# Patient Record
Sex: FEMALE | Race: OTHER | HISPANIC OR LATINO
[De-identification: names, ages, dates, MRNs, and addresses within clinical notes are randomized per-mention and may not be internally consistent; named-entity substitution may affect disease eponyms.]

---

## 2017-02-24 ENCOUNTER — APPOINTMENT (OUTPATIENT)
Dept: FAMILY MEDICINE | Facility: CLINIC | Age: 77
End: 2017-02-24

## 2017-02-24 VITALS
WEIGHT: 137 LBS | OXYGEN SATURATION: 98 % | SYSTOLIC BLOOD PRESSURE: 185 MMHG | DIASTOLIC BLOOD PRESSURE: 90 MMHG | HEIGHT: 62 IN | HEART RATE: 75 BPM | BODY MASS INDEX: 25.21 KG/M2 | TEMPERATURE: 98.2 F

## 2017-02-24 DIAGNOSIS — Z00.00 ENCOUNTER FOR GENERAL ADULT MEDICAL EXAMINATION W/OUT ABNORMAL FINDINGS: ICD-10-CM

## 2017-06-22 ENCOUNTER — APPOINTMENT (OUTPATIENT)
Dept: FAMILY MEDICINE | Facility: CLINIC | Age: 77
End: 2017-06-22

## 2017-06-22 VITALS
DIASTOLIC BLOOD PRESSURE: 75 MMHG | OXYGEN SATURATION: 97 % | BODY MASS INDEX: 24.84 KG/M2 | TEMPERATURE: 97.9 F | SYSTOLIC BLOOD PRESSURE: 158 MMHG | HEART RATE: 75 BPM | HEIGHT: 62 IN | WEIGHT: 135 LBS

## 2017-06-25 LAB
25(OH)D3 SERPL-MCNC: 13.8 NG/ML
ALBUMIN SERPL ELPH-MCNC: 4.2 G/DL
ALP BLD-CCNC: 89 U/L
ALT SERPL-CCNC: 83 U/L
ANION GAP SERPL CALC-SCNC: 19 MMOL/L
APPEARANCE: CLEAR
AST SERPL-CCNC: 71 U/L
BACTERIA: NEGATIVE
BASOPHILS # BLD AUTO: 0.02 K/UL
BASOPHILS NFR BLD AUTO: 0.4 %
BILIRUB SERPL-MCNC: 0.5 MG/DL
BILIRUBIN URINE: NEGATIVE
BLOOD URINE: NEGATIVE
BUN SERPL-MCNC: 16 MG/DL
CALCIUM SERPL-MCNC: 9.1 MG/DL
CHLORIDE SERPL-SCNC: 97 MMOL/L
CO2 SERPL-SCNC: 25 MMOL/L
COLOR: ABNORMAL
CREAT SERPL-MCNC: 0.64 MG/DL
CREAT SPEC-SCNC: 133 MG/DL
EOSINOPHIL # BLD AUTO: 0.32 K/UL
EOSINOPHIL NFR BLD AUTO: 6.8 %
GLUCOSE QUALITATIVE U: NORMAL MG/DL
GLUCOSE SERPL-MCNC: 210 MG/DL
HBA1C MFR BLD HPLC: 9.1 %
HCT VFR BLD CALC: 41.9 %
HGB BLD-MCNC: 14 G/DL
HYALINE CASTS: 0 /LPF
IMM GRANULOCYTES NFR BLD AUTO: 0.2 %
KETONES URINE: NEGATIVE
LEUKOCYTE ESTERASE URINE: NEGATIVE
LYMPHOCYTES # BLD AUTO: 1.3 K/UL
LYMPHOCYTES NFR BLD AUTO: 27.5 %
MAN DIFF?: NORMAL
MCHC RBC-ENTMCNC: 28.5 PG
MCHC RBC-ENTMCNC: 33.4 GM/DL
MCV RBC AUTO: 85.3 FL
MICROALBUMIN 24H UR DL<=1MG/L-MCNC: 3.8 MG/DL
MICROALBUMIN/CREAT 24H UR-RTO: 29 MG/G
MICROSCOPIC-UA: NORMAL
MONOCYTES # BLD AUTO: 0.25 K/UL
MONOCYTES NFR BLD AUTO: 5.3 %
NEUTROPHILS # BLD AUTO: 2.82 K/UL
NEUTROPHILS NFR BLD AUTO: 59.8 %
NITRITE URINE: NEGATIVE
PH URINE: 5.5
PLATELET # BLD AUTO: 262 K/UL
POTASSIUM SERPL-SCNC: 4.3 MMOL/L
PROT SERPL-MCNC: 7.7 G/DL
PROTEIN URINE: ABNORMAL MG/DL
RBC # BLD: 4.91 M/UL
RBC # FLD: 13.5 %
RED BLOOD CELLS URINE: 2 /HPF
SODIUM SERPL-SCNC: 141 MMOL/L
SPECIFIC GRAVITY URINE: 1.03
SQUAMOUS EPITHELIAL CELLS: 1 /HPF
T4 FREE SERPL-MCNC: 1.4 NG/DL
TSH SERPL-ACNC: 0.78 UIU/ML
URINE COMMENTS: NORMAL
UROBILINOGEN URINE: NORMAL MG/DL
WBC # FLD AUTO: 4.72 K/UL
WHITE BLOOD CELLS URINE: 2 /HPF

## 2017-08-03 ENCOUNTER — APPOINTMENT (OUTPATIENT)
Dept: FAMILY MEDICINE | Facility: CLINIC | Age: 77
End: 2017-08-03
Payer: MEDICARE

## 2017-08-03 VITALS
TEMPERATURE: 98.4 F | OXYGEN SATURATION: 96 % | HEIGHT: 62 IN | WEIGHT: 135 LBS | SYSTOLIC BLOOD PRESSURE: 155 MMHG | HEART RATE: 67 BPM | DIASTOLIC BLOOD PRESSURE: 82 MMHG | BODY MASS INDEX: 24.84 KG/M2

## 2017-08-03 PROCEDURE — 36415 COLL VENOUS BLD VENIPUNCTURE: CPT

## 2017-08-03 PROCEDURE — 99214 OFFICE O/P EST MOD 30 MIN: CPT | Mod: 25

## 2017-08-03 RX ORDER — TRIAMCINOLONE ACETONIDE 5 MG/G
0.5 CREAM TOPICAL
Qty: 60 | Refills: 0 | Status: ACTIVE | COMMUNITY
Start: 2017-08-03 | End: 1900-01-01

## 2017-08-06 LAB
ALBUMIN SERPL ELPH-MCNC: 4.4 G/DL
ALP BLD-CCNC: 66 U/L
ALT SERPL-CCNC: 66 U/L
ANION GAP SERPL CALC-SCNC: 15 MMOL/L
AST SERPL-CCNC: 53 U/L
BASOPHILS # BLD AUTO: 0.02 K/UL
BASOPHILS NFR BLD AUTO: 0.5 %
BILIRUB DIRECT SERPL-MCNC: 0.1 MG/DL
BILIRUB INDIRECT SERPL-MCNC: 0.4 MG/DL
BILIRUB SERPL-MCNC: 0.5 MG/DL
BUN SERPL-MCNC: 15 MG/DL
CALCIUM SERPL-MCNC: 8.9 MG/DL
CHLORIDE SERPL-SCNC: 99 MMOL/L
CHOLEST SERPL-MCNC: 213 MG/DL
CHOLEST/HDLC SERPL: 5.8 RATIO
CO2 SERPL-SCNC: 25 MMOL/L
CREAT SERPL-MCNC: 0.69 MG/DL
EOSINOPHIL # BLD AUTO: 0.28 K/UL
EOSINOPHIL NFR BLD AUTO: 6.7 %
ERYTHROCYTE [SEDIMENTATION RATE] IN BLOOD BY WESTERGREN METHOD: 9 MM/HR
GLUCOSE SERPL-MCNC: 153 MG/DL
HCT VFR BLD CALC: 40.7 %
HDLC SERPL-MCNC: 37 MG/DL
HGB BLD-MCNC: 13.8 G/DL
IMM GRANULOCYTES NFR BLD AUTO: 0.2 %
LDLC SERPL CALC-MCNC: 147 MG/DL
LYMPHOCYTES # BLD AUTO: 1.22 K/UL
LYMPHOCYTES NFR BLD AUTO: 29.3 %
MAN DIFF?: NORMAL
MCHC RBC-ENTMCNC: 29.4 PG
MCHC RBC-ENTMCNC: 33.9 GM/DL
MCV RBC AUTO: 86.8 FL
MONOCYTES # BLD AUTO: 0.26 K/UL
MONOCYTES NFR BLD AUTO: 6.3 %
NEUTROPHILS # BLD AUTO: 2.37 K/UL
NEUTROPHILS NFR BLD AUTO: 57 %
PLATELET # BLD AUTO: 230 K/UL
POTASSIUM SERPL-SCNC: 4.4 MMOL/L
PROT SERPL-MCNC: 7.8 G/DL
RBC # BLD: 4.69 M/UL
RBC # FLD: 13.9 %
SODIUM SERPL-SCNC: 139 MMOL/L
TRIGL SERPL-MCNC: 143 MG/DL
WBC # FLD AUTO: 4.16 K/UL

## 2017-08-10 ENCOUNTER — APPOINTMENT (OUTPATIENT)
Dept: FAMILY MEDICINE | Facility: CLINIC | Age: 77
End: 2017-08-10
Payer: MEDICARE

## 2017-08-10 VITALS
DIASTOLIC BLOOD PRESSURE: 80 MMHG | TEMPERATURE: 97.6 F | WEIGHT: 137 LBS | HEART RATE: 77 BPM | BODY MASS INDEX: 25.21 KG/M2 | HEIGHT: 62 IN | OXYGEN SATURATION: 98 % | SYSTOLIC BLOOD PRESSURE: 196 MMHG

## 2017-08-10 VITALS — SYSTOLIC BLOOD PRESSURE: 170 MMHG | DIASTOLIC BLOOD PRESSURE: 90 MMHG

## 2017-08-10 PROCEDURE — 99214 OFFICE O/P EST MOD 30 MIN: CPT

## 2017-08-10 RX ORDER — ERGOCALCIFEROL 1.25 MG/1
1.25 MG CAPSULE, LIQUID FILLED ORAL
Qty: 12 | Refills: 0 | Status: DISCONTINUED | COMMUNITY
Start: 2017-06-26 | End: 2017-08-10

## 2017-09-27 ENCOUNTER — APPOINTMENT (OUTPATIENT)
Dept: FAMILY MEDICINE | Facility: CLINIC | Age: 77
End: 2017-09-27

## 2017-11-02 ENCOUNTER — APPOINTMENT (OUTPATIENT)
Dept: FAMILY MEDICINE | Facility: CLINIC | Age: 77
End: 2017-11-02
Payer: MEDICARE

## 2017-11-02 VITALS
BODY MASS INDEX: 24.66 KG/M2 | HEIGHT: 62 IN | OXYGEN SATURATION: 97 % | SYSTOLIC BLOOD PRESSURE: 175 MMHG | HEART RATE: 63 BPM | WEIGHT: 134 LBS | TEMPERATURE: 97.8 F | DIASTOLIC BLOOD PRESSURE: 76 MMHG

## 2017-11-02 PROCEDURE — 99214 OFFICE O/P EST MOD 30 MIN: CPT

## 2017-11-02 RX ORDER — PERMETHRIN 50 MG/G
5 CREAM TOPICAL
Qty: 1 | Refills: 0 | Status: DISCONTINUED | COMMUNITY
Start: 2017-08-10 | End: 2017-11-02

## 2017-11-08 DIAGNOSIS — Z56.0 UNEMPLOYMENT, UNSPECIFIED: ICD-10-CM

## 2017-11-08 DIAGNOSIS — Z63.4 DISAPPEARANCE AND DEATH OF FAMILY MEMBER: ICD-10-CM

## 2017-11-08 SDOH — SOCIAL STABILITY - SOCIAL INSECURITY: DISSAPEARANCE AND DEATH OF FAMILY MEMBER: Z63.4

## 2017-11-08 SDOH — ECONOMIC STABILITY - INCOME SECURITY: UNEMPLOYMENT, UNSPECIFIED: Z56.0

## 2017-12-07 ENCOUNTER — APPOINTMENT (OUTPATIENT)
Dept: FAMILY MEDICINE | Facility: CLINIC | Age: 77
End: 2017-12-07

## 2017-12-23 ENCOUNTER — RX RENEWAL (OUTPATIENT)
Age: 77
End: 2017-12-23

## 2018-02-21 ENCOUNTER — APPOINTMENT (OUTPATIENT)
Dept: FAMILY MEDICINE | Facility: CLINIC | Age: 78
End: 2018-02-21
Payer: MEDICARE

## 2018-02-21 VITALS
HEIGHT: 62 IN | SYSTOLIC BLOOD PRESSURE: 190 MMHG | WEIGHT: 135 LBS | HEART RATE: 80 BPM | OXYGEN SATURATION: 96 % | DIASTOLIC BLOOD PRESSURE: 83 MMHG | BODY MASS INDEX: 24.84 KG/M2 | TEMPERATURE: 97.1 F

## 2018-02-21 VITALS — SYSTOLIC BLOOD PRESSURE: 193 MMHG | DIASTOLIC BLOOD PRESSURE: 100 MMHG

## 2018-02-21 DIAGNOSIS — Z91.11 PATIENT'S NONCOMPLIANCE WITH DIETARY REGIMEN: ICD-10-CM

## 2018-02-21 PROCEDURE — 99215 OFFICE O/P EST HI 40 MIN: CPT | Mod: 25

## 2018-02-21 PROCEDURE — 36415 COLL VENOUS BLD VENIPUNCTURE: CPT

## 2018-02-21 PROCEDURE — 93000 ELECTROCARDIOGRAM COMPLETE: CPT

## 2018-02-24 LAB
25(OH)D3 SERPL-MCNC: 11.9 NG/ML
ALBUMIN SERPL ELPH-MCNC: 4.5 G/DL
ALP BLD-CCNC: 82 U/L
ALT SERPL-CCNC: 74 U/L
ANION GAP SERPL CALC-SCNC: 17 MMOL/L
APPEARANCE: CLEAR
AST SERPL-CCNC: 37 U/L
BACTERIA: NEGATIVE
BASOPHILS # BLD AUTO: 0.01 K/UL
BASOPHILS NFR BLD AUTO: 0.1 %
BILIRUB SERPL-MCNC: 0.8 MG/DL
BILIRUBIN URINE: NEGATIVE
BLOOD URINE: NEGATIVE
BUN SERPL-MCNC: 14 MG/DL
CALCIUM SERPL-MCNC: 8.9 MG/DL
CHLORIDE SERPL-SCNC: 95 MMOL/L
CHOLEST SERPL-MCNC: 225 MG/DL
CHOLEST/HDLC SERPL: 5.2 RATIO
CO2 SERPL-SCNC: 23 MMOL/L
COLOR: YELLOW
CREAT SERPL-MCNC: 0.79 MG/DL
CREAT SPEC-SCNC: 59 MG/DL
EOSINOPHIL # BLD AUTO: 0.27 K/UL
EOSINOPHIL NFR BLD AUTO: 3.1 %
ERYTHROCYTE [SEDIMENTATION RATE] IN BLOOD BY WESTERGREN METHOD: 12 MM/HR
GLUCOSE QUALITATIVE U: 500 MG/DL
GLUCOSE SERPL-MCNC: 255 MG/DL
HBA1C MFR BLD HPLC: 8.2 %
HCT VFR BLD CALC: 44.8 %
HDLC SERPL-MCNC: 43 MG/DL
HETEROPH AB SER QL: NEGATIVE
HGB BLD-MCNC: 15.4 G/DL
HYALINE CASTS: 0 /LPF
IMM GRANULOCYTES NFR BLD AUTO: 0.1 %
KETONES URINE: NEGATIVE
LDLC SERPL CALC-MCNC: 151 MG/DL
LEUKOCYTE ESTERASE URINE: NEGATIVE
LYMPHOCYTES # BLD AUTO: 1.48 K/UL
LYMPHOCYTES NFR BLD AUTO: 17.2 %
MAN DIFF?: NORMAL
MCHC RBC-ENTMCNC: 29.8 PG
MCHC RBC-ENTMCNC: 34.4 GM/DL
MCV RBC AUTO: 86.8 FL
MICROALBUMIN 24H UR DL<=1MG/L-MCNC: 2.1 MG/DL
MICROALBUMIN/CREAT 24H UR-RTO: 36 MG/G
MICROSCOPIC-UA: NORMAL
MONOCYTES # BLD AUTO: 0.45 K/UL
MONOCYTES NFR BLD AUTO: 5.2 %
NEUTROPHILS # BLD AUTO: 6.36 K/UL
NEUTROPHILS NFR BLD AUTO: 74.3 %
NITRITE URINE: NEGATIVE
PH URINE: 5
PLATELET # BLD AUTO: 257 K/UL
POTASSIUM SERPL-SCNC: 4.5 MMOL/L
PROT SERPL-MCNC: 8 G/DL
PROTEIN URINE: NEGATIVE MG/DL
RAPID RVP RESULT: DETECTED
RBC # BLD: 5.16 M/UL
RBC # FLD: 13.9 %
RED BLOOD CELLS URINE: 0 /HPF
RV+EV RNA SPEC QL NAA+PROBE: DETECTED
SODIUM SERPL-SCNC: 135 MMOL/L
SPECIFIC GRAVITY URINE: 1.02
SQUAMOUS EPITHELIAL CELLS: 0 /HPF
T4 FREE SERPL-MCNC: 1.5 NG/DL
TRIGL SERPL-MCNC: 156 MG/DL
TSH SERPL-ACNC: 0.94 UIU/ML
UROBILINOGEN URINE: 1 MG/DL
WBC # FLD AUTO: 8.58 K/UL
WHITE BLOOD CELLS URINE: 0 /HPF

## 2018-03-13 ENCOUNTER — APPOINTMENT (OUTPATIENT)
Dept: FAMILY MEDICINE | Facility: CLINIC | Age: 78
End: 2018-03-13

## 2018-12-03 ENCOUNTER — APPOINTMENT (OUTPATIENT)
Dept: INTERNAL MEDICINE | Facility: CLINIC | Age: 78
End: 2018-12-03
Payer: MEDICARE

## 2018-12-03 ENCOUNTER — NON-APPOINTMENT (OUTPATIENT)
Age: 78
End: 2018-12-03

## 2018-12-03 VITALS
SYSTOLIC BLOOD PRESSURE: 144 MMHG | HEIGHT: 62 IN | TEMPERATURE: 97.9 F | WEIGHT: 128 LBS | BODY MASS INDEX: 23.55 KG/M2 | DIASTOLIC BLOOD PRESSURE: 100 MMHG | OXYGEN SATURATION: 95 % | HEART RATE: 58 BPM

## 2018-12-03 DIAGNOSIS — Z12.11 ENCOUNTER FOR SCREENING FOR MALIGNANT NEOPLASM OF COLON: ICD-10-CM

## 2018-12-03 DIAGNOSIS — J06.9 ACUTE UPPER RESPIRATORY INFECTION, UNSPECIFIED: ICD-10-CM

## 2018-12-03 DIAGNOSIS — Z87.898 PERSONAL HISTORY OF OTHER SPECIFIED CONDITIONS: ICD-10-CM

## 2018-12-03 DIAGNOSIS — R52 PAIN, UNSPECIFIED: ICD-10-CM

## 2018-12-03 DIAGNOSIS — Z87.09 PERSONAL HISTORY OF OTHER DISEASES OF THE RESPIRATORY SYSTEM: ICD-10-CM

## 2018-12-03 DIAGNOSIS — Z87.2 PERSONAL HISTORY OF DISEASES OF THE SKIN AND SUBCUTANEOUS TISSUE: ICD-10-CM

## 2018-12-03 LAB — GLUCOSE BLDC GLUCOMTR-MCNC: 370

## 2018-12-03 PROCEDURE — 99215 OFFICE O/P EST HI 40 MIN: CPT | Mod: 25

## 2018-12-03 PROCEDURE — 93000 ELECTROCARDIOGRAM COMPLETE: CPT

## 2018-12-03 PROCEDURE — 82962 GLUCOSE BLOOD TEST: CPT

## 2018-12-03 RX ORDER — AZITHROMYCIN DIHYDRATE 250 MG/1
250 TABLET, FILM COATED ORAL
Qty: 1 | Refills: 0 | Status: DISCONTINUED | COMMUNITY
Start: 2018-02-21 | End: 2018-12-03

## 2018-12-03 RX ORDER — OSELTAMIVIR PHOSPHATE 75 MG/1
75 CAPSULE ORAL TWICE DAILY
Qty: 10 | Refills: 0 | Status: DISCONTINUED | COMMUNITY
Start: 2018-02-21 | End: 2018-12-03

## 2018-12-03 RX ORDER — ERGOCALCIFEROL 1.25 MG/1
1.25 MG CAPSULE, LIQUID FILLED ORAL
Qty: 12 | Refills: 0 | Status: DISCONTINUED | COMMUNITY
Start: 2018-02-24 | End: 2018-12-03

## 2018-12-03 NOTE — HISTORY OF PRESENT ILLNESS
[de-identified] : Here today with her daughter \par \par She is here to f/u on her DM and HTN.  She reports compliance with meds\par \par She has not been checking her FS because she gardner snot have test strips\par \par She has lost about 7 lbs.  She is not sure if this s intentional.  She states she has been eating vegetable soups which she makes at home\par \par She was previously advised to see cardiology, Endocrine, ophthalmology, and hepatology but she never went

## 2018-12-03 NOTE — COUNSELING
[Weight management counseling provided] : Weight management [Healthy eating counseling provided] : healthy eating [Transportation Issues] : Transportation issues [Patient motivation] : Patient motivation [Good understanding] : Patient has a good understanding of lifestyle changes and the steps needed to achieve self management goals

## 2018-12-03 NOTE — PHYSICAL EXAM
[General Appearance - Alert] : alert [General Appearance - In No Acute Distress] : in no acute distress [Sclera] : the sclera and conjunctiva were normal [PERRL With Normal Accommodation] : pupils were equal in size, round, and reactive to light [Neck Appearance] : the appearance of the neck was normal [Jugular Venous Distention Increased] : there was no jugular-venous distention [Auscultation Breath Sounds / Voice Sounds] : lungs were clear to auscultation bilaterally [Heart Rate And Rhythm] : heart rate was normal and rhythm regular [Heart Sounds] : normal S1 and S2 [Heart Sounds Gallop] : no gallops [Murmurs] : no murmurs [Heart Sounds Pericardial Friction Rub] : no pericardial rub [Edema] : there was no peripheral edema [Bowel Sounds] : normal bowel sounds [Abdomen Soft] : soft [Abdomen Tenderness] : non-tender [Abdomen Mass (___ Cm)] : no abdominal mass palpated [No Spinal Tenderness] : no spinal tenderness [Nail Clubbing] : no clubbing  or cyanosis of the fingernails [] : no rash [No Focal Deficits] : no focal deficits [Oriented To Time, Place, And Person] : oriented to person, place, and time [Affect] : the affect was normal [Mood] : the mood was normal [Oropharynx] : the oropharynx was normal [Neck Cervical Mass (___cm)] : no neck mass was observed [Thyroid Diffuse Enlargement] : the thyroid was not enlarged [Thyroid Nodule] : there were no palpable thyroid nodules [FreeTextEntry1] : no calf tenderness

## 2018-12-03 NOTE — REVIEW OF SYSTEMS
[Recent Weight Loss (___ Lbs)] : recent [unfilled] ~Ulb weight loss [Negative] : Musculoskeletal [Fever] : no fever [Chills] : no chills [Feeling Poorly] : not feeling poorly [Feeling Tired] : not feeling tired [Earache] : no earache [Nasal Discharge] : no nasal discharge [Sore Throat] : no sore throat [Chest Pain] : no chest pain [Palpitations] : no palpitations [Lower Ext Edema] : no lower extremity edema [Shortness Of Breath] : no shortness of breath [Wheezing] : no wheezing [Cough] : no cough [SOB on Exertion] : no shortness of breath during exertion [Abdominal Pain] : no abdominal pain [Vomiting] : no vomiting [Diarrhea] : no diarrhea [Anxiety] : no anxiety [Depression] : no depression

## 2018-12-03 NOTE — ASSESSMENT
[FreeTextEntry1] : \par \par Fatigue:\par -has resolved\par \par CAD:\par -mild CAD noted on cath\par -she has refused ASA\par -on atorvastatin 20mg PO daily\par -EKG today NSR at 70- with PAC and non specific T wave abnormalities\par -she denies chest pain, sob, palpitations, CARO\par -referred to cardiology today\par -will check fasting labs\par \par DM:\par -HgA1c  8.2 2018\par -FS today 370 after eating\par -on lantus 30 units QHS and metformin 1000mg PO BID\par -I advised her she needs to adhere to low fat/low cholesterol low carb diet\par -she needs to start checking FS BID-she will call back with name of glucometer she has so I can send strips\par -A:C 36 2018-will check urine A:C-she is on losartan\par -on atorvastatin-will check lipids\par -Ophthalmology 3/2017-referred today\par -Foot exam 2017-will check foot exam at next visit\par -she was previously advised to see Endocrine but she refused\par \par Hyperlipidemia:\par -low fat/low cholesterol diet advised\par -on atorvastatin 20mg PO daily \par -will check labs\par \par Elevated LFTs\par -hepatitis B and C serologies were negative\par -Abd US revealed fatty liver\par -she was referred to hepatology-but she never went \par -will check labs\par \par HTN:\par -she has uncontrolled HTN\par -she reports compliance with meds\par -risks of uncontrolled HTN discussed\par -continue losartan/HCTZ 100/25mg PO QAM, Norvasc 10 mg PO daily,  and  coreg 6.25mg PO BID (will not increase coreg as her HR is 58)\par -will add hydralazine 25mg PO BID\par -I adv low fat/low chol diet and low salt diet\par -F/U 4 weeks for lab review and BP check\par \par -Vitamin D deficiency:\par -will check vitamin D 25-OH\par \par Hearing loss:\par -will refer to ENT previously but she did not go\par \par Weight loss unclear if this is intentional-she states she has been eating healthier\par -will check labs\par -she refuses mammograms, GYN exams and colonoscopy\par -she was agreeable to doing FIT test\par -f/u 4 weeks for weight check\par \par HCM:\par \par EKG 12/3/2018\par \par Flu shot: pt refused 12/3/2018\par \par Pneumovax adv: pt refused previously\par \par Prevnar 13: adv pt refused previously\par \par Tdap: adv pt refused previously\par \par Shingles vaccine adv: pt refused previously\par \par GYN/PAP: pt does not want further exams\par \par Mammogram: advised 12/3/2018: pt refused \par \par Colonoscopy: advised 12/3/2018-pt refused. \par \par I advised FIT testing-she was agreeable and took testing kit\par \par DEXA: advised: pt refused previously\par \par Depression screenin/3/2018 negative\par \par HIV testing offered: she refused 8/3/2017\par \par F/U 4 weeks\par \par

## 2019-01-16 ENCOUNTER — APPOINTMENT (OUTPATIENT)
Dept: INTERNAL MEDICINE | Facility: CLINIC | Age: 79
End: 2019-01-16

## 2019-01-17 LAB
25(OH)D3 SERPL-MCNC: 17.8 NG/ML
ALBUMIN SERPL ELPH-MCNC: 4.7 G/DL
ALP BLD-CCNC: 75 U/L
ALT SERPL-CCNC: 97 U/L
ANION GAP SERPL CALC-SCNC: 14 MMOL/L
APPEARANCE: ABNORMAL
AST SERPL-CCNC: 51 U/L
BACTERIA: NEGATIVE
BASOPHILS # BLD AUTO: 0.01 K/UL
BASOPHILS NFR BLD AUTO: 0.2 %
BILIRUB SERPL-MCNC: 0.4 MG/DL
BILIRUBIN URINE: NEGATIVE
BLOOD URINE: NEGATIVE
BUN SERPL-MCNC: 23 MG/DL
CALCIUM SERPL-MCNC: 9.6 MG/DL
CHLORIDE SERPL-SCNC: 101 MMOL/L
CHOLEST SERPL-MCNC: 183 MG/DL
CHOLEST/HDLC SERPL: 3.8 RATIO
CO2 SERPL-SCNC: 25 MMOL/L
COLOR: ABNORMAL
CREAT SERPL-MCNC: 0.79 MG/DL
CREAT SPEC-SCNC: 190 MG/DL
EOSINOPHIL # BLD AUTO: 0.34 K/UL
EOSINOPHIL NFR BLD AUTO: 6 %
ERYTHROCYTE [SEDIMENTATION RATE] IN BLOOD BY WESTERGREN METHOD: 20 MM/HR
GLUCOSE QUALITATIVE U: 1000 MG/DL
GLUCOSE SERPL-MCNC: 301 MG/DL
HBA1C MFR BLD HPLC: 10.8 %
HCT VFR BLD CALC: 41.6 %
HDLC SERPL-MCNC: 48 MG/DL
HGB BLD-MCNC: 14.3 G/DL
HYALINE CASTS: 7 /LPF
IMM GRANULOCYTES NFR BLD AUTO: 0.2 %
KETONES URINE: NEGATIVE
LDLC SERPL CALC-MCNC: 114 MG/DL
LEUKOCYTE ESTERASE URINE: NEGATIVE
LYMPHOCYTES # BLD AUTO: 1.32 K/UL
LYMPHOCYTES NFR BLD AUTO: 23.4 %
MAN DIFF?: NORMAL
MCHC RBC-ENTMCNC: 29.3 PG
MCHC RBC-ENTMCNC: 34.4 GM/DL
MCV RBC AUTO: 85.2 FL
MICROALBUMIN 24H UR DL<=1MG/L-MCNC: 4.2 MG/DL
MICROALBUMIN/CREAT 24H UR-RTO: 22 MG/G
MICROSCOPIC-UA: NORMAL
MONOCYTES # BLD AUTO: 0.28 K/UL
MONOCYTES NFR BLD AUTO: 5 %
NEUTROPHILS # BLD AUTO: 3.67 K/UL
NEUTROPHILS NFR BLD AUTO: 65.2 %
NITRITE URINE: NEGATIVE
PH URINE: 5
PLATELET # BLD AUTO: 305 K/UL
POTASSIUM SERPL-SCNC: 4.4 MMOL/L
PROT SERPL-MCNC: 7.3 G/DL
PROTEIN URINE: ABNORMAL MG/DL
RBC # BLD: 4.88 M/UL
RBC # FLD: 13.1 %
RED BLOOD CELLS URINE: 8 /HPF
SODIUM SERPL-SCNC: 140 MMOL/L
SPECIFIC GRAVITY URINE: 1.03
SQUAMOUS EPITHELIAL CELLS: 2 /HPF
T4 FREE SERPL-MCNC: 1.5 NG/DL
TRIGL SERPL-MCNC: 103 MG/DL
TSH SERPL-ACNC: 0.67 UIU/ML
UROBILINOGEN URINE: NEGATIVE MG/DL
WBC # FLD AUTO: 5.63 K/UL
WHITE BLOOD CELLS URINE: 3 /HPF

## 2019-02-12 ENCOUNTER — APPOINTMENT (OUTPATIENT)
Dept: INTERNAL MEDICINE | Facility: CLINIC | Age: 79
End: 2019-02-12
Payer: MEDICARE

## 2019-02-12 VITALS
OXYGEN SATURATION: 98 % | WEIGHT: 128 LBS | SYSTOLIC BLOOD PRESSURE: 132 MMHG | RESPIRATION RATE: 14 BRPM | TEMPERATURE: 97.6 F | HEART RATE: 77 BPM | DIASTOLIC BLOOD PRESSURE: 90 MMHG | HEIGHT: 62 IN | BODY MASS INDEX: 23.55 KG/M2

## 2019-02-12 DIAGNOSIS — R80.9 PROTEINURIA, UNSPECIFIED: ICD-10-CM

## 2019-02-12 DIAGNOSIS — K76.0 FATTY (CHANGE OF) LIVER, NOT ELSEWHERE CLASSIFIED: ICD-10-CM

## 2019-02-12 DIAGNOSIS — E11.9 TYPE 2 DIABETES MELLITUS W/OUT COMPLICATIONS: ICD-10-CM

## 2019-02-12 LAB — GLUCOSE BLDC GLUCOMTR-MCNC: 171

## 2019-02-12 PROCEDURE — 99215 OFFICE O/P EST HI 40 MIN: CPT | Mod: 25

## 2019-02-12 PROCEDURE — 82962 GLUCOSE BLOOD TEST: CPT

## 2019-02-12 NOTE — ASSESSMENT
[FreeTextEntry1] : \par CAD:\par -mild CAD noted on cath\par -she has refused ASA\par -on atorvastatin \par -EKG previously NSR at 70- with PAC and non specific T wave abnormalities\par -she denies chest pain, sob, palpitations, CARO\par -referred to cardiology last visit-advised she make appt\par \par DM:\par -HgA1c  10.8 2019\par -FS today 171\par -on lantus 30 units QHS and metformin 1000mg PO BID-she states she is taking medication as directed\par -I discussed adding 3rd agent but she refuses\par -I advised she see Endocrine but she refuses\par -I advised her she needs to adhere to low fat/low cholesterol low carb diet\par -risks of uncontrolled DM discussed with pt at length\par -she needs to start checking FS BID\par -A:C negative 2019 on losartan\par -on atorvastatin\par -Ophthalmology 3/2017-referred previously\par -Foot exam 2017-will check foot exam at next visit\par \par Hyperlipidemia:\par -recent lipids reviewed with pt-\par -low fat/low cholesterol diet advised\par -will increase atorvastatin 40mg PO daily \par -will check labs in 3 months\par \par Elevated LFTs:\par -AST/ALT 51/97\par -hepatitis B and C serologies were negative\par -Abd US revealed fatty liver\par -she was referred to hepatology-but she never went and refuses to go\par -will monitor closely\par \par HTN:\par -BP better but still not at goal\par -appears she is not taking coreg or hydralazine BID\par -I instructed pt on how she is supposed to take medication\par -risks of uncontrolled HTN discussed\par -continue losartan/HCTZ 100/25mg PO QAM, Norvasc 10 mg PO daily, coreg 6.25mg PO BID, and hydralazine 25mg PO BID\par -I adv low fat/low chol diet and low salt diet\par \par Vitamin D Deficiency:\par -I advised ergocalciferol 50,000 units once a week for 12 weeks\par -After finishing 12 week course of ergocalciferol, I adv starting over the counter vitamin D 3 1000 units daily\par -I advised repeat vitamin D 25-OH level in 3 months\par \par Hearing loss:\par -she has been referred to ENT previously but she did not go\par \par Weight loss:\par -weight stable\par -she refuses mammograms, GYN exams and colonoscopy\par -she refuses to do FIT test\par \par microscopic hematuria and mild proteinuria on UA:\par -urine A:C negative\par -will check UA and urine P:C\par \par HCM:\par \par EKG 12/3/2018\par \par Flu shot: pt refused 12/3/2018\par \par Pneumovax adv: pt refused previously\par \par Prevnar 13: adv pt refused previously\par \par Tdap: adv pt refused previously\par \par Shingles vaccine adv: pt refused previously\par \par GYN/PAP: pt does not want further exams\par \par Mammogram: advised 12/3/2018: pt refused \par \par Colonoscopy: advised 12/3/2018-pt refused. \par \par I advised FIT testing-she refuses\par \par DEXA: advised: pt refused previously\par \par Depression screenin/3/2018 negative\par \par HIV testing offered: she refused 8/3/2017\par \par F/U 3 months\par \par

## 2019-02-12 NOTE — REVIEW OF SYSTEMS
[Negative] : Genitourinary [Fever] : no fever [Chills] : no chills [Feeling Poorly] : not feeling poorly [Feeling Tired] : not feeling tired [Recent Weight Loss (___ Lbs)] : no recent weight loss [Chest Pain] : no chest pain [Palpitations] : no palpitations [Lower Ext Edema] : no lower extremity edema [Shortness Of Breath] : no shortness of breath [Wheezing] : no wheezing [Cough] : no cough [SOB on Exertion] : no shortness of breath during exertion [Abdominal Pain] : no abdominal pain [Vomiting] : no vomiting [Diarrhea] : no diarrhea

## 2019-02-12 NOTE — PHYSICAL EXAM
[General Appearance - Alert] : alert [General Appearance - In No Acute Distress] : in no acute distress [Sclera] : the sclera and conjunctiva were normal [PERRL With Normal Accommodation] : pupils were equal in size, round, and reactive to light [Oropharynx] : the oropharynx was normal [Neck Appearance] : the appearance of the neck was normal [Neck Cervical Mass (___cm)] : no neck mass was observed [Jugular Venous Distention Increased] : there was no jugular-venous distention [Thyroid Diffuse Enlargement] : the thyroid was not enlarged [Thyroid Nodule] : there were no palpable thyroid nodules [Auscultation Breath Sounds / Voice Sounds] : lungs were clear to auscultation bilaterally [Heart Rate And Rhythm] : heart rate was normal and rhythm regular [Heart Sounds] : normal S1 and S2 [Heart Sounds Gallop] : no gallops [Murmurs] : no murmurs [Heart Sounds Pericardial Friction Rub] : no pericardial rub [Edema] : there was no peripheral edema [Bowel Sounds] : normal bowel sounds [Abdomen Soft] : soft [Abdomen Tenderness] : non-tender [Abdomen Mass (___ Cm)] : no abdominal mass palpated [No Spinal Tenderness] : no spinal tenderness [Nail Clubbing] : no clubbing  or cyanosis of the fingernails [] : no rash [No Focal Deficits] : no focal deficits [Oriented To Time, Place, And Person] : oriented to person, place, and time [Affect] : the affect was normal [Mood] : the mood was normal [FreeTextEntry1] : no calf tenderness

## 2019-02-12 NOTE — HISTORY OF PRESENT ILLNESS
[de-identified] : Here for f/u of DM and BP check\par \par She appears to not be taking coreg and hydrazine BID and only once daily\par \par She is not checking FS at home\par \par She states she is not adhering to DM diet\par \par No new complaints

## 2019-02-16 LAB
APPEARANCE: CLEAR
BACTERIA: NEGATIVE
BILIRUBIN URINE: NEGATIVE
BLOOD URINE: NEGATIVE
COLOR: YELLOW
CREAT SPEC-SCNC: 120 MG/DL
CREAT/PROT UR: 0.2 RATIO
GLUCOSE QUALITATIVE U: NEGATIVE MG/DL
HYALINE CASTS: 3 /LPF
KETONES URINE: NEGATIVE
LEUKOCYTE ESTERASE URINE: NEGATIVE
MICROSCOPIC-UA: NORMAL
NITRITE URINE: NEGATIVE
PH URINE: 5.5
PROT UR-MCNC: 25 MG/DL
PROTEIN URINE: NEGATIVE MG/DL
RED BLOOD CELLS URINE: 5 /HPF
SPECIFIC GRAVITY URINE: 1.02
SQUAMOUS EPITHELIAL CELLS: 1 /HPF
UROBILINOGEN URINE: NEGATIVE MG/DL
WHITE BLOOD CELLS URINE: 1 /HPF

## 2019-03-01 ENCOUNTER — OUTPATIENT (OUTPATIENT)
Dept: OUTPATIENT SERVICES | Facility: HOSPITAL | Age: 79
LOS: 1 days | End: 2019-03-01
Payer: MEDICARE

## 2019-03-01 DIAGNOSIS — Z98.89 OTHER SPECIFIED POSTPROCEDURAL STATES: Chronic | ICD-10-CM

## 2019-03-01 PROCEDURE — G9001: CPT

## 2019-03-21 ENCOUNTER — EMERGENCY (EMERGENCY)
Facility: HOSPITAL | Age: 79
LOS: 1 days | Discharge: HOME | End: 2019-03-21
Attending: EMERGENCY MEDICINE
Payer: MEDICARE

## 2019-03-21 VITALS
DIASTOLIC BLOOD PRESSURE: 85 MMHG | HEART RATE: 76 BPM | TEMPERATURE: 98 F | RESPIRATION RATE: 20 BRPM | SYSTOLIC BLOOD PRESSURE: 175 MMHG | OXYGEN SATURATION: 100 %

## 2019-03-21 DIAGNOSIS — Z98.89 OTHER SPECIFIED POSTPROCEDURAL STATES: Chronic | ICD-10-CM

## 2019-03-21 PROCEDURE — 93971 EXTREMITY STUDY: CPT | Mod: 26,RT

## 2019-03-21 PROCEDURE — 99284 EMERGENCY DEPT VISIT MOD MDM: CPT | Mod: 25

## 2019-03-21 PROCEDURE — 99284 EMERGENCY DEPT VISIT MOD MDM: CPT

## 2019-03-21 PROCEDURE — 73590 X-RAY EXAM OF LOWER LEG: CPT | Mod: 26,RT

## 2019-03-21 PROCEDURE — 93971 EXTREMITY STUDY: CPT

## 2019-03-21 PROCEDURE — 73590 X-RAY EXAM OF LOWER LEG: CPT

## 2019-03-21 RX ORDER — HYDRALAZINE HCL 50 MG
0 TABLET ORAL
Qty: 0 | Refills: 0 | COMMUNITY

## 2019-03-21 RX ORDER — IBUPROFEN 200 MG
600 TABLET ORAL ONCE
Qty: 0 | Refills: 0 | Status: COMPLETED | OUTPATIENT
Start: 2019-03-21 | End: 2019-03-21

## 2019-03-21 RX ORDER — LOSARTAN/HYDROCHLOROTHIAZIDE 100MG-25MG
1 TABLET ORAL
Qty: 0 | Refills: 0 | COMMUNITY

## 2019-03-21 RX ORDER — ATORVASTATIN CALCIUM 80 MG/1
1 TABLET, FILM COATED ORAL
Qty: 0 | Refills: 0 | COMMUNITY

## 2019-03-21 RX ADMIN — Medication 600 MILLIGRAM(S): at 14:16

## 2019-03-21 NOTE — ED STATDOCS - CLINICAL SUMMARY MEDICAL DECISION MAKING FREE TEXT BOX
Pt with x3 days of RLE pain, atraumatic with hx of DM but no hx of neuropathy will eval for DVT with Us and labs.

## 2019-03-21 NOTE — ED ADULT NURSE NOTE - CAS EDP DISCH TYPE
Patient with complaints of generalized fatigue and states that she had her blood drawn on Monday at dialysis and was sent here for evaluation. Sepsis Screening completed    (  )Patient meets SIRS criteria. ( x )Patient does not meet SIRS criteria.       SIRS Criteria is achieved when two or more of the following are present   Temperature < 96.8°F (36°C) or > 100.9°F (38.3°C)   Heart Rate > 90 beats per minute   Respiratory Rate > 20 breaths per minute   WBC count > 12,000 or <4,000 or > 10% bands
Home

## 2019-03-21 NOTE — ED STATDOCS - CARDIAC, MLM
normal rate, regular rhythm, and no murmur. DP and PT pulses 2+ with cap refill <2 sec, extremity warm

## 2019-03-21 NOTE — ED STATDOCS - MUSCULOSKELETAL, MLM
RLE calf negative Britni's sign, achilles intact. no tenderness to sciatic notch, full ROM of Right knee and right hip with no tenderness. Antalgic gait.

## 2019-03-21 NOTE — ED STATDOCS - PROGRESS NOTE DETAILS
PA note: No acute findings on imaging. Pt with improvement in symptoms s/p treatment. WIll give follow up information for ortho (Dr. Garvin )

## 2019-03-21 NOTE — ED ADULT NURSE NOTE - NSIMPLEMENTINTERV_GEN_ALL_ED
Implemented All Fall with Harm Risk Interventions:  [place] to call system. Call bell, personal items and telephone within reach. Instruct patient to call for assistance. Room bathroom lighting operational. Non-slip footwear when patient is off stretcher. Physically safe environment: no spills, clutter or unnecessary equipment. Stretcher in lowest position, wheels locked, appropriate side rails in place. Provide visual cue, wrist band, yellow gown, etc. Monitor gait and stability. Monitor for mental status changes and reorient to person, place, and time. Review medications for side effects contributing to fall risk. Reinforce activity limits and safety measures with patient and family. Provide visual clues: red socks.

## 2019-03-21 NOTE — ED ADULT TRIAGE NOTE - CHIEF COMPLAINT QUOTE
Pt c/o severe right leg pain x3 days, denies any fall. No visible signs of trauma, extremity warm to touch with +2 palpable pulses. Hx of HTN and DM

## 2019-03-21 NOTE — ED ADULT NURSE NOTE - OBJECTIVE STATEMENT
Pt comes in complaining of right lower leg pain for 3 days. Denies any falls or injuries. No swelling or deformity noted. Pain is 10/10.

## 2019-03-21 NOTE — ED STATDOCS - OBJECTIVE STATEMENT
78 y/o F pt with hx of DM and HTN presents to ED with daughter who translates c/o RLE pain radiating from knee down to foot worsening over last few weeks. Sitting allows relief while ambulation and standing causes exacerbation of pain. Daughter states she had similar pain last year that resolved spontaneously. Squeezing of calf allows slight relief. BS at 146 this morning, states it is usually higher for her. Denies hx of DVT or neuropathy, N/V, SOB, CP, recent trauma. 78 y/o F pt with hx of DM and HTN presents to ED with daughter who translates c/o RLE pain radiating from knee down to foot x3 days. Sitting allows relief while ambulation and standing causes exacerbation of pain. Daughter states she had similar pain last year that resolved spontaneously. Squeezing of calf allows slight relief. BS at 146 this morning, states it is usually higher for her. Denies hx of DVT or neuropathy, N/V, SOB, CP, recent trauma.

## 2019-03-22 DIAGNOSIS — Z71.89 OTHER SPECIFIED COUNSELING: ICD-10-CM

## 2019-03-26 ENCOUNTER — APPOINTMENT (OUTPATIENT)
Dept: INTERNAL MEDICINE | Facility: CLINIC | Age: 79
End: 2019-03-26
Payer: MEDICARE

## 2019-03-26 VITALS
DIASTOLIC BLOOD PRESSURE: 80 MMHG | OXYGEN SATURATION: 98 % | TEMPERATURE: 97.5 F | SYSTOLIC BLOOD PRESSURE: 160 MMHG | RESPIRATION RATE: 14 BRPM | HEIGHT: 62 IN | HEART RATE: 72 BPM

## 2019-03-26 PROBLEM — E78.5 HYPERLIPIDEMIA, UNSPECIFIED: Chronic | Status: ACTIVE | Noted: 2019-03-21

## 2019-03-26 PROCEDURE — 99214 OFFICE O/P EST MOD 30 MIN: CPT | Mod: 25

## 2019-03-26 PROCEDURE — 36415 COLL VENOUS BLD VENIPUNCTURE: CPT
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
## 2019-03-26 NOTE — REVIEW OF SYSTEMS
[Negative] : Psychiatric [Feeling Poorly] : feeling poorly [Recent Weight Loss (___ Lbs)] : recent [unfilled] ~Ulb weight loss [see HPI] : see HPI [Fever] : no fever [Chills] : no chills [Feeling Tired] : not feeling tired [Chest Pain] : no chest pain [Palpitations] : no palpitations [Leg Claudication] : no intermittent leg claudication [Lower Ext Edema] : no lower extremity edema [Shortness Of Breath] : no shortness of breath [Wheezing] : no wheezing [Cough] : no cough [SOB on Exertion] : no shortness of breath during exertion [Abdominal Pain] : no abdominal pain [Vomiting] : no vomiting [Diarrhea] : no diarrhea

## 2019-03-28 ENCOUNTER — FORM ENCOUNTER (OUTPATIENT)
Age: 79
End: 2019-03-28

## 2019-03-29 ENCOUNTER — OUTPATIENT (OUTPATIENT)
Dept: OUTPATIENT SERVICES | Facility: HOSPITAL | Age: 79
LOS: 1 days | End: 2019-03-29
Payer: MEDICARE

## 2019-03-29 ENCOUNTER — APPOINTMENT (OUTPATIENT)
Dept: MRI IMAGING | Facility: CLINIC | Age: 79
End: 2019-03-29
Payer: MEDICARE

## 2019-03-29 DIAGNOSIS — Z00.8 ENCOUNTER FOR OTHER GENERAL EXAMINATION: ICD-10-CM

## 2019-03-29 DIAGNOSIS — Z98.89 OTHER SPECIFIED POSTPROCEDURAL STATES: Chronic | ICD-10-CM

## 2019-03-29 DIAGNOSIS — M54.16 RADICULOPATHY, LUMBAR REGION: ICD-10-CM

## 2019-03-29 DIAGNOSIS — M79.604 PAIN IN RIGHT LEG: ICD-10-CM

## 2019-03-29 LAB
ALBUMIN SERPL ELPH-MCNC: 4.8 G/DL
ALP BLD-CCNC: 60 U/L
ALT SERPL-CCNC: 37 U/L
ANION GAP SERPL CALC-SCNC: 11 MMOL/L
APPEARANCE: CLEAR
AST SERPL-CCNC: 30 U/L
BACTERIA UR CULT: NORMAL
BACTERIA: NEGATIVE
BASOPHILS # BLD AUTO: 0.02 K/UL
BASOPHILS NFR BLD AUTO: 0.4 %
BILIRUB SERPL-MCNC: 0.3 MG/DL
BILIRUBIN URINE: NEGATIVE
BLOOD URINE: NEGATIVE
BUN SERPL-MCNC: 17 MG/DL
CALCIUM SERPL-MCNC: 9.7 MG/DL
CHLORIDE SERPL-SCNC: 103 MMOL/L
CK SERPL-CCNC: 88 U/L
CO2 SERPL-SCNC: 22 MMOL/L
COLOR: NORMAL
CREAT SERPL-MCNC: 0.8 MG/DL
EOSINOPHIL # BLD AUTO: 0.22 K/UL
EOSINOPHIL NFR BLD AUTO: 4.1 %
ERYTHROCYTE [SEDIMENTATION RATE] IN BLOOD BY WESTERGREN METHOD: 6 MM/HR
ESTIMATED AVERAGE GLUCOSE: 243 MG/DL
GLUCOSE QUALITATIVE U: NORMAL
GLUCOSE SERPL-MCNC: 159 MG/DL
HBA1C MFR BLD HPLC: 10.1 %
HCT VFR BLD CALC: 42.1 %
HGB BLD-MCNC: 13.6 G/DL
HYALINE CASTS: 2 /LPF
IMM GRANULOCYTES NFR BLD AUTO: 0 %
KETONES URINE: NEGATIVE
LEUKOCYTE ESTERASE URINE: NEGATIVE
LYMPHOCYTES # BLD AUTO: 1.52 K/UL
LYMPHOCYTES NFR BLD AUTO: 28.4 %
MAN DIFF?: NORMAL
MCHC RBC-ENTMCNC: 29.2 PG
MCHC RBC-ENTMCNC: 32.3 GM/DL
MCV RBC AUTO: 90.5 FL
MICROSCOPIC-UA: NORMAL
MONOCYTES # BLD AUTO: 0.29 K/UL
MONOCYTES NFR BLD AUTO: 5.4 %
NEUTROPHILS # BLD AUTO: 3.3 K/UL
NEUTROPHILS NFR BLD AUTO: 61.7 %
NITRITE URINE: NEGATIVE
PH URINE: 6
PLATELET # BLD AUTO: 274 K/UL
POTASSIUM SERPL-SCNC: 4.6 MMOL/L
PROT SERPL-MCNC: 7.2 G/DL
PROTEIN URINE: NORMAL
RBC # BLD: 4.65 M/UL
RBC # FLD: 13.3 %
RED BLOOD CELLS URINE: 1 /HPF
SODIUM SERPL-SCNC: 136 MMOL/L
SPECIFIC GRAVITY URINE: 1.01
SQUAMOUS EPITHELIAL CELLS: 5 /HPF
UROBILINOGEN URINE: NORMAL
WBC # FLD AUTO: 5.35 K/UL
WHITE BLOOD CELLS URINE: 3 /HPF

## 2019-03-29 PROCEDURE — 72148 MRI LUMBAR SPINE W/O DYE: CPT | Mod: 26

## 2019-03-29 PROCEDURE — 72148 MRI LUMBAR SPINE W/O DYE: CPT

## 2019-04-02 ENCOUNTER — APPOINTMENT (OUTPATIENT)
Dept: ORTHOPEDIC SURGERY | Facility: CLINIC | Age: 79
End: 2019-04-02

## 2019-04-03 ENCOUNTER — FORM ENCOUNTER (OUTPATIENT)
Age: 79
End: 2019-04-03

## 2019-04-04 ENCOUNTER — OUTPATIENT (OUTPATIENT)
Dept: OUTPATIENT SERVICES | Facility: HOSPITAL | Age: 79
LOS: 1 days | End: 2019-04-04
Payer: MEDICARE

## 2019-04-04 ENCOUNTER — APPOINTMENT (OUTPATIENT)
Dept: INTERNAL MEDICINE | Facility: CLINIC | Age: 79
End: 2019-04-04
Payer: MEDICARE

## 2019-04-04 VITALS
RESPIRATION RATE: 14 BRPM | HEIGHT: 62 IN | SYSTOLIC BLOOD PRESSURE: 136 MMHG | DIASTOLIC BLOOD PRESSURE: 90 MMHG | OXYGEN SATURATION: 99 % | TEMPERATURE: 97.8 F | HEART RATE: 81 BPM

## 2019-04-04 DIAGNOSIS — Z56.0 UNEMPLOYMENT, UNSPECIFIED: ICD-10-CM

## 2019-04-04 DIAGNOSIS — Z98.89 OTHER SPECIFIED POSTPROCEDURAL STATES: Chronic | ICD-10-CM

## 2019-04-04 DIAGNOSIS — Z63.4 DISAPPEARANCE AND DEATH OF FAMILY MEMBER: ICD-10-CM

## 2019-04-04 PROCEDURE — 73564 X-RAY EXAM KNEE 4 OR MORE: CPT | Mod: 26,RT

## 2019-04-04 PROCEDURE — 99214 OFFICE O/P EST MOD 30 MIN: CPT

## 2019-04-04 RX ORDER — LANCETS 33 GAUGE
31G X 5 MM EACH MISCELLANEOUS
Qty: 1 | Refills: 5 | Status: ACTIVE | COMMUNITY
Start: 2017-02-24 | End: 1900-01-01

## 2019-04-04 SDOH — ECONOMIC STABILITY - INCOME SECURITY: UNEMPLOYMENT, UNSPECIFIED: Z56.0

## 2019-04-04 SDOH — SOCIAL STABILITY - SOCIAL INSECURITY: DISSAPEARANCE AND DEATH OF FAMILY MEMBER: Z63.4

## 2019-04-07 NOTE — ASSESSMENT
[FreeTextEntry1] : \par Severe right leg pain: possible secondary to lumbar radiculopathy\par -MRI of L-spine results reviewed with pt and her daughter\par -I have advised she make appt to see pain management \par -will also refer her to Neurosurgery for evaluation\par -xray of tib/Fib was normal\par -Right leg venous US was normal\par -will check xray of right knee\par -she states her pain is still severe and ibuprofen and tylenol have not helped at all\par -will refill cyclobenzeprine 5mg PO TID prn \par -will refill percocet 5/325mg PO q 6 hours prn 5 day supply given-  (R/B/A/side effects discussed including addictive potential)\par -I STOP reviewed\par -she missed appt with orthopedics 2019\par \par CAD:\par -mild CAD noted on cath\par -she has refused ASA\par -she is on atorvastatin\par -referred to cardiology previously\par \par DM:\par -HgA1c 10.1 (3/2019)-still not at goal\par -he daughter states she does not take her DM meds every day\par -I advised compliance with meds\par -advised she check her sugars three times daily at home and bring results with her next visit \par -on lantus 30 units QHS and metformin 1000mg PO BID\par -I advised she see Endocrine-referred today\par -A:C negative 2019 on losartan\par -on atorvastatin\par -Ophthalmology 3/2017-referred previously\par -Foot exam: 2019\par \par Hyperlipidemia:\par -low fat/low cholesterol diet advised\par -on atorvastatin 40mg PO daily \par -will check labs in 2 months\par \par Elevated LFTs:\par -LFTs normal 3/2019\par -hepatitis B and C serologies were negative\par -Abd US revealed fatty liver\par -she was referred to hepatology-but she never went and refused to go\par \par HTN:\par -BP elevated today but she is in pain\par -continue losartan/HCTZ 100/25mg PO QAM, Norvasc 10 mg PO daily, coreg 6.25mg PO BID, and hydralazine 25mg PO BID\par \par Vitamin D Deficiency:\par -I advised ergocalciferol 50,000 units once a week for 12 weeks\par -After finishing 12 week course of ergocalciferol, I adv starting over the counter vitamin D 3 1000 units daily\par -I advised repeat vitamin D 25-OH level in 2 months\par \par Hearing loss:\par -she has been referred to ENT previously but she did not go\par \par Weight loss:\par -she refused to be weighed today\par -she refuses mammograms, GYN exams and colonoscopy\par -she refuses to do FIT test\par \par Microscopic hematuria\par -repeat UA was normal\par \par HCM:\par \par EKG 12/3/2018\par \par Flu shot: pt refused 12/3/2018\par \par Pneumovax adv: pt refused previously\par \par Prevnar 13: adv pt refused previously\par \par Tdap: adv pt refused previously\par \par Shingles vaccine adv: pt refused previously\par \par GYN/PAP: pt does not want further exams\par \par Mammogram: advised 12/3/2018: pt refused \par \par Colonoscopy: advised 12/3/2018-pt refused. \par \par I advised FIT testing-she refuses\par \par DEXA: advised: pt refused previously\par \par Depression screenin/3/2018 negative\par \par HIV testing offered: she refused 8/3/2017\par \par F/U 4-6 weeks\par \par

## 2019-04-07 NOTE — PHYSICAL EXAM
[Well Developed] : well developed [Well-Appearing] : well-appearing [Normal Voice/Communication] : normal voice/communication [PERRL] : pupils equal round and reactive to light [Normal Oropharynx] : the oropharynx was normal [No JVD] : no jugular venous distention [Supple] : supple [No Lymphadenopathy] : no lymphadenopathy [No Respiratory Distress] : no respiratory distress  [Clear to Auscultation] : lungs were clear to auscultation bilaterally [No Accessory Muscle Use] : no accessory muscle use [Normal Percussion] : the chest was normal to percussion [Normal Rate] : normal rate  [Regular Rhythm] : with a regular rhythm [Normal S1, S2] : normal S1 and S2 [No Murmur] : no murmur heard [Pedal Pulses Present] : the pedal pulses are present [No Edema] : there was no peripheral edema [Soft] : abdomen soft [Non Tender] : non-tender [Non-distended] : non-distended [No HSM] : no HSM [No CVA Tenderness] : no CVA  tenderness [No Spinal Tenderness] : no spinal tenderness [No Focal Deficits] : no focal deficits [Normal Affect] : the affect was normal [Alert and Oriented x3] : oriented to person, place, and time [Normal Mood] : the mood was normal [Normal Insight/Judgement] : insight and judgment were intact [No Acute Distress] : no acute distress [Normal Sclera/Conjunctiva] : normal sclera/conjunctiva [No Extremity Clubbing/Cyanosis] : no extremity clubbing/cyanosis [No Joint Swelling] : no joint swelling [Grossly Normal Strength/Tone] : grossly normal strength/tone [No Rash] : no rash [No Skin Lesions] : no skin lesions [Comprehensive Foot Exam Normal] : Right and left foot were examined and both feet are normal. No ulcers in either foot. Toes are normal and with full ROM.  Normal tactile sensation with monofilament testing throughout both feet [de-identified] : no calf tenderness [de-identified] : right knee with full ROM, no swelling, no tendernes

## 2019-04-07 NOTE — REVIEW OF SYSTEMS
[Negative] : Neurological [Fever] : no fever [Chills] : no chills [Fatigue] : no fatigue [Chest Pain] : no chest pain [Palpitations] : no palpitations [Lower Ext Edema] : no lower extremity edema [Shortness Of Breath] : no shortness of breath [Wheezing] : no wheezing [Cough] : no cough [Dyspnea on Exertion] : no dyspnea on exertion [Abdominal Pain] : no abdominal pain [Nausea] : no nausea [Diarrhea] : diarrhea [Vomiting] : no vomiting [Skin Rash] : no skin rash [FreeTextEntry9] : see HPI

## 2019-04-07 NOTE — ADDENDUM
[FreeTextEntry1] : I, Antionette Wyatt, acted solely as a scribe for Dr. Reed on this date [4/4/19].\par

## 2019-04-07 NOTE — END OF VISIT
[FreeTextEntry3] : All medical entries made by the Scribe were at my, Dr. Ge Reed's, direction and personally dictated by me on [4/4/19]. I have reviewed the chart and agree that the record accurately reflects my personal performance of the history, physical exam, assessment and plan. I have also personally directed, reviewed, and agreed with the chart.\par

## 2019-04-07 NOTE — HISTORY OF PRESENT ILLNESS
[FreeTextEntry1] : Right leg pain [de-identified] : Patient is here for follow up of right leg pain. \par \par She states she is still having severe pain in her right leg. She states that when she puts weight on her leg is when the pain is most severe. She states that the medication has provided her relief but she needs a refill on percocet and cyclobenzeprine.  She had MRI of L-spine done.  he daughter is concerned that she may have OA knee or knee pathology which may be contributing.  She denies leg swelling\par \par She has not gone to pain management yet

## 2019-04-19 ENCOUNTER — APPOINTMENT (OUTPATIENT)
Dept: INTERNAL MEDICINE | Facility: CLINIC | Age: 79
End: 2019-04-19
Payer: MEDICARE

## 2019-04-19 VITALS
BODY MASS INDEX: 24.11 KG/M2 | SYSTOLIC BLOOD PRESSURE: 132 MMHG | HEART RATE: 71 BPM | OXYGEN SATURATION: 98 % | DIASTOLIC BLOOD PRESSURE: 84 MMHG | RESPIRATION RATE: 14 BRPM | TEMPERATURE: 97.5 F | HEIGHT: 62 IN | WEIGHT: 131 LBS

## 2019-04-19 DIAGNOSIS — Z86.79 PERSONAL HISTORY OF OTHER DISEASES OF THE CIRCULATORY SYSTEM: ICD-10-CM

## 2019-04-19 DIAGNOSIS — M54.16 RADICULOPATHY, LUMBAR REGION: ICD-10-CM

## 2019-04-19 DIAGNOSIS — Z86.69 PERSONAL HISTORY OF OTHER DISEASES OF THE NERVOUS SYSTEM AND SENSE ORGANS: ICD-10-CM

## 2019-04-19 DIAGNOSIS — M79.604 PAIN IN RIGHT LEG: ICD-10-CM

## 2019-04-19 LAB — GLUCOSE BLDC GLUCOMTR-MCNC: 135

## 2019-04-19 PROCEDURE — 99214 OFFICE O/P EST MOD 30 MIN: CPT | Mod: 25

## 2019-04-19 PROCEDURE — 82962 GLUCOSE BLOOD TEST: CPT

## 2019-04-19 NOTE — PHYSICAL EXAM
[No Acute Distress] : no acute distress [Well-Appearing] : well-appearing [Normal Sclera/Conjunctiva] : normal sclera/conjunctiva [PERRL] : pupils equal round and reactive to light [Normal Voice/Communication] : normal voice/communication [No JVD] : no jugular venous distention [Normal Oropharynx] : the oropharynx was normal [Supple] : supple [No Lymphadenopathy] : no lymphadenopathy [Clear to Auscultation] : lungs were clear to auscultation bilaterally [No Accessory Muscle Use] : no accessory muscle use [No Respiratory Distress] : no respiratory distress  [Normal Percussion] : the chest was normal to percussion [Normal Rate] : normal rate  [Regular Rhythm] : with a regular rhythm [Normal S1, S2] : normal S1 and S2 [No Murmur] : no murmur heard [Pedal Pulses Present] : the pedal pulses are present [No Extremity Clubbing/Cyanosis] : no extremity clubbing/cyanosis [No Edema] : there was no peripheral edema [No CVA Tenderness] : no CVA  tenderness [No Spinal Tenderness] : no spinal tenderness [No Joint Swelling] : no joint swelling [No Rash] : no rash [No Skin Lesions] : no skin lesions [No Focal Deficits] : no focal deficits [Normal Affect] : the affect was normal [Normal Mood] : the mood was normal [Normal Insight/Judgement] : insight and judgment were intact [Alert and Oriented x3] : oriented to person, place, and time [Normal Outer Ear/Nose] : the outer ears and nose were normal in appearance [de-identified] : mild B/l cerumen noted in B/l canals bu not obstructing.  TMs appears to be intact, narrow canals [de-identified] : no calf tenderness

## 2019-04-19 NOTE — REVIEW OF SYSTEMS
[Negative] : ENT [Fever] : no fever [Chills] : no chills [Fatigue] : no fatigue [Chest Pain] : no chest pain [Recent Change In Weight] : ~T no recent weight change [Palpitations] : no palpitations [Lower Ext Edema] : no lower extremity edema [Shortness Of Breath] : no shortness of breath [Wheezing] : no wheezing [Cough] : no cough [Dyspnea on Exertion] : no dyspnea on exertion [Abdominal Pain] : no abdominal pain [Vomiting] : no vomiting [Nausea] : no nausea [Diarrhea] : diarrhea [FreeTextEntry9] : see HPI [Skin Rash] : no skin rash

## 2019-04-19 NOTE — HISTORY OF PRESENT ILLNESS
[FreeTextEntry1] : Here for follow up \par  [de-identified] : Patient is here today with her daughter. \par \par Her daughter states the her mother's right leg pain sx have improved since last visit but not gone. Her daughter states that she has been taking her to the Chiropractor and to acupuncture which has helped her sx but the pain is still present. She also states that her mother has an appt with pain management Dr. Díaz today.\par \par Her daughter also states that because of her pain her mother has been eating much\par \par They would also like a referral for the ENT doctor.  She states she went to audiologist who told her there was some wax in her canals which may be contributing to hearing loss and advised her to see ENT.  She denies ear pain

## 2019-04-19 NOTE — ADDENDUM
[FreeTextEntry1] : I, Antionette Wyatt, acted solely as a scribe for Dr. Reed on this date [4/19/19].\par

## 2019-04-19 NOTE — ASSESSMENT
[FreeTextEntry1] : \par Right leg pain: possible secondary to lumbar radiculopathy\par -she has been seeing an acupuncturist and chiropractor,  sx have lessen but not gone\par -she has appt for pain management today with Dr. Díaz \par -x-ray of right knee which was normal \par -xray of tib/Fib was normal\par -Right leg venous US was normal\par -she was also previously referred to Neurosurgery for evaluation\par -xray of tib/Fib was normal\par -Right leg venous US was normal\par -using c yclobenzeprine 5mg PO TID prn and percocet 5/325mg PO q 6 hours prn\par \par CAD:\par -mild CAD noted on cath\par -she has refused ASA\par -she is on atorvastatin\par -referred to cardiology previously-I again advised she make appt\par \par DM:\par -will check glucose FS today-135\par -HgA1c 10.1 (3/2019)\par -I advised compliance with meds\par -she did not bring in FS log\par -on lantus 30 units QHS and metformin 1000mg PO BID\par -I advised she see Endocrine-she has appt May 2019 \par -A:C negative 2019 on losartan\par -on atorvastatin\par -Ophthalmology 3/2017-referred previously-she will make appt \par -Foot exam: 2019\par \par Hyperlipidemia:\par -low fat/low cholesterol diet advised\par -on atorvastatin 40mg PO daily \par -will check labs in 2-3 months\par \par Elevated LFTs:\par -LFTs normal 3/2019\par -hepatitis B and C serologies were negative\par -Abd US revealed fatty liver\par -she was referred to hepatology-but she never went and refused to go\par \par HTN:\par -BP at goal today\par -continue losartan/HCTZ 100/25mg PO QAM, Norvasc 10 mg PO daily, coreg 6.25mg PO BID, and hydralazine 25mg PO BID\par \par Vitamin D Deficiency:\par -I advised ergocalciferol 50,000 units once a week for 12 weeks\par -After finishing 12 week course of ergocalciferol, I adv starting over the counter vitamin D 3 1000 units daily\par -I advised repeat vitamin D 25-OH level in 2-3 months\par \par Hearing loss:\par -will refer to ENT\par \par \par HCM:\par \par EKG 12/3/2018\par \par Flu shot: pt refused 12/3/2018\par \par Pneumovax adv: pt refused previously\par \par Prevnar 13: adv pt refused previously\par \par Tdap: adv pt refused previously\par \par Shingles vaccine adv: pt refused previously\par \par GYN/PAP: pt does not want further exams\par \par Mammogram: advised 12/3/2018: pt refused \par \par Colonoscopy: advised 12/3/2018-pt refused. \par \par I advised FIT testing-she refuses\par \par DEXA: advised: pt refused previously\par \par Depression screenin/3/2018 negative\par \par HIV testing offered: she refused 8/3/2017\par \par F/U 2-3 months for fasting labs\par \par

## 2019-04-19 NOTE — END OF VISIT
[FreeTextEntry3] : All medical entries made by the Scribe were at my, Dr. Ge Reed's, direction and personally dictated by me on [4/19/19]. I have reviewed the chart and agree that the record accurately reflects my personal performance of the history, physical exam, assessment and plan. I have also personally directed, reviewed, and agreed with the chart.\par

## 2019-04-27 ENCOUNTER — APPOINTMENT (OUTPATIENT)
Dept: INTERNAL MEDICINE | Facility: CLINIC | Age: 79
End: 2019-04-27

## 2019-05-02 PROBLEM — Z56.0 UNEMPLOYED: Status: ACTIVE | Noted: 2019-05-02

## 2019-05-02 PROBLEM — Z63.4 WIDOWED: Status: ACTIVE | Noted: 2019-05-02

## 2019-05-07 ENCOUNTER — RX RENEWAL (OUTPATIENT)
Age: 79
End: 2019-05-07

## 2019-05-13 ENCOUNTER — APPOINTMENT (OUTPATIENT)
Dept: INTERNAL MEDICINE | Facility: CLINIC | Age: 79
End: 2019-05-13

## 2019-06-02 ENCOUNTER — RX RENEWAL (OUTPATIENT)
Age: 79
End: 2019-06-02

## 2019-07-14 ENCOUNTER — EMERGENCY (EMERGENCY)
Facility: HOSPITAL | Age: 79
LOS: 1 days | Discharge: HOME | End: 2019-07-14
Attending: EMERGENCY MEDICINE
Payer: MEDICARE

## 2019-07-14 VITALS
WEIGHT: 130.95 LBS | SYSTOLIC BLOOD PRESSURE: 148 MMHG | TEMPERATURE: 98 F | HEIGHT: 62 IN | OXYGEN SATURATION: 99 % | RESPIRATION RATE: 18 BRPM | DIASTOLIC BLOOD PRESSURE: 75 MMHG | HEART RATE: 67 BPM

## 2019-07-14 DIAGNOSIS — Z98.89 OTHER SPECIFIED POSTPROCEDURAL STATES: Chronic | ICD-10-CM

## 2019-07-14 PROCEDURE — 96372 THER/PROPH/DIAG INJ SC/IM: CPT

## 2019-07-14 PROCEDURE — 99284 EMERGENCY DEPT VISIT MOD MDM: CPT

## 2019-07-14 PROCEDURE — 99283 EMERGENCY DEPT VISIT LOW MDM: CPT | Mod: 25

## 2019-07-14 RX ORDER — OXYCODONE AND ACETAMINOPHEN 5; 325 MG/1; MG/1
1 TABLET ORAL ONCE
Refills: 0 | Status: DISCONTINUED | OUTPATIENT
Start: 2019-07-14 | End: 2019-07-14

## 2019-07-14 RX ORDER — DIAZEPAM 5 MG
2 TABLET ORAL ONCE
Refills: 0 | Status: DISCONTINUED | OUTPATIENT
Start: 2019-07-14 | End: 2019-07-14

## 2019-07-14 RX ORDER — KETOROLAC TROMETHAMINE 30 MG/ML
30 SYRINGE (ML) INJECTION ONCE
Refills: 0 | Status: DISCONTINUED | OUTPATIENT
Start: 2019-07-14 | End: 2019-07-14

## 2019-07-14 RX ADMIN — Medication 30 MILLIGRAM(S): at 21:41

## 2019-07-14 RX ADMIN — Medication 40 MILLIGRAM(S): at 21:41

## 2019-07-14 RX ADMIN — OXYCODONE AND ACETAMINOPHEN 1 TABLET(S): 5; 325 TABLET ORAL at 21:41

## 2019-07-14 RX ADMIN — Medication 2 MILLIGRAM(S): at 21:41

## 2019-07-14 NOTE — ED PROVIDER NOTE - CLINICAL SUMMARY MEDICAL DECISION MAKING FREE TEXT BOX
80 yo female with herniated discs and left side sciatica with acute atraumatic flare. pain control and refer to spine center

## 2019-07-14 NOTE — ED ADULT TRIAGE NOTE - CHIEF COMPLAINT QUOTE
patient states that she has sciatica pain left lower back radiating down left leg, has HX of herniated disc

## 2019-07-14 NOTE — ED ADULT NURSE NOTE - OBJECTIVE STATEMENT
Patient presents to ER complaining of severe pain on left buttock radiating to lower extremity, denies recent injuries, reports HX of sciatica

## 2019-07-14 NOTE — ED PROVIDER NOTE - ATTENDING CONTRIBUTION TO CARE
I personally saw the patient with the PA, and completed the key components of the history and physical exam. I then discussed the management plan with the PA.   gen in nad resp clear cardiac no murmur abd soft msk + ttp right paraspinal lumbar region no midline ttp neuro: CN II - XII intact, EOMI, PERRL, no papilledema, 5/5 muscle strength x 4 extremities, no sensory deficits, 2+ dtr globally, negative babinski, no ataxic gait, normal MEHNAZ and FNT, normal romberg

## 2019-07-14 NOTE — ED PROVIDER NOTE - OBJECTIVE STATEMENT
80 yo female htn DM hx of herniated discs and sciatica ( being treated by chiropractor and pain management, no spine specialist). states that she has had left lower back pain the radiates to the buttocks and the left knee for the last 2 days with minimal relief with ibu and flexeril. pain similar episodes of sciatica. no recent accidents or injuries, no bladder or bowel incontinence or numbness or tingling in the genital region. pain with ambulation. no hematuria abdominal pain chest pain or sob.

## 2019-07-18 ENCOUNTER — APPOINTMENT (OUTPATIENT)
Dept: INTERNAL MEDICINE | Facility: CLINIC | Age: 79
End: 2019-07-18

## 2019-07-23 ENCOUNTER — APPOINTMENT (OUTPATIENT)
Dept: INTERNAL MEDICINE | Facility: CLINIC | Age: 79
End: 2019-07-23

## 2019-08-14 ENCOUNTER — APPOINTMENT (OUTPATIENT)
Dept: INTERNAL MEDICINE | Facility: CLINIC | Age: 79
End: 2019-08-14
Payer: MEDICARE

## 2019-08-14 VITALS
HEIGHT: 62 IN | TEMPERATURE: 98 F | SYSTOLIC BLOOD PRESSURE: 140 MMHG | BODY MASS INDEX: 23.74 KG/M2 | WEIGHT: 129 LBS | DIASTOLIC BLOOD PRESSURE: 82 MMHG | HEART RATE: 74 BPM | OXYGEN SATURATION: 97 % | RESPIRATION RATE: 14 BRPM

## 2019-08-14 PROCEDURE — 99214 OFFICE O/P EST MOD 30 MIN: CPT | Mod: 25

## 2019-08-14 PROCEDURE — 36415 COLL VENOUS BLD VENIPUNCTURE: CPT

## 2019-08-14 RX ORDER — CARVEDILOL 6.25 MG/1
6.25 TABLET, FILM COATED ORAL TWICE DAILY
Qty: 180 | Refills: 1 | Status: ACTIVE | COMMUNITY
Start: 2017-08-10 | End: 1900-01-01

## 2019-08-14 RX ORDER — BIOTIN 1 MG
W/DEVICE TABLET ORAL
Qty: 1 | Refills: 0 | Status: ACTIVE | COMMUNITY
Start: 2019-02-12 | End: 1900-01-01

## 2019-08-18 LAB
25(OH)D3 SERPL-MCNC: 15.2 NG/ML
ALBUMIN SERPL ELPH-MCNC: 4.6 G/DL
ALP BLD-CCNC: 92 U/L
ALT SERPL-CCNC: 52 U/L
ANION GAP SERPL CALC-SCNC: 13 MMOL/L
APPEARANCE: CLEAR
AST SERPL-CCNC: 39 U/L
BACTERIA: NEGATIVE
BASOPHILS # BLD AUTO: 0.03 K/UL
BASOPHILS NFR BLD AUTO: 0.5 %
BILIRUB SERPL-MCNC: 0.6 MG/DL
BILIRUBIN URINE: NEGATIVE
BLOOD URINE: NEGATIVE
BUN SERPL-MCNC: 12 MG/DL
CALCIUM SERPL-MCNC: 9.9 MG/DL
CHLORIDE SERPL-SCNC: 100 MMOL/L
CHOLEST SERPL-MCNC: 228 MG/DL
CHOLEST/HDLC SERPL: 4.7 RATIO
CO2 SERPL-SCNC: 23 MMOL/L
COLOR: YELLOW
CREAT SERPL-MCNC: 0.61 MG/DL
CREAT SPEC-SCNC: 83 MG/DL
EOSINOPHIL # BLD AUTO: 0.25 K/UL
EOSINOPHIL NFR BLD AUTO: 4.1 %
ESTIMATED AVERAGE GLUCOSE: 229 MG/DL
GLUCOSE QUALITATIVE U: NEGATIVE
GLUCOSE SERPL-MCNC: 194 MG/DL
HBA1C MFR BLD HPLC: 9.6 %
HCT VFR BLD CALC: 44.2 %
HDLC SERPL-MCNC: 49 MG/DL
HGB BLD-MCNC: 14.8 G/DL
HYALINE CASTS: 0 /LPF
IMM GRANULOCYTES NFR BLD AUTO: 0.2 %
KETONES URINE: NEGATIVE
LDLC SERPL CALC-MCNC: 146 MG/DL
LEUKOCYTE ESTERASE URINE: NEGATIVE
LYMPHOCYTES # BLD AUTO: 1.16 K/UL
LYMPHOCYTES NFR BLD AUTO: 19 %
MAN DIFF?: NORMAL
MCHC RBC-ENTMCNC: 29.9 PG
MCHC RBC-ENTMCNC: 33.5 GM/DL
MCV RBC AUTO: 89.3 FL
MICROALBUMIN 24H UR DL<=1MG/L-MCNC: 4 MG/DL
MICROALBUMIN/CREAT 24H UR-RTO: 48 MG/G
MICROSCOPIC-UA: NORMAL
MONOCYTES # BLD AUTO: 0.31 K/UL
MONOCYTES NFR BLD AUTO: 5.1 %
NEUTROPHILS # BLD AUTO: 4.35 K/UL
NEUTROPHILS NFR BLD AUTO: 71.1 %
NITRITE URINE: NEGATIVE
PH URINE: 5.5
PLATELET # BLD AUTO: 295 K/UL
POTASSIUM SERPL-SCNC: 4.7 MMOL/L
PROT SERPL-MCNC: 7.1 G/DL
PROTEIN URINE: NORMAL
RBC # BLD: 4.95 M/UL
RBC # FLD: 13.5 %
RED BLOOD CELLS URINE: 3 /HPF
SODIUM SERPL-SCNC: 136 MMOL/L
SPECIFIC GRAVITY URINE: 1.03
SQUAMOUS EPITHELIAL CELLS: 1 /HPF
TRIGL SERPL-MCNC: 165 MG/DL
UROBILINOGEN URINE: NORMAL
WBC # FLD AUTO: 6.11 K/UL
WHITE BLOOD CELLS URINE: 1 /HPF

## 2019-08-18 NOTE — REVIEW OF SYSTEMS
[Negative] : Neurological [Depression] : depression [Fever] : no fever [Chills] : no chills [Recent Change In Weight] : ~T no recent weight change [Fatigue] : no fatigue [Chest Pain] : no chest pain [Palpitations] : no palpitations [Shortness Of Breath] : no shortness of breath [Lower Ext Edema] : no lower extremity edema [Wheezing] : no wheezing [Cough] : no cough [Dyspnea on Exertion] : no dyspnea on exertion [Abdominal Pain] : no abdominal pain [Diarrhea] : diarrhea [Nausea] : no nausea [Skin Rash] : no skin rash [Vomiting] : no vomiting [Insomnia] : no insomnia [Suicidal] : not suicidal [Anxiety] : no anxiety [FreeTextEntry9] : see HPI

## 2019-08-18 NOTE — ADDENDUM
[FreeTextEntry1] : I, Antionette Wyatt, acted solely as a scribe for Dr. Reed on this date [8/14/2019].\par

## 2019-08-18 NOTE — ASSESSMENT
[FreeTextEntry1] : \par Low back pain:\par s.p hospitalization for intractable back pain s/p lumbar laminectomy with microdiskectomy\par -overall doing better\par -she was advised to f/u with spinal surgeon\par -she was adv to avoid PT for now by spinal surgeon\par \par Mild constipation:\par -will check labs'\par -I advised she increase PO fluids\par \par CAD:\par -mild CAD noted on cath\par -she has refused ASA\par -she is on atorvastatin\par -referred to cardiology again today\par \par DM: last HgA1c 8.4 in hospital\par -she has not been checking FS at home-I adv she start checking at least once a day\par -will check labs\par -on lantus 36 units QHS.  Metformin held in hospital-will check labs may need to restart\par -I have again advised she make appt to see Endocrine\par -A:C negative 2019 on losartan-will check A:C\par -on atorvastatin\par -Ophthalmology 3/2017-referred again today\par -Foot exam: 2019\par \par Hyperlipidemia:\par -low fat/low cholesterol diet advised\par -on atorvastatin 40mg PO daily \par -will check labs\par \par Elevated LFTs:\par -will check labs\par -LFTs normal 3/2019\par -hepatitis B and C serologies were negative\par -Abd US revealed fatty liver\par -she was referred to hepatology-but she never went and refused to go\par \par HTN:\par -BP mildly elevated today\par -compliance with meds advised\par -low fat/low cholesterol and low salt diet advised\par -on losartan/HCTZ 100/25mg PO QAM, Norvasc 10 mg PO daily, coreg 6.25mg PO BID, and hydralazine 25mg PO BID\par \par Vitamin D Deficiency:\par -will check vitamin D 25-OH level\par \par Hearing loss:\par -will refer again to ENT\par \par Mild depressed mood:\par -she states her daughter is Moving [place]\par -PHQ 9 score 6\par -mild sx\par -will monitor\par -she is to notify office if symptoms persist or worsen\par \par \par HCM:\par \par EKG 12/3/2018\par \par Flu shot: pt refused 12/3/2018\par \par Pneumovax adv: pt refused previously\par \par Prevnar 13: adv pt refused previously\par \par Tdap: adv pt refused previously\par \par Shingles vaccine adv: pt refused previously\par \par GYN/PAP: pt does not want further exams\par \par Mammogram: advised 12/3/2018: pt refused \par \par Colonoscopy: advised 12/3/2018-pt refused. \par \par I advised FIT testing-she refuses\par \par DEXA: advised: pt refused previously\par \par Depression screenin2019 PHQ 9 score 6\par \par HIV testing offered: she refused 8/3/2017\par \par F/U 3 months. Labs drawn in office today.\par \par Medications renewed today  \par \par

## 2019-08-18 NOTE — PHYSICAL EXAM
[No Acute Distress] : no acute distress [Well-Appearing] : well-appearing [Normal Voice/Communication] : normal voice/communication [Normal Sclera/Conjunctiva] : normal sclera/conjunctiva [PERRL] : pupils equal round and reactive to light [Normal Oropharynx] : the oropharynx was normal [No JVD] : no jugular venous distention [Supple] : supple [No Respiratory Distress] : no respiratory distress  [No Lymphadenopathy] : no lymphadenopathy [Clear to Auscultation] : lungs were clear to auscultation bilaterally [No Accessory Muscle Use] : no accessory muscle use [Normal Percussion] : the chest was normal to percussion [Normal Rate] : normal rate  [Regular Rhythm] : with a regular rhythm [No Murmur] : no murmur heard [Normal S1, S2] : normal S1 and S2 [No Edema] : there was no peripheral edema [No Extremity Clubbing/Cyanosis] : no extremity clubbing/cyanosis [No Spinal Tenderness] : no spinal tenderness [No CVA Tenderness] : no CVA  tenderness [No Rash] : no rash [No Skin Lesions] : no skin lesions [No Focal Deficits] : no focal deficits [Normal Affect] : the affect was normal [Alert and Oriented x3] : oriented to person, place, and time [Normal Insight/Judgement] : insight and judgment were intact [Normal Mood] : the mood was normal [Soft] : abdomen soft [Non Tender] : non-tender [Non-distended] : non-distended [No Masses] : no abdominal mass palpated [No HSM] : no HSM [Normal Bowel Sounds] : normal bowel sounds [Thyroid Normal, No Nodules] : the thyroid was normal and there were no nodules present [Grossly Normal Strength/Tone] : grossly normal strength/tone [de-identified] : no calf tenderness

## 2019-08-18 NOTE — HISTORY OF PRESENT ILLNESS
[de-identified] : Patient is here for follow up accompanied by her daughter after having Laminectomy/ Micro Discectomy while admitted to hospital for intractable pain pain. She was admitted to [facility] from 7/22/2019-7/26/2019 and seen by neurosurgeon. She is doing well but still has some ongoing mild low back pain. She states overall doing much better Daughter states that metformin was stopped because her GFR may have been a little low. \par \par She needs medication refilled today. She came in fasting\par \par She has been a little constipated.  No abd pain, nausea, vomiting, diarrhea, or rectal bleeding\par \par She has not been able to see any of the specialist advised because she lost the appts. \par \par Patient feels mildly depressed because her daughter is moving to [place]. No SI/HI.  [FreeTextEntry1] : Here for follow s/p hospitalization

## 2019-08-19 ENCOUNTER — NON-APPOINTMENT (OUTPATIENT)
Age: 79
End: 2019-08-19

## 2019-08-19 ENCOUNTER — APPOINTMENT (OUTPATIENT)
Dept: OPHTHALMOLOGY | Facility: CLINIC | Age: 79
End: 2019-08-19
Payer: MEDICARE

## 2019-08-19 PROCEDURE — 92025 CPTRIZED CORNEAL TOPOGRAPHY: CPT

## 2019-08-19 PROCEDURE — 92134 CPTRZ OPH DX IMG PST SGM RTA: CPT

## 2019-08-19 PROCEDURE — 92285 EXTERNAL OCULAR PHOTOGRAPHY: CPT

## 2019-08-19 PROCEDURE — 92004 COMPRE OPH EXAM NEW PT 1/>: CPT

## 2019-09-09 ENCOUNTER — APPOINTMENT (OUTPATIENT)
Dept: OTOLARYNGOLOGY | Facility: CLINIC | Age: 79
End: 2019-09-09
Payer: MEDICARE

## 2019-09-09 VITALS — HEIGHT: 62 IN | BODY MASS INDEX: 23.74 KG/M2 | WEIGHT: 129 LBS

## 2019-09-09 DIAGNOSIS — H90.3 SENSORINEURAL HEARING LOSS, BILATERAL: ICD-10-CM

## 2019-09-09 PROCEDURE — 99203 OFFICE O/P NEW LOW 30 MIN: CPT

## 2019-09-09 RX ORDER — HYDROCORTISONE AND ACETIC ACID OTIC 20.75; 10.375 MG/ML; MG/ML
1-2 SOLUTION AURICULAR (OTIC) 4 TIMES DAILY
Qty: 1 | Refills: 2 | Status: ACTIVE | COMMUNITY
Start: 2019-09-09 | End: 1900-01-01

## 2019-09-09 NOTE — REVIEW OF SYSTEMS
[Hearing Loss] : hearing loss [Joint Pain] : joint pain [Muscle Weakness] : muscle weakness [Ear Drainage] : ear drainage [Negative] : Heme/Lymph [Patient Intake Form Reviewed] : Patient intake form was reviewed

## 2019-09-09 NOTE — PHYSICAL EXAM
[Midline] : trachea located in midline position [Normal] : no rashes [de-identified] : left  otitis externa - appears fungal - ear debrided partally; right normal - narrow eac  [de-identified] : right tm wnl; left not visualized

## 2019-09-09 NOTE — ASSESSMENT
[FreeTextEntry1] : Patient with left otalgia and left otitis externa.  Patient noted to have left otitis externa.  Ear partially debrided - very painful.  Started on vosol hc otic 3x a day and advised to keep hearing aide out and keep left ear dry at all times. Follow up in 2 weeks - if no improvement may need wick.  TM unable to be visualized.

## 2019-09-09 NOTE — HISTORY OF PRESENT ILLNESS
[de-identified] : c/o problem with left ear.  Went for audio and told of cerumen - does wear hearing aides.   No freq ear infections or prior ear problems Patient also c/o painful left ear  ( - son)

## 2019-09-23 ENCOUNTER — APPOINTMENT (OUTPATIENT)
Dept: OTOLARYNGOLOGY | Facility: CLINIC | Age: 79
End: 2019-09-23
Payer: MEDICARE

## 2019-09-23 VITALS
SYSTOLIC BLOOD PRESSURE: 134 MMHG | WEIGHT: 129 LBS | HEIGHT: 62 IN | BODY MASS INDEX: 23.74 KG/M2 | HEART RATE: 75 BPM | DIASTOLIC BLOOD PRESSURE: 66 MMHG

## 2019-09-23 DIAGNOSIS — H91.90 UNSPECIFIED HEARING LOSS, UNSPECIFIED EAR: ICD-10-CM

## 2019-09-23 PROCEDURE — 99213 OFFICE O/P EST LOW 20 MIN: CPT

## 2019-09-23 NOTE — HISTORY OF PRESENT ILLNESS
[de-identified] : Patient here for follow up left ear infection. Has been on vosol and keeping hearing aide out (translation by son)

## 2019-09-23 NOTE — PHYSICAL EXAM
[Midline] : trachea located in midline position [Normal] : orientation to person, place, and time: normal [de-identified] : sl debris left ear canal - siuctioned and ba instilled  [de-identified] : right tm wnl; left normal after suction

## 2019-09-23 NOTE — ASSESSMENT
[FreeTextEntry1] : Patient with resolved left otitis externa.  Ear debrided today and ba instilled.  Recommend keep ear dry and can start using left hearing aide in one week.  Follow up in 6 mos and as necessary

## 2019-11-01 ENCOUNTER — APPOINTMENT (OUTPATIENT)
Dept: CARDIOLOGY | Facility: CLINIC | Age: 79
End: 2019-11-01

## 2019-11-06 ENCOUNTER — APPOINTMENT (OUTPATIENT)
Dept: INTERNAL MEDICINE | Facility: CLINIC | Age: 79
End: 2019-11-06

## 2019-11-12 ENCOUNTER — APPOINTMENT (OUTPATIENT)
Dept: INTERNAL MEDICINE | Facility: CLINIC | Age: 79
End: 2019-11-12

## 2019-11-13 ENCOUNTER — APPOINTMENT (OUTPATIENT)
Dept: INTERNAL MEDICINE | Facility: CLINIC | Age: 79
End: 2019-11-13
Payer: MEDICARE

## 2019-11-13 ENCOUNTER — NON-APPOINTMENT (OUTPATIENT)
Age: 79
End: 2019-11-13

## 2019-11-13 VITALS
BODY MASS INDEX: 23.37 KG/M2 | OXYGEN SATURATION: 97 % | HEART RATE: 63 BPM | WEIGHT: 127 LBS | HEIGHT: 62 IN | SYSTOLIC BLOOD PRESSURE: 122 MMHG | TEMPERATURE: 98.4 F | RESPIRATION RATE: 14 BRPM | DIASTOLIC BLOOD PRESSURE: 70 MMHG

## 2019-11-13 DIAGNOSIS — Z86.79 PERSONAL HISTORY OF OTHER DISEASES OF THE CIRCULATORY SYSTEM: ICD-10-CM

## 2019-11-13 DIAGNOSIS — M54.5 LOW BACK PAIN: ICD-10-CM

## 2019-11-13 LAB — GLUCOSE BLDC GLUCOMTR-MCNC: 396

## 2019-11-13 PROCEDURE — 82962 GLUCOSE BLOOD TEST: CPT

## 2019-11-13 PROCEDURE — 93000 ELECTROCARDIOGRAM COMPLETE: CPT

## 2019-11-13 PROCEDURE — 99214 OFFICE O/P EST MOD 30 MIN: CPT | Mod: 25

## 2019-11-13 RX ORDER — OXYCODONE AND ACETAMINOPHEN 5; 325 MG/1; MG/1
5-325 TABLET ORAL EVERY 6 HOURS
Qty: 20 | Refills: 0 | Status: DISCONTINUED | COMMUNITY
Start: 2019-03-26 | End: 2019-11-13

## 2019-11-13 RX ORDER — ERGOCALCIFEROL 1.25 MG/1
1.25 MG CAPSULE, LIQUID FILLED ORAL
Qty: 12 | Refills: 0 | Status: DISCONTINUED | COMMUNITY
Start: 2019-02-12 | End: 2019-11-13

## 2019-11-13 RX ORDER — HYDRALAZINE HYDROCHLORIDE 25 MG/1
25 TABLET ORAL TWICE DAILY
Qty: 180 | Refills: 1 | Status: DISCONTINUED | COMMUNITY
Start: 2018-12-03 | End: 2019-11-13

## 2019-11-13 RX ORDER — CYCLOBENZAPRINE HYDROCHLORIDE 5 MG/1
5 TABLET, FILM COATED ORAL EVERY 8 HOURS
Qty: 60 | Refills: 0 | Status: DISCONTINUED | COMMUNITY
Start: 2019-03-26 | End: 2019-11-13

## 2019-11-13 RX ORDER — MECLIZINE HYDROCHLORIDE 12.5 MG/1
12.5 TABLET ORAL 3 TIMES DAILY
Qty: 30 | Refills: 0 | Status: ACTIVE | COMMUNITY
Start: 2019-11-13 | End: 1900-01-01

## 2019-11-13 NOTE — HISTORY OF PRESENT ILLNESS
[FreeTextEntry8] : Here for evaluation of "vertigo"\par \par She states she recently had ears cleaned out by another doctor.  She states she was told she had ear infection and given antibiotics.\par \par Since then she has had dizziness and unsteady gait.  She states dizziness exacerbated by movement.  She wants medication for this.  She denies chest pain, SOB, palpitations.  She has not had falls.  There is no headache\par \par She states FS at home were high before but states better now.  She states most recent FS was 180\par \par She states she was previously not complaint with diet but is now adhering to better diet

## 2019-11-13 NOTE — ASSESSMENT
[FreeTextEntry1] : \par Dizziness: possibly vertigo vs orthostasis\par -given BP low normal today will d/c hydralazine\par -Will give meclizine for prn use\par -EKG today  NSR at 72, flipped t waves in V1-V6-no sig change\par -I have advised she see cardiology\par -Will order MRI of brain\par -Will check labs\par -will prescribe meclizine for prn use-adv it could cause drowsiness\par -she will notify me if sx persist or worsen\par -if sx persist she should f/u with her ENT Dr Carver\par \par Low back pain:\par -S/P lumbar laminectomy with microdiskectomy\par -she states sx are well controlled\par \par CAD:\par -mild CAD noted on cath\par -she has refused ASA in past\par -she is on atorvastatin\par -referred to cardiology again today\par -fasting labs ordered\par \par DM: last HgA1c 9.6 2019\par -she reports FS better at home but FS today was 396\par -she admits to eating bread and oatmeal\par -I advised low fat/low cholesterol and low carb diet\par -she should stop eating bread and oatmeal and increase protein and vegetables in diet\par -on lantus 36 units QHS.  \par -She states she is taking metformin 1000mg PO BID now\par -I explained to her she may need premeal insulin vs second oral agent\par -she will have fasting labs done at lab tomorrow\par -she was previously referred to Endocrine\par -A:C 48 2019 on losartan\par -on atorvastatin\par -Ophthalmology approx 2019 per pt\par -Foot exam: 2019\par \par Hyperlipidemia:\par -recent lipids not at goal\par -low fat/low cholesterol diet advised\par -on atorvastatin 40mg PO daily \par -will check labs\par \par Elevated LFTs:\par -will check labs\par -hepatitis B and C serologies were negative\par -Abd US revealed fatty liver\par -she was referred to hepatology-but she never went and refused to go\par \par HTN:\par -/70 today\par -on losartan/HCTZ 100/25mg PO QAM, Norvasc 10 mg PO daily, coreg 6.25mg PO BID, and hydralazine 25mg PO BID\par -given dizziness will d/c hydralazine and monitor\par -f/u 4-6 weeks for BP check\par \par Vitamin D Deficiency:\par -will check vitamin D 25-OH level\par \par Hearing loss:\par -she was seen by ENT per pt\par \par Weight loss:\par -she states she has been eating healthier\par -will check labs\par -I adv CXR\par -she refuses colon cancer screening, breast cancer screening, and GYN examm\par \par HCM:\par \par EKG 2019\par \par Flu shot: pt refused 2019\par \par Pneumovax adv: pt refused previously\par \par Prevnar 13: adv pt refused previously\par \par Tdap: adv pt refused previously\par \par Shingles vaccine adv: pt refused previously\par \par GYN/PAP: pt does not want further exams\par \par Mammogram: advised 12/3/2018: pt refused \par \par Colonoscopy: advised 12/3/2018-pt refused. \par \par I advised FIT testing-she refuses\par \par DEXA: advised: pt refused previously\par \par Depression screenin2019 PHQ 9 score 6\par \par HIV testing offered: she refused 8/3/2017\par \par F/U 4-6 weeks.  She will have labs done at lab\par

## 2019-11-13 NOTE — PHYSICAL EXAM
[No Acute Distress] : no acute distress [Well-Appearing] : well-appearing [Normal Sclera/Conjunctiva] : normal sclera/conjunctiva [Normal Voice/Communication] : normal voice/communication [Normal Oropharynx] : the oropharynx was normal [PERRL] : pupils equal round and reactive to light [Supple] : supple [No JVD] : no jugular venous distention [Thyroid Normal, No Nodules] : the thyroid was normal and there were no nodules present [No Lymphadenopathy] : no lymphadenopathy [No Respiratory Distress] : no respiratory distress  [Clear to Auscultation] : lungs were clear to auscultation bilaterally [No Accessory Muscle Use] : no accessory muscle use [Normal Rate] : normal rate  [Normal Percussion] : the chest was normal to percussion [Regular Rhythm] : with a regular rhythm [Normal S1, S2] : normal S1 and S2 [No Murmur] : no murmur heard [No Edema] : there was no peripheral edema [No Extremity Clubbing/Cyanosis] : no extremity clubbing/cyanosis [Soft] : abdomen soft [Non-distended] : non-distended [Non Tender] : non-tender [No Masses] : no abdominal mass palpated [No HSM] : no HSM [Normal Bowel Sounds] : normal bowel sounds [No Spinal Tenderness] : no spinal tenderness [No CVA Tenderness] : no CVA  tenderness [No Rash] : no rash [No Skin Lesions] : no skin lesions [No Focal Deficits] : no focal deficits [Alert and Oriented x3] : oriented to person, place, and time [Normal Mood] : the mood was normal [Normal Affect] : the affect was normal [Normal Insight/Judgement] : insight and judgment were intact [Normal Outer Ear/Nose] : the outer ears and nose were normal in appearance [Normal TMs] : both tympanic membranes were normal [Normal Nasal Mucosa] : the nasal mucosa was normal [No Carotid Bruits] : no carotid bruits [No Joint Swelling] : no joint swelling [de-identified] : no calf tenderness

## 2019-11-13 NOTE — REVIEW OF SYSTEMS
[Recent Change In Weight] : ~T recent weight change [Hearing Loss] : hearing loss [Negative] : Psychiatric [Fever] : no fever [Chills] : no chills [Earache] : no earache [Fatigue] : no fatigue [Sore Throat] : no sore throat [Nasal Discharge] : no nasal discharge [Palpitations] : no palpitations [Chest Pain] : no chest pain [Lower Ext Edema] : no lower extremity edema [Shortness Of Breath] : no shortness of breath [Cough] : no cough [Wheezing] : no wheezing [Dyspnea on Exertion] : no dyspnea on exertion [Abdominal Pain] : no abdominal pain [Nausea] : no nausea [Diarrhea] : diarrhea [Vomiting] : no vomiting [Skin Rash] : no skin rash [FreeTextEntry2] : 2 lb weight loss [de-identified] : see HPI

## 2019-11-19 ENCOUNTER — FORM ENCOUNTER (OUTPATIENT)
Age: 79
End: 2019-11-19

## 2019-11-20 ENCOUNTER — APPOINTMENT (OUTPATIENT)
Dept: MRI IMAGING | Facility: CLINIC | Age: 79
End: 2019-11-20
Payer: MEDICARE

## 2019-11-20 ENCOUNTER — OUTPATIENT (OUTPATIENT)
Dept: OUTPATIENT SERVICES | Facility: HOSPITAL | Age: 79
LOS: 1 days | End: 2019-11-20
Payer: MEDICARE

## 2019-11-20 DIAGNOSIS — Z98.89 OTHER SPECIFIED POSTPROCEDURAL STATES: Chronic | ICD-10-CM

## 2019-11-20 DIAGNOSIS — Z00.00 ENCOUNTER FOR GENERAL ADULT MEDICAL EXAMINATION WITHOUT ABNORMAL FINDINGS: ICD-10-CM

## 2019-11-20 PROCEDURE — 71046 X-RAY EXAM CHEST 2 VIEWS: CPT | Mod: 26

## 2019-11-20 PROCEDURE — 71046 X-RAY EXAM CHEST 2 VIEWS: CPT

## 2019-11-20 PROCEDURE — 70553 MRI BRAIN STEM W/O & W/DYE: CPT | Mod: 26

## 2019-11-20 PROCEDURE — 70553 MRI BRAIN STEM W/O & W/DYE: CPT

## 2019-11-20 PROCEDURE — A9585: CPT

## 2019-12-02 ENCOUNTER — APPOINTMENT (OUTPATIENT)
Dept: OPHTHALMOLOGY | Facility: CLINIC | Age: 79
End: 2019-12-02

## 2019-12-16 ENCOUNTER — FORM ENCOUNTER (OUTPATIENT)
Age: 79
End: 2019-12-16

## 2019-12-17 ENCOUNTER — OUTPATIENT (OUTPATIENT)
Dept: OUTPATIENT SERVICES | Facility: HOSPITAL | Age: 79
LOS: 1 days | End: 2019-12-17
Payer: MEDICARE

## 2019-12-17 ENCOUNTER — APPOINTMENT (OUTPATIENT)
Dept: INTERNAL MEDICINE | Facility: CLINIC | Age: 79
End: 2019-12-17
Payer: MEDICARE

## 2019-12-17 ENCOUNTER — APPOINTMENT (OUTPATIENT)
Dept: CT IMAGING | Facility: CLINIC | Age: 79
End: 2019-12-17
Payer: MEDICARE

## 2019-12-17 VITALS
HEIGHT: 62 IN | HEART RATE: 79 BPM | DIASTOLIC BLOOD PRESSURE: 72 MMHG | WEIGHT: 131 LBS | BODY MASS INDEX: 24.11 KG/M2 | TEMPERATURE: 98.6 F | SYSTOLIC BLOOD PRESSURE: 125 MMHG | OXYGEN SATURATION: 97 %

## 2019-12-17 DIAGNOSIS — R10.9 UNSPECIFIED ABDOMINAL PAIN: ICD-10-CM

## 2019-12-17 DIAGNOSIS — M54.9 DORSALGIA, UNSPECIFIED: ICD-10-CM

## 2019-12-17 DIAGNOSIS — Z98.89 OTHER SPECIFIED POSTPROCEDURAL STATES: Chronic | ICD-10-CM

## 2019-12-17 LAB
BILIRUB UR QL STRIP: NEGATIVE
GLUCOSE BLDC GLUCOMTR-MCNC: 177
GLUCOSE UR-MCNC: 100
HCG UR QL: 0.2 EU/DL
HGB UR QL STRIP.AUTO: NEGATIVE
KETONES UR-MCNC: NEGATIVE
LEUKOCYTE ESTERASE UR QL STRIP: NEGATIVE
NITRITE UR QL STRIP: NEGATIVE
PH UR STRIP: 5.5
PROT UR STRIP-MCNC: 30
SP GR UR STRIP: 1.02

## 2019-12-17 PROCEDURE — 72070 X-RAY EXAM THORAC SPINE 2VWS: CPT | Mod: 26

## 2019-12-17 PROCEDURE — 81003 URINALYSIS AUTO W/O SCOPE: CPT | Mod: QW

## 2019-12-17 PROCEDURE — 71046 X-RAY EXAM CHEST 2 VIEWS: CPT | Mod: 26

## 2019-12-17 PROCEDURE — 74176 CT ABD & PELVIS W/O CONTRAST: CPT

## 2019-12-17 PROCEDURE — 74176 CT ABD & PELVIS W/O CONTRAST: CPT | Mod: 26

## 2019-12-17 PROCEDURE — 99214 OFFICE O/P EST MOD 30 MIN: CPT | Mod: 25

## 2019-12-17 PROCEDURE — 82962 GLUCOSE BLOOD TEST: CPT

## 2019-12-17 PROCEDURE — 72070 X-RAY EXAM THORAC SPINE 2VWS: CPT

## 2019-12-17 PROCEDURE — 71046 X-RAY EXAM CHEST 2 VIEWS: CPT

## 2019-12-17 RX ORDER — SITAGLIPTIN 100 MG/1
100 TABLET, FILM COATED ORAL DAILY
Qty: 90 | Refills: 0 | Status: ACTIVE | COMMUNITY
Start: 2019-12-17 | End: 1900-01-01

## 2019-12-17 RX ORDER — CYCLOBENZAPRINE HYDROCHLORIDE 5 MG/1
5 TABLET, FILM COATED ORAL EVERY 8 HOURS
Qty: 30 | Refills: 0 | Status: ACTIVE | COMMUNITY
Start: 2019-12-17 | End: 1900-01-01

## 2019-12-17 RX ORDER — BLOOD SUGAR DIAGNOSTIC
STRIP MISCELLANEOUS TWICE DAILY
Qty: 1 | Refills: 3 | Status: ACTIVE | COMMUNITY
Start: 2019-02-12 | End: 1900-01-01

## 2019-12-17 RX ORDER — MULTIVIT-MIN/FOLIC/VIT K/LYCOP 400-300MCG
25 MCG TABLET ORAL DAILY
Qty: 90 | Refills: 1 | Status: ACTIVE | COMMUNITY
Start: 2019-12-17 | End: 1900-01-01

## 2019-12-18 LAB
APPEARANCE: CLEAR
BACTERIA: NEGATIVE
BILIRUBIN URINE: NEGATIVE
BLOOD URINE: NEGATIVE
COLOR: YELLOW
GLUCOSE QUALITATIVE U: ABNORMAL
HYALINE CASTS: 2 /LPF
KETONES URINE: NEGATIVE
LEUKOCYTE ESTERASE URINE: NEGATIVE
MICROSCOPIC-UA: NORMAL
NITRITE URINE: NEGATIVE
PH URINE: 6
PROTEIN URINE: ABNORMAL
RED BLOOD CELLS URINE: 2 /HPF
SPECIFIC GRAVITY URINE: 1.02
SQUAMOUS EPITHELIAL CELLS: 2 /HPF
UROBILINOGEN URINE: NORMAL
WHITE BLOOD CELLS URINE: 1 /HPF

## 2019-12-22 NOTE — HISTORY OF PRESENT ILLNESS
[Family Member] : family member [FreeTextEntry1] : DM management  [de-identified] : Here for DM management. \par \par She has appt with endocrinologist 1/2020. She also requests Lantus refill. She states that she has been watching her diet. \par \par She c/o moderate left sided flank pain/,id bacl pain. She states breathing, cold weather, and movement exacerbates sx. She is taking Aleve with some improvement. She denies any recent injuries or trauma. She denies urinary complaints. \par \par She will be traveling to [place] on 12/21/2019 and coming back 1/5/2020.

## 2019-12-22 NOTE — REVIEW OF SYSTEMS
[Back Pain] : back pain [Negative] : ENT [Fever] : no fever [Chills] : no chills [Fatigue] : no fatigue [Chest Pain] : no chest pain [Lower Ext Edema] : no lower extremity edema [Shortness Of Breath] : no shortness of breath [Palpitations] : no palpitations [Dyspnea on Exertion] : no dyspnea on exertion [Wheezing] : no wheezing [Cough] : no cough [Abdominal Pain] : no abdominal pain [Nausea] : no nausea [Vomiting] : no vomiting [Diarrhea] : diarrhea [Incontinence] : no incontinence [Dysuria] : no dysuria [Hematuria] : no hematuria [Frequency] : no frequency [Skin Rash] : no skin rash

## 2019-12-22 NOTE — ASSESSMENT
[FreeTextEntry1] : Left flank pain/id back pain:\par -will order x-ray of T spine\par -will order CXR\par -will order CT of abd/pelvis r/o kidney stone\par -will check urine dip: 30 protein, 1-- glucose otherwise negative\par -will tx with cyclobenzaprine 5mg PO 1 tab every 8 hrs prn \par -continue aleve prn\par \par Low back pain:\par -no sx currently\par -S/P lumbar laminectomy with microdiskectomy\par \par Dizziness: \par -sx have resolved\par -MRI of brain showed mild to mod microvascular ischemic changes-I discussed adding ASA 81mg PO daily but will hold off for now as she is taking aleve-once her back pain resolves and she is no longer taking NSAIDS should start ASA 81mg PO daily\par -I have advised she see cardiology-she states she will make appt\par -she will notify me if sx recur\par -if sx persist she should f/u with her ENT Dr Carver\par \par CAD:\par -mild CAD noted on cath\par -advised ASA 81mg PO daily once she stops NSAIDS\par -she is on atorvastatin\par -I have advised she see cardiology\par -recent lipids not at goal--I have advised she increase atorvastatin to 80mg PO daily\par \par DM: last HgA1c 9.6 2019\par - FS today was 171\par -she admits to eating bread and oatmeal\par -I advised low fat/low cholesterol and low carb diet\par -she should stop eating bread and oatmeal and increase protein and vegetables in diet\par -on lantus 36 units QHS,  metformin 1000mg PO BID \par -tx options for DM disucssed\par -will add Januvia 100mg PO 1 tab daily  (R/B/A/side effects discussed) \par -she was previously referred to Endocrine- she has appt 2020\par -A:C 48 2019 on losartan\par -on atorvastatin\par -Ophthalmology approx 2019 per pt\par -Foot exam: 2019\par \par Hyperlipidemia:\par -recent lipids not at goal--I have advised she increase atorvastatin to 80mg PO daily\par -low fat/low cholesterol diet advised\par \par Elevated LFTs:\par -ALT 55\par -hepatitis B and C serologies were negative\par -Abd US revealed fatty liver\par -she was referred to hepatology-but she never went and refused to go\par \par HTN:\par -BP at goal today\par -on losartan/HCTZ 100/25mg PO QAM, Norvasc 10 mg PO daily, coreg 6.25mg PO BID\par \par Vitamin D Deficiency:\par -I advised vitamin D3 1000 units daily\par \par Weight loss:\par -weight up 4 lbs since last visit\par -CXR was normal 2019\par -she refuses colon cancer screening, breast cancer screening, and GYN exam\par \par HCM:\par \par EKG 2019\par \par Flu shot: pt refused 2019\par \par Pneumovax adv: pt refused previously\par \par Prevnar 13: adv pt refused previously\par \par Tdap: adv pt refused previously\par \par Shingles vaccine adv: pt refused previously\par \par GYN/PAP: pt does not want further exams\par \par Mammogram: advised 12/3/2018: pt refused \par \par Colonoscopy: advised 12/3/2018-pt refused. \par \par I advised FIT testing-she refused\par \par DEXA: advised: pt refused previously\par \par Depression screenin2019 PHQ 9 score 6-she does not feel depressed or anxious\par \par HIV testing offered: she refused 8/3/2017\par \par F/U 4-6 weeks.  \par

## 2019-12-22 NOTE — END OF VISIT
[FreeTextEntry3] : All medical entries made by the Scribe were at my, Dr. Ge Reed's, direction and personally dictated by me on [12/17/2019]. I have reviewed the chart and agree that the record accurately reflects my personal performance of the history, physical exam, assessment and plan. I have also personally directed, reviewed, and agreed with the chart.\par

## 2019-12-22 NOTE — ADDENDUM
[FreeTextEntry1] : I, Antionette Wyatt, acted solely as a scribe for Dr. Reed on this date [12/17/2019].\par

## 2019-12-22 NOTE — PHYSICAL EXAM
[Normal Outer Ear/Nose] : the outer ears and nose were normal in appearance [Normal TMs] : both tympanic membranes were normal [No JVD] : no jugular venous distention [Normal Nasal Mucosa] : the nasal mucosa was normal [Supple] : supple [No Lymphadenopathy] : no lymphadenopathy [Thyroid Normal, No Nodules] : the thyroid was normal and there were no nodules present [No Respiratory Distress] : no respiratory distress  [Clear to Auscultation] : lungs were clear to auscultation bilaterally [Normal Percussion] : the chest was normal to percussion [No Accessory Muscle Use] : no accessory muscle use [Regular Rhythm] : with a regular rhythm [Normal Rate] : normal rate  [Normal S1, S2] : normal S1 and S2 [No Murmur] : no murmur heard [No Carotid Bruits] : no carotid bruits [No CVA Tenderness] : no CVA  tenderness [No Joint Swelling] : no joint swelling [No Acute Distress] : no acute distress [Well-Appearing] : well-appearing [Normal Voice/Communication] : normal voice/communication [Normal Sclera/Conjunctiva] : normal sclera/conjunctiva [PERRL] : pupils equal round and reactive to light [Normal Oropharynx] : the oropharynx was normal [Normal] : normal rate, regular rhythm, normal S1 and S2 and no murmur heard [No Edema] : there was no peripheral edema [No Extremity Clubbing/Cyanosis] : no extremity clubbing/cyanosis [Non Tender] : non-tender [Soft] : abdomen soft [No Masses] : no abdominal mass palpated [Non-distended] : non-distended [No HSM] : no HSM [Normal Bowel Sounds] : normal bowel sounds [No Spinal Tenderness] : no spinal tenderness [No Rash] : no rash [No Skin Lesions] : no skin lesions [No Focal Deficits] : no focal deficits [Alert and Oriented x3] : oriented to person, place, and time [Normal Affect] : the affect was normal [Normal Insight/Judgement] : insight and judgment were intact [Normal Mood] : the mood was normal [Grossly Normal Strength/Tone] : grossly normal strength/tone [de-identified] : Left sided CVA tenderness  [de-identified] : no calf tenderness

## 2020-01-15 ENCOUNTER — APPOINTMENT (OUTPATIENT)
Dept: ENDOCRINOLOGY | Facility: CLINIC | Age: 80
End: 2020-01-15
Payer: MEDICARE

## 2020-01-15 VITALS
BODY MASS INDEX: 24.84 KG/M2 | SYSTOLIC BLOOD PRESSURE: 110 MMHG | HEIGHT: 62 IN | WEIGHT: 135 LBS | DIASTOLIC BLOOD PRESSURE: 80 MMHG

## 2020-01-15 PROCEDURE — 99204 OFFICE O/P NEW MOD 45 MIN: CPT

## 2020-01-15 RX ORDER — LANCETS 28 GAUGE
EACH MISCELLANEOUS
Qty: 3 | Refills: 0 | Status: ACTIVE | COMMUNITY
Start: 2020-01-15 | End: 1900-01-01

## 2020-01-15 RX ORDER — BLOOD SUGAR DIAGNOSTIC
STRIP MISCELLANEOUS 3 TIMES DAILY
Qty: 1 | Refills: 5 | Status: DISCONTINUED | COMMUNITY
Start: 2018-12-11 | End: 2020-01-15

## 2020-01-15 RX ORDER — BLOOD SUGAR DIAGNOSTIC
STRIP MISCELLANEOUS
Qty: 3 | Refills: 1 | Status: ACTIVE | COMMUNITY
Start: 2020-01-15 | End: 1900-01-01

## 2020-01-15 RX ORDER — BLOOD-GLUCOSE METER
KIT MISCELLANEOUS
Qty: 1 | Refills: 0 | Status: ACTIVE | COMMUNITY
Start: 2020-01-15 | End: 1900-01-01

## 2020-01-15 RX ORDER — TRIAMCINOLONE ACETONIDE 1 MG/G
0.1 CREAM TOPICAL
Qty: 1 | Refills: 0 | Status: DISCONTINUED | COMMUNITY
Start: 2017-11-02 | End: 2020-01-15

## 2020-01-15 NOTE — HISTORY OF PRESENT ILLNESS
[FreeTextEntry1] : referred here by PCP for dm2 management \par diagnosed 2015 ]\par severity mild \par

## 2020-01-15 NOTE — ASSESSMENT
[Carbohydrate Consistent Diet] : carbohydrate consistent diet [Diabetes Foot Care] : diabetes foot care [Insulin Self-Administration] : insulin self-administration [Importance of Diet and Exercise] : importance of diet and exercise to improve glycemic control, achieve weight loss and improve cardiovascular health [Self Monitoring of Blood Glucose] : self monitoring of blood glucose [Retinopathy Screening] : Patient was referred to ophthalmology for retinopathy screening [FreeTextEntry1] : DM2 severely uncontrolled A1c 12.2 in patient HTN, HLD. \par \par Dm2 : bgs severely high Bgs 300s. she needs meal insulin \par continue MF 1000 mg BID \par stop januvia \par continue lantus 36 u HS  \par start humalog  3 u TID w meals\par check bgs 4 x day and adjust doses of insulins and send me logbook in 1 week.\par CDE for diet education and logbook review. consider socorro \par discussed diet and exercise\par encouraged more exercise walking 30 min 3 x week\par Discussed complications of diabetes at length including MI/CVA/ESRD/dialysis/blindness/amputations/infections\par Importance of blood glucose monitoring discussed. Targets reviewed. Recommended pt try to keep blood glucose level below 130 (110) before meals and below 160 (140) two hours after meals.\par slightly high LFts: ENGLISH. will m onitor. \par check microalb spot\par GFr normal. \par \par HTN :  bp at target on meds. Continue current management.\par I advised low fat/low cholesterol diet, low salt diet, and weight loss\par \par HLD: LDL at target.\par low fat/low cholesterol diet and weight loss advised\par continue statins \par \par vitamin d defic: just started ergoc weekly recently. \par \par

## 2020-01-15 NOTE — PHYSICAL EXAM
[Alert] : alert [No Acute Distress] : no acute distress [Well Nourished] : well nourished [Well Developed] : well developed [Normal Sclera/Conjunctiva] : normal sclera/conjunctiva [EOMI] : extra ocular movement intact [Normal Oropharynx] : the oropharynx was normal [No Proptosis] : no proptosis [Thyroid Not Enlarged] : the thyroid was not enlarged [No Thyroid Nodules] : there were no palpable thyroid nodules [No Accessory Muscle Use] : no accessory muscle use [No Respiratory Distress] : no respiratory distress [Clear to Auscultation] : lungs were clear to auscultation bilaterally [Normal Rate] : heart rate was normal  [Normal S1, S2] : normal S1 and S2 [Regular Rhythm] : with a regular rhythm [Pedal Pulses Normal] : the pedal pulses are present [No Edema] : there was no peripheral edema [Normal Bowel Sounds] : normal bowel sounds [Not Tender] : non-tender [Soft] : abdomen soft [Not Distended] : not distended [Anterior Cervical Nodes] : anterior cervical nodes [Post Cervical Nodes] : posterior cervical nodes [Normal] : normal and non tender [No Spinal Tenderness] : no spinal tenderness [No Stigmata of Cushings Syndrome] : no stigmata of cushings syndrome [Spine Straight] : spine straight [Normal Gait] : normal gait [Normal Strength/Tone] : muscle strength and tone were normal [No Rash] : no rash [No Tremors] : no tremors [Oriented x3] : oriented to person, place, and time [Acanthosis Nigricans] : no acanthosis nigricans

## 2020-01-28 ENCOUNTER — APPOINTMENT (OUTPATIENT)
Dept: INTERNAL MEDICINE | Facility: CLINIC | Age: 80
End: 2020-01-28

## 2020-01-29 ENCOUNTER — APPOINTMENT (OUTPATIENT)
Dept: ENDOCRINOLOGY | Facility: CLINIC | Age: 80
End: 2020-01-29

## 2020-02-09 ENCOUNTER — RX RENEWAL (OUTPATIENT)
Age: 80
End: 2020-02-09

## 2020-03-07 NOTE — HISTORY OF PRESENT ILLNESS
[FreeTextEntry8] : Here today with her daughter for evaluation of severe right lower leg pain.  She states sx started about 10 days ago.  She denies truama or falls.  She states she went to ER 3/21/2019 at [facility] and had xray of tib/fib and right lower extremity venous US.  both studies were negative.  She was advised to take ibuprofen 600mg Po q 8 hours prn and advised to see orthopedics.  They have Orthopedics appt for next week.  \par \par She states her pain is severe and located on lateral right calf.  She describes it as burning.  She states she can not sleep at night or find a comfortable position to sleep.  She has been using ibuprofen and tylenol and it is not helping at all.  She is requesting something stronger for the pain\par \par She denies low back pain.  She denies leg weakness.  No denies fever/chills.  She denies urinary complaints\par \par She states because of pain she has not been eating much.\par \par She has been applying heating pads of right lower leg.  She states she has mild superficial burns on legs from the heating pads. Never smoker

## 2020-03-24 ENCOUNTER — APPOINTMENT (OUTPATIENT)
Dept: OTOLARYNGOLOGY | Facility: CLINIC | Age: 80
End: 2020-03-24

## 2020-04-23 ENCOUNTER — APPOINTMENT (OUTPATIENT)
Dept: ENDOCRINOLOGY | Facility: CLINIC | Age: 80
End: 2020-04-23

## 2020-06-01 ENCOUNTER — OUTPATIENT (OUTPATIENT)
Dept: OUTPATIENT SERVICES | Facility: HOSPITAL | Age: 80
LOS: 1 days | End: 2020-06-01
Payer: MEDICARE

## 2020-06-01 DIAGNOSIS — Z98.89 OTHER SPECIFIED POSTPROCEDURAL STATES: Chronic | ICD-10-CM

## 2020-06-15 PROCEDURE — G9001: CPT

## 2020-07-02 DIAGNOSIS — Z71.89 OTHER SPECIFIED COUNSELING: ICD-10-CM

## 2020-08-06 ENCOUNTER — NON-APPOINTMENT (OUTPATIENT)
Age: 80
End: 2020-08-06

## 2020-08-06 ENCOUNTER — APPOINTMENT (OUTPATIENT)
Dept: INTERNAL MEDICINE | Facility: CLINIC | Age: 80
End: 2020-08-06
Payer: MEDICARE

## 2020-08-06 VITALS
SYSTOLIC BLOOD PRESSURE: 142 MMHG | OXYGEN SATURATION: 98 % | HEART RATE: 75 BPM | BODY MASS INDEX: 23.55 KG/M2 | RESPIRATION RATE: 14 BRPM | DIASTOLIC BLOOD PRESSURE: 82 MMHG | WEIGHT: 128 LBS | HEIGHT: 62 IN | TEMPERATURE: 98.2 F

## 2020-08-06 DIAGNOSIS — R07.81 PLEURODYNIA: ICD-10-CM
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
## 2020-08-06 DIAGNOSIS — Z13.31 ENCOUNTER FOR SCREENING FOR DEPRESSION: ICD-10-CM

## 2020-08-06 DIAGNOSIS — R63.4 ABNORMAL WEIGHT LOSS: ICD-10-CM

## 2020-08-06 DIAGNOSIS — R94.31 ABNORMAL ELECTROCARDIOGRAM [ECG] [EKG]: ICD-10-CM

## 2020-08-06 LAB — GLUCOSE BLDC GLUCOMTR-MCNC: 302

## 2020-08-06 PROCEDURE — 99215 OFFICE O/P EST HI 40 MIN: CPT | Mod: 25

## 2020-08-06 PROCEDURE — 36415 COLL VENOUS BLD VENIPUNCTURE: CPT

## 2020-08-06 PROCEDURE — 93000 ELECTROCARDIOGRAM COMPLETE: CPT | Mod: 59

## 2020-08-06 PROCEDURE — G0444 DEPRESSION SCREEN ANNUAL: CPT | Mod: 59

## 2020-08-06 PROCEDURE — 82962 GLUCOSE BLOOD TEST: CPT

## 2020-08-06 RX ORDER — GABAPENTIN 100 MG/1
100 CAPSULE ORAL
Qty: 60 | Refills: 0 | Status: ACTIVE | COMMUNITY
Start: 2019-08-14 | End: 1900-01-01

## 2020-08-06 RX ORDER — VALACYCLOVIR 1 G/1
1 TABLET, FILM COATED ORAL 3 TIMES DAILY
Qty: 21 | Refills: 0 | Status: ACTIVE | COMMUNITY
Start: 2020-08-06 | End: 1900-01-01

## 2020-08-06 RX ORDER — ACETAMINOPHEN 500 MG/1
500 TABLET, COATED ORAL
Qty: 60 | Refills: 0 | Status: ACTIVE | COMMUNITY
Start: 2020-08-06 | End: 1900-01-01

## 2020-08-09 PROBLEM — R63.4 WEIGHT LOSS, UNINTENTIONAL: Status: ACTIVE | Noted: 2018-12-03

## 2020-08-09 PROBLEM — R94.31 ABNORMAL EKG: Status: ACTIVE | Noted: 2018-02-21

## 2020-08-10 PROBLEM — Z13.31 DEPRESSION SCREENING: Status: ACTIVE | Noted: 2020-08-10

## 2020-08-10 NOTE — ASSESSMENT
[FreeTextEntry1] : Right rib pain s/p fall:\par -will check xray to r/o fracture\par \par Zoster:\par -she does have vesicular rash on right lower abdomen which could also explain pain along ribs\par -will prescribe tylenol and gabapentin for pain\par -will given 7 day course of vatlrex\par -discssed possibility of PHN\par -will check labs\par \par CAD:\par -mild CAD noted on cath\par -previously ASA 81mg PO daily \par -she is on atorvastatin\par -I have again advised she see cardiology\par -will check labs\par -EKG today NSR at 70 with APC, non specific T wave abnormalities-no sig change\par \par DM: last HgA1c 12.2\par -Glucose \par -I advised low fat/low cholesterol and low carb diet\par -on lantus, Humalog, Januvia and metformin per Endocrine\par -I have advised her she needs to f/u with Endocrine\par -A:C 48 8/2019 on losartan\par -on atorvastatin\par -Ophthalmology approx 7/2019 per pt\par -Foot exam: 4/4/2019\par -will check labs\par \par Hyperlipidemia:\par -will check labs\par -on atorvastatin\par \par Elevated LFTs:\par -will check labs\par -hepatitis B and C serologies were negative\par -Abd US revealed fatty liver\par -she was referred to hepatology-but she never went and refused to go\par \par HTN:\par -.80 today but she is in pain\par -on losartan/HCTZ 100/25mg PO QAM, Norvasc 10 mg PO daily, coreg 6.25mg PO BID\par \par Vitamin D Deficiency:\par -will check vitamin D 25-OH\par \par Weight loss:\par -she lost 7 lbs\par -CXR was normal 11/2019\par -she refuses colon cancer screening, breast cancer screening, and GYN exams\par -will check labs\par -f/u 4 weeks\par \par Uterine fibroid:\par -noted on previous CT\par \par Left kidney 11mm fluid attenuation lesion noted on previous CT\par -will have her f/u in 4 weeks and will discuss f/u imaging at next visit\par \par Laceration on right thumb:\par -no cellulitis\par -I advised Tdap but she refused-discussed tetanus risk-she still refuled\par -she is to wash area with warm soap and water and can apply otc bacitracin oint\par \par HCM:\par \par Depression screening: PHQ 2 score 0 8/6/2020\par \par EKG 8/6/2020\par \par Flu shot: pt refused 11/13/2019\par \par Pneumovax adv: pt refused previously\par \par Prevnar 13: adv pt refused previously\par \par Tdap: adv pt refused previously\par \par Shingles vaccine adv: pt refused previously\par \par GYN/PAP: pt does not want further exams\par \par Mammogram: advised 12/3/2018: pt refused \par \par Colonoscopy: advised 12/3/2018-pt refused. \par \par I advised FIT testing-she refused\par \par DEXA: advised: pt refused previously\par \par HIV testing offered: she refused 8/3/2017\par \par Covid antibody test offered: she refused\par \par F/U 4-6 weeks.  \par

## 2020-08-10 NOTE — PHYSICAL EXAM
[No Acute Distress] : no acute distress [Well-Appearing] : well-appearing [Normal Sclera/Conjunctiva] : normal sclera/conjunctiva [Normal Voice/Communication] : normal voice/communication [PERRL] : pupils equal round and reactive to light [Normal Oropharynx] : the oropharynx was normal [Normal] : normal rate, regular rhythm, normal S1 and S2 and no murmur heard [No Extremity Clubbing/Cyanosis] : no extremity clubbing/cyanosis [No Edema] : there was no peripheral edema [Soft] : abdomen soft [Non Tender] : non-tender [Non-distended] : non-distended [No Masses] : no abdominal mass palpated [No HSM] : no HSM [Normal Bowel Sounds] : normal bowel sounds [No Spinal Tenderness] : no spinal tenderness [Grossly Normal Strength/Tone] : grossly normal strength/tone [No Focal Deficits] : no focal deficits [Normal Affect] : the affect was normal [Alert and Oriented x3] : oriented to person, place, and time [Normal Mood] : the mood was normal [Normal Insight/Judgement] : insight and judgment were intact [No CVA Tenderness] : no CVA  tenderness [de-identified] : NC/AT [de-identified] : no calf tenderness [de-identified] : + right lower anterior and lateral rib tenderness, no deformity [de-identified] : She has vesicular rash on her right lower abdoemn which does not cross midline, right thumb with smal 0.5 cm laceration-no erythema, no tenderness, no discharge

## 2020-08-10 NOTE — HISTORY OF PRESENT ILLNESS
[FreeTextEntry8] : Here today for evaluation if right sided raib pain.  She states she fell in her bathroom approx 10 days ago.  She states she was getting out of shower and stepped onto wet floor and slipped.  She fell and her right ribs hit the toilet.  She states she continues to have right rib pain.  She states it is jabbing and lectric like in quality.  She states massage makes it better.  She has taken advil with no significant improvement. She denies loss of consciousness or head trauma with fall\par \par She also reports  rash on her right lower abdomen x 3 days\par \par She also reports she cut right thumb with a piece of glass a few days ago.  She states it is healing and does not hurt much

## 2020-08-10 NOTE — REVIEW OF SYSTEMS
[Negative] : Neurological [Fever] : no fever [Chills] : no chills [Fatigue] : no fatigue [Chest Pain] : no chest pain [Lower Ext Edema] : no lower extremity edema [Palpitations] : no palpitations [Shortness Of Breath] : no shortness of breath [Wheezing] : no wheezing [Cough] : no cough [Dyspnea on Exertion] : no dyspnea on exertion [Abdominal Pain] : no abdominal pain [Nausea] : no nausea [Diarrhea] : diarrhea [Vomiting] : no vomiting [Dysuria] : no dysuria [Hematuria] : no hematuria [Incontinence] : no incontinence [Skin Rash] : no skin rash [Frequency] : no frequency [Headache] : no headache [Dizziness] : no dizziness [FreeTextEntry2] : 7 lb weight loss [de-identified] : see HPI [FreeTextEntry9] : see HPI

## 2020-08-11 LAB
25(OH)D3 SERPL-MCNC: 33 NG/ML
ALBUMIN SERPL ELPH-MCNC: 5.1 G/DL
ALP BLD-CCNC: 115 U/L
ALT SERPL-CCNC: 93 U/L
ANION GAP SERPL CALC-SCNC: 16 MMOL/L
APPEARANCE: CLEAR
AST SERPL-CCNC: 82 U/L
BACTERIA: NEGATIVE
BASOPHILS # BLD AUTO: 0.02 K/UL
BASOPHILS NFR BLD AUTO: 0.3 %
BILIRUB SERPL-MCNC: 0.6 MG/DL
BILIRUBIN URINE: NEGATIVE
BLOOD URINE: NEGATIVE
BUN SERPL-MCNC: 16 MG/DL
CALCIUM SERPL-MCNC: 10 MG/DL
CHLORIDE SERPL-SCNC: 97 MMOL/L
CHOLEST SERPL-MCNC: 251 MG/DL
CHOLEST/HDLC SERPL: 5.3 RATIO
CO2 SERPL-SCNC: 23 MMOL/L
COLOR: YELLOW
CREAT SERPL-MCNC: 0.62 MG/DL
CREAT SPEC-SCNC: 87 MG/DL
EOSINOPHIL # BLD AUTO: 0.13 K/UL
EOSINOPHIL NFR BLD AUTO: 2.1 %
ERYTHROCYTE [SEDIMENTATION RATE] IN BLOOD BY WESTERGREN METHOD: 9 MM/HR
ESTIMATED AVERAGE GLUCOSE: 232 MG/DL
GLUCOSE QUALITATIVE U: ABNORMAL
GLUCOSE SERPL-MCNC: 299 MG/DL
HBA1C MFR BLD HPLC: 9.7 %
HCT VFR BLD CALC: 48.2 %
HDLC SERPL-MCNC: 47 MG/DL
HGB BLD-MCNC: 15.6 G/DL
HYALINE CASTS: 0 /LPF
IMM GRANULOCYTES NFR BLD AUTO: 0.2 %
KETONES URINE: NORMAL
LDLC SERPL CALC-MCNC: 167 MG/DL
LEUKOCYTE ESTERASE URINE: NEGATIVE
LYMPHOCYTES # BLD AUTO: 1.39 K/UL
LYMPHOCYTES NFR BLD AUTO: 22.1 %
MAN DIFF?: NORMAL
MCHC RBC-ENTMCNC: 29.2 PG
MCHC RBC-ENTMCNC: 32.4 GM/DL
MCV RBC AUTO: 90.3 FL
MICROALBUMIN 24H UR DL<=1MG/L-MCNC: 3.4 MG/DL
MICROALBUMIN/CREAT 24H UR-RTO: 39 MG/G
MICROSCOPIC-UA: NORMAL
MONOCYTES # BLD AUTO: 0.29 K/UL
MONOCYTES NFR BLD AUTO: 4.6 %
NEUTROPHILS # BLD AUTO: 4.44 K/UL
NEUTROPHILS NFR BLD AUTO: 70.7 %
NITRITE URINE: NEGATIVE
PH URINE: 5.5
PLATELET # BLD AUTO: 226 K/UL
POTASSIUM SERPL-SCNC: 4.4 MMOL/L
PROT SERPL-MCNC: 7.5 G/DL
PROTEIN URINE: NORMAL
RBC # BLD: 5.34 M/UL
RBC # FLD: 13.2 %
RED BLOOD CELLS URINE: 5 /HPF
SODIUM SERPL-SCNC: 136 MMOL/L
SPECIFIC GRAVITY URINE: 1.04
SQUAMOUS EPITHELIAL CELLS: 1 /HPF
T4 FREE SERPL-MCNC: 1.5 NG/DL
TRIGL SERPL-MCNC: 181 MG/DL
TSH SERPL-ACNC: 0.55 UIU/ML
UROBILINOGEN URINE: NORMAL
WBC # FLD AUTO: 6.28 K/UL
WHITE BLOOD CELLS URINE: 3 /HPF

## 2020-08-12 ENCOUNTER — OUTPATIENT (OUTPATIENT)
Dept: OUTPATIENT SERVICES | Facility: HOSPITAL | Age: 80
LOS: 1 days | End: 2020-08-12
Payer: MEDICARE

## 2020-08-12 DIAGNOSIS — R07.81 PLEURODYNIA: ICD-10-CM

## 2020-08-12 DIAGNOSIS — Z98.89 OTHER SPECIFIED POSTPROCEDURAL STATES: Chronic | ICD-10-CM

## 2020-08-12 PROCEDURE — 71100 X-RAY EXAM RIBS UNI 2 VIEWS: CPT | Mod: 26,RT

## 2020-08-13 ENCOUNTER — RX RENEWAL (OUTPATIENT)
Age: 80
End: 2020-08-13

## 2020-08-19 ENCOUNTER — OUTPATIENT (OUTPATIENT)
Dept: OUTPATIENT SERVICES | Facility: HOSPITAL | Age: 80
LOS: 1 days | End: 2020-08-19
Payer: MEDICARE

## 2020-08-19 ENCOUNTER — APPOINTMENT (OUTPATIENT)
Dept: ULTRASOUND IMAGING | Facility: CLINIC | Age: 80
End: 2020-08-19
Payer: MEDICARE

## 2020-08-19 DIAGNOSIS — Z00.8 ENCOUNTER FOR OTHER GENERAL EXAMINATION: ICD-10-CM

## 2020-08-19 DIAGNOSIS — Z98.89 OTHER SPECIFIED POSTPROCEDURAL STATES: Chronic | ICD-10-CM

## 2020-08-19 PROCEDURE — 76700 US EXAM ABDOM COMPLETE: CPT

## 2020-08-19 PROCEDURE — 76700 US EXAM ABDOM COMPLETE: CPT | Mod: 26

## 2020-09-03 ENCOUNTER — APPOINTMENT (OUTPATIENT)
Dept: INTERNAL MEDICINE | Facility: CLINIC | Age: 80
End: 2020-09-03
Payer: MEDICARE

## 2020-09-03 VITALS
BODY MASS INDEX: 25.13 KG/M2 | DIASTOLIC BLOOD PRESSURE: 80 MMHG | SYSTOLIC BLOOD PRESSURE: 134 MMHG | WEIGHT: 128 LBS | HEIGHT: 60 IN | OXYGEN SATURATION: 96 % | HEART RATE: 78 BPM | TEMPERATURE: 98.3 F | RESPIRATION RATE: 14 BRPM

## 2020-09-03 DIAGNOSIS — B02.9 ZOSTER W/OUT COMPLICATIONS: ICD-10-CM

## 2020-09-03 DIAGNOSIS — Z91.14 PATIENT'S OTHER NONCOMPLIANCE WITH MEDICATION REGIMEN: ICD-10-CM

## 2020-09-03 DIAGNOSIS — S22.39XA FRACTURE OF ONE RIB, UNSPECIFIED SIDE, INITIAL ENCOUNTER FOR CLOSED FRACTURE: ICD-10-CM

## 2020-09-03 DIAGNOSIS — S61.219A LACERATION W/OUT FOREIGN BODY OF UNSPECIFIED FINGER W/OUT DAMAGE TO NAIL, INITIAL ENCOUNTER: ICD-10-CM

## 2020-09-03 PROCEDURE — 36415 COLL VENOUS BLD VENIPUNCTURE: CPT

## 2020-09-03 PROCEDURE — 99214 OFFICE O/P EST MOD 30 MIN: CPT | Mod: 25

## 2020-09-07 PROBLEM — Z91.14 NONCOMPLIANCE WITH MEDICATIONS: Status: ACTIVE | Noted: 2018-02-21

## 2020-09-07 PROBLEM — S22.39XA RIB FRACTURES: Status: ACTIVE | Noted: 2020-08-13

## 2020-09-07 PROBLEM — S61.219A FINGER LACERATION: Status: RESOLVED | Noted: 2020-08-09 | Resolved: 2020-09-07

## 2020-09-07 PROBLEM — B02.9 HERPES ZOSTER: Status: ACTIVE | Noted: 2020-08-06

## 2020-09-07 NOTE — REVIEW OF SYSTEMS
[Negative] : Integumentary [Fever] : no fever [Chills] : no chills [Fatigue] : no fatigue [Chest Pain] : no chest pain [Palpitations] : no palpitations [Lower Ext Edema] : no lower extremity edema [Shortness Of Breath] : no shortness of breath [Wheezing] : no wheezing [Cough] : no cough [Dyspnea on Exertion] : no dyspnea on exertion [Abdominal Pain] : no abdominal pain [Nausea] : no nausea [Diarrhea] : diarrhea [Vomiting] : no vomiting [Dysuria] : no dysuria [Incontinence] : no incontinence [Hematuria] : no hematuria [Frequency] : no frequency [Skin Rash] : no skin rash

## 2020-09-07 NOTE — PHYSICAL EXAM
[Well-Appearing] : well-appearing [Normal Voice/Communication] : normal voice/communication [No Acute Distress] : no acute distress [PERRL] : pupils equal round and reactive to light [Normal Oropharynx] : the oropharynx was normal [Normal Sclera/Conjunctiva] : normal sclera/conjunctiva [Normal] : no respiratory distress, lungs were clear to auscultation bilaterally and no accessory muscle use [Soft] : abdomen soft [No Extremity Clubbing/Cyanosis] : no extremity clubbing/cyanosis [No Edema] : there was no peripheral edema [Non-distended] : non-distended [Non Tender] : non-tender [Normal Bowel Sounds] : normal bowel sounds [No Masses] : no abdominal mass palpated [No HSM] : no HSM [No Spinal Tenderness] : no spinal tenderness [No Focal Deficits] : no focal deficits [Normal Affect] : the affect was normal [Alert and Oriented x3] : oriented to person, place, and time [Normal Insight/Judgement] : insight and judgment were intact [Normal Mood] : the mood was normal [No Skin Lesions] : no skin lesions [No Rash] : no rash

## 2020-09-07 NOTE — ASSESSMENT
[FreeTextEntry1] : Right rib pain s/p fall:\par -xray showed 10th rib fracture and she was referred to orthopedics but she never went\par -she reports sx have significantly improved and she no longer has pain\par \par Zoster:\par -sx have resolved\par \par CAD:\par -mild CAD noted on cath\par -previously ASA 81mg PO daily \par -she self d/c her atorvastatin which I advised she restart\par -I have again advised she see cardiology\par -recent lipids not at goal\par \par DM: last HgA1c  9.7 8/2020\par -I advised low fat/low cholesterol and low carb diet\par -She is supposed to be on lantus 36 units at bedtime, Humalog 3 units prior to meals, Januvia and metformin per Endocrine\par -she appears to only be taking Humalog 20 units once a day because she states that is what she was told to take by Endocrine-I reviewed last Endocrine note with her and explained to her how she is lewis[posed to be taking her medications\par -I discussed dangers of improperly using insulin \par -I have advised her she needs to f/u with Endocrine\par -I advised her and daughter in law to call me once they are home and let me know exactly what meds she is actually taking at home and doses\par -A:C 39 8/2020on losartan\par -on atorvastatin\par -Ophthalmology approx 7/2019 per pt\par -Foot exam: 4/4/2019-will check at next visit\par -will check labs\par \par Hyperlipidemia:\par -lipids not at goal but she appears to not be taking her atorvastatin-I advised she restart\par -she needs to adhere to low fat/low cholesterol diet\par \par Elevated LFTs:\par -last AST/ALT 82/93\par -hepatitis B and C serologies were negative\par -recent Abd US revealed fatty liver 8/2020\par -she was referred to hepatology-but she never went and refused to go\par \par HTN:\par -/80\par -on losartan/HCTZ 100/25mg PO QAM, Norvasc 10 mg PO daily, coreg 6.25mg PO BID but unclear if she is taking these meds or not\par -they will call me back with names of all meds she is taking\par \par Vitamin D Deficiency:\par -vitamin D 25-OH was normal 8/2020\par \par Elevated H/H:\par -will repeat labs\par \par Weight loss:\par -weight stable today\par -CXR was normal 11/2019\par -she refuses colon cancer screening, breast cancer screening, and GYN exams\par -will monitor\par \par Left kidney 11mm fluid attenuation lesion noted on previous CT\par -abdominal US 8/2020 revealed kidney cyst\par \par Microscopic hematuria:\par -will check UA and urine cx\par \par HCM:\par \par Depression screening: PHQ 2 score 0 8/6/2020\par \par EKG 8/6/2020\par \par Flu shot: pt refused 9/3/2020\par \par Pneumovax adv: pt refused previously\par \par Prevnar 13: adv pt refused previously\par \par Tdap: adv pt refused previously\par \par Shingles vaccine adv: pt refused previously\par \par GYN/PAP: pt does not want further exams\par \par Mammogram: advised 12/3/2018: pt refused \par \par Colonoscopy: advised 12/3/2018-pt refused. \par \par FIT testing-she refused previously\par \par DEXA: advised: pt refused previously\par \par HIV testing offered: she refused 8/3/2017\par \par F/U 4 weeks.  \par

## 2020-09-10 LAB — BACTERIA UR CULT: NORMAL

## 2020-09-12 LAB
ALBUMIN SERPL ELPH-MCNC: 4.7 G/DL
ALP BLD-CCNC: 100 U/L
ALT SERPL-CCNC: 71 U/L
ANION GAP SERPL CALC-SCNC: 15 MMOL/L
APPEARANCE: CLEAR
AST SERPL-CCNC: 43 U/L
BACTERIA: NEGATIVE
BASOPHILS # BLD AUTO: 0.02 K/UL
BASOPHILS NFR BLD AUTO: 0.3 %
BILIRUB SERPL-MCNC: 0.5 MG/DL
BILIRUBIN URINE: NEGATIVE
BLOOD URINE: ABNORMAL
BUN SERPL-MCNC: 18 MG/DL
CALCIUM SERPL-MCNC: 9.9 MG/DL
CHLORIDE SERPL-SCNC: 97 MMOL/L
CO2 SERPL-SCNC: 23 MMOL/L
COLOR: YELLOW
CREAT SERPL-MCNC: 0.67 MG/DL
EOSINOPHIL # BLD AUTO: 0.09 K/UL
EOSINOPHIL NFR BLD AUTO: 1.5 %
GLUCOSE QUALITATIVE U: ABNORMAL
GLUCOSE SERPL-MCNC: 424 MG/DL
HCT VFR BLD CALC: 43.9 %
HGB BLD-MCNC: 14.7 G/DL
HYALINE CASTS: 1 /LPF
IMM GRANULOCYTES NFR BLD AUTO: 0.2 %
KETONES URINE: NEGATIVE
LEUKOCYTE ESTERASE URINE: NEGATIVE
LYMPHOCYTES # BLD AUTO: 1.14 K/UL
LYMPHOCYTES NFR BLD AUTO: 19.5 %
MAN DIFF?: NORMAL
MCHC RBC-ENTMCNC: 29.9 PG
MCHC RBC-ENTMCNC: 33.5 GM/DL
MCV RBC AUTO: 89.2 FL
MICROSCOPIC-UA: NORMAL
MONOCYTES # BLD AUTO: 0.34 K/UL
MONOCYTES NFR BLD AUTO: 5.8 %
NEUTROPHILS # BLD AUTO: 4.24 K/UL
NEUTROPHILS NFR BLD AUTO: 72.7 %
NITRITE URINE: NEGATIVE
PH URINE: 5.5
PLATELET # BLD AUTO: 241 K/UL
POTASSIUM SERPL-SCNC: 4.5 MMOL/L
PROT SERPL-MCNC: 7.1 G/DL
PROTEIN URINE: NORMAL
RBC # BLD: 4.92 M/UL
RBC # FLD: 14.4 %
RED BLOOD CELLS URINE: 5 /HPF
SODIUM SERPL-SCNC: 136 MMOL/L
SPECIFIC GRAVITY URINE: 1.03
SQUAMOUS EPITHELIAL CELLS: 6 /HPF
UROBILINOGEN URINE: NORMAL
WBC # FLD AUTO: 5.84 K/UL
WHITE BLOOD CELLS URINE: 1 /HPF

## 2020-09-15 ENCOUNTER — APPOINTMENT (OUTPATIENT)
Dept: ENDOCRINOLOGY | Facility: CLINIC | Age: 80
End: 2020-09-15
Payer: MEDICARE

## 2020-09-15 VITALS
SYSTOLIC BLOOD PRESSURE: 120 MMHG | HEIGHT: 61 IN | OXYGEN SATURATION: 96 % | HEART RATE: 75 BPM | WEIGHT: 134 LBS | DIASTOLIC BLOOD PRESSURE: 70 MMHG | BODY MASS INDEX: 25.3 KG/M2

## 2020-09-15 DIAGNOSIS — Z79.4 LONG TERM (CURRENT) USE OF INSULIN: ICD-10-CM

## 2020-09-15 PROCEDURE — 99214 OFFICE O/P EST MOD 30 MIN: CPT | Mod: 25

## 2020-09-15 PROCEDURE — 82962 GLUCOSE BLOOD TEST: CPT

## 2020-09-15 RX ORDER — BLOOD SUGAR DIAGNOSTIC
STRIP MISCELLANEOUS
Qty: 300 | Refills: 1 | Status: ACTIVE | COMMUNITY
Start: 2020-09-15 | End: 1900-01-01

## 2020-09-15 NOTE — HISTORY OF PRESENT ILLNESS
[FreeTextEntry1] : Type: 2\par Severity: uncontrolled\par Duration: diagnosed in \par \par Associated symptoms-\par Last eye exam: 2020, macular degeneration\par Last foot exam: 2019, denies pain/numbness/tingling in B/L feet\par Kidney disease: none\par Heart disease: +CAD\par \par Modifying factors-\par Current meds for glycemic control:\par Metformin 1000 mg BID\par Lantus 36 units daily HS- has not been taking\par Humalog 3 units TID AC- has been taking 20 units before breakfast only (was doing 20 units twice a day but was getting lows)\par \par Diet: 3 meals daily\par Weight: stable, fluctuates +/- 3 pounds\par Exercise: walks around the house\par \par No SMBG.\par Current B, ate rice, beans, vegetables, and strawberries 3 hours

## 2020-09-15 NOTE — ASSESSMENT
[FreeTextEntry1] : 80 year old female with T2DM with associated CAD, also with hyperlipidemia and hypertension. Glycemic control is poor.\par \par 1. T2DM- uncontrolled due to inappropriate use of insulin.\par -Reviewed insulin action times, difference between basal and bolus insulin, and appropriate administration.\par -Resume Lantus 20 units daily.\par -Start Humang 5 units TID AC.\par -Start SMBG 4x daily, AC and HS. Send glucose logs in one week.\par -Sample Contour Next meter and strips provided.\par -Repeat A1c and RTO for follow-up in 6 weeks.\par \par 2. Hyperlipidemia\par -Reviewed low fat diet.\par -Stress importance of taking atorvastatin daily.\par \par 3. Hypertension- controlled.\par -Continue ARB.

## 2020-09-15 NOTE — REASON FOR VISIT
[Follow - Up] : a follow-up visit [DM Type 2] : DM Type 2 [Family Member] : family member [[employer] ] : provided by [employer]   [FreeTextEntry1] : [id number] [FreeTextEntry2] : Edgardo [TWNoteComboBox1] : Azerbaijani

## 2020-10-08 ENCOUNTER — APPOINTMENT (OUTPATIENT)
Dept: INTERNAL MEDICINE | Facility: CLINIC | Age: 80
End: 2020-10-08
Payer: MEDICARE

## 2020-10-08 VITALS — DIASTOLIC BLOOD PRESSURE: 80 MMHG | SYSTOLIC BLOOD PRESSURE: 160 MMHG

## 2020-10-08 VITALS
HEIGHT: 61 IN | SYSTOLIC BLOOD PRESSURE: 140 MMHG | OXYGEN SATURATION: 97 % | RESPIRATION RATE: 14 BRPM | HEART RATE: 80 BPM | BODY MASS INDEX: 24.17 KG/M2 | DIASTOLIC BLOOD PRESSURE: 80 MMHG | WEIGHT: 128 LBS | TEMPERATURE: 98 F

## 2020-10-08 DIAGNOSIS — I25.10 ATHEROSCLEROTIC HEART DISEASE OF NATIVE CORONARY ARTERY W/OUT ANGINA PECTORIS: ICD-10-CM

## 2020-10-08 DIAGNOSIS — R79.89 OTHER SPECIFIED ABNORMAL FINDINGS OF BLOOD CHEMISTRY: ICD-10-CM

## 2020-10-08 DIAGNOSIS — D58.2 OTHER HEMOGLOBINOPATHIES: ICD-10-CM

## 2020-10-08 DIAGNOSIS — H60.512 ACUTE ACTINIC OTITIS EXTERNA, LEFT EAR: ICD-10-CM

## 2020-10-08 DIAGNOSIS — R31.29 OTHER MICROSCOPIC HEMATURIA: ICD-10-CM

## 2020-10-08 DIAGNOSIS — Z87.898 PERSONAL HISTORY OF OTHER SPECIFIED CONDITIONS: ICD-10-CM

## 2020-10-08 DIAGNOSIS — I10 ESSENTIAL (PRIMARY) HYPERTENSION: ICD-10-CM

## 2020-10-08 DIAGNOSIS — Z87.19 PERSONAL HISTORY OF OTHER DISEASES OF THE DIGESTIVE SYSTEM: ICD-10-CM

## 2020-10-08 PROCEDURE — 99213 OFFICE O/P EST LOW 20 MIN: CPT

## 2020-10-08 NOTE — ASSESSMENT
[FreeTextEntry1] : \par Right rib pain s/p fall:\par -xray showed 10th rib fracture and she was referred to orthopedics but she never went\par -She states symptoms have resolved\par -she currently has no pain\par \par CAD:\par -mild CAD noted on cath\par -previously advised ASA 81mg PO daily \par -Now back on atorvastatin\par -She has been referred to cardiology\par \par DM: last HgA1c  9.7 8/2020\par -I advised low fat/low cholesterol and low carb diet\par -now on lantus 20 units at bedtime, Humalog 5 units prior to meals, Januvia and metformin per Endocrine\par -She will continue to f/u with Endocrines\par -A:C 39 8/2020 on losartan\par -on atorvastatin\par -Ophthalmology approximately 7/2020\par -Foot exam: 10/8/2020\par \par Hyperlipidemia:\par -now back on atorvastatin\par -she needs to adhere to low fat/low cholesterol diet\par -Will check fasting labs at next visit\par \par Elevated LFTs:\par -last AST/ALT 4371 9/2020\par -hepatitis B and C serologies were negative\par -recent Abd US revealed fatty liver 8/2020\par -she was referred to hepatology-but she never went and refused to go\par \par HTN:\par -BP Mildly elevated today but she did not take her medications\par -Compliance with her BP medications discussed at length with patient\par -on losartan/HCTZ 100/25mg PO QAM, Norvasc 10 mg PO daily, coreg 6.25mg PO BID\par -followup 3 months for blood pressure check\par \par Vitamin D Deficiency:\par -vitamin D 25-OH was normal 8/2020\par \par Elevated H/H:\par -repeat H/H was normal\par \par Microscopic hematuria:\par -microscopic hematuria persists\par -Urine culture was negative\par -We will refer to urologist for further evaluation\par \par HCM:\par \par Depression screening: PHQ 2 score 0 8/6/2020\par \par EKG 8/6/2020\par \par Flu shot: pt refused 10/8/2020\par \par Pneumovax adv: pt refused previously\par \par Prevnar 13: adv pt refused previously\par \par Tdap: adv pt refused previously\par \par Shingles vaccine adv: pt refused previously\par \par GYN/PAP: pt does not want further exams\par \par Mammogram: advised 12/3/2018: pt refused \par \par Colonoscopy: advised 12/3/2018-pt refused. \par \par FIT testing-she refused previously\par \par DEXA: advised: pt refused previously\par \par HIV testing offered: she refused 8/3/2017\par \par F/U 3 months\par

## 2020-10-08 NOTE — HISTORY OF PRESENT ILLNESS
[de-identified] : Here today for followup\par \par She is here today with her daughter-in-law\par \par She denies any new complaints\par \par She reports she did see endocrine and is now taking Lantus once daily and Humalog prior to meals\par \par She states her glucose fingerstick this morning was 113\par \par She states she did not take her blood pressure medication today\par \par

## 2020-10-08 NOTE — REVIEW OF SYSTEMS
[Negative] : Neurological [Fever] : no fever [Chills] : no chills [Fatigue] : no fatigue [Recent Change In Weight] : ~T no recent weight change [Chest Pain] : no chest pain [Palpitations] : no palpitations [Lower Ext Edema] : no lower extremity edema [Shortness Of Breath] : no shortness of breath [Wheezing] : no wheezing [Cough] : no cough [Dyspnea on Exertion] : no dyspnea on exertion [Abdominal Pain] : no abdominal pain [Nausea] : no nausea [Diarrhea] : diarrhea [Vomiting] : no vomiting [Dysuria] : no dysuria [Incontinence] : no incontinence [Hematuria] : no hematuria [Frequency] : no frequency [Skin Rash] : no skin rash

## 2020-10-08 NOTE — PHYSICAL EXAM
[No Acute Distress] : no acute distress [Well-Appearing] : well-appearing [Normal Voice/Communication] : normal voice/communication [Normal Sclera/Conjunctiva] : normal sclera/conjunctiva [PERRL] : pupils equal round and reactive to light [Normal Oropharynx] : the oropharynx was normal [Normal] : normal rate, regular rhythm, normal S1 and S2 and no murmur heard [No Edema] : there was no peripheral edema [No Extremity Clubbing/Cyanosis] : no extremity clubbing/cyanosis [No Rash] : no rash [Normal Affect] : the affect was normal [Alert and Oriented x3] : oriented to person, place, and time [Normal Mood] : the mood was normal [Normal Insight/Judgement] : insight and judgment were intact [Comprehensive Foot Exam Normal] : Right and left foot were examined and both feet are normal. No ulcers in either foot. Toes are normal and with full ROM.  Normal tactile sensation with monofilament testing throughout both feet [de-identified] : +2 DP/PT pulses bilaterally

## 2020-10-27 ENCOUNTER — APPOINTMENT (OUTPATIENT)
Dept: ENDOCRINOLOGY | Facility: CLINIC | Age: 80
End: 2020-10-27
Payer: MEDICARE

## 2020-10-27 VITALS
WEIGHT: 128 LBS | BODY MASS INDEX: 24.17 KG/M2 | SYSTOLIC BLOOD PRESSURE: 118 MMHG | HEIGHT: 61 IN | DIASTOLIC BLOOD PRESSURE: 70 MMHG | OXYGEN SATURATION: 98 % | HEART RATE: 121 BPM

## 2020-10-27 DIAGNOSIS — I10 ESSENTIAL (PRIMARY) HYPERTENSION: ICD-10-CM

## 2020-10-27 DIAGNOSIS — E11.65 TYPE 2 DIABETES MELLITUS WITH HYPERGLYCEMIA: ICD-10-CM

## 2020-10-27 DIAGNOSIS — E55.9 VITAMIN D DEFICIENCY, UNSPECIFIED: ICD-10-CM

## 2020-10-27 DIAGNOSIS — E78.5 HYPERLIPIDEMIA, UNSPECIFIED: ICD-10-CM

## 2020-10-27 LAB — GLUCOSE BLDC GLUCOMTR-MCNC: 328

## 2020-10-27 PROCEDURE — 82962 GLUCOSE BLOOD TEST: CPT

## 2020-10-27 PROCEDURE — 99214 OFFICE O/P EST MOD 30 MIN: CPT | Mod: 25

## 2020-10-27 NOTE — REVIEW OF SYSTEMS
[Chest Pain] : no chest pain [Palpitations] : no palpitations [Shortness Of Breath] : no shortness of breath [Nausea] : no nausea [Abdominal Pain] : no abdominal pain [Vomiting] : no vomiting [Polyuria] : no polyuria [Polydipsia] : no polydipsia

## 2020-10-27 NOTE — REASON FOR VISIT
[Follow - Up] : a follow-up visit [DM Type 2] : DM Type 2 [Family Member] : family member [[employer] ] : provided by [employer]   [FreeTextEntry1] : [id number] [FreeTextEntry2] : Hoang [TWNoteComboBox1] : Cambodian

## 2020-10-27 NOTE — ASSESSMENT
[FreeTextEntry1] : 80 year old female with T2DM with associated CAD, also with hyperlipidemia and hypertension. Glycemic control is suboptimal.\par \par 1. T2DM\par -Reviewed insulin action times, difference between basal and bolus insulin, and appropriate administration.\par -Continue Lantus 25 units daily.\par -Increase Humalog to three times daily- take 5 units before each meal.\par -Start SMBG with Freestyle Meredith. Must test frequently, at least 4x daily.\par -First sensor applied in office today without complicaiton. Patient instructed on application and use of Freestyle Meredith.\par -Repeat A1c and RTO for CGM download in 6 weeks.\par -Glucose sensor necessity: This patient with diabetes performs four or more glucose checks per day utilizing a home blood glucose monitor. The patient is treated with insulin via three or more injections daily. This patient requires frequent adjustments to their insulin treatment on the basis of therapeutic continuous glucose monitoring results. In addition, the patient has been to our office for an evaluation of their diabetes control within the past six months. \par \par 2. Hyperlipidemia\par -Continue statin.\par -Repeat lipids in 3 months.\par \par 3. Hypertension- controlled.\par -Continue ARB. \par \par 4. Vitamin D deficiency\par -Check level with next labs.\par

## 2020-10-27 NOTE — HISTORY OF PRESENT ILLNESS
[FreeTextEntry1] : Type: 2\par Severity: uncontrolled\par Duration: diagnosed in \par \par Associated symptoms-\par Last eye exam: 2020, macular degeneration\par Last foot exam: 2020, denies pain/numbness/tingling in B/L feet\par Kidney disease: none\par Heart disease: +CAD\par \par Modifying factors-\par Current meds for glycemic control:\par Metformin 1000 mg BID\par Lantus 25 units daily\par Humalog 5 units before lunch and before dinner\par \par Diet: 3 meals daily\par Weight: stable, fluctuates +/- 3 pounds\par Exercise: walks around the house\par \par Reports BG is still high but is improving, now more in the mid-100s.\par Current B, ate chocolate, eggs, bread for breakfast 3 hours ago.

## 2020-10-27 NOTE — PHYSICAL EXAM
[Alert] : alert [Well Nourished] : well nourished [No Acute Distress] : no acute distress [Well Developed] : well developed [Normal Sclera/Conjunctiva] : normal sclera/conjunctiva [EOMI] : extra ocular movement intact [No Proptosis] : no proptosis [Normal Oropharynx] : the oropharynx was normal [Thyroid Not Enlarged] : the thyroid was not enlarged [No Thyroid Nodules] : no palpable thyroid nodules [No Respiratory Distress] : no respiratory distress [No Accessory Muscle Use] : no accessory muscle use [Clear to Auscultation] : lungs were clear to auscultation bilaterally [Normal S1, S2] : normal S1 and S2 [Normal Rate] : heart rate was normal [Regular Rhythm] : with a regular rhythm [No Edema] : no peripheral edema [Normal Anterior Cervical Nodes] : no anterior cervical lymphadenopathy [No Stigmata of Cushings Syndrome] : no stigmata of Cushings Syndrome [Normal Gait] : normal gait [No Tremors] : no tremors [Oriented x3] : oriented to person, place, and time [Normal Affect] : the affect was normal [Normal Mood] : the mood was normal [Acanthosis Nigricans] : no acanthosis nigricans

## 2020-10-30 ENCOUNTER — APPOINTMENT (OUTPATIENT)
Dept: ENDOCRINOLOGY | Facility: CLINIC | Age: 80
End: 2020-10-30

## 2020-11-09 ENCOUNTER — RX RENEWAL (OUTPATIENT)
Age: 80
End: 2020-11-09

## 2020-11-11 ENCOUNTER — APPOINTMENT (OUTPATIENT)
Dept: UROLOGY | Facility: CLINIC | Age: 80
End: 2020-11-11

## 2020-11-11 RX ORDER — FLASH GLUCOSE SENSOR
KIT MISCELLANEOUS
Qty: 6 | Refills: 0 | Status: ACTIVE | COMMUNITY
Start: 2020-10-27 | End: 1900-01-01

## 2020-11-12 ENCOUNTER — RX RENEWAL (OUTPATIENT)
Age: 80
End: 2020-11-12

## 2020-11-12 RX ORDER — INSULIN LISPRO 100 [IU]/ML
100 INJECTION, SOLUTION INTRAVENOUS; SUBCUTANEOUS
Qty: 6 | Refills: 0 | Status: ACTIVE | COMMUNITY
Start: 2020-01-15 | End: 1900-01-01

## 2020-12-08 ENCOUNTER — APPOINTMENT (OUTPATIENT)
Dept: ENDOCRINOLOGY | Facility: CLINIC | Age: 80
End: 2020-12-08

## 2021-01-07 ENCOUNTER — APPOINTMENT (OUTPATIENT)
Dept: INTERNAL MEDICINE | Facility: CLINIC | Age: 81
End: 2021-01-07

## 2021-04-15 ENCOUNTER — RX RENEWAL (OUTPATIENT)
Age: 81
End: 2021-04-15

## 2021-06-19 ENCOUNTER — RX RENEWAL (OUTPATIENT)
Age: 81
End: 2021-06-19

## 2021-06-19 ENCOUNTER — INPATIENT (INPATIENT)
Facility: HOSPITAL | Age: 81
LOS: 18 days | Discharge: HOSPICE | DRG: 64 | End: 2021-07-08
Attending: INTERNAL MEDICINE | Admitting: FAMILY MEDICINE
Payer: MEDICARE

## 2021-06-19 VITALS — WEIGHT: 147.93 LBS

## 2021-06-19 DIAGNOSIS — I63.311 CEREBRAL INFARCTION DUE TO THROMBOSIS OF RIGHT MIDDLE CEREBRAL ARTERY: ICD-10-CM

## 2021-06-19 DIAGNOSIS — Z98.89 OTHER SPECIFIED POSTPROCEDURAL STATES: Chronic | ICD-10-CM

## 2021-06-19 DIAGNOSIS — R47.1 DYSARTHRIA AND ANARTHRIA: ICD-10-CM

## 2021-06-19 DIAGNOSIS — R53.1 WEAKNESS: ICD-10-CM

## 2021-06-19 DIAGNOSIS — I63.9 CEREBRAL INFARCTION, UNSPECIFIED: ICD-10-CM

## 2021-06-19 LAB
ALBUMIN SERPL ELPH-MCNC: 4.5 G/DL — SIGNIFICANT CHANGE UP (ref 3.3–5.2)
ALP SERPL-CCNC: 109 U/L — SIGNIFICANT CHANGE UP (ref 40–120)
ALT FLD-CCNC: 49 U/L — HIGH
ANION GAP SERPL CALC-SCNC: 10 MMOL/L — SIGNIFICANT CHANGE UP (ref 5–17)
ANION GAP SERPL CALC-SCNC: 13 MMOL/L — SIGNIFICANT CHANGE UP (ref 5–17)
APTT BLD: 29.7 SEC — SIGNIFICANT CHANGE UP (ref 27.5–35.5)
AST SERPL-CCNC: 41 U/L — HIGH
BASE EXCESS BLDV CALC-SCNC: 3.6 MMOL/L — HIGH (ref -2–3)
BASOPHILS # BLD AUTO: 0.02 K/UL — SIGNIFICANT CHANGE UP (ref 0–0.2)
BASOPHILS NFR BLD AUTO: 0.3 % — SIGNIFICANT CHANGE UP (ref 0–2)
BILIRUB SERPL-MCNC: 0.6 MG/DL — SIGNIFICANT CHANGE UP (ref 0.4–2)
BUN SERPL-MCNC: 11.7 MG/DL — SIGNIFICANT CHANGE UP (ref 8–20)
BUN SERPL-MCNC: 15.7 MG/DL — SIGNIFICANT CHANGE UP (ref 8–20)
CALCIUM SERPL-MCNC: 9.4 MG/DL — SIGNIFICANT CHANGE UP (ref 8.6–10.2)
CALCIUM SERPL-MCNC: 9.4 MG/DL — SIGNIFICANT CHANGE UP (ref 8.6–10.2)
CHLORIDE SERPL-SCNC: 102 MMOL/L — SIGNIFICANT CHANGE UP (ref 98–107)
CHLORIDE SERPL-SCNC: 102 MMOL/L — SIGNIFICANT CHANGE UP (ref 98–107)
CO2 SERPL-SCNC: 23 MMOL/L — SIGNIFICANT CHANGE UP (ref 22–29)
CO2 SERPL-SCNC: 27 MMOL/L — SIGNIFICANT CHANGE UP (ref 22–29)
CREAT SERPL-MCNC: 0.46 MG/DL — LOW (ref 0.5–1.3)
CREAT SERPL-MCNC: 0.6 MG/DL — SIGNIFICANT CHANGE UP (ref 0.5–1.3)
EOSINOPHIL # BLD AUTO: 0.07 K/UL — SIGNIFICANT CHANGE UP (ref 0–0.5)
EOSINOPHIL NFR BLD AUTO: 1 % — SIGNIFICANT CHANGE UP (ref 0–6)
ETHANOL SERPL-MCNC: <10 MG/DL — SIGNIFICANT CHANGE UP (ref 0–9)
GLUCOSE BLDC GLUCOMTR-MCNC: 145 MG/DL — HIGH (ref 70–99)
GLUCOSE BLDC GLUCOMTR-MCNC: 156 MG/DL — HIGH (ref 70–99)
GLUCOSE SERPL-MCNC: 154 MG/DL — HIGH (ref 70–99)
GLUCOSE SERPL-MCNC: 242 MG/DL — HIGH (ref 70–99)
HCO3 BLDV-SCNC: 29 MMOL/L — SIGNIFICANT CHANGE UP (ref 22–29)
HCT VFR BLD CALC: 42.6 % — SIGNIFICANT CHANGE UP (ref 34.5–45)
HGB BLD-MCNC: 14.1 G/DL — SIGNIFICANT CHANGE UP (ref 11.5–15.5)
IMM GRANULOCYTES NFR BLD AUTO: 0.4 % — SIGNIFICANT CHANGE UP (ref 0–1.5)
INR BLD: 1.17 RATIO — HIGH (ref 0.88–1.16)
LYMPHOCYTES # BLD AUTO: 0.99 K/UL — LOW (ref 1–3.3)
LYMPHOCYTES # BLD AUTO: 14.5 % — SIGNIFICANT CHANGE UP (ref 13–44)
MCHC RBC-ENTMCNC: 29.3 PG — SIGNIFICANT CHANGE UP (ref 27–34)
MCHC RBC-ENTMCNC: 33.1 GM/DL — SIGNIFICANT CHANGE UP (ref 32–36)
MCV RBC AUTO: 88.4 FL — SIGNIFICANT CHANGE UP (ref 80–100)
MONOCYTES # BLD AUTO: 0.3 K/UL — SIGNIFICANT CHANGE UP (ref 0–0.9)
MONOCYTES NFR BLD AUTO: 4.4 % — SIGNIFICANT CHANGE UP (ref 2–14)
NEUTROPHILS # BLD AUTO: 5.43 K/UL — SIGNIFICANT CHANGE UP (ref 1.8–7.4)
NEUTROPHILS NFR BLD AUTO: 79.4 % — HIGH (ref 43–77)
PCO2 BLDV: 50 MMHG — HIGH (ref 39–42)
PH BLDV: 7.37 — SIGNIFICANT CHANGE UP (ref 7.32–7.43)
PLATELET # BLD AUTO: 223 K/UL — SIGNIFICANT CHANGE UP (ref 150–400)
PO2 BLDV: 45 MMHG — SIGNIFICANT CHANGE UP (ref 25–45)
POTASSIUM SERPL-MCNC: 3.6 MMOL/L — SIGNIFICANT CHANGE UP (ref 3.5–5.3)
POTASSIUM SERPL-MCNC: 3.8 MMOL/L — SIGNIFICANT CHANGE UP (ref 3.5–5.3)
POTASSIUM SERPL-SCNC: 3.6 MMOL/L — SIGNIFICANT CHANGE UP (ref 3.5–5.3)
POTASSIUM SERPL-SCNC: 3.8 MMOL/L — SIGNIFICANT CHANGE UP (ref 3.5–5.3)
PROT SERPL-MCNC: 7.4 G/DL — SIGNIFICANT CHANGE UP (ref 6.6–8.7)
PROTHROM AB SERPL-ACNC: 13.5 SEC — SIGNIFICANT CHANGE UP (ref 10.6–13.6)
RBC # BLD: 4.82 M/UL — SIGNIFICANT CHANGE UP (ref 3.8–5.2)
RBC # FLD: 13.4 % — SIGNIFICANT CHANGE UP (ref 10.3–14.5)
SAO2 % BLDV: 79.9 % — SIGNIFICANT CHANGE UP
SARS-COV-2 RNA SPEC QL NAA+PROBE: SIGNIFICANT CHANGE UP
SODIUM SERPL-SCNC: 138 MMOL/L — SIGNIFICANT CHANGE UP (ref 135–145)
SODIUM SERPL-SCNC: 139 MMOL/L — SIGNIFICANT CHANGE UP (ref 135–145)
TROPONIN T SERPL-MCNC: <0.01 NG/ML — SIGNIFICANT CHANGE UP (ref 0–0.06)
WBC # BLD: 6.84 K/UL — SIGNIFICANT CHANGE UP (ref 3.8–10.5)
WBC # FLD AUTO: 6.84 K/UL — SIGNIFICANT CHANGE UP (ref 3.8–10.5)

## 2021-06-19 PROCEDURE — 99221 1ST HOSP IP/OBS SF/LOW 40: CPT

## 2021-06-19 PROCEDURE — 70498 CT ANGIOGRAPHY NECK: CPT | Mod: 26,MA

## 2021-06-19 PROCEDURE — 99291 CRITICAL CARE FIRST HOUR: CPT

## 2021-06-19 PROCEDURE — 70450 CT HEAD/BRAIN W/O DYE: CPT | Mod: 26,59,MA

## 2021-06-19 PROCEDURE — 0042T: CPT

## 2021-06-19 PROCEDURE — 71045 X-RAY EXAM CHEST 1 VIEW: CPT | Mod: 26

## 2021-06-19 PROCEDURE — 70496 CT ANGIOGRAPHY HEAD: CPT | Mod: 26,MA

## 2021-06-19 PROCEDURE — 93010 ELECTROCARDIOGRAM REPORT: CPT

## 2021-06-19 RX ORDER — INSULIN LISPRO 100/ML
VIAL (ML) SUBCUTANEOUS EVERY 6 HOURS
Refills: 0 | Status: DISCONTINUED | OUTPATIENT
Start: 2021-06-19 | End: 2021-07-08

## 2021-06-19 RX ORDER — LABETALOL HCL 100 MG
10 TABLET ORAL ONCE
Refills: 0 | Status: COMPLETED | OUTPATIENT
Start: 2021-06-19 | End: 2021-06-19

## 2021-06-19 RX ORDER — DEXTROSE 50 % IN WATER 50 %
12.5 SYRINGE (ML) INTRAVENOUS ONCE
Refills: 0 | Status: DISCONTINUED | OUTPATIENT
Start: 2021-06-19 | End: 2021-07-08

## 2021-06-19 RX ORDER — ALTEPLASE 100 MG
6 KIT INTRAVENOUS ONCE
Refills: 0 | Status: DISCONTINUED | OUTPATIENT
Start: 2021-06-19 | End: 2021-06-19

## 2021-06-19 RX ORDER — HALOPERIDOL DECANOATE 100 MG/ML
2 INJECTION INTRAMUSCULAR EVERY 6 HOURS
Refills: 0 | Status: DISCONTINUED | OUTPATIENT
Start: 2021-06-19 | End: 2021-06-27

## 2021-06-19 RX ORDER — SODIUM CHLORIDE 9 MG/ML
1000 INJECTION, SOLUTION INTRAVENOUS
Refills: 0 | Status: DISCONTINUED | OUTPATIENT
Start: 2021-06-19 | End: 2021-07-08

## 2021-06-19 RX ORDER — SODIUM CHLORIDE 9 MG/ML
1000 INJECTION INTRAMUSCULAR; INTRAVENOUS; SUBCUTANEOUS
Refills: 0 | Status: DISCONTINUED | OUTPATIENT
Start: 2021-06-19 | End: 2021-06-19

## 2021-06-19 RX ORDER — ATORVASTATIN CALCIUM 80 MG/1
80 TABLET, FILM COATED ORAL AT BEDTIME
Refills: 0 | Status: DISCONTINUED | OUTPATIENT
Start: 2021-06-19 | End: 2021-06-19

## 2021-06-19 RX ORDER — ALTEPLASE 100 MG
54.4 KIT INTRAVENOUS ONCE
Refills: 0 | Status: DISCONTINUED | OUTPATIENT
Start: 2021-06-19 | End: 2021-06-19

## 2021-06-19 RX ORDER — DEXTROSE 50 % IN WATER 50 %
15 SYRINGE (ML) INTRAVENOUS ONCE
Refills: 0 | Status: DISCONTINUED | OUTPATIENT
Start: 2021-06-19 | End: 2021-07-08

## 2021-06-19 RX ORDER — HALOPERIDOL DECANOATE 100 MG/ML
2 INJECTION INTRAMUSCULAR ONCE
Refills: 0 | Status: COMPLETED | OUTPATIENT
Start: 2021-06-19 | End: 2021-06-19

## 2021-06-19 RX ORDER — DEXTROSE 50 % IN WATER 50 %
25 SYRINGE (ML) INTRAVENOUS ONCE
Refills: 0 | Status: DISCONTINUED | OUTPATIENT
Start: 2021-06-19 | End: 2021-07-08

## 2021-06-19 RX ORDER — GLUCAGON INJECTION, SOLUTION 0.5 MG/.1ML
1 INJECTION, SOLUTION SUBCUTANEOUS ONCE
Refills: 0 | Status: DISCONTINUED | OUTPATIENT
Start: 2021-06-19 | End: 2021-07-08

## 2021-06-19 RX ORDER — SODIUM CHLORIDE 5 G/100ML
1000 INJECTION, SOLUTION INTRAVENOUS
Refills: 0 | Status: DISCONTINUED | OUTPATIENT
Start: 2021-06-19 | End: 2021-06-20

## 2021-06-19 RX ADMIN — SODIUM CHLORIDE 50 MILLILITER(S): 9 INJECTION INTRAMUSCULAR; INTRAVENOUS; SUBCUTANEOUS at 17:09

## 2021-06-19 RX ADMIN — Medication 10 MILLIGRAM(S): at 16:21

## 2021-06-19 RX ADMIN — HALOPERIDOL DECANOATE 2 MILLIGRAM(S): 100 INJECTION INTRAMUSCULAR at 22:30

## 2021-06-19 RX ADMIN — SODIUM CHLORIDE 60 MILLILITER(S): 5 INJECTION, SOLUTION INTRAVENOUS at 20:43

## 2021-06-19 NOTE — H&P ADULT - NSHPLABSRESULTS_GEN_ALL_CORE
< from: CT Angio Head w/ IV Cont (06.19.21 @ 15:04) >      IMPRESSION:    Brain CT without contrast:  1.  Acute right MCA territory infarct is better seen on CT perfusion.  2.  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    CT perfusion:  CBF<30% volume: 16 ml, right MCA territory, suggesting core infarct.  Tmax>6.0s volume: 38 ml  Mismatch volume: 22 ml, right MCA territory, suggesting ischemic penumbra.  Mismatch ratio: 2.4    CTA head and neck:  1.  Occlusion of the right ICA from the proximal cervical segment through the supraclinoid segment.  2.  Right M2 occlusion.    CTA and CT perfusion findings were discussed with Dr. JESUS ALBERTO BARTON [phone] at 6/19/2021 3:19 PM by Dr. Tashi Al with read back confirmation.    < end of copied text >

## 2021-06-19 NOTE — CONSULT NOTE ADULT - ASSESSMENT
80 y/o female, PMH DM, HTN, presents to ED as Code Stroke after she was found down on her front lawn by a neighbor. Unknown down time, reported LKW 10:50 am. Initial fingerstick for EMS 60, pt given amp of d50 with improvement in glucose level. Pt presenting with L sided facial droop, L sided weakness, dysarthria. Out of window for TPA. CTH with acute right MCA infarct, CTA Right M2 occlusion. CTP with 16 ml core infarct, right MCA territory.Mismatch volume: 22 ml, right MCA territory, suggesting ischemic penumbra. Mismatch ratio: 2.4. 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 Out of window for TPA per telestroke. Discussed with Dr Baltazar, not a candidate for thrombectomy.     NIHSS= 18  mRs= 0                 Discussed with Dr Baltazar, images reviewed, not a candidate for thrombectomy in setting of completed RMCA infarct               Neurosurgery consulted for hemicraniotomy watch              Admit to [facility]      	Q1 hour Neuro checks, Q1 hour Vitals              Stroke Core Measures TTE, TSH, HgA1C, LDL, MRI   	Permissive HTN              BP Goal 160- 220  	No urgent neurointervention indicated at this time,  please recall as needed

## 2021-06-19 NOTE — ED ADULT NURSE NOTE - INTERVENTIONS DEFINITIONS
Instruct patient to call for assistance/Stretcher in lowest position, wheels locked, appropriate side rails in place/Monitor for mental status changes and reorient to person, place, and time/Review medications for side effects contributing to fall risk

## 2021-06-19 NOTE — ED PROVIDER NOTE - ATTENDING CONTRIBUTION TO CARE
I personally saw the patient with the resident, and completed the key components of the history and physical exam. I then discussed the management plan with the resident.      82 yo female unknown pmh comes to ed found on lawn with ams  for unknown amount of time;  pt confused and with lt facial droop and lt paresis;   pt last known normal unknown;   pe awake alert heent ncat  neck supple cor s1 [id number] clear abd soft  nontender neuro left facial droop, left paralysis of upper and lower extremity;    dx cva; pt not a tpa candidated secondary to unknown time when patient last normal;     pt son clarified time ;  however, pt not within the 3  hour window for tpa;   case discussed with telestroke; I personally saw the patient with the resident, and completed the key components of the history and physical exam. I then discussed the management plan with the resident.      80 yo female unknown pmh comes to ed found on lawn with ams  for unknown amount of time;  pt confused and with lt facial droop and lt paresis;   pt last known normal unknown;   pe awake alert heent ncat  neck supple cor s1 [id number] clear abd soft  nontender neuro left facial droop, left paralysis of upper and lower extremity;    dx cva; pt not a tpa candidated secondary to unknown time when patient last normal;     pt son clarified time ;  however, pt not within the 3  hour window for tpa;   case discussed with telestroke;      above critical time spent  with patient at bedside obtaining  history and physical from ems and family ; in addition to consultation with telestroke and neurology for management and final disposition of patient;

## 2021-06-19 NOTE — H&P ADULT - NSHPREVIEWOFSYSTEMS_GEN_ALL_CORE
Constitutional:  Pain well controlled with PRNs, no fevers, chills, or new weakness  Eyes: No double vision, no change in visual acuity, no blurry vision, no occular discharge  Neurologic: Left sided weakness, dysarthria  Ears, nose, mouth throat:  No ottorrhea. No change in hearing, No anosmia, No oral lesions, No sore throat  Cardiovascular: No palpitations, no chest pain, no nocturnal or positional dyspnea.  Respiratory: No shortness of breath, No Cough  Gastrointestinal: No change in bowel habits, no change in appetite  Genitourinary: No Frequency, No Dysuria, no Hematuria  Musculoskeletal: No muscle wasting, No new weakness  Psych: No changes in mood, Denies drug use, Denies history of psyciatric illness  Integumentary: Denies new skin lesions  Endocrine: Denies history of DM, denies history of thyroid disease, No heat or cold intolerance  Heme/Lymph: No use of antiplatelet/anticoagulant  Allergic / Immune: No active allergies unless otherwise specified in medical chart    All other systems reviewed and are negative

## 2021-06-19 NOTE — ED PROVIDER NOTE - PHYSICAL EXAMINATION
Gen: no acute distress  Head: normocephalic, atraumatic  Lung: CTAB, no respiratory distress, no wheezing, rales, rhonchi  CV: normal s1/s2, rrr,   Abd: soft, non-tender, non-distended  MSK: No edema, no visible deformities, full range of motion in all 4 extremities  Neuro: L facial droop, LUE/LLE weakness  Skin: No rash   Psych: normal affect

## 2021-06-19 NOTE — H&P ADULT - HISTORY OF PRESENT ILLNESS
1A: Level of consciousness      +1= Arouses to minor stimulation         1B: Ask month and age       0= Both questions right         1C: "Blink eyes" and "Squeeze Hands"       0= Performs both         2: Horizontal EOMs       +1= Partial gaze palsy; gaze is abnormal in one or both eyes, but forced deviation or total gaze paresis is not present    3: Visual fields     +2= Complete hemianopsia    4: Facial palsy (use grimace if obtunded)      +3=Complete paralysis of one or both sides (absence of facial movement in the upper and lower face)        5A: Left arm motor drift (count out loud and use fingers to show count)      +3= No effort against gravity         5B: Right arm motor drift         0= No drift x 10 seconds        6A: Left leg motor drift       +3= No effort against gravity         6B: Right leg motor drift       0= No drift x 10 seconds         7: Limb ataxia (FNF/heel-shin)       0= No ataxia        8: Sensation       +2= severe sensory loss left side         9: Language/aphasia- describe the scene (on jeanine); name the items; read the sentences (on jeanine)       +1= mild-moderate aphasia some obvious changes without significant limitation         10: Dysarthria- read the words        +2= Severe dysarthria: unintelligible slurring or out of proportion to dysphasia         11: Extinction/inattention       0= No abnormality      Total: 18        80 y/o female, PMH DM, HTN, presents to ED as Code Stroke after she was found down on her front lawn by a neighbor. Unknown down time, reported LKW 10:50 am. Initial fingerstick for EMS 60, pt given amp of d50 with improvement in glucose level. Pt presenting with L sided facial droop, L sided weakness, dysarthria. Out of window for TPA. CTH with acute right MCA infarct, CTA Right M2 occlusion. CTP with 16 ml core infarct, right MCA territory.Mismatch volume: 22 ml, right MCA territory, suggesting ischemic penumbra. Mismatch ratio: 2.4. 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    NIHSS= 18  mRs= 0     1A: Level of consciousness     +1= Arouses to minor stimulation  1B: Ask month and age       0= Both questions right  1C: "Blink eyes" and "Squeeze Hands"       0= Performs both   2: Horizontal EOMs       +1= Partial gaze palsy; gaze is abnormal in one or both eyes, but forced deviation or total gaze paresis is not present  3: Visual fields     +2= Complete hemianopsia  4: Facial palsy (use grimace if obtunded)      +3=Complete paralysis of one or both sides (absence of facial movement in the upper and lower face)  5A: Left arm motor drift (count out loud and use fingers to show count)      +3= No effort against gravity  5B: Right arm motor drift         0= No drift x 10 seconds  6A: Left leg motor drift       +3= No effort against gravity  6B: Right leg motor drift       0= No drift x 10 seconds  7: Limb ataxia (FNF/heel-shin)       0= No ataxia   8: Sensation       +2= severe sensory loss left side  9: Language/aphasia- describe the scene (on jeanine); name the items; read the sentences (on jeanine)       +1= mild-moderate aphasia some obvious changes without significant limitation  10: Dysarthria- read the words        +2= Severe dysarthria: unintelligible slurring or out of proportion to dysphasia  11: Extinction/inattention       0= No abnormality  Total: 18

## 2021-06-19 NOTE — ED ADULT TRIAGE NOTE - CHIEF COMPLAINT QUOTE
Pt BIBA from home, pt family noticed pt to be slurring words and lethargic, unknown LKW, unknown pt baseline mental status, initial BS 60, EMS provided 1 amp D50%, pt noted to have left side facial droop and drooling, priority CT called by Dr Hairston

## 2021-06-19 NOTE — ED ADULT NURSE REASSESSMENT NOTE - NS ED NURSE REASSESS COMMENT FT1
Neurosurgery ANNIA Aldrich at bedside examining patient.
Pt drooling from left side of mouth, pt able to clear her own secretions and wipe mouth. Rt side purposeful movements, pt able to follow commands. Remains on CM.

## 2021-06-19 NOTE — CONSULT NOTE ADULT - SUBJECTIVE AND OBJECTIVE BOX
to follow  Patient is a 81y old  Female who presents with a chief complaint of Right MCA CVA (19 Jun 2021 18:33)      HPI:  80 y/o right-hand dominant female, PMH DM, HTN, presents to ED as Code Stroke after she was found down on her front lawn by a neighbor. Unknown down time, reported LKW 10:50 am. Initial fingerstick for EMS 60, pt given amp of d50 with improvement in glucose level. Pt presenting with L sided facial droop, L sided weakness, dysarthria. Out of window for TPA. CTH with acute right MCA infarct, CTA Right M2 occlusion. CTP with 16 ml core infarct, right MCA territory.Mismatch volume: 22 ml, right MCA territory, suggesting ischemic penumbra. Mismatch ratio: 2.4. 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    Nsx called to evaluate pt for possible hemicraniectomy watch due to recent CVA  pt seen in bed/ dysarthric and primarily Algerian speaking in some distress     NIHSS= 18  mRs= 0     1A: Level of consciousness     +1= Arouses to minor stimulation  1B: Ask month and age       0= Both questions right  1C: "Blink eyes" and "Squeeze Hands"       0= Performs both   2: Horizontal EOMs       +1= Partial gaze palsy; gaze is abnormal in one or both eyes, but forced deviation or total gaze paresis is not present  3: Visual fields     +2= Complete hemianopsia  4: Facial palsy (use grimace if obtunded)      +3=Complete paralysis of one or both sides (absence of facial movement in the upper and lower face)  5A: Left arm motor drift (count out loud and use fingers to show count)      +3= No effort against gravity  5B: Right arm motor drift         0= No drift x 10 seconds  6A: Left leg motor drift       +3= No effort against gravity  6B: Right leg motor drift       0= No drift x 10 seconds  7: Limb ataxia (FNF/heel-shin)       0= No ataxia   8: Sensation       +2= severe sensory loss left side  9: Language/aphasia- describe the scene (on jeanine); name the items; read the sentences (on jeanine)       +1= mild-moderate aphasia some obvious changes without significant limitation  10: Dysarthria- read the words        +2= Severe dysarthria: unintelligible slurring or out of proportion to dysphasia  11: Extinction/inattention       0= No abnormality  Total: 18 (19 Jun 2021 16:30)      GCS: 12  Eye response (E)3  Verbal response (V)3  Motor response (M)6        PAST MEDICAL & SURGICAL HISTORY:  Diabetes    Hypertension        SOCIAL HISTORY:  - EtOH, - tobacco, - drugs    FAMILY HISTORY:   non-pertinent at this time        MEDICATIONS  (STANDING):  dextrose 40% Gel 15 Gram(s) Oral once  dextrose 5%. 1000 milliLiter(s) (50 mL/Hr) IV Continuous <Continuous>  dextrose 5%. 1000 milliLiter(s) (100 mL/Hr) IV Continuous <Continuous>  dextrose 50% Injectable 25 Gram(s) IV Push once  dextrose 50% Injectable 12.5 Gram(s) IV Push once  dextrose 50% Injectable 25 Gram(s) IV Push once  glucagon  Injectable 1 milliGRAM(s) IntraMuscular once  insulin lispro (ADMELOG) corrective regimen sliding scale   SubCutaneous every 6 hours  sodium chloride 2% . 1000 milliLiter(s) (60 mL/Hr) IV Continuous <Continuous>    MEDICATIONS  (PRN):    Allergies    Allergy Status Unknown    Intolerances      Vital Signs Last 24 Hrs  T(C): 36.6 (19 Jun 2021 20:02), Max: 36.6 (19 Jun 2021 16:01)  T(F): 97.9 (19 Jun 2021 20:02), Max: 97.9 (19 Jun 2021 20:02)  HR: 90 (19 Jun 2021 20:02) (65 - 100)  BP: 197/93 (19 Jun 2021 20:02) (124/64 - 235/103)  BP(mean): 133 (19 Jun 2021 20:02) (111 - 133)  RR: 23 (19 Jun 2021 20:02) (20 - 28)  SpO2: 99% (19 Jun 2021 20:02) (94% - 99%)        PHYSICAL EXAM:  GENERAL: in some distress, complaining about the tight BP cuff , well-groomed/obese female, well-developed, AAOx3  HEAD:  Atraumatic, Normocephalic, no palpable step-off appreciated on palpation  EYES: b/l EOMI, PERRL at 5mm b/l and cataract b/l, conjunctiva and sclera clear,   NECK: Supple, nontender to palpation  TS/LS: nontender to palpation midline or paraspinal muscles b/l,   NERVOUS SYSTEM:  Alert & Oriented to self, +dysarthria/ expressive aphasia. Motor Strength 5/5 right upper and lower extremities 2/5 LUE and 1/5 LLE, sensory is at baseline and symmetric b/l withdrawing x 4 to peripheral stimuli; + left pronator/ weakness, no ankle clonus appreciated b/l, left facial droop appreciated, no perea's b/l appreciated.   LYMPH: No lymphadenopathy noted  SKIN: No rashes or lesions appreciated                           14.1   6.84  )-----------( 223      ( 19 Jun 2021 14:53 )             42.6     06-19    139  |  102  |  15.7  ----------------------------<  242<H>  3.6   |  27.0  |  0.60    Ca    9.4      19 Jun 2021 14:53    TPro  7.4  /  Alb  4.5  /  TBili  0.6  /  DBili  x   /  AST  41<H>  /  ALT  49<H>  /  AlkPhos  109  06-19    PT/INR - ( 19 Jun 2021 14:53 )   PT: 13.5 sec;   INR: 1.17 ratio         PTT - ( 19 Jun 2021 14:53 )  PTT:29.7 sec    LIVER FUNCTIONS - ( 19 Jun 2021 14:53 )  Alb: 4.5 g/dL / Pro: 7.4 g/dL / ALK PHOS: 109 U/L / ALT: 49 U/L / AST: 41 U/L / GGT: x                 RADIOLOGY & ADDITIONAL STUDIES:  < from: CT Angio Neck w/ IV Cont (06.19.21 @ 15:04) >   EXAM:  CT PERFUSION W MAPS IC                         EXAM:  CT ANGIO BRAIN (W)AW IC                         EXAM:  CT BRAIN                         EXAM:  CT ANGIO NECK (W)AW IC                          PROCEDURE DATE:  06/19/2021    INTERPRETATION:  CLINICAL HISTORY: ams. . . Left-sided weakness and facial droop.    TECHNIQUE: Noncontrast CT head. RAPID artificial intelligence was used for preliminary evaluation of intracranial hemorrhage. After the administration of 50 cc Omnipaque 350, serial thin sections were obtained through the brain for the purposes of evaluating CT perfusion. Raw data was sent to the RAPID software for postprocessing. Contrast enhanced axial CT images were acquired from the aortic arch to the vertex of the calvarium, during the angiographic phase.  Three-dimensional maximum intensity projection reformats were generated.  70 ml of Omnipaque-350 mg/ml were administered intravenously, without immediate complication.    COMPARISON STUDY: None.    FINDINGS:  CT BRAIN WITHOUT CONTRAST:  Some images are degraded by motion. No acute intracranial hemorrhage. Acute right MCA territory infarct is better seen on CT perfusion. In the superior cerebellar cistern, abutting the undersurface of the tentorium andthe vein of Matias, there is a hyperdense extra-axial lesion measuring 1.2 x 0.9 x 1.0 cm suggesting a posterior tentorial meningioma. There are areas of hypodensity in the bilateral hemispheric white matter suggesting white matter microvascular ischemic change. The ventricles and sulci are normal in size for patient's age.  There is no extraaxial fluid collection.  There is no displaced calvarial fracture. The visualized orbits are within normal limits. The visualized portions of the paranasal sinuses are well aerated. The mastoid air cells are well aerated.      CT PERFUSION:  CBF<30% volume: 16 ml, right MCA territory, suggesting core infarct.  Tmax>6.0s volume: 38 ml  Mismatch volume: 22 ml, right MCA territory, suggesting ischemic penumbra.  Mismatch ratio: 2.4    CT ANGIOGRAPHY NECK:  Thoracic aorta and branch vessels: Patent.  Right carotid system: Occlusion of the right proximal cervical ICA without reconstitution in the neck.  Left carotid system: Patent.  No hemodynamically significant stenosis using NASCET criteria.  No evidence of dissection.  Vertebral arteries: No focal stenosis or dissection.  Right pleural effusion.    CT ANGIOGRAPHY BRAIN:  Internal carotid arteries: Occluded right petrous, cavernous, and supraclinoid ICA.  Anterior cerebral arteries: Patent.  Middle cerebral arteries: Right M2 occlusion (5:149, 8:77). Patent left MCA.  Posterior cerebral arteries: Mild stenosis of the right P1 segment.  Vertebrobasilar: Stenoses of bilateral V4 segments due to calcified plaque. Patent basilar artery.  Vascular lesions: No evidence of intracranial aneurysm or large vascular malformation, within limits of CT technique.      IMPRESSION:    Brain CT without contrast:  1.  Acute right MCA territory infarct is better seen on CT perfusion.  2.  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    CT perfusion:  CBF<30% volume: 16 ml, right MCA territory, suggesting core infarct.  Tmax>6.0s volume: 38 ml  Mismatch volume: 22 ml, right MCA territory, suggesting ischemic penumbra.  Mismatch ratio: 2.4    CTA head and neck:  1.  Occlusion of the right ICA from the proximal cervical segment through the supraclinoid segment.  2.  Right M2 occlusion.    CTA and CT perfusion findings were discussed with Dr. JESUS ALBERTO BARTON [phone] at 6/19/2021 3:19 PM by Dr. Carolin Al with read back confirmation.    CAROLIN AL MD; Attending Radiologist  This document has been electronically signed. Jun 19 2021  3:33PM  < end of copied text >      Time Spent with patient/ education on the floor & arranging care: >65 min discussing care w NCCU team, ED RN and family at great lengths, Son and Daughter will discuss GOC, full code at this time, want to continue w all supportive care and medical management

## 2021-06-19 NOTE — CONSULT NOTE ADULT - ASSESSMENT
pt was outside gardening/ independent prior and was found by a bystander/  on the ground unable to get up but awake and dysarthric   Nsx called for hemicraniectomy watch due to new MCA CVA, not tPA candidate per neurology & not a thrombectomy candidate per IR   NCCU to admit w q1 hr neurochecks   HOB > 340 degrees   goal Na+ 140-150   f/u CTB if change in MS/ decline in exam noted   defer further care to primary team at this time  further plan as per Dr William  pt is an 81 y.o. right-handed female, per family, pt was outside gardening/ independent prior and was found by a bystander/  on the ground unable to get up but awake and dysarthric   Nsx called for hemicraniectomy watch due to new MCA CVA, not tPA candidate per neurology & not a thrombectomy candidate per IR   NCCU to admit w q1 hr neurochecks   HOB > 30 degrees   goal Na+ 140-150   f/u CTB if change in MS/ decline in exam noted   defer further care to primary team at this time  further plan as per Dr William  pt is an 81 y.o. right-handed female, per family, pt was outside gardening/ independent prior and was found by a bystander/  on the ground unable to get up but awake and dysarthric   Nsx called for hemicraniectomy watch due to new MCA CVA, not tPA candidate per neurology & not a thrombectomy candidate per IR   NCCU to admit w q1 hr neurochecks   HOB > 30 degrees   goal Na+ 140-150   f/u CTB if change in MS/ decline in exam noted   defer further care to primary team at this time  further plan as per Dr William     --     NSGY Attg:    see above    patient seen and examined by PA staff    imaging reviewed  R MCA infarct without shift; cisterns open    agree with plan as above

## 2021-06-19 NOTE — H&P ADULT - ASSESSMENT
82 y/o female, PMH DM, HTN, presents to ED as Code Stroke after she was found down on her front lawn by a neighbor. Unknown down time, reported LKW 10:50 am. Initial fingerstick for EMS 60, pt given amp of d50 with improvement in glucose level. Pt presenting with L sided facial droop, L sided weakness, dysarthria. Out of window for TPA. CTH with acute right MCA infarct, CTA Right M2 occlusion. CTP with 16 ml core infarct, right MCA territory.Mismatch volume: 22 ml, right MCA territory, suggesting ischemic penumbra. Mismatch ratio: 2.4. 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 Out of window for TPA per telestroke. Discussed with Dr Baltazar, not a candidate for thrombectomy.     NIHSS= 18  mRs= 0       Neuro:              Neurosurgery consulted for hemicraniotomy watch              Admit to NCCU      	Q1 hour Neuro checks, Q1 hour Vitals  	HOB 30 degrees, Neck midline position  	Maintain normothermia, PO acetaminophen for temp>38 C or pain              Repeat CTH in am, sooner if any change in neurologic exam  	Avoid sedation              Stroke Core Measures TTE, TSH, HgA1C, LDL, MRI     CV:  	Permissive HTN              BP Goal 160- 220 Cardene gtt and PRN medications as needed  	  Pulm:  	Frequent Suctioning  	Supplemental O2 PRN to maintain Spo2>92%  	Chest PT, OOB, Pulmonary Toilet    GI:  	Nutrition:              Failed Dysphagia Screen               Pending Speech/Swallow  	Bowel regimen when tolerating po  	  Gu:  	Voiding   	I&O Q1 hour  	Monitor Electrolytes & Renal Function    Heme:  	Monitor H&H  	Hold Antiplatelet at this time, pending neurosurgery input regarding hemicrani watch	              Chemical DVT prophylaxis is contraindicated due to risk of bleeding  	Mechanical DVT Prophylaxis: Maintain B/L LE sequential compression devices  	  ID:  	Monitor WBC and Temperature  	    Endo              Check HgA1c in am    	Monitor BGL, maintain <180  	CARI   		100-150 no correction  		150-200  2units  		200-250  4units  		250-300	 6units  		300-350  8units  		350-400  10units  		400+	12units

## 2021-06-19 NOTE — ED PROVIDER NOTE - OBJECTIVE STATEMENT
80 y/o F pt with unknown medical hx prsenting today via EMS with c/o ams. Pt was found in the front lawn by family. Unknown down time, unknown lkw. Initial fingerstick for ems 60, pt given amp of d50 with improvement in glucose level, but not ams. Pt presenting with L sided facial droop, and L sided weakness. Hx limited. 80 y/o F pt with unknown medical hx prsenting today via EMS with c/o ams. Pt was found in the front lawn by family. Unknown down time, unknown lkw. Initial fingerstick for ems 60, pt given amp of d50 with improvement in glucose level, but not ams. Pt presenting with L sided facial droop, and L sided weakness. Hx limited.    Pt's actual name: Erendira JenkinsMatti, : 1940

## 2021-06-19 NOTE — ED PROVIDER NOTE - PROGRESS NOTE DETAILS
Call from radiology regarding CTA findings for occlusion. D/w Dr. Baltazar for neurointerventional. WIll review CT scans Son now at bedside states that pt's lkw was 10:50am today. States he has cameras at his home and observed his mother gardening in the front yard at her baseline. Next found 1 hour later on the lawn. - Hebert Hairston, PGY-2 D/w Dr. Sterling on call teleneurologist. States that given lkw and CT findings to hold TPA at this time. Will discuss with neurointerventional for code bypane vs tpa vs alternative mgmt. Call back from Dr. Sterling, after discussion with Dr. Baltazar advising no code byplane and no tpa at this time. Will medically manage and admit to stroke unit. - Hebert Hairston, PGY-2

## 2021-06-19 NOTE — H&P ADULT - NSHPPHYSICALEXAM_GEN_ALL_CORE
General: Patient is crying, states she is scared.   Eyes: Right gaze preference. Left hemianopsia,  Neck: Supple and symmetrical, no JVD  CV: RRR, no m/r/g  Lungs: CTA b/l, no use of accessory muscles, no wheezes, rales, rhonchi  Skin: warm, dry, no rashes  Psych: crying, upset  Abdomen: Normal BS, soft, NT/ND  Extremities: no edema, cyanosis  Musculoskeletal: hemiparesis LUE/LLE. 5/5 strength RUE/RLE  Neuro: A & O X 3, sensory deficit left side, right gaze preference, See NIHSS above       Detailed Neurologic Exam:    Mental status: The patient is awake and alert and has normal attention span.  The patient is fully oriented in 3 spheres. The patient is oriented to current events. The patient is able to name objects, follow commands, repeat sentences but is severely dysarthric.     Cranial nerves: Pupils equal and react symmetrically to light. There is left hemianopsia visual field deficit to confrontation. Right gaze preference. There is no ptosis. Left facial droop.     Motor: There is normal bulk and tone.  There is no tremor.  Strength is 5/5 in the right arm and leg.   Strength is 1/5 in the left arm and leg.    Sensation: Severe sensory loss left side. Intact to light touch right side.    Cerebellar: There is no dysmetria on finger to nose testing.    Gait : deferred

## 2021-06-19 NOTE — ED ADULT NURSE NOTE - ED STAT RN HANDOFF DETAILS
Report given to DIANA Wheeler in [facility] ([facility])

## 2021-06-19 NOTE — ED PROVIDER NOTE - CLINICAL SUMMARY MEDICAL DECISION MAKING FREE TEXT BOX
Pt with uknown hx found down in front yard prsenting with focal L-sided sx. WIll evaluate for stroke with cth, cta h/n, ctp, labs, ck, trop, ua, cxr, reeval.

## 2021-06-19 NOTE — CONSULT NOTE ADULT - SUBJECTIVE AND OBJECTIVE BOX
HPI: 80 y/o female, PMH DM, HTN, presents to ED as Code Stroke after she was found down on her front lawn by a neighbor. Unknown down time, reported LKW 10:50 am. Initial fingerstick for EMS 60, pt given amp of d50 with improvement in glucose level. Pt presenting with L sided facial droop, L sided weakness, dysarthria. Out of window for TPA. CTH with acute right MCA infarct, CTA Right M2 occlusion. CTP with 16 ml core infarct, right MCA territory.Mismatch volume: 22 ml, right MCA territory, suggesting ischemic penumbra. Mismatch ratio: 2.4. 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    NIHSS=18  mRs= 0      PAST MEDICAL & SURGICAL HISTORY:  Diabetes    Hypertension      SOCIAL HISTORY:  Tobacco Use: none  EtOH use: none  Substance: none    Allergies: NKA    Review of Systems: Constitutional:  Pain well controlled with PRNs, no fevers, chills, or new weakness  Eyes: No double vision, no change in visual acuity, no blurry vision, no occular discharge  Neurologic: Left sided weakness, dysarthria  Ears, nose, mouth throat:  No ottorrhea. No change in hearing, No anosmia, No oral lesions, No sore throat  Cardiovascular: No palpitations, no chest pain, no nocturnal or positional dyspnea.  Respiratory: No shortness of breath, No Cough  Gastrointestinal: No change in bowel habits, no change in appetite  Genitourinary: No Frequency, No Dysuria, no Hematuria  Musculoskeletal: No muscle wasting, No new weakness  Psych: No changes in mood, Denies drug use, Denies history of psyciatric illness  Integumentary: Denies new skin lesions  Endocrine: Denies history of DM, denies history of thyroid disease, No heat or cold intolerance  Heme/Lymph: No use of antiplatelet/anticoagulant  Allergic / Immune: No active allergies unless otherwise specified in medical chart    All other systems reviewed and are negative        HOME MEDICATIONS:  Home Medications:  Lantus Solostar Pen 100 units/mL subcutaneous solution: 25 unit(s) subcutaneous once a day (at bedtime) (19 Jun 2021 17:32)  metFORMIN 500 mg oral tablet: 2 tab(s) orally 2 times a day (19 Jun 2021 17:32)      MEDICATIONS:  Antibiotics:    Neuro:    Anticoagulation:    OTHER:  dextrose 40% Gel 15 Gram(s) Oral once  dextrose 50% Injectable 25 Gram(s) IV Push once  dextrose 50% Injectable 12.5 Gram(s) IV Push once  dextrose 50% Injectable 25 Gram(s) IV Push once  glucagon  Injectable 1 milliGRAM(s) IntraMuscular once  insulin lispro (ADMELOG) corrective regimen sliding scale   SubCutaneous every 6 hours    IVF:  dextrose 5%. 1000 milliLiter(s) IV Continuous <Continuous>  dextrose 5%. 1000 milliLiter(s) IV Continuous <Continuous>  sodium chloride 2% . 1000 milliLiter(s) IV Continuous <Continuous>      Vital Signs Last 24 Hrs  T(C): 36.6 (19 Jun 2021 16:01), Max: 36.6 (19 Jun 2021 16:01)  T(F): 97.8 (19 Jun 2021 16:01), Max: 97.8 (19 Jun 2021 16:01)  HR: 68 (19 Jun 2021 18:00) (65 - 100)  BP: 163/77 (19 Jun 2021 18:00) (124/64 - 235/103)  BP(mean): 111 (19 Jun 2021 18:00) (111 - 111)  RR: 22 (19 Jun 2021 18:00) (22 - 28)  SpO2: 97% (19 Jun 2021 18:00) (94% - 99%)    Physical Exam: General: Patient is crying, states she is scared.   Eyes: Right gaze preference. Left hemianopsia,  Neck: Supple and symmetrical, no JVD  CV: RRR, no m/r/g  Lungs: CTA b/l, no use of accessory muscles, no wheezes, rales, rhonchi  Skin: warm, dry, no rashes  Psych: crying, upset  Abdomen: Normal BS, soft, NT/ND  Extremities: no edema, cyanosis  Musculoskeletal: hemiparesis LUE/LLE. 5/5 strength RUE/RLE  Neuro: A & O X 3, sensory deficit left side, right gaze preference, See NIHSS above       Detailed Neurologic Exam:    Mental status: The patient is awake and alert and has normal attention span.  The patient is fully oriented in 3 spheres. The patient is oriented to current events. The patient is able to name objects, follow commands, repeat sentences but is severely dysarthric.     Cranial nerves: Pupils equal and react symmetrically to light. There is left hemianopsia visual field deficit to confrontation. Right gaze preference. There is no ptosis. Left facial droop.     Motor: There is normal bulk and tone.  There is no tremor.  Strength is 5/5 in the right arm and leg.   Strength is 1/5 in the left arm and leg.    Sensation: Severe sensory loss left side. Intact to light touch right side.    Cerebellar: There is no dysmetria on finger to nose testing.    Gait : deferred        1A: Level of consciousness     +1= Arouses to minor stimulation  1B: Ask month and age       0= Both questions right  1C: "Blink eyes" and "Squeeze Hands"       0= Performs both   2: Horizontal EOMs       +1= Partial gaze palsy; gaze is abnormal in one or both eyes, but forced deviation or total gaze paresis is not present  3: Visual fields     +2= Complete hemianopsia  4: Facial palsy (use grimace if obtunded)      +3=Complete paralysis of one or both sides (absence of facial movement in the upper and lower face)  5A: Left arm motor drift (count out loud and use fingers to show count)      +3= No effort against gravity  5B: Right arm motor drift         0= No drift x 10 seconds  6A: Left leg motor drift       +3= No effort against gravity  6B: Right leg motor drift       0= No drift x 10 seconds  7: Limb ataxia (FNF/heel-shin)       0= No ataxia   8: Sensation       +2= severe sensory loss left side  9: Language/aphasia- describe the scene (on jeanine); name the items; read the sentences (on jeanine)       +1= mild-moderate aphasia some obvious changes without significant limitation  10: Dysarthria- read the words        +2= Severe dysarthria: unintelligible slurring or out of proportion to dysphasia  11: Extinction/inattention       0= No abnormality  Total: 18 (19 Jun 2021 16:30)        LABS:                        14.1   6.84  )-----------( 223      ( 19 Jun 2021 14:53 )             42.6     06-19    139  |  102  |  15.7  ----------------------------<  242<H>  3.6   |  27.0  |  0.60    Ca    9.4      19 Jun 2021 14:53    TPro  7.4  /  Alb  4.5  /  TBili  0.6  /  DBili  x   /  AST  41<H>  /  ALT  49<H>  /  AlkPhos  109  06-19    PT/INR - ( 19 Jun 2021 14:53 )   PT: 13.5 sec;   INR: 1.17 ratio         PTT - ( 19 Jun 2021 14:53 )  PTT:29.7 sec      RADIOLOGY & ADDITIONAL STUDIES:  < from: CT Angio Head w/ IV Cont (06.19.21 @ 15:04) >      IMPRESSION:    Brain CT without contrast:  1.  Acute right MCA territory infarct is better seen on CT perfusion.  2.  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    CT perfusion:  CBF<30% volume: 16 ml, right MCA territory, suggesting core infarct.  Tmax>6.0s volume: 38 ml  Mismatch volume: 22 ml, right MCA territory, suggesting ischemic penumbra.  Mismatch ratio: 2.4    CTA head and neck:  1.  Occlusion of the right ICA from the proximal cervical segment through the supraclinoid segment.  2.  Right M2 occlusion.    CTA and CT perfusion findings were discussed with Dr. JESUS ALBERTO BARTON [id number] at 6/19/2021 3:19 PM by Dr. Tashi Al with read back confirmation.

## 2021-06-20 LAB
A1C WITH ESTIMATED AVERAGE GLUCOSE RESULT: 6.9 % — HIGH (ref 4–5.6)
ANION GAP SERPL CALC-SCNC: 12 MMOL/L — SIGNIFICANT CHANGE UP (ref 5–17)
ANION GAP SERPL CALC-SCNC: 13 MMOL/L — SIGNIFICANT CHANGE UP (ref 5–17)
ANION GAP SERPL CALC-SCNC: 14 MMOL/L — SIGNIFICANT CHANGE UP (ref 5–17)
BUN SERPL-MCNC: 10.9 MG/DL — SIGNIFICANT CHANGE UP (ref 8–20)
BUN SERPL-MCNC: 12 MG/DL — SIGNIFICANT CHANGE UP (ref 8–20)
BUN SERPL-MCNC: 9.3 MG/DL — SIGNIFICANT CHANGE UP (ref 8–20)
CALCIUM SERPL-MCNC: 8.7 MG/DL — SIGNIFICANT CHANGE UP (ref 8.6–10.2)
CALCIUM SERPL-MCNC: 8.7 MG/DL — SIGNIFICANT CHANGE UP (ref 8.6–10.2)
CALCIUM SERPL-MCNC: 8.9 MG/DL — SIGNIFICANT CHANGE UP (ref 8.6–10.2)
CHLORIDE SERPL-SCNC: 103 MMOL/L — SIGNIFICANT CHANGE UP (ref 98–107)
CHLORIDE SERPL-SCNC: 104 MMOL/L — SIGNIFICANT CHANGE UP (ref 98–107)
CHLORIDE SERPL-SCNC: 107 MMOL/L — SIGNIFICANT CHANGE UP (ref 98–107)
CHOLEST SERPL-MCNC: 214 MG/DL — HIGH
CO2 SERPL-SCNC: 21 MMOL/L — LOW (ref 22–29)
CO2 SERPL-SCNC: 21 MMOL/L — LOW (ref 22–29)
CO2 SERPL-SCNC: 22 MMOL/L — SIGNIFICANT CHANGE UP (ref 22–29)
COVID-19 SPIKE DOMAIN AB INTERP: NEGATIVE — SIGNIFICANT CHANGE UP
COVID-19 SPIKE DOMAIN ANTIBODY RESULT: 0.4 U/ML — SIGNIFICANT CHANGE UP
CREAT SERPL-MCNC: 0.43 MG/DL — LOW (ref 0.5–1.3)
CREAT SERPL-MCNC: 0.46 MG/DL — LOW (ref 0.5–1.3)
CREAT SERPL-MCNC: 0.48 MG/DL — LOW (ref 0.5–1.3)
ESTIMATED AVERAGE GLUCOSE: 151 MG/DL — HIGH (ref 68–114)
GLUCOSE BLDC GLUCOMTR-MCNC: 150 MG/DL — HIGH (ref 70–99)
GLUCOSE BLDC GLUCOMTR-MCNC: 160 MG/DL — HIGH (ref 70–99)
GLUCOSE BLDC GLUCOMTR-MCNC: 164 MG/DL — HIGH (ref 70–99)
GLUCOSE BLDC GLUCOMTR-MCNC: 165 MG/DL — HIGH (ref 70–99)
GLUCOSE BLDC GLUCOMTR-MCNC: 170 MG/DL — HIGH (ref 70–99)
GLUCOSE SERPL-MCNC: 147 MG/DL — HIGH (ref 70–99)
GLUCOSE SERPL-MCNC: 175 MG/DL — HIGH (ref 70–99)
GLUCOSE SERPL-MCNC: 175 MG/DL — HIGH (ref 70–99)
HCT VFR BLD CALC: 40.6 % — SIGNIFICANT CHANGE UP (ref 34.5–45)
HDLC SERPL-MCNC: 45 MG/DL — LOW
HGB BLD-MCNC: 14 G/DL — SIGNIFICANT CHANGE UP (ref 11.5–15.5)
LIPID PNL WITH DIRECT LDL SERPL: 148 MG/DL — HIGH
MCHC RBC-ENTMCNC: 29.2 PG — SIGNIFICANT CHANGE UP (ref 27–34)
MCHC RBC-ENTMCNC: 34.5 GM/DL — SIGNIFICANT CHANGE UP (ref 32–36)
MCV RBC AUTO: 84.8 FL — SIGNIFICANT CHANGE UP (ref 80–100)
NON HDL CHOLESTEROL: 169 MG/DL — HIGH
PLATELET # BLD AUTO: 236 K/UL — SIGNIFICANT CHANGE UP (ref 150–400)
POTASSIUM SERPL-MCNC: 3.4 MMOL/L — LOW (ref 3.5–5.3)
POTASSIUM SERPL-MCNC: 3.5 MMOL/L — SIGNIFICANT CHANGE UP (ref 3.5–5.3)
POTASSIUM SERPL-MCNC: 3.6 MMOL/L — SIGNIFICANT CHANGE UP (ref 3.5–5.3)
POTASSIUM SERPL-SCNC: 3.4 MMOL/L — LOW (ref 3.5–5.3)
POTASSIUM SERPL-SCNC: 3.5 MMOL/L — SIGNIFICANT CHANGE UP (ref 3.5–5.3)
POTASSIUM SERPL-SCNC: 3.6 MMOL/L — SIGNIFICANT CHANGE UP (ref 3.5–5.3)
RBC # BLD: 4.79 M/UL — SIGNIFICANT CHANGE UP (ref 3.8–5.2)
RBC # FLD: 13.3 % — SIGNIFICANT CHANGE UP (ref 10.3–14.5)
SARS-COV-2 IGG+IGM SERPL QL IA: 0.4 U/ML — SIGNIFICANT CHANGE UP
SARS-COV-2 IGG+IGM SERPL QL IA: NEGATIVE — SIGNIFICANT CHANGE UP
SODIUM SERPL-SCNC: 137 MMOL/L — SIGNIFICANT CHANGE UP (ref 135–145)
SODIUM SERPL-SCNC: 138 MMOL/L — SIGNIFICANT CHANGE UP (ref 135–145)
SODIUM SERPL-SCNC: 142 MMOL/L — SIGNIFICANT CHANGE UP (ref 135–145)
TRIGL SERPL-MCNC: 104 MG/DL — SIGNIFICANT CHANGE UP
TSH SERPL-MCNC: 1.24 UIU/ML — SIGNIFICANT CHANGE UP (ref 0.27–4.2)
WBC # BLD: 9.99 K/UL — SIGNIFICANT CHANGE UP (ref 3.8–10.5)
WBC # FLD AUTO: 9.99 K/UL — SIGNIFICANT CHANGE UP (ref 3.8–10.5)

## 2021-06-20 PROCEDURE — 70450 CT HEAD/BRAIN W/O DYE: CPT | Mod: 26,77

## 2021-06-20 PROCEDURE — 99232 SBSQ HOSP IP/OBS MODERATE 35: CPT

## 2021-06-20 PROCEDURE — 99222 1ST HOSP IP/OBS MODERATE 55: CPT

## 2021-06-20 PROCEDURE — 93306 TTE W/DOPPLER COMPLETE: CPT | Mod: 26

## 2021-06-20 PROCEDURE — 70450 CT HEAD/BRAIN W/O DYE: CPT | Mod: 26

## 2021-06-20 PROCEDURE — 99291 CRITICAL CARE FIRST HOUR: CPT

## 2021-06-20 RX ORDER — SODIUM CHLORIDE 9 MG/ML
1000 INJECTION INTRAMUSCULAR; INTRAVENOUS; SUBCUTANEOUS ONCE
Refills: 0 | Status: COMPLETED | OUTPATIENT
Start: 2021-06-20 | End: 2021-06-20

## 2021-06-20 RX ORDER — SODIUM CHLORIDE 5 G/100ML
500 INJECTION, SOLUTION INTRAVENOUS
Refills: 0 | Status: DISCONTINUED | OUTPATIENT
Start: 2021-06-20 | End: 2021-06-21

## 2021-06-20 RX ORDER — VALPROIC ACID (AS SODIUM SALT) 250 MG/5ML
500 SOLUTION, ORAL ORAL EVERY 12 HOURS
Refills: 0 | Status: DISCONTINUED | OUTPATIENT
Start: 2021-06-20 | End: 2021-07-01

## 2021-06-20 RX ORDER — INSULIN GLARGINE 100 [IU]/ML
10 INJECTION, SOLUTION SUBCUTANEOUS AT BEDTIME
Refills: 0 | Status: DISCONTINUED | OUTPATIENT
Start: 2021-06-20 | End: 2021-06-23

## 2021-06-20 RX ORDER — POTASSIUM CHLORIDE 20 MEQ
10 PACKET (EA) ORAL
Refills: 0 | Status: COMPLETED | OUTPATIENT
Start: 2021-06-20 | End: 2021-06-20

## 2021-06-20 RX ORDER — ASPIRIN/CALCIUM CARB/MAGNESIUM 324 MG
300 TABLET ORAL ONCE
Refills: 0 | Status: COMPLETED | OUTPATIENT
Start: 2021-06-20 | End: 2021-06-20

## 2021-06-20 RX ORDER — DEXMEDETOMIDINE HYDROCHLORIDE IN 0.9% SODIUM CHLORIDE 4 UG/ML
0.1 INJECTION INTRAVENOUS
Qty: 200 | Refills: 0 | Status: DISCONTINUED | OUTPATIENT
Start: 2021-06-20 | End: 2021-06-21

## 2021-06-20 RX ORDER — ASPIRIN/CALCIUM CARB/MAGNESIUM 324 MG
81 TABLET ORAL DAILY
Refills: 0 | Status: DISCONTINUED | OUTPATIENT
Start: 2021-06-21 | End: 2021-06-21

## 2021-06-20 RX ORDER — ENOXAPARIN SODIUM 100 MG/ML
40 INJECTION SUBCUTANEOUS
Refills: 0 | Status: DISCONTINUED | OUTPATIENT
Start: 2021-06-20 | End: 2021-07-08

## 2021-06-20 RX ADMIN — INSULIN GLARGINE 10 UNIT(S): 100 INJECTION, SOLUTION SUBCUTANEOUS at 21:34

## 2021-06-20 RX ADMIN — Medication 100 MILLIEQUIVALENT(S): at 07:59

## 2021-06-20 RX ADMIN — Medication 27.5 MILLIGRAM(S): at 17:55

## 2021-06-20 RX ADMIN — SODIUM CHLORIDE 500 MILLILITER(S): 9 INJECTION INTRAMUSCULAR; INTRAVENOUS; SUBCUTANEOUS at 03:15

## 2021-06-20 RX ADMIN — SODIUM CHLORIDE 30 MILLILITER(S): 5 INJECTION, SOLUTION INTRAVENOUS at 13:14

## 2021-06-20 RX ADMIN — ENOXAPARIN SODIUM 40 MILLIGRAM(S): 100 INJECTION SUBCUTANEOUS at 17:55

## 2021-06-20 RX ADMIN — Medication 2: at 06:01

## 2021-06-20 RX ADMIN — Medication 2: at 23:50

## 2021-06-20 RX ADMIN — Medication 2: at 17:55

## 2021-06-20 RX ADMIN — Medication 300 MILLIGRAM(S): at 17:54

## 2021-06-20 RX ADMIN — Medication 100 MILLIEQUIVALENT(S): at 06:58

## 2021-06-20 RX ADMIN — Medication 2: at 11:32

## 2021-06-20 RX ADMIN — Medication 100 MILLIEQUIVALENT(S): at 06:02

## 2021-06-20 NOTE — PROGRESS NOTE ADULT - ASSESSMENT
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   Out of Alteplase window for TX and not thrombectomy candidate per IR     NEURO:  Neuro check q 2 hrs  If no surgery offered would start ASA and Plavix   Step down  F/U MRI  ECHO   Baseline EKG  Maintain Na 140-150 for swelling from cytotoxic edema   Activity: [X] OOB as tolerated  [X] PT [X] OT X[] splint    PULM:  Monitor O2 sats maintain above 92 %    CV:  SBP goal- Permissive HTN to SBP for another 48 hrs      RENAL: 2 % Na Cl - maintain Na goal   Fluids: Maintain IVF till taking po    GI:  Diet: Dysphagia eval  If pass CCD   GI prophylaxis [] not indicated [  Bowel regimen  [X] senna     ENDO:   Goal euglycemia (-180)  HISS    HEME/ONC:  VTE prophylaxis: [X] SCDs [X] chemoprophylaxis- Lovenox 40mh  at 6 pm tonight    ID: monitor for fever      SOCIAL/FAMILY:  [X] updated at bedside     CODE STATUS:  [X] Full Code     DISPOSITION:  [X] ICU     [X] Patient is at high risk of neurologic deterioration/death due to:  brain swelling and risk of herniation     Time seen:  Time spent:   40 critical care minutes 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   Out of Alteplase window for TX and not thrombectomy candidate per IR     NEURO:  Hemicraniectomy watch   Neuro check q 2 hrs  ASA today- Surgery agrees    Plavix if no OR    F/U MRI  ECHO   Baseline EKG  Maintain Na 140-150 for swelling from cytotoxic edema - 3 % 30cc  2 PIV's - check Na q 6 after   Activity: [X] OOB as tolerated  [X] PT [X] OT X[] splint    PULM:  Monitor O2 sats maintain above 92 %    CV:  SBP goal- Permissive HTN to SBP for another 24 hrs   - 200- re-eval in am      RENAL: 3 % Na Cl -  Urinary retention - diallo - no cardura for now   Fluids: IVF    GI:  Diet: Not pass dysphagia  Recommend early PEG   GI prophylaxis [] not indicated   Bowel regimen  [X] senna     ENDO: Hyperglycemia   Lantus dose ( 25 U q day ) - start 10 u q day  SSI  Atorvastatin for HLD  Goal euglycemia (-180)      HEME/ONC:  VTE prophylaxis: [X] SCDs [X] chemoprophylaxis- Lovenox 40mh  at 6 pm tonight    ID: monitor for fever    SOCIAL/FAMILY:  [X] updated at bedside     CODE STATUS:  [X] Full Code     DISPOSITION:  [X] ICU     [X] Patient is at high risk of neurologic deterioration/death due to:  brain swelling and risk of herniation     Time spent:   40 critical care minutes

## 2021-06-20 NOTE — CONSULT NOTE ADULT - SUBJECTIVE AND OBJECTIVE BOX
Neurology Consult Note  [facility] [place] [address] [place] [address]  [phone]    HPI:  Patient unable to provide history. History obtained from chart.  82 yo woman with medical conditions as outlined below who was found down yesterday. She presented with L facial droop, left sided weakness, and dysarthria. CT head showed R MCA infarct, CTA head showed R M2 occlusion, and CTA neck showed occlusion of the right ICA. She was admitted to NSICU for q 1 h neurochecks and hemicrani watch last night.     PMHx:  HTN  DM    PSHx:  unknown    Meds:  MEDICATIONS  (STANDING):  aspirin Suppository 300 milliGRAM(s) Rectal once  dexMEDEtomidine Infusion 0.1 MICROgram(s)/kG/Hr (1.68 mL/Hr) IV Continuous <Continuous>  dextrose 40% Gel 15 Gram(s) Oral once  dextrose 5%. 1000 milliLiter(s) (50 mL/Hr) IV Continuous <Continuous>  dextrose 5%. 1000 milliLiter(s) (100 mL/Hr) IV Continuous <Continuous>  dextrose 50% Injectable 25 Gram(s) IV Push once  dextrose 50% Injectable 12.5 Gram(s) IV Push once  dextrose 50% Injectable 25 Gram(s) IV Push once  enoxaparin Injectable 40 milliGRAM(s) SubCutaneous <User Schedule>  glucagon  Injectable 1 milliGRAM(s) IntraMuscular once  insulin glargine Injectable (LANTUS) 10 Unit(s) SubCutaneous at bedtime  insulin lispro (ADMELOG) corrective regimen sliding scale   SubCutaneous every 6 hours  sodium chloride 3%. 500 milliLiter(s) (30 mL/Hr) IV Continuous <Continuous>  sodium chloride 3%. 500 milliLiter(s) (30 mL/Hr) IV Continuous <Continuous>  valproate sodium IVPB 500 milliGRAM(s) IV Intermittent every 12 hours    MEDICATIONS  (PRN):  haloperidol    Injectable 2 milliGRAM(s) IV Push every 6 hours PRN Agitation    Allergies:  unknown    FamHx:  unknonw    SocHx:  Denies toxic habits    ROS: unable to obtain due to patient's mental status    Physical Exam  Vital Signs Last 24 Hrs  T(C): 37.3 (20 Jun 2021 15:32), Max: 37.5 (20 Jun 2021 04:12)  T(F): 99.2 (20 Jun 2021 15:32), Max: 99.5 (20 Jun 2021 04:12)  HR: 82 (20 Jun 2021 15:00) (63 - 107)  BP: 182/82 (20 Jun 2021 15:00) (124/64 - 233/96)  BP(mean): 110 (20 Jun 2021 15:00) (86 - 143)  RR: 29 (20 Jun 2021 15:00) (17 - 38)  SpO2: 96% (20 Jun 2021 15:00) (91% - 99%)  Mental Status: lying in bed with eyes closed, follows some simple commands, opens eyes on command  CN: PERRL, right gaze preference, blinks to threat bilaterally, left facial droop  Motor:  LUE 0/5  LLE 0/5  RUE and RLE at least 4/5  Sensory: grimaces to pinch in LUE and LLE  Gait: deferred      LABS:  cret                        14.0   9.99  )-----------( 236      ( 20 Jun 2021 04:22 )             40.6     06-20    137  |  103  |  9.3  ----------------------------<  175<H>  3.6   |  21.0<L>  |  0.43<L>    Ca    8.7      20 Jun 2021 11:59    TPro  7.4  /  Alb  4.5  /  TBili  0.6  /  DBili  x   /  AST  41<H>  /  ALT  49<H>  /  AlkPhos  109  06-19    PT/INR - ( 19 Jun 2021 14:53 )   PT: 13.5 sec;   INR: 1.17 ratio         PTT - ( 19 Jun 2021 14:53 )  PTT:29.7 sec      Brain CT without contrast:  1.  Acute right MCA territory infarct is better seen on CT perfusion.  2.  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    CT perfusion:  CBF<30% volume: 16 ml, right MCA territory, suggesting core infarct.  Tmax>6.0s volume: 38 ml  Mismatch volume: 22 ml, right MCA territory, suggesting ischemic penumbra.  Mismatch ratio: 2.4    CTA head and neck:  1.  Occlusion of the right ICA from the proximal cervical segment through the supraclinoid segment.  2.  Right M2 occlusion.      TTE:  Summary:   1. Left ventricular ejection fraction, by visual estimation, is 60 to 65%.   2. Normal global left ventricular systolic function.   3. The mitral in-flow pattern reveals no discernable A-wave, therefore no comment on diastolic function can be made.   4. Normal right ventricular size and function.   5. Mildly enlarged left atrium.   6. Mild mitral annular calcification.   7. Thickening and calcification of the anterior and posterior mitral valve leaflets.   8. Mild-moderate tricuspid regurgitation.   9. Mild aortic regurgitation.  10. Estimated pulmonary artery systolic pressure is 51.1 mmHg assuming a right atrial pressure of 3 mmHg, which is consistent with moderate pulmonary hypertension.

## 2021-06-20 NOTE — PROGRESS NOTE ADULT - SUBJECTIVE AND OBJECTIVE BOX
HPI:  80 y/o female, PMH DM, HTN, presents to ED as Code Stroke after she was found down on her front lawn by a neighbor. Unknown down time, reported LKW 10:50 am. Initial fingerstick for EMS 60, pt given amp of d50 with improvement in glucose level. Pt presenting with L sided facial droop, L sided weakness, dysarthria. Out of window for TPA. CTH with acute right MCA infarct, CTA Right M2 occlusion. CTP with 16 ml core infarct, right MCA territory.Mismatch volume: 22 ml, right MCA territory, suggesting ischemic penumbra. Mismatch ratio: 2.4. 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    NIHSS= 18  mRs= 0           Interval History:  24 Hour Events: CTH performed, right MCA distribution hypodensity     1. Patient's Neurologic Exam unchanged.    2. Patient's Hemodynamic's maintained without drips.    3. Patient's Respiratory status is: adequate    4. Patient is pending: Patient is pending further neurologic recovery, further neurologic, respiratory, and hemodynamic monitoring & management in the ICU.     5. Dispo: ICU for now, downgrade when clinically stable.           Physical Exam:  Constitutional: NAD    Neuro  * Mental Status:  GCS 15:  E(3, V(4), M(6).  Opens eyes to voice, speech very dysarthric - nearly unintelligible hard of hearing but follows simple commands  * Cranial Nerves: Right gaze preference but crosses midline PERRL, EOMI, tongue midline, no gaze deviation  * Motor: RUE 5/5, LUE 1/5, RLE 5/5, LLE 1/5  * Sensory: Decreased sensation on left, left sided neglect  * Reflexes: Not assessed  * Gait: Not assessed    Cardiovascular:  S1, S2 no murmurs appreciated.  Regular rate and rhythm.  Eyes: See neurologic examination with detailed examination of eyes.  ENT: No JVD, Trachea Midline.  Respiratory: Clear to auscultation.  Gastrointestinal: Soft, nontender, nondistended.  Genitourinary: [ ] Goodwin, [ x ] No Goodwin.   Musculoskeletal: No muscle wasting noted, (See neuorlogic assessment for full muscle strength assessment) No pretibial edema appreciated, no appreciable calf tenderness.  Skin:  No pathologic skin lesions   Hematologic / Lymph / Immunologic: No bleeding from IV sites or wounds, No lymphadenopathy, No Hives or allergic type skin lesions      Vitals:  Vital Signs Last 24 Hrs  T(C): 37.5 (2021 04:12), Max: 37.5 (2021 04:12)  T(F): 99.5 (2021 04:12), Max: 99.5 (2021 04:12)  HR: 107 (2021 04:00) (65 - 107)  BP: 137/98 (2021 04:00) (124/64 - 235/103)  BP(mean): 106 (2021 04:00) (86 - 143)  RR: 25 (:00) (18 - 38)  SpO2: 96% (:00) (91% - 99%)    I&O:  I&O's Summary    2021 07:01  -  2021 05:35  --------------------------------------------------------  IN: 1530 mL / OUT: 900 mL / NET: 630 mL        Labs & Radiology:                        14.0   9.99  )-----------( 236      ( 2021 04:22 )             40.6       06-20    138  |  104  |  10.9  ----------------------------<  147<H>  3.5   |  22.0  |  0.46<L>    Ca    8.9      2021 04:22    TPro  7.4  /  Alb  4.5  /  TBili  0.6  /  DBili  x   /  AST  41<H>  /  ALT  49<H>  /  AlkPhos  109  06-19      LIVER FUNCTIONS - ( 2021 14:53 )  Alb: 4.5 g/dL / Pro: 7.4 g/dL / ALK PHOS: 109 U/L / ALT: 49 U/L / AST: 41 U/L / GGT: x             CARDIAC MARKERS ( 2021 14:53 )  x     / <0.01 ng/mL / x     / x     / x            PT/INR - ( 2021 14:53 )   PT: 13.5 sec;   INR: 1.17 ratio         PTT - ( 2021 14:53 )  PTT:29.7 sec            CAPILLARY BLOOD GLUCOSE      POCT Blood Glucose.: 145 mg/dL (2021 23:55)  POCT Blood Glucose.: 156 mg/dL (2021 18:41)  POCT Blood Glucose.: 247 mg/dL (2021 14:23)      Neurosurgery Imagin/20 right mca distribution CVA    Medications:  MEDICATIONS  (STANDING):  dextrose 40% Gel 15 Gram(s) Oral once  dextrose 5%. 1000 milliLiter(s) (50 mL/Hr) IV Continuous <Continuous>  dextrose 5%. 1000 milliLiter(s) (100 mL/Hr) IV Continuous <Continuous>  dextrose 50% Injectable 25 Gram(s) IV Push once  dextrose 50% Injectable 12.5 Gram(s) IV Push once  dextrose 50% Injectable 25 Gram(s) IV Push once  glucagon  Injectable 1 milliGRAM(s) IntraMuscular once  insulin lispro (ADMELOG) corrective regimen sliding scale   SubCutaneous every 6 hours  sodium chloride 2% . 1000 milliLiter(s) (60 mL/Hr) IV Continuous <Continuous>    MEDICATIONS  (PRN):  haloperidol    Injectable 2 milliGRAM(s) IV Push every 6 hours PRN Agitation      Assessment:  81f who presents with CVA secondary to likely artery to artery emboli (occluded right carotid --> right M2).  Not candidate for alteplase or endovascular therapy.  In NCCU for hemicraniectomy watch.    Continue Neuro Checks  Despite high NIH, the CTH shows rather small volume of infarct unlikely to require a hemicraniectomy   Maintain BP within desired range 160-200 per NCCU.  Hypodensity already apparent on CT, stroke likely completed, consider trial of normalization of SBP goal  Speech evaluation / swallow eval, may need peg, has required frequent suctioning overnight and severe facial droop    Medical care per Neuro ICU  No acute neurosurgical intervention is indicated at this time, will continue to follow

## 2021-06-20 NOTE — PROGRESS NOTE ADULT - SUBJECTIVE AND OBJECTIVE BOX
SUMMARY:HPI:  82 y/o female, PMH DM, HTN, presents to ED as Code Stroke after she was found down on her front lawn by a neighbor. Unknown down time, reported LKW 10:50 am. Initial fingerstick for EMS 60, pt given amp of d50 with improvement in glucose level. Pt presenting with L sided facial droop, L sided weakness, dysarthria. Out of window for TPA. CTH with acute right MCA infarct, CTA Right M2 occlusion. CTP with 16 ml core infarct, right MCA territory.Mismatch volume: 22 ml, right MCA territory, suggesting ischemic penumbra. Mismatch ratio: 2.4. 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     ADMISSION NIHSS= 18  mRs= 0     1A: Level of consciousness     +1= Arouses to minor stimulation  1B: Ask month and age       0= Both questions right  1C: "Blink eyes" and "Squeeze Hands"       0= Performs both   2: Horizontal EOMs       +1= Partial gaze palsy; gaze is abnormal in one or both eyes, but forced deviation or total gaze paresis is not present  3: Visual fields     +2= Complete hemianopsia  4: Facial palsy (use grimace if obtunded)      +3=Complete paralysis of one or both sides (absence of facial movement in the upper and lower face)  5A: Left arm motor drift (count out loud and use fingers to show count)      +3= No effort against gravity  5B: Right arm motor drift         0= No drift x 10 seconds  6A: Left leg motor drift       +3= No effort against gravity  6B: Right leg motor drift       0= No drift x 10 seconds  7: Limb ataxia (FNF/heel-shin)       0= No ataxia   8: Sensation       +2= severe sensory loss left side  9: Language/aphasia- describe the scene (on jeanine); name the items; read the sentences (on jeanine)       +1= mild-moderate aphasia some obvious changes without significant limitation  10: Dysarthria- read the words        +2= Severe dysarthria: unintelligible slurring or out of proportion to dysphasia  11: Extinction/inattention       0= No abnormality  Total: 18 (19 Jun 2021 16:30)      Allergies    Allergy Status Unknown    Intolerances        REVIEW OF SYSTEMS:  [X ] All ROS addressed below are non-contributory, except:  Neuro: [ ] Headache [ ] Back pain [ ] Numbness [ X] Weakness [ ] Ataxia [ ] Dizziness [ ] Aphasia [ ] Dysarthria [ ] Visual disturbance  Resp: [ ] Shortness of breath/dyspnea, [ ] Orthopnea [ ] Cough  CV: [ ] Chest pain [ ] Palpitation [ ] Lightheadedness [ ] Syncope  Renal: [ ] Thirst [ ] Edema  GI: [ ] Nausea [ ] Emesis [ ] Abdominal pain [ ] Constipation [ ] Diarrhea  Hem: [ ] Hematemesis [ ] bright red blood per rectum  ID: [ ] Fever [ ] Chills [ ] Dysuria  ENT: [ ] Rhinorrhea      VITALS: [X ] Reviewed  Vital Signs Last 24 Hrs  T(C): 37.1 (20 Jun 2021 08:00), Max: 37.5 (20 Jun 2021 04:12)  T(F): 98.7 (20 Jun 2021 08:00), Max: 99.5 (20 Jun 2021 04:12)  HR: 86 (20 Jun 2021 08:00) (63 - 107)  BP: 194/88 (20 Jun 2021 08:00) (124/64 - 235/103)  BP(mean): 110 (20 Jun 2021 08:00) (86 - 143)  RR: 24 (20 Jun 2021 08:00) (18 - 38)  SpO2: 97% (20 Jun 2021 08:00) (91% - 99%)  CAPILLARY BLOOD GLUCOSE      POCT Blood Glucose.: 160 mg/dL (20 Jun 2021 05:38)  POCT Blood Glucose.: 145 mg/dL (19 Jun 2021 23:55)  POCT Blood Glucose.: 156 mg/dL (19 Jun 2021 18:41)  POCT Blood Glucose.: 247 mg/dL (19 Jun 2021 14:23)        LABS:  PT/INR - ( 19 Jun 2021 14:53 )   PT: 13.5 sec;   INR: 1.17 ratio         PTT - ( 19 Jun 2021 14:53 )  PTT:29.7 sec  06-20    138  |  104  |  10.9  ----------------------------<  147<H>  3.5   |  22.0  |  0.46<L>    Ca    8.9      20 Jun 2021 04:22    TPro  7.4  /  Alb  4.5  /  TBili  0.6  /  DBili  x   /  AST  41<H>  /  ALT  49<H>  /  AlkPhos  109  06-19                          14.0   9.99  )-----------( 236      ( 20 Jun 2021 04:22 )             40.6       STROKE CORE MEASURES:    HGBA1C - 6.9  LDL- 148  TSH- 2.4            IVF FLUIDS/MEDICATIONS: [ X] Reviewed  MEDICATIONS  (STANDING):  dextrose 40% Gel 15 Gram(s) Oral once  dextrose 5%. 1000 milliLiter(s) (50 mL/Hr) IV Continuous <Continuous>  dextrose 5%. 1000 milliLiter(s) (100 mL/Hr) IV Continuous <Continuous>  dextrose 50% Injectable 25 Gram(s) IV Push once  dextrose 50% Injectable 12.5 Gram(s) IV Push once  dextrose 50% Injectable 25 Gram(s) IV Push once  glucagon  Injectable 1 milliGRAM(s) IntraMuscular once  insulin lispro (ADMELOG) corrective regimen sliding scale   SubCutaneous every 6 hours  sodium chloride 2% . 1000 milliLiter(s) (60 mL/Hr) IV Continuous <Continuous>    MEDICATIONS  (PRN):  haloperidol    Injectable 2 milliGRAM(s) IV Push every 6 hours PRN Agitation    I&O's Summary    19 Jun 2021 07:01  -  20 Jun 2021 07:00  --------------------------------------------------------  IN: 1910 mL / OUT: 1900 mL / NET: 10 mL    20 Jun 2021 07:01  -  20 Jun 2021 09:02  --------------------------------------------------------  IN: 220 mL / OUT: 0 mL / NET: 220 mL    IMAGING/DATA: [X ] Reviewed    CT Head No Cont (06.20.21 @ 02:29) >  INTERPRETATION:  CLINICAL INDICATION: Known right MCA CVA, unresponsive.    TECHNIQUE: CT axial images of the head were obtained without intravenous contrast. Computer-reconstructed coronal and sagittal images were obtained.    COMPARISON: 6/19/2021.    FINDINGS: Nonspecific periventricular and subcortical white matter lucencies likely represent chronic microvascular ischemic changes. The right frontoparietal lucencyappears more prominent than the prior study and likely represents an evolving right MCA territory infarct. There is no obvious acute intracranial hemorrhage, mass effect or midline shift.    There is a stable hyperdense structure along the superior cerebellar cistern abutting the on the tentorium/surface of the vein of Matias, suggestive of a meningioma. There is mild to moderate cerebral volume loss with ventricular dilatation.    There is no depressed skull fracture. There is sinus mucosal thickening with a left maxillary sinus mucus retention cyst/polyp. The tympanomastoid region is unremarkable.    IMPRESSION:    Evolving right MCA territorial infarct. No obvious acute intracranial hemorrhage. If clinically indicated, short-term follow-up or MRI may be obtained for further evaluation.    EXAMINATION:  PHYSICAL EXAM:    Constitutional: No Acute Distress     Neurological: Awake, alert oriented to person, place and time, Following Commands, PERRL, EOMI, No Gaze Preference, Face Symmetrical, Speech Fluent, No dysmetria, No ataxia, No nystagmus     Motor exam:          Upper extremity                         Delt     Bicep     Tricep    HG                                                 R         5/5        5/5        5/5       5/5                                               L          5/5        5/5        5/5       5/5          Lower extremity                        HF         KF        KE       DF         PF                                                  R        5/5        5/5        5/5       5/5         5/5                                               L         5/5        5/5       5/5       5/5          5/5                                                 Sensation: [ ] intact to light touch  [ ] decreased:     Reflexes: Deep Tendon Reflexes Intact     Pulmonary: Clear to Auscultation, No rales, No rhonchi, No wheezes     Cardiovascular: S1, S2, Regular rate and rhythm     Gastrointestinal: Soft, Non-tender, Non-distended     Extremities: No calf tenderness     Incision:    SUMMARY:HPI:  82 y/o female, PMH DM, HTN, presents to ED as Code Stroke after she was found down on her front lawn by a neighbor. Unknown down time, reported LKW 10:50 am. Initial fingerstick for EMS 60, pt given amp of d50 with improvement in glucose level. Pt presenting with L sided facial droop, L sided weakness, dysarthria. Out of window for TPA. CTH with acute right MCA infarct, CTA Right M2 occlusion. CTP with 16 ml core infarct, right MCA territory.Mismatch volume: 22 ml, right MCA territory, suggesting ischemic penumbra. Mismatch ratio: 2.4. 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     ADMISSION NIHSS= 18  mRs= 0     1A: Level of consciousness     +1= Arouses to minor stimulation  1B: Ask month and age       0= Both questions right  1C: "Blink eyes" and "Squeeze Hands"       0= Performs both   2: Horizontal EOMs       +1= Partial gaze palsy; gaze is abnormal in one or both eyes, but forced deviation or total gaze paresis is not present  3: Visual fields     +2= Complete hemianopsia  4: Facial palsy (use grimace if obtunded)      +3=Complete paralysis of one or both sides (absence of facial movement in the upper and lower face)  5A: Left arm motor drift (count out loud and use fingers to show count)      +3= No effort against gravity  5B: Right arm motor drift         0= No drift x 10 seconds  6A: Left leg motor drift       +3= No effort against gravity  6B: Right leg motor drift       0= No drift x 10 seconds  7: Limb ataxia (FNF/heel-shin)       0= No ataxia   8: Sensation       +2= severe sensory loss left side  9: Language/aphasia- describe the scene (on jeanine); name the items; read the sentences (on jeanine)       +1= mild-moderate aphasia some obvious changes without significant limitation  10: Dysarthria- read the words        +2= Severe dysarthria: unintelligible slurring or out of proportion to dysphasia  11: Extinction/inattention       0= No abnormality  Total: 18 (19 Jun 2021 16:30)      Allergies    Allergy Status Unknown    Intolerances        REVIEW OF SYSTEMS:  [X ] All ROS addressed below are non-contributory, except:  Neuro: [ ] Headache [ ] Back pain [ ] Numbness [ X] Weakness [ ] Ataxia [ ] Dizziness [ ] Aphasia [ ] Dysarthria [ ] Visual disturbance  Resp: [ ] Shortness of breath/dyspnea, [ ] Orthopnea [ ] Cough  CV: [ ] Chest pain [ ] Palpitation [ ] Lightheadedness [ ] Syncope  Renal: [ ] Thirst [ ] Edema  GI: [ ] Nausea [ ] Emesis [ ] Abdominal pain [ ] Constipation [ ] Diarrhea  Hem: [ ] Hematemesis [ ] bright red blood per rectum  ID: [ ] Fever [ ] Chills [ ] Dysuria  ENT: [ ] Rhinorrhea      VITALS: [X ] Reviewed  Vital Signs Last 24 Hrs  T(C): 37.1 (20 Jun 2021 08:00), Max: 37.5 (20 Jun 2021 04:12)  T(F): 98.7 (20 Jun 2021 08:00), Max: 99.5 (20 Jun 2021 04:12)  HR: 86 (20 Jun 2021 08:00) (63 - 107)  BP: 194/88 (20 Jun 2021 08:00) (124/64 - 235/103)  BP(mean): 110 (20 Jun 2021 08:00) (86 - 143)  RR: 24 (20 Jun 2021 08:00) (18 - 38)  SpO2: 97% (20 Jun 2021 08:00) (91% - 99%)  CAPILLARY BLOOD GLUCOSE      POCT Blood Glucose.: 160 mg/dL (20 Jun 2021 05:38)  POCT Blood Glucose.: 145 mg/dL (19 Jun 2021 23:55)  POCT Blood Glucose.: 156 mg/dL (19 Jun 2021 18:41)  POCT Blood Glucose.: 247 mg/dL (19 Jun 2021 14:23)        LABS:  PT/INR - ( 19 Jun 2021 14:53 )   PT: 13.5 sec;   INR: 1.17 ratio         PTT - ( 19 Jun 2021 14:53 )  PTT:29.7 sec  06-20    138  |  104  |  10.9  ----------------------------<  147<H>  ( 2 % at 60 cc/hr)   3.5   |  22.0  |  0.46<L>    Ca    8.9      20 Jun 2021 04:22    TPro  7.4  /  Alb  4.5  /  TBili  0.6  /  DBili  x   /  AST  41<H>  /  ALT  49<H>  /  AlkPhos  109  06-19                          14.0   9.99  )-----------( 236      ( 20 Jun 2021 04:22 )             40.6       STROKE CORE MEASURES:   HGBA1C - 6.9  LDL- 148  TSH-1. 24            IVF FLUIDS/MEDICATIONS: [ X] Reviewed  MEDICATIONS  (STANDING):  dextrose 40% Gel 15 Gram(s) Oral once  dextrose 5%. 1000 milliLiter(s) (50 mL/Hr) IV Continuous <Continuous>  dextrose 5%. 1000 milliLiter(s) (100 mL/Hr) IV Continuous <Continuous>  dextrose 50% Injectable 25 Gram(s) IV Push once  dextrose 50% Injectable 12.5 Gram(s) IV Push once  dextrose 50% Injectable 25 Gram(s) IV Push once  glucagon  Injectable 1 milliGRAM(s) IntraMuscular once  insulin lispro (ADMELOG) corrective regimen sliding scale   SubCutaneous every 6 hours  sodium chloride 2% . 1000 milliLiter(s) (60 mL/Hr) IV Continuous <Continuous>    MEDICATIONS  (PRN):  haloperidol    Injectable 2 milliGRAM(s) IV Push every 6 hours PRN Agitation    I&O's Summary    19 Jun 2021 07:01  -  20 Jun 2021 07:00  --------------------------------------------------------  IN: 1910 mL / OUT: 1900 mL / NET: 10 mL    20 Jun 2021 07:01  -  20 Jun 2021 09:02  --------------------------------------------------------  IN: 220 mL / OUT: 0 mL / NET: 220 mL    IMAGING/DATA: [X ] Reviewed    CT Head No Cont (06.20.21 @ 02:29) >  INTERPRETATION:  CLINICAL INDICATION: Known right MCA CVA, unresponsive.    TECHNIQUE: CT axial images of the head were obtained without intravenous contrast. Computer-reconstructed coronal and sagittal images were obtained.    COMPARISON: 6/19/2021.    FINDINGS: Nonspecific periventricular and subcortical white matter lucencies likely represent chronic microvascular ischemic changes. The right frontoparietal lucencyappears more prominent than the prior study and likely represents an evolving right MCA territory infarct. There is no obvious acute intracranial hemorrhage, mass effect or midline shift.    There is a stable hyperdense structure along the superior cerebellar cistern abutting the on the tentorium/surface of the vein of Matias, suggestive of a meningioma. There is mild to moderate cerebral volume loss with ventricular dilatation.    There is no depressed skull fracture. There is sinus mucosal thickening with a left maxillary sinus mucus retention cyst/polyp. The tympanomastoid region is unremarkable.    IMPRESSION:    Evolving right MCA territorial infarct. No obvious acute intracranial hemorrhage. If clinically indicated, short-term follow-up or MRI may be obtained for further evaluation.    EXAMINATION:  PHYSICAL EXAM:    Constitutional: No Acute Distress     Neurological: Awake,  Severely dysarthric,  follows commands, pupils 2mm and reactive , L facial     Motor exam:          Upper extremity                         Delt     Bicep     Tricep    HG                                                 R        5/5                                               L         0/5          Lower extremity                        HF         KF        KE       DF         PF                                                  R       4+/5                                               L         withdraws                                                 Sensation: [X ] intact to light touch       Pulmonary: Clear to Auscultation, No rales, No rhonchi, No wheezes     Cardiovascular: S1, S2, Regular rate and rhythm     Gastrointestinal: Soft, Non-tender, Non-distended     Extremities: No calf tenderness

## 2021-06-20 NOTE — CONSULT NOTE ADULT - ASSESSMENT
82 yo woman with large R MCA infarct      R MCA infarct  aspirin suppository  MRI brain w/o contrast  Permissive HTN until tomorrow morning and then SBP goal 140-160s  SS/PT/OT    Will continue to follow

## 2021-06-20 NOTE — PROGRESS NOTE ADULT - ATTENDING COMMENTS
CAROLYN Attg:    see above    patient seen and examined with PA staff    awake  eyes open  dysarthric  follows commands intermittently    agree with plan as above

## 2021-06-21 DIAGNOSIS — Z51.5 ENCOUNTER FOR PALLIATIVE CARE: ICD-10-CM

## 2021-06-21 DIAGNOSIS — J18.9 PNEUMONIA, UNSPECIFIED ORGANISM: ICD-10-CM

## 2021-06-21 LAB
ANION GAP SERPL CALC-SCNC: 12 MMOL/L — SIGNIFICANT CHANGE UP (ref 5–17)
ANION GAP SERPL CALC-SCNC: 14 MMOL/L — SIGNIFICANT CHANGE UP (ref 5–17)
APPEARANCE UR: CLEAR — SIGNIFICANT CHANGE UP
BACTERIA # UR AUTO: ABNORMAL
BILIRUB UR-MCNC: NEGATIVE — SIGNIFICANT CHANGE UP
BUN SERPL-MCNC: 15.7 MG/DL — SIGNIFICANT CHANGE UP (ref 8–20)
BUN SERPL-MCNC: 19.3 MG/DL — SIGNIFICANT CHANGE UP (ref 8–20)
CALCIUM SERPL-MCNC: 8.1 MG/DL — LOW (ref 8.6–10.2)
CALCIUM SERPL-MCNC: 8.7 MG/DL — SIGNIFICANT CHANGE UP (ref 8.6–10.2)
CHLORIDE SERPL-SCNC: 106 MMOL/L — SIGNIFICANT CHANGE UP (ref 98–107)
CHLORIDE SERPL-SCNC: 114 MMOL/L — HIGH (ref 98–107)
CO2 SERPL-SCNC: 22 MMOL/L — SIGNIFICANT CHANGE UP (ref 22–29)
CO2 SERPL-SCNC: 22 MMOL/L — SIGNIFICANT CHANGE UP (ref 22–29)
COLOR SPEC: YELLOW — SIGNIFICANT CHANGE UP
CREAT SERPL-MCNC: 0.49 MG/DL — LOW (ref 0.5–1.3)
CREAT SERPL-MCNC: 0.54 MG/DL — SIGNIFICANT CHANGE UP (ref 0.5–1.3)
DIFF PNL FLD: ABNORMAL
EPI CELLS # UR: SIGNIFICANT CHANGE UP
GLUCOSE BLDC GLUCOMTR-MCNC: 112 MG/DL — HIGH (ref 70–99)
GLUCOSE BLDC GLUCOMTR-MCNC: 126 MG/DL — HIGH (ref 70–99)
GLUCOSE BLDC GLUCOMTR-MCNC: 158 MG/DL — HIGH (ref 70–99)
GLUCOSE BLDC GLUCOMTR-MCNC: 165 MG/DL — HIGH (ref 70–99)
GLUCOSE SERPL-MCNC: 133 MG/DL — HIGH (ref 70–99)
GLUCOSE SERPL-MCNC: 178 MG/DL — HIGH (ref 70–99)
GLUCOSE UR QL: 50 MG/DL
HCT VFR BLD CALC: 38.6 % — SIGNIFICANT CHANGE UP (ref 34.5–45)
HGB BLD-MCNC: 13.1 G/DL — SIGNIFICANT CHANGE UP (ref 11.5–15.5)
KETONES UR-MCNC: ABNORMAL
LEUKOCYTE ESTERASE UR-ACNC: NEGATIVE — SIGNIFICANT CHANGE UP
MAGNESIUM SERPL-MCNC: 1.9 MG/DL — SIGNIFICANT CHANGE UP (ref 1.8–2.6)
MCHC RBC-ENTMCNC: 29.3 PG — SIGNIFICANT CHANGE UP (ref 27–34)
MCHC RBC-ENTMCNC: 33.9 GM/DL — SIGNIFICANT CHANGE UP (ref 32–36)
MCV RBC AUTO: 86.4 FL — SIGNIFICANT CHANGE UP (ref 80–100)
NITRITE UR-MCNC: NEGATIVE — SIGNIFICANT CHANGE UP
PH UR: 6 — SIGNIFICANT CHANGE UP (ref 5–8)
PHOSPHATE SERPL-MCNC: 3.8 MG/DL — SIGNIFICANT CHANGE UP (ref 2.4–4.7)
PLATELET # BLD AUTO: 215 K/UL — SIGNIFICANT CHANGE UP (ref 150–400)
POTASSIUM SERPL-MCNC: 3.5 MMOL/L — SIGNIFICANT CHANGE UP (ref 3.5–5.3)
POTASSIUM SERPL-MCNC: 3.6 MMOL/L — SIGNIFICANT CHANGE UP (ref 3.5–5.3)
POTASSIUM SERPL-SCNC: 3.5 MMOL/L — SIGNIFICANT CHANGE UP (ref 3.5–5.3)
POTASSIUM SERPL-SCNC: 3.6 MMOL/L — SIGNIFICANT CHANGE UP (ref 3.5–5.3)
PROT UR-MCNC: 100 MG/DL
RBC # BLD: 4.47 M/UL — SIGNIFICANT CHANGE UP (ref 3.8–5.2)
RBC # FLD: 13.5 % — SIGNIFICANT CHANGE UP (ref 10.3–14.5)
RBC CASTS # UR COMP ASSIST: ABNORMAL /HPF (ref 0–4)
SODIUM SERPL-SCNC: 142 MMOL/L — SIGNIFICANT CHANGE UP (ref 135–145)
SODIUM SERPL-SCNC: 148 MMOL/L — HIGH (ref 135–145)
SP GR SPEC: 1.02 — SIGNIFICANT CHANGE UP (ref 1.01–1.02)
UROBILINOGEN FLD QL: NEGATIVE MG/DL — SIGNIFICANT CHANGE UP
WBC # BLD: 6.57 K/UL — SIGNIFICANT CHANGE UP (ref 3.8–10.5)
WBC # FLD AUTO: 6.57 K/UL — SIGNIFICANT CHANGE UP (ref 3.8–10.5)
WBC UR QL: SIGNIFICANT CHANGE UP

## 2021-06-21 PROCEDURE — 99232 SBSQ HOSP IP/OBS MODERATE 35: CPT

## 2021-06-21 PROCEDURE — 99291 CRITICAL CARE FIRST HOUR: CPT

## 2021-06-21 PROCEDURE — 71045 X-RAY EXAM CHEST 1 VIEW: CPT | Mod: 26

## 2021-06-21 PROCEDURE — 71045 X-RAY EXAM CHEST 1 VIEW: CPT | Mod: 26,77

## 2021-06-21 PROCEDURE — 99231 SBSQ HOSP IP/OBS SF/LOW 25: CPT

## 2021-06-21 PROCEDURE — 99223 1ST HOSP IP/OBS HIGH 75: CPT

## 2021-06-21 RX ORDER — ROCURONIUM BROMIDE 10 MG/ML
50 VIAL (ML) INTRAVENOUS ONCE
Refills: 0 | Status: COMPLETED | OUTPATIENT
Start: 2021-06-21 | End: 2021-06-21

## 2021-06-21 RX ORDER — SODIUM CHLORIDE 9 MG/ML
1000 INJECTION, SOLUTION INTRAVENOUS
Refills: 0 | Status: DISCONTINUED | OUTPATIENT
Start: 2021-06-21 | End: 2021-06-22

## 2021-06-21 RX ORDER — ETOMIDATE 2 MG/ML
20 INJECTION INTRAVENOUS ONCE
Refills: 0 | Status: COMPLETED | OUTPATIENT
Start: 2021-06-21 | End: 2021-06-21

## 2021-06-21 RX ORDER — SCOPALAMINE 1 MG/3D
1 PATCH, EXTENDED RELEASE TRANSDERMAL
Refills: 0 | Status: DISCONTINUED | OUTPATIENT
Start: 2021-06-21 | End: 2021-06-22

## 2021-06-21 RX ORDER — CHLORHEXIDINE GLUCONATE 213 G/1000ML
15 SOLUTION TOPICAL EVERY 12 HOURS
Refills: 0 | Status: DISCONTINUED | OUTPATIENT
Start: 2021-06-21 | End: 2021-06-27

## 2021-06-21 RX ORDER — MIDAZOLAM HYDROCHLORIDE 1 MG/ML
2 INJECTION, SOLUTION INTRAMUSCULAR; INTRAVENOUS ONCE
Refills: 0 | Status: DISCONTINUED | OUTPATIENT
Start: 2021-06-21 | End: 2021-06-21

## 2021-06-21 RX ORDER — PROPOFOL 10 MG/ML
10 INJECTION, EMULSION INTRAVENOUS
Qty: 1000 | Refills: 0 | Status: DISCONTINUED | OUTPATIENT
Start: 2021-06-21 | End: 2021-06-25

## 2021-06-21 RX ORDER — ASPIRIN/CALCIUM CARB/MAGNESIUM 324 MG
300 TABLET ORAL DAILY
Refills: 0 | Status: DISCONTINUED | OUTPATIENT
Start: 2021-06-21 | End: 2021-06-22

## 2021-06-21 RX ORDER — PIPERACILLIN AND TAZOBACTAM 4; .5 G/20ML; G/20ML
3.38 INJECTION, POWDER, LYOPHILIZED, FOR SOLUTION INTRAVENOUS ONCE
Refills: 0 | Status: COMPLETED | OUTPATIENT
Start: 2021-06-21 | End: 2021-06-21

## 2021-06-21 RX ORDER — POTASSIUM CHLORIDE 20 MEQ
10 PACKET (EA) ORAL
Refills: 0 | Status: COMPLETED | OUTPATIENT
Start: 2021-06-21 | End: 2021-06-21

## 2021-06-21 RX ORDER — MAGNESIUM SULFATE 500 MG/ML
2 VIAL (ML) INJECTION ONCE
Refills: 0 | Status: COMPLETED | OUTPATIENT
Start: 2021-06-21 | End: 2021-06-21

## 2021-06-21 RX ORDER — LABETALOL HCL 100 MG
10 TABLET ORAL
Refills: 0 | Status: DISCONTINUED | OUTPATIENT
Start: 2021-06-21 | End: 2021-06-26

## 2021-06-21 RX ORDER — PIPERACILLIN AND TAZOBACTAM 4; .5 G/20ML; G/20ML
3.38 INJECTION, POWDER, LYOPHILIZED, FOR SOLUTION INTRAVENOUS EVERY 8 HOURS
Refills: 0 | Status: COMPLETED | OUTPATIENT
Start: 2021-06-21 | End: 2021-06-25

## 2021-06-21 RX ORDER — ACETAMINOPHEN 500 MG
650 TABLET ORAL EVERY 6 HOURS
Refills: 0 | Status: DISCONTINUED | OUTPATIENT
Start: 2021-06-21 | End: 2021-06-27

## 2021-06-21 RX ADMIN — PIPERACILLIN AND TAZOBACTAM 200 GRAM(S): 4; .5 INJECTION, POWDER, LYOPHILIZED, FOR SOLUTION INTRAVENOUS at 14:45

## 2021-06-21 RX ADMIN — Medication 650 MILLIGRAM(S): at 19:25

## 2021-06-21 RX ADMIN — SCOPALAMINE 1 PATCH: 1 PATCH, EXTENDED RELEASE TRANSDERMAL at 19:25

## 2021-06-21 RX ADMIN — Medication 50 MILLIGRAM(S): at 17:00

## 2021-06-21 RX ADMIN — INSULIN GLARGINE 10 UNIT(S): 100 INJECTION, SOLUTION SUBCUTANEOUS at 21:44

## 2021-06-21 RX ADMIN — Medication 27.5 MILLIGRAM(S): at 05:21

## 2021-06-21 RX ADMIN — ENOXAPARIN SODIUM 40 MILLIGRAM(S): 100 INJECTION SUBCUTANEOUS at 17:49

## 2021-06-21 RX ADMIN — Medication 100 MILLIEQUIVALENT(S): at 06:05

## 2021-06-21 RX ADMIN — Medication 10 MILLIGRAM(S): at 14:07

## 2021-06-21 RX ADMIN — MIDAZOLAM HYDROCHLORIDE 2 MILLIGRAM(S): 1 INJECTION, SOLUTION INTRAMUSCULAR; INTRAVENOUS at 17:15

## 2021-06-21 RX ADMIN — Medication 50 GRAM(S): at 06:05

## 2021-06-21 RX ADMIN — CHLORHEXIDINE GLUCONATE 15 MILLILITER(S): 213 SOLUTION TOPICAL at 17:48

## 2021-06-21 RX ADMIN — Medication 650 MILLIGRAM(S): at 18:30

## 2021-06-21 RX ADMIN — SODIUM CHLORIDE 30 MILLILITER(S): 5 INJECTION, SOLUTION INTRAVENOUS at 04:35

## 2021-06-21 RX ADMIN — Medication 27.5 MILLIGRAM(S): at 17:49

## 2021-06-21 RX ADMIN — ETOMIDATE 20 MILLIGRAM(S): 2 INJECTION INTRAVENOUS at 17:00

## 2021-06-21 RX ADMIN — Medication 100 MILLIEQUIVALENT(S): at 08:02

## 2021-06-21 RX ADMIN — PIPERACILLIN AND TAZOBACTAM 25 GRAM(S): 4; .5 INJECTION, POWDER, LYOPHILIZED, FOR SOLUTION INTRAVENOUS at 21:44

## 2021-06-21 RX ADMIN — Medication 300 MILLIGRAM(S): at 11:54

## 2021-06-21 RX ADMIN — Medication 100 MILLIEQUIVALENT(S): at 06:49

## 2021-06-21 RX ADMIN — SODIUM CHLORIDE 30 MILLILITER(S): 5 INJECTION, SOLUTION INTRAVENOUS at 04:34

## 2021-06-21 RX ADMIN — SCOPALAMINE 1 PATCH: 1 PATCH, EXTENDED RELEASE TRANSDERMAL at 16:33

## 2021-06-21 RX ADMIN — Medication 10 MILLIGRAM(S): at 18:06

## 2021-06-21 NOTE — PROGRESS NOTE ADULT - SUBJECTIVE AND OBJECTIVE BOX
Chief complaint:   Patient is a 81y old  Female who presents with a chief complaint of Right MCA CVA (21 Jun 2021 02:16)    HPI:  80 y/o female, PMH DM, HTN, presents to ED as Code Stroke after she was found down on her front lawn by a neighbor. Unknown down time, reported LKW 10:50 am. Initial fingerstick for EMS 60, pt given amp of d50 with improvement in glucose level. Pt presenting with L sided facial droop, L sided weakness, dysarthria. Out of window for TPA. CTH with acute right MCA infarct, CTA Right M2 occlusion. CTP with 16 ml core infarct, right MCA territory.Mismatch volume: 22 ml, right MCA territory, suggesting ischemic penumbra. Mismatch ratio: 2.4. 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    NIHSS= 18  mRs= 0         24hr EVENTS:      ROS: [ ]  Unable to assess due to mental status   All other systems negative    -----------------------------------------------------------------------------------------------------------------------------------------------------------------------------------  ICU Vital Signs Last 24 Hrs  T(C): 37.2 (21 Jun 2021 04:57), Max: 37.3 (20 Jun 2021 12:00)  T(F): 99 (21 Jun 2021 04:57), Max: 99.2 (20 Jun 2021 15:32)  HR: 81 (21 Jun 2021 07:00) (56 - 107)  BP: 188/101 (21 Jun 2021 07:00) (119/59 - 188/101)  BP(mean): 122 (21 Jun 2021 07:00) (77 - 136)  ABP: --  ABP(mean): --  RR: 21 (21 Jun 2021 07:00) (17 - 38)  SpO2: 93% (21 Jun 2021 07:00) (87% - 99%)      I&O's Summary    20 Jun 2021 07:01  -  21 Jun 2021 07:00  --------------------------------------------------------  IN: 1720 mL / OUT: 2220 mL / NET: -500 mL    21 Jun 2021 07:01  -  21 Jun 2021 08:11  --------------------------------------------------------  IN: 50 mL / OUT: 0 mL / NET: 50 mL        MEDICATIONS  (STANDING):  aspirin Suppository 300 milliGRAM(s) Rectal daily  dextrose 40% Gel 15 Gram(s) Oral once  dextrose 5%. 1000 milliLiter(s) (50 mL/Hr) IV Continuous <Continuous>  dextrose 5%. 1000 milliLiter(s) (100 mL/Hr) IV Continuous <Continuous>  dextrose 50% Injectable 25 Gram(s) IV Push once  dextrose 50% Injectable 12.5 Gram(s) IV Push once  dextrose 50% Injectable 25 Gram(s) IV Push once  enoxaparin Injectable 40 milliGRAM(s) SubCutaneous <User Schedule>  glucagon  Injectable 1 milliGRAM(s) IntraMuscular once  insulin glargine Injectable (LANTUS) 10 Unit(s) SubCutaneous at bedtime  insulin lispro (ADMELOG) corrective regimen sliding scale   SubCutaneous every 6 hours  lactated ringers. 1000 milliLiter(s) (50 mL/Hr) IV Continuous <Continuous>  potassium chloride  10 mEq/100 mL IVPB 10 milliEquivalent(s) IV Intermittent every 1 hour  valproate sodium IVPB 500 milliGRAM(s) IV Intermittent every 12 hours      RESPIRATORY:        IMAGING:   Recent imaging studies were reviewed.    LAB RESULTS:                          13.1   6.57  )-----------( 215      ( 21 Jun 2021 04:39 )             38.6       PT/INR - ( 19 Jun 2021 14:53 )   PT: 13.5 sec;   INR: 1.17 ratio         PTT - ( 19 Jun 2021 14:53 )  PTT:29.7 sec    06-21    148<H>  |  114<H>  |  15.7  ----------------------------<  133<H>  3.5   |  22.0  |  0.54    Ca    8.1<L>      21 Jun 2021 04:39  Phos  3.8     06-21  Mg     1.9     06-21    TPro  7.4  /  Alb  4.5  /  TBili  0.6  /  DBili  x   /  AST  41<H>  /  ALT  49<H>  /  AlkPhos  109  06-19                -----------------------------------------------------------------------------------------------------------------------------------------------------------------------------------    PHYSICAL EXAM:  General: Calm  HEENT: MMM  Neuro:  -Mental status- No acute distress  -CN- PERRL 3mm, EOMI, tongue midline, face symmetric  RUE 5  RLE 4+  LUE 0  LLE WD    CV: RRR  Pulm: clear to auscultation  Abd: Soft, nontender, nondistended  Ext: no noted edema in lower ext  Skin: warm, dry       Chief complaint:   Patient is a 81y old  Female who presents with a chief complaint of Right MCA CVA (21 Jun 2021 02:16)    HPI:  80 y/o female, PMH DM, HTN, presents to ED as Code Stroke after she was found down on her front lawn by a neighbor. Unknown down time, reported LKW 10:50 am. Initial fingerstick for EMS 60, pt given amp of d50 with improvement in glucose level. Pt presenting with L sided facial droop, L sided weakness, dysarthria. Out of window for TPA. CTH with acute right MCA infarct, CTA Right M2 occlusion. CTP with 16 ml core infarct, right MCA territory.Mismatch volume: 22 ml, right MCA territory, suggesting ischemic penumbra. Mismatch ratio: 2.4. 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    NIHSS= 18  mRs= 0     24hr EVENTS:  more lethargic   inc  NC to 4L    ROS: [x]  Unable to assess due to mental status   All other systems negative    -----------------------------------------------------------------------------------------------------------------------------------------------------------------------------------  ICU Vital Signs Last 24 Hrs  T(C): 37.2 (21 Jun 2021 04:57), Max: 37.3 (20 Jun 2021 12:00)  T(F): 99 (21 Jun 2021 04:57), Max: 99.2 (20 Jun 2021 15:32)  HR: 81 (21 Jun 2021 07:00) (56 - 107)  BP: 188/101 (21 Jun 2021 07:00) (119/59 - 188/101)  BP(mean): 122 (21 Jun 2021 07:00) (77 - 136)  ABP: --  ABP(mean): --  RR: 21 (21 Jun 2021 07:00) (17 - 38)  SpO2: 93% (21 Jun 2021 07:00) (87% - 99%)      I&O's Summary    20 Jun 2021 07:01  -  21 Jun 2021 07:00  --------------------------------------------------------  IN: 1720 mL / OUT: 2220 mL / NET: -500 mL    21 Jun 2021 07:01  -  21 Jun 2021 08:11  --------------------------------------------------------  IN: 50 mL / OUT: 0 mL / NET: 50 mL        MEDICATIONS  (STANDING):  aspirin Suppository 300 milliGRAM(s) Rectal daily  dextrose 40% Gel 15 Gram(s) Oral once  dextrose 5%. 1000 milliLiter(s) (50 mL/Hr) IV Continuous <Continuous>  dextrose 5%. 1000 milliLiter(s) (100 mL/Hr) IV Continuous <Continuous>  dextrose 50% Injectable 25 Gram(s) IV Push once  dextrose 50% Injectable 12.5 Gram(s) IV Push once  dextrose 50% Injectable 25 Gram(s) IV Push once  enoxaparin Injectable 40 milliGRAM(s) SubCutaneous <User Schedule>  glucagon  Injectable 1 milliGRAM(s) IntraMuscular once  insulin glargine Injectable (LANTUS) 10 Unit(s) SubCutaneous at bedtime  insulin lispro (ADMELOG) corrective regimen sliding scale   SubCutaneous every 6 hours  lactated ringers. 1000 milliLiter(s) (50 mL/Hr) IV Continuous <Continuous>  potassium chloride  10 mEq/100 mL IVPB 10 milliEquivalent(s) IV Intermittent every 1 hour  valproate sodium IVPB 500 milliGRAM(s) IV Intermittent every 12 hours      IMAGING:   Recent imaging studies were reviewed.    LAB RESULTS:                          13.1   6.57  )-----------( 215      ( 21 Jun 2021 04:39 )             38.6       PT/INR - ( 19 Jun 2021 14:53 )   PT: 13.5 sec;   INR: 1.17 ratio         PTT - ( 19 Jun 2021 14:53 )  PTT:29.7 sec    06-21    148<H>  |  114<H>  |  15.7  ----------------------------<  133<H>  3.5   |  22.0  |  0.54    Ca    8.1<L>      21 Jun 2021 04:39  Phos  3.8     06-21  Mg     1.9     06-21    TPro  7.4  /  Alb  4.5  /  TBili  0.6  /  DBili  x   /  AST  41<H>  /  ALT  49<H>  /  AlkPhos  109  06-19      -----------------------------------------------------------------------------------------------------------------------------------------------------------------------------------    PHYSICAL EXAM:  General: Calm  HEENT: MMM  Neuro:  -Mental status- No acute distress, R gaze pref, int follows simple commands  -CN- PERRL 3mm, EOMI, tongue midline, L facial droop  RUE 5  RLE 4+  LUE 0  LLE WD  decr sensation on L  L neglect    CV: RRR  Pulm: clear to auscultation  Abd: Soft, nontender, nondistended  Ext: no noted edema in lower ext  Skin: warm, dry

## 2021-06-21 NOTE — OCCUPATIONAL THERAPY INITIAL EVALUATION ADULT - GROSSLY INTACT, SENSORY
Continue to assess; pt responds to light touch on Right extremities, left UE appears to have impairments

## 2021-06-21 NOTE — PROGRESS NOTE ADULT - SUBJECTIVE AND OBJECTIVE BOX
HPI: 82 y/o female, PMH DM, HTN, presents to ED as Code Stroke after she was found down on her front lawn by a neighbor. Unknown down time, reported LKW 10:50 am. Initial fingerstick for EMS 60, pt given amp of d50 with improvement in glucose level. Pt presenting with L sided facial droop, L sided weakness, dysarthria. Out of window for TPA. CTH with acute right MCA infarct, CTA Right M2 occlusion. CTP with 16 ml core infarct, right MCA territory.Mismatch volume: 22 ml, right MCA territory, suggesting ischemic penumbra. Mismatch ratio: 2.4. 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    NIHSS= 18  mRs= 0     24 Hour Events: CTH right small distal MCA distribution hypodensity, Pt is on hypertonic saline to decrease cerebral edema. Started on ASA & Lovenox for DVT prophylaxis. Stable exam    Physical Exam:  Constitutional: NAD    Neuro  * Mental Status:  Doesn't open eyes, speech very dysarthric - nearly unintelligible hard of hearing but follows simple commands  * Cranial Nerves: Right gaze preference but crosses midline PERRL, EOMI,   * Motor: RUE 5/5, LUE 1/5, RLE 5/5, LLE 2/5  * Sensory: Decreased sensation on left, left sided neglect  * Reflexes: Not assessed  * Gait: Not assessed    Cardiovascular:  S1, S2 no murmurs appreciated.  Regular rate and rhythm.  Eyes: See neurologic examination with detailed examination of eyes.  ENT: No JVD, Trachea Midline.  Respiratory: Clear to auscultation.  Gastrointestinal: Soft, nontender, nondistended.  Genitourinary: [ ] Goodwin, [ x ] No Goodwin.   Musculoskeletal: No muscle wasting noted, (See neuorlogic assessment for full muscle strength assessment) No pretibial edema appreciated, no appreciable calf tenderness.  Skin:  No pathologic skin lesions   Hematologic / Lymph / Immunologic: No bleeding from IV sites or wounds, No lymphadenopathy, No Hives or allergic type skin lesions    Vital Signs Last 24 Hrs  T(C): 37.3 (20 Jun 2021 23:35), Max: 37.5 (20 Jun 2021 04:12)  T(F): 99.1 (20 Jun 2021 23:35), Max: 99.5 (20 Jun 2021 04:12)  HR: 64 (21 Jun 2021 02:00) (56 - 107)  BP: 141/75 (21 Jun 2021 02:00) (119/59 - 194/88)  BP(mean): 95 (21 Jun 2021 02:00) (77 - 136)  RR: 31 (21 Jun 2021 02:00) (17 - 38)  SpO2: 90% (21 Jun 2021 02:00) (90% - 99%)    I&O's Summary    19 Jun 2021 07:01  -  20 Jun 2021 07:00  --------------------------------------------------------  IN: 1910 mL / OUT: 1900 mL / NET: 10 mL    20 Jun 2021 07:01  -  21 Jun 2021 02:27  --------------------------------------------------------  IN: 1290 mL / OUT: 1925 mL / NET: -635 mL    Labs:                         14.0   9.99  )-----------( 236      ( 20 Jun 2021 04:22 )             40.6     142  |  107  |  12.0  ----------------------------<  175<H>  3.4<L>   |  21.0<L>  |  0.48<L>    Ca    8.7      20 Jun 2021 22:12    TPro  7.4  /  Alb  4.5  /  TBili  0.6  /  DBili  x   /  AST  41<H>  /  ALT  49<H>  /  AlkPhos  109  06-19  LIVER FUNCTIONS - ( 19 Jun 2021 14:53 )  Alb: 4.5 g/dL / Pro: 7.4 g/dL / ALK PHOS: 109 U/L / ALT: 49 U/L / AST: 41 U/L / GGT: x         PT/INR - ( 19 Jun 2021 14:53 )   PT: 13.5 sec;   INR: 1.17 ratio     PTT - ( 19 Jun 2021 14:53 )  PTT:29.7 sec    Neurosurgery Imaging:  CT Head No Cont (06.20.21 @ 02:29)   IMPRESSION:  Evolving right MCA territorial infarct. No obvious acute intracranial hemorrhage. If clinically indicated, short-term follow-up or MRI may be obtained for further evaluation.    CT Head No Cont (06.20.21 @ 16:32)   IMPRESSION: Redemonstration of an evolving acute/subacute infarct within the right MCA distribution with associated cytotoxic edema and without hemorrhagic transformation.  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

## 2021-06-21 NOTE — CONSULT NOTE ADULT - SUBJECTIVE AND OBJECTIVE BOX
Palliative Medicine Initial Consultation Note    HPI:  History from chart - patient poor MS  80 y/o female, PMH DM, HTN, presents to ED as Code Stroke after she was found down on her front lawn by a neighbor. Unknown down time, reported LKW 10:50 am. Initial fingerstick for EMS 60, pt given amp of d50 with improvement in glucose level. Pt presenting with L sided facial droop, L sided weakness, dysarthria. Out of window for TPA. CTH with acute right MCA infarct, CTA Right M2 occlusion. CTP with 16 ml core infarct, right MCA territory.Mismatch volume: 22 ml, right MCA territory, suggesting ischemic penumbra. Mismatch ratio: 2.4. 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    PERTINENT PMH REVIEWED:  [x ] YES [ ] NO         Diabetes     Hypertension.        SOCIAL HISTORY:        EtOH [ ] Yes  [ x] No                                    Drugs [ ] Yes [x ] No                                   [ ] smoker [ x] nonsmoker                                    Admitted from: [ ] home [ ] SNF _________ [ ] [facility] ________    Surrogate/HCP/Guardian: son Eliezer, daughter Tammy listed  FAMILY HISTORY:      Baseline ADLs (prior to admission):  Independent [ ] moderately [ x] fully   Dependent   [ ] moderately [ ]fully    MEDICATIONS  (STANDING):  aspirin Suppository 300 milliGRAM(s) Rectal daily  dextrose 40% Gel 15 Gram(s) Oral once  dextrose 5%. 1000 milliLiter(s) (50 mL/Hr) IV Continuous <Continuous>  dextrose 5%. 1000 milliLiter(s) (100 mL/Hr) IV Continuous <Continuous>  dextrose 50% Injectable 25 Gram(s) IV Push once  dextrose 50% Injectable 12.5 Gram(s) IV Push once  dextrose 50% Injectable 25 Gram(s) IV Push once  enoxaparin Injectable 40 milliGRAM(s) SubCutaneous <User Schedule>  glucagon  Injectable 1 milliGRAM(s) IntraMuscular once  insulin glargine Injectable (LANTUS) 10 Unit(s) SubCutaneous at bedtime  insulin lispro (ADMELOG) corrective regimen sliding scale   SubCutaneous every 6 hours  lactated ringers. 1000 milliLiter(s) (50 mL/Hr) IV Continuous <Continuous>  piperacillin/tazobactam IVPB.. 3.375 Gram(s) IV Intermittent every 8 hours  scopolamine 1 mG/72 Hr(s) Patch 1 Patch Transdermal every 72 hours  valproate sodium IVPB 500 milliGRAM(s) IV Intermittent every 12 hours    MEDICATIONS  (PRN):  haloperidol    Injectable 2 milliGRAM(s) IV Push every 6 hours PRN Agitation  labetalol Injectable 10 milliGRAM(s) IV Push every 2 hours PRN SBP >180      Allergies    Allergy Status Unknown    Intolerances        REVIEW OF SYSTEMS       [x ] Unable to obtain due to poor mentation       Karnofsky Performance Score/Palliative Performance Status Version 2:  20 %    Vital Signs Last 24 Hrs  T(C): 38.2 (21 Jun 2021 15:45), Max: 38.2 (21 Jun 2021 15:45)  T(F): 100.8 (21 Jun 2021 15:45), Max: 100.8 (21 Jun 2021 15:45)  HR: 81 (21 Jun 2021 16:00) (56 - 120)  BP: 177/70 (21 Jun 2021 16:00) (119/59 - 239/137)  BP(mean): 106 (21 Jun 2021 15:00) (77 - 163)  RR: 29 (21 Jun 2021 16:00) (20 - 38)  SpO2: 97% (21 Jun 2021 16:00) (87% - 99%)    PHYSICAL EXAM:    General: Elderly woman, NAD eyes closed, attempting to write with right hand  HEENT: [ x] normal  [ ] dry mouth  [ ] ET tube/trach    Lungs: [ x] comfortable [ ] tachypnea/labored breathing  [ ] excessive secretions    CV: [x ] normal  [ ] tachycardia    GI: [x ] normal  [ ] distended  [ ] tender  [ ] no BS               [ ] PEG/NG/OG tube    : [ ] normal  [x ] incontinent  [ ] oliguria/anuria  [ ] diallo    MSK: [ ] normal  [ x] weakness  [ ] edema             [ ] ambulatory  [ x] bedbound/wheelchair bound    Skin: [x ] normal  [ ] pressure ulcers- Stage_____  [ ] no rash    LABS:                        13.1   6.57  )-----------( 215      ( 21 Jun 2021 04:39 )             38.6     06-21    148<H>  |  114<H>  |  15.7  ----------------------------<  133<H>  3.5   |  22.0  |  0.54    Ca    8.1<L>      21 Jun 2021 04:39  Phos  3.8     06-21  Mg     1.9     06-21          I&O's Summary    20 Jun 2021 07:01  -  21 Jun 2021 07:00  --------------------------------------------------------  IN: 1720 mL / OUT: 2220 mL / NET: -500 mL    21 Jun 2021 07:01  -  21 Jun 2021 16:51  --------------------------------------------------------  IN: 650 mL / OUT: 845 mL / NET: -195 mL        RADIOLOGY & ADDITIONAL STUDIES:  < from: Xray Chest 1 View- PORTABLE-Urgent (Xray Chest 1 View- PORTABLE-Urgent .) (06.21.21 @ 14:25) >     EXAM:  XR CHEST PORTABLE URGENT 1V                          PROCEDURE DATE:  06/21/2021          INTERPRETATION:  Clinical history: 81-year-old female, hypoxia.    Two rotated/kyphotic views are compared to 6/19/2021 and demonstrate a normal cardiac silhouette is normal pulmonary vasculature with no pneumothorax or acute osseous finding.    Haze in the left mid/lower hemithorax consistent with infiltrate/atelectasis/possible effusion, new.    IMPRESSION:  Atelectasis/possible infiltrate/effusion at the left base, new. Aspiration cannot be excluded            AMMY HIGGINBOTHAM DO; Attending Radiologist  This document has been electronically signed. Jun 21 2021  2:28PM    < end of copied text >      < from: CT Head No Cont (06.20.21 @ 16:32) >   EXAM:  CT BRAIN                          PROCEDURE DATE:  06/20/2021          INTERPRETATION:  .    CLINICAL INFORMATION: change in mentation    TECHNIQUE: Multiple axial CT images of the head were obtained without contrast. Sagittal and coronal reconstructed images were acquired from the source data.    COMPARISON: Most recent prior CT examination of the head from earlier today. Prior CT examination of the head from 6/19/2021.    FINDINGS: There is an further interval demarcation of hypoattenuation involving the right posterior frontal and right parietal lobes when compared with 6/20/2021. No hemorrhagic transformation is appreciated.    A rounded area of hyperattenuation is again seen at the junction of the falx and tentorial leaflets. This appears unchanged in size. Findings are most compatible with a partially calcified meningioma.    There is diffuse cerebral volume loss with prominence of the sulci, fissures, and cisternal spaces which is normal for the patient's age. There is mild periventricular white matter hypoattenuation statistically compatible with microvascular changes given calcific atherosclerotic disease of the intracranial arteries.    Polypoidal mucosal thickening is again seen within the left maxillary sinus posteriorly. The mastoid air cells are clear. The calvarium is intact. The orbits appear unremarkable.    IMPRESSION: Redemonstration of an evolving acute/subacute infarct within the right MCA distribution with associated cytotoxic edema and without hemorrhagic transformation.    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    < end of copied text >      < from: CT Angio Head w/ IV Cont (06.19.21 @ 15:04) >  IMPRESSION:    Brain CT without contrast:  1.  Acute right MCA territory infarct is better seen on CT perfusion.  2.  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    CT perfusion:  CBF<30% volume: 16 ml, right MCA territory, suggesting core infarct.  Tmax>6.0s volume: 38 ml  Mismatch volume: 22 ml, right MCA territory, suggesting ischemic penumbra.  Mismatch ratio: 2.4    CTA head and neck:  1.  Occlusion of the right ICA from the proximal cervical segment through the supraclinoid segment.  2.  Right M2 occlusion.    CTA and CT perfusion findings were discussed with Dr. JESUS ALBERTO BARTON [id number] at 6/19/2021 3:19 PM by Dr. Tashi Al with read back confirmation.      < end of copied text >          ADVANCE DIRECTIVES:  [x ] YES [ ] NO   DNR [ x] YES [ ] NO  Completed on:                     MOLST  [ ] YES [ ] NO   Completed on:  Living Will  [ ] YES [ ] NO   Completed on:

## 2021-06-21 NOTE — CONSULT NOTE ADULT - PROBLEM SELECTOR RECOMMENDATION 2
IV Abx  Discussed with family possible need for trial of intubation - family to think about it
as above

## 2021-06-21 NOTE — PROGRESS NOTE ADULT - ASSESSMENT
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   Out of Alteplase window for TX and not thrombectomy candidate per IR     NEURO:  Neuro check q 2 hrs  ASA   Plavix if no OR    F/U MRI  ECHO   Baseline EKG  Maintain Na 140-150 for swelling from cytotoxic edema - 3 % 30cc  2 PIV's - check Na q 6 after   Activity: [X] OOB as tolerated  [X] PT [X] OT X[] splint    PULM:  Monitor O2 sats maintain above 92 %    CV:  SBP goal- Permissive HTN to SBP for another 24 hrs   - 200- re-eval in am      RENAL: 3 % Na Cl -  Urinary retention - diallo - no cardura for now   Fluids: IVF    GI:  Diet: Not pass dysphagia  Recommend early PEG   GI prophylaxis [] not indicated   Bowel regimen  [X] senna     ENDO: Hyperglycemia   Lantus dose ( 25 U q day ) - start 10 u q day  SSI  Atorvastatin for HLD  Goal euglycemia (-180)      HEME/ONC:  VTE prophylaxis: [X] SCDs [X] chemoprophylaxis- Lovenox 40mh  at 6 pm tonight    ID: monitor for fever    SOCIAL/FAMILY:  [X] updated at bedside     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   Out of Alteplase window for TX and not thrombectomy candidate per IR     NEURO:  Neuro check q 4 hrs  ASA   Plavix when PO access  DC EEG- no sz  ECHO   1:1 for agitation  Maintain Na 140-150 for cytotoxic edema - LR    palliative consult- family wants to care for patient at home, DNR/DNI  Activity: [X] OOB as tolerated  [X] PT [X] OT X[] splint    PULM:  Monitor O2 sats maintain above 92 %  NC to 4L  chest PT    CV:  SBP goal- Permissive HTN to SBP for another 24 hrs   - 180  labetalol prn    RENAL: LR  Urinary retention - diallo - no cardura for now   Fluids: IVF    GI:  Diet: Not pass dysphagia, won't tolerate NGT  Recommend early PEG   GI prophylaxis [] not indicated   Bowel regimen  [X] senna     ENDO: Hyperglycemia   Lantus dose ( 25 U q day home ) - 10 u q day  SSI  Atorvastatin for HLD  Goal euglycemia (-180)    HEME/ONC:  VTE prophylaxis: [X] SCDs [X] chemoprophylaxis- Lovenox 40mh     ID: monitor for fever    SOCIAL/FAMILY:  [X] updated at bedside     CODE STATUS:  [X] Full Code     DISPOSITION:  [X] ICU     [X] Patient is at high risk of neurologic deterioration/death due to:  brain swelling and risk of herniation

## 2021-06-21 NOTE — CONSULT NOTE ADULT - PROBLEM SELECTOR RECOMMENDATION 4
Palliative Care consulted to assist with goals of care. Patient family have decided DNR/I.   Discussed with Dr. Giovany Zaragoza- patient is Day 3 of CVA- still in the period for possible swelling. Patient with PNA and  may need a trial of Intubation as she recovers from PNA.   Discussed with family who was at bedside - daughters, son.  Explained above.  Although mother is DNR/I, consideration of Trial of Intubation to assist  mother recover from PNA as she gets through the next few days whereupon brain swelling can cause some decline.  Intubation can be temporary. If  is prolonged or  not able to be weaned, decision can be made to withdraw vent and focus on her comfort.  Family understood, but would just want to take the time to discuss with other family.

## 2021-06-21 NOTE — CONSULT NOTE ADULT - PROBLEM SELECTOR PROBLEM 1
Acute cerebrovascular accident (CVA) due to thrombosis of right middle cerebral artery
Acute cerebrovascular accident (CVA) due to thrombosis of right middle cerebral artery

## 2021-06-21 NOTE — PROGRESS NOTE ADULT - ASSESSMENT
80 yo woman with large R MCA infarct    R MCA infarct  aspirin suppository  MRI brain w/o contrast  SBP goal 140-160s  Consider repeat CT head w/o contrast as patient not following commands today according to family wishes, discussed with NSICU team      Will continue to follow

## 2021-06-21 NOTE — DIETITIAN INITIAL EVALUATION ADULT. - OTHER INFO
82 y/o female, PMH DM, HTN, presents to ED as Code Stroke after she was found down on her front lawn by a neighbor. Unknown down time, reported LKW 10:50 am. Initial fingerstick for EMS 60, pt given amp of d50 with improvement in glucose level. Pt presenting with L sided facial droop, L sided weakness, dysarthria. Out of window for TPA. CTH with acute right MCA infarct, CTA Right M2 occlusion. CTP with 16 ml core infarct, right MCA territory. Mismatch volume: 22 ml, right MCA territory, suggesting ischemic penumbra. Mismatch ratio: 2.4. 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 Per neuro documentation, "Doesn't open eyes, speech very dysarthric - nearly unintelligible hard of hearing but follows simple commands." Unable to obtain hx at this time. Per RN plan for PEG. Last documented BM 6/19. RD to remain available.

## 2021-06-21 NOTE — CHART NOTE - NSCHARTNOTEFT_GEN_A_CORE
NCCU Procedure Note    Patient positioned at 15 degrees, neck in mild extension.  Patient preoxygenated with NRB.  Given etomidate and rocuronium.  Intubated without difficulty with 7.5 ETT to 23, ETT just above catherine on x-ray, pulled back to 21cm.     Patient tolerated procedure well.  Started propofol for comfort and started on assist control.

## 2021-06-21 NOTE — OCCUPATIONAL THERAPY INITIAL EVALUATION ADULT - LIGHT TOUCH SENSATION, LLE, REHAB EVAL
continue to assess; slight response to light touch on plantar surface of left foot; appears to be decreased sensation

## 2021-06-21 NOTE — DIETITIAN INITIAL EVALUATION ADULT. - ENTERAL
After PEG placement, recommend Glucerna 1.5 initiated at 20 ml/hr and advance 10 ml/hr q4 hrs until goal rate of 55 ml/hr (x20 hrs) to provide 1100 ml, 1650 kcal, 91g protein, 835 ml free water, and >100% of RDIs for vitamins/minerals. Additional free water per MD discretion.

## 2021-06-21 NOTE — PROGRESS NOTE ADULT - SUBJECTIVE AND OBJECTIVE BOX
Neurology Progress Note  [facility] [place] [address] [place] [address]  [phone]    Interval History:  No acute overnight events. Patient not following commands today.    HPI 6/20/21:  Patient unable to provide history. History obtained from chart.  82 yo woman with medical conditions as outlined below who was found down yesterday. She presented with L facial droop, left sided weakness, and dysarthria. CT head showed R MCA infarct, CTA head showed R M2 occlusion, and CTA neck showed occlusion of the right ICA. She was admitted to NSICU for q 1 h neurochecks and hemicrani watch last night.     PMHx:  HTN  DM    PSHx:  unknown    Meds:  MEDICATIONS  (STANDING):  aspirin Suppository 300 milliGRAM(s) Rectal once  dexMEDEtomidine Infusion 0.1 MICROgram(s)/kG/Hr (1.68 mL/Hr) IV Continuous <Continuous>  dextrose 40% Gel 15 Gram(s) Oral once  dextrose 5%. 1000 milliLiter(s) (50 mL/Hr) IV Continuous <Continuous>  dextrose 5%. 1000 milliLiter(s) (100 mL/Hr) IV Continuous <Continuous>  dextrose 50% Injectable 25 Gram(s) IV Push once  dextrose 50% Injectable 12.5 Gram(s) IV Push once  dextrose 50% Injectable 25 Gram(s) IV Push once  enoxaparin Injectable 40 milliGRAM(s) SubCutaneous <User Schedule>  glucagon  Injectable 1 milliGRAM(s) IntraMuscular once  insulin glargine Injectable (LANTUS) 10 Unit(s) SubCutaneous at bedtime  insulin lispro (ADMELOG) corrective regimen sliding scale   SubCutaneous every 6 hours  sodium chloride 3%. 500 milliLiter(s) (30 mL/Hr) IV Continuous <Continuous>  sodium chloride 3%. 500 milliLiter(s) (30 mL/Hr) IV Continuous <Continuous>  valproate sodium IVPB 500 milliGRAM(s) IV Intermittent every 12 hours    MEDICATIONS  (PRN):  haloperidol    Injectable 2 milliGRAM(s) IV Push every 6 hours PRN Agitation    Allergies:  unknown    FamHx:  unknonw    SocHx:  Denies toxic habits    ROS: unable to obtain due to patient's mental status    Physical Exam  Vital Signs Last 24 Hrs  T(C): 37.4 (21 Jun 2021 12:00), Max: 37.4 (21 Jun 2021 12:00)  T(F): 99.3 (21 Jun 2021 12:00), Max: 99.3 (21 Jun 2021 12:00)  HR: 78 (21 Jun 2021 13:00) (56 - 96)  BP: 147/62 (21 Jun 2021 13:00) (119/59 - 195/107)  BP(mean): 130 (21 Jun 2021 12:00) (77 - 136)  RR: 26 (21 Jun 2021 13:00) (19 - 38)  SpO2: 96% (21 Jun 2021 13:00) (87% - 99%)  Mental status: lying in bed with eyes closed, not following commands today  CN: PERRL, right gaze preference, left facial droop  Motor:  LUE 0/5  LLE 0/5  RUE and RLE at least 4/5, moves spontaneously  Sensory: grimaces to pinch in LUE and LLE  Gait: deferred      LABS:    LABS:                        13.1   6.57  )-----------( 215      ( 21 Jun 2021 04:39 )             38.6     06-21    148<H>  |  114<H>  |  15.7  ----------------------------<  133<H>  3.5   |  22.0  |  0.54    Ca    8.1<L>      21 Jun 2021 04:39  Phos  3.8     06-21  Mg     1.9     06-21    TPro  7.4  /  Alb  4.5  /  TBili  0.6  /  DBili  x   /  AST  41<H>  /  ALT  49<H>  /  AlkPhos  109  06-19    PT/INR - ( 19 Jun 2021 14:53 )   PT: 13.5 sec;   INR: 1.17 ratio         PTT - ( 19 Jun 2021 14:53 )  PTT:29.7 sec      Brain CT without contrast:  1.  Acute right MCA territory infarct is better seen on CT perfusion.  2.  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    CT perfusion:  CBF<30% volume: 16 ml, right MCA territory, suggesting core infarct.  Tmax>6.0s volume: 38 ml  Mismatch volume: 22 ml, right MCA territory, suggesting ischemic penumbra.  Mismatch ratio: 2.4    CTA head and neck:  1.  Occlusion of the right ICA from the proximal cervical segment through the supraclinoid segment.  2.  Right M2 occlusion.      TTE:  Summary:   1. Left ventricular ejection fraction, by visual estimation, is 60 to 65%.   2. Normal global left ventricular systolic function.   3. The mitral in-flow pattern reveals no discernable A-wave, therefore no comment on diastolic function can be made.   4. Normal right ventricular size and function.   5. Mildly enlarged left atrium.   6. Mild mitral annular calcification.   7. Thickening and calcification of the anterior and posterior mitral valve leaflets.   8. Mild-moderate tricuspid regurgitation.   9. Mild aortic regurgitation.  10. Estimated pulmonary artery systolic pressure is 51.1 mmHg assuming a right atrial pressure of 3 mmHg, which is consistent with moderate pulmonary hypertension.

## 2021-06-21 NOTE — CHART NOTE - NSCHARTNOTEFT_GEN_A_CORE
NCCU Event Note:    Called by RN to bedside due to worsening respiratory status and slowly declining mental status. The patient has been noted to have increasing lethargy throughout the day and has required frequent suctioning.  Chest x-ray revealed new left infiltrate.  Antibiotics started for pneumonia.  We had further goals of care discussion with the family and the wished to rescind the previous DNI and want a short term trial of intubation with hopes of a neurologic recovery.    Vitals:  Vital Signs Last 24 Hrs  T(C): 38.2 (21 Jun 2021 15:45), Max: 38.2 (21 Jun 2021 15:45)  T(F): 100.8 (21 Jun 2021 15:45), Max: 100.8 (21 Jun 2021 15:45)  HR: 81 (21 Jun 2021 16:00) (61 - 120)  BP: 177/70 (21 Jun 2021 16:00) (120/73 - 239/137)  BP(mean): 106 (21 Jun 2021 15:00) (82 - 163)  RR: 29 (21 Jun 2021 16:00) (20 - 38)  SpO2: 97% (21 Jun 2021 16:00) (87% - 99%)    I&O:  I&O's Summary    20 Jun 2021 07:01  -  21 Jun 2021 07:00  --------------------------------------------------------  IN: 1720 mL / OUT: 2220 mL / NET: -500 mL    21 Jun 2021 07:01  -  21 Jun 2021 17:27  --------------------------------------------------------  IN: 650 mL / OUT: 845 mL / NET: -195 mL        Labs:                         13.1   6.57  )-----------( 215      ( 21 Jun 2021 04:39 )             38.6       06-21    148<H>  |  114<H>  |  15.7  ----------------------------<  133<H>  3.5   |  22.0  |  0.54    Ca    8.1<L>      21 Jun 2021 04:39  Phos  3.8     06-21  Mg     1.9     06-21        Constitutional: NAD    Neuro  * Mental Status:  GCS 15:  E(2), V(2), M(5).  Opens eyes to noxious, incomprehensible sounds.  Localizing bilateral uppers R>L  * Cranial Nerves: Cnii-Cnxii grossly intact. PERRL, EOMI, tongue midline, no gaze deviation  * Motor: RUE 5/5, LUE 3/5, RLE 5/5, LLE 5/5  * Sensory: Sensation intact to light touch  * Reflexes: Not assessed  * Gait: Not assessed    Cardiovascular:  S1, S2 no murmurs appreciated.  Regular rate and rhythm.  Eyes: See neurologic examination with detailed examination of eyes.  ENT: No JVD, Trachea Midline.  Respiratory: Course bilateral breath sounds, rhonchi LLL  Gastrointestinal: Soft, nontender, nondistended.  Genitourinary: [ x ] Goodwin, [ ] No Goodwin.   Musculoskeletal: No muscle wasting noted, (See neuorlogic assessment for full muscle strength assessment) No pretibial edema appreciated, no appreciable calf tenderness.  Skin:  No pathologic skin lesions  Hematologic / Lymph / Immunologic: No bleeding from IV sites or wounds, No lymphadenopathy, No Hives or allergic type skin lesions      Plan  -proceed with trial of intubation given worsening mental status contributing to acute hypoxic respiratory failure  -start antibiotics for pneumonia  -monitor mental status  -on going family discussions    I have spent a total of [__40__] mins of nonconsecutive critical care time managing this patient with[ __Right MCA CVA____, ___Pneumonia___, and ___Acute hypoxic respiratory failure secondary to neurologic condition___].  This included review of relevant history, clinical examination, review of data and images, discussion of treatment with the multidisciplinary team and any consultants involved in this patient’s care as well as family discussion.     I affirm that this patient is critically ill and at high risk for sudden, fatal deterioration due to one or more of the above stated active issues. I managed/supervised life or organ support interventions that required frequent assessment. This time does not include bedside procedures that are documented separately.

## 2021-06-21 NOTE — PROGRESS NOTE ADULT - ASSESSMENT
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  Not candidate for alteplase or endovascular therapy.  In NCCU for hemicraniectomy watch.  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  Not candidate for alteplase or endovascular therapy.  In NCCU for hemicraniectomy watch.  Cerebral edema  Brain compression  Dysphagia    Plan  Continue q2 hr Neuro Checks  Despite high NIH, the CTH shows rather small volume of infarct unlikely to require a hemicraniectomy   Keep normotensive, no exam change with BP in 120s  Pt will need a PEG, call ACS today  Medical care per Neuro ICU  No acute neurosurgical intervention is indicated at this time     NSGY Attg:    see above    patient seen and examined by PA staff and rounding attendings    agree that patient unlikely to require hemicraniectomy

## 2021-06-21 NOTE — EEG REPORT - NS EEG TEXT BOX
[facility]   COMPREHENSIVE EPILEPSY CENTER   REPORT OF LONG-TERM VIDEO EEG     [facility]: [address], [place] [address], Ph#: [phone]  [facility]: [id number] [address] [place] [address], Ph#: [phone]  [facility]: [address] [place] [address], Ph#: [phone]    Patient Name: RISA BEASLEY  Age and : 81y ([birth year])  MRN #: [id number]  Location: [place] [address]  Referring Physician: Charlie Garica    Start Time/Date: 18:18 on 21  End Time/Date: 08:00 on 21  Duration: 12hr 20m    _____________________________________________________________  STUDY INFORMATION    EEG Recording Technique:  The patient underwent continuous Video-EEG monitoring, using Telemetry System hardware on the XLTek Digital System. EEG and video data were stored on a computer hard drive with important events saved in digital archive files. The material was reviewed by a physician (electroencephalographer / epileptologist) on a daily basis. Jitendra and seizure detection algorithms were utilized and reviewed. An EEG Technician attended to the patient, and was available throughout daytime work hours.  The epilepsy center neurologist was available in person or on call 24-hours per day.    EEG Placement and Labeling of Electrodes:  The EEG was performed utilizing 20 channel referential EEG connections (coronal over temporal over parasagittal montage) using all standard 10-20 electrode placements with EKG, with additional electrodes placed in the inferior temporal region using the modified 10-10 montage electrode placements for elective admissions, or if deemed necessary. Recording was at a sampling rate of 256 samples per second per channel. Time synchronized digital video recording was done simultaneously with EEG recording. A low light infrared camera was used for low light recording.     _____________________________________________________________  HISTORY    Patient is a 81y old  Female who presents with a chief complaint of Right MCA CVA (2021 08:11)      PERTINENT MEDICATION:  valproate sodium IVPB 500 milliGRAM(s) IV Intermittent every 12 hours    _____________________________________________________________  STUDY INTERPRETATION    Findings: The background was continuous, spontaneously variable and reactive. No posterior dominant rhythm seen.    Background Slowing:  Background predominantly consisted of theta, delta and faster activities.    Focal Slowing:   Continuous theta/delta slowing in the right hemisphere, max frontotemporally (max F8/T8/P8 >Fp2/F4).    Sleep Background:  Drowsiness was characterized by fragmentation, attenuation, and slowing of the background activity.    Sleep was characterized by the presence of asymmetric sleep spindles and K-complexes, better formed over the left hemisphere.    Other Non-Epileptiform Findings:  Attenuation of fast activity in the left hemisphere.    Interictal Epileptiform Activity:   Rare right temporal (max F8) sharp transients.    Events:  Clinical events: None recorded.  Seizures: None recorded.    Activation Procedures:   Hyperventilation was not performed.    Photic stimulation was not performed.     Artifacts:  Intermittent myogenic and movement artifacts were noted.    ECG:  The heart rate on single channel ECG was predominantly between 70-80 BPM.    _____________________________________________________________  EEG SUMMARY/CLASSIFICATION    Abnormal EEG in an altered / a sedated patient.  - Rare right temporal (max F8) sharp transients.  - Continuous theta/delta slowing in the right hemisphere, max frontotemporally (max F8/T8/P8 >Fp2/F4).  - Attenuation of fast activity in the left hemisphere.  - Moderate generalized slowing.    _____________________________________________________________  EEG IMPRESSION/CLINICAL CORRELATE    Abnormal EEG study.  1. Cortical hyperexcitability in the right temporal region.   2. Structural and cortical dysfunction in the right hemisphere, max frontotemporally.  3. Moderate nonspecific diffuse or multifocal cerebral dysfunction.   4. No seizure seen.    _____________________________________________________________    Marianna Siegel MD  Director, Epilepsy/EMU - [facility]

## 2021-06-21 NOTE — PHYSICAL THERAPY INITIAL EVALUATION ADULT - GENERAL OBSERVATIONS, REHAB EVAL
Pt received supine in bed, + telemetry//BP + EEG + IV + O2 4Lnc, + Venous Compression Boots + diallo + right hand mitten + 1:1 present. Pt dysarthric, No non verbal indication of pain.

## 2021-06-21 NOTE — CONSULT NOTE ADULT - SUBJECTIVE AND OBJECTIVE BOX
81yF was admitted on 06-19 after found down by neighbor on lawn. NIHSS=18.    Imaging performed:  Brain CT without contrast 6/19 - 1.  Acute right MCA territory infarct is better seen on CT perfusion. 2.  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    CT perfusion  6/19 - CBF<30% volume: 16 ml, right MCA territory, suggesting core infarct.  Tmax>6.0s volume: 38 ml  Mismatch volume: 22 ml, right MCA territory, suggesting ischemic penumbra.  Mismatch ratio: 2.4    CTA head and neck  6/19 - 1.  Occlusion of the right ICA from the proximal cervical segment through the supraclinoid segment. 2.  Right M2 occlusion.    HEAD CT 6/20 - Evolving right MCA territorial infarct. No obvious acute intracranial hemorrhage. If clinically indicated, short-term follow-up or MRI may be obtained for further evaluation.    HEAD CT 6/20 - Redemonstration of an evolving acute/subacute infarct within the right MCA distribution with associated cytotoxic edema and without hemorrhagic transformation. 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    EEG 6/21 - Abnormal EEG study.  1. Cortical hyperexcitability in the right temporal region.   2. Structural and cortical dysfunction in the right hemisphere, max frontotemporally.  3. Moderate nonspecific diffuse or multifocal cerebral dysfunction.   4. No seizure seen.    Patient is very somnolent.   Able to follow a few commands.   Moving the right side.     REVIEW OF SYSTEMS - unable to provide    VITALS  T(C): 37.2 (06-21-21 @ 08:00), Max: 37.3 (06-20-21 @ 12:00)  HR: 82 (06-21-21 @ 09:00) (56 - 96)  BP: 195/107 (06-21-21 @ 09:00) (119/59 - 195/107)  RR: 26 (06-21-21 @ 09:00) (17 - 38)  SpO2: 95% (06-21-21 @ 09:00) (87% - 99%)  Wt(kg): --    PAST MEDICAL & SURGICAL HISTORY  Diabetes    Hypertension        SOCIAL HISTORY - as per documentation/history  Smoking - None  EtOH - None  Drugs - None    FUNCTIONAL HISTORY  Independent    CURRENT FUNCTIONAL STATUS  6/21  Bed Mobility: Rolling/Turning:     · Level of [place]	dependent (less than 25% patients effort)  · Physical Assist/Nonphysical Assist	2 person assist    Bed Mobility: Sit to Supine:     · Level of [place]	dependent (less than 25% patients effort)  · Physical Assist/Nonphysical Assist	2 person assist    Bed Mobility: Supine to Sit:     · Level of [place]	dependent (less than 25% patients effort)  · Physical Assist/Nonphysical Assist	2 person assist    Bed Mobility Analysis:     · Bed Mobility Limitations	decreased ability to use legs for bridging/pushing; decreased ability to use arms for pushing/pulling; impaired ability to control trunk for mobility  · Impairments Contributing to Impaired Bed Mobility	impaired balance; impaired postural control; decreased strength    Transfer: Bed to Chair:     Transfer Skill: Bed to Chair   · Level of [place]	unable to perform; unsafe, pt unable to follow commands consistently    Transfer: Sit to Stand:     · Level of [place]	unable to perform; 2 attempts made, pt unable    Gait Skills:     · Level of [place]	unable to perform    Stair Negotiation:     · Level of [place]	unable to perform      FAMILY HISTORY       RECENT LABS - Reviewed  CBC Full  -  ( 21 Jun 2021 04:39 )  WBC Count : 6.57 K/uL  RBC Count : 4.47 M/uL  Hemoglobin : 13.1 g/dL  Hematocrit : 38.6 %  Platelet Count - Automated : 215 K/uL  Mean Cell Volume : 86.4 fl  Mean Cell Hemoglobin : 29.3 pg  Mean Cell Hemoglobin Concentration : 33.9 gm/dL  Auto Neutrophil # : x  Auto Lymphocyte # : x  Auto Monocyte # : x  Auto Eosinophil # : x  Auto Basophil # : x  Auto Neutrophil % : x  Auto Lymphocyte % : x  Auto Monocyte % : x  Auto Eosinophil % : x  Auto Basophil % : x    06-21    148<H>  |  114<H>  |  15.7  ----------------------------<  133<H>  3.5   |  22.0  |  0.54    Ca    8.1<L>      21 Jun 2021 04:39  Phos  3.8     06-21  Mg     1.9     06-21    TPro  7.4  /  Alb  4.5  /  TBili  0.6  /  DBili  x   /  AST  41<H>  /  ALT  49<H>  /  AlkPhos  109  06-19        ALLERGIES  Allergy Status Unknown      MEDICATIONS   aspirin Suppository 300 milliGRAM(s) Rectal daily  dextrose 40% Gel 15 Gram(s) Oral once  dextrose 5%. 1000 milliLiter(s) IV Continuous <Continuous>  dextrose 5%. 1000 milliLiter(s) IV Continuous <Continuous>  dextrose 50% Injectable 25 Gram(s) IV Push once  dextrose 50% Injectable 12.5 Gram(s) IV Push once  dextrose 50% Injectable 25 Gram(s) IV Push once  enoxaparin Injectable 40 milliGRAM(s) SubCutaneous <User Schedule>  glucagon  Injectable 1 milliGRAM(s) IntraMuscular once  haloperidol    Injectable 2 milliGRAM(s) IV Push every 6 hours PRN  insulin glargine Injectable (LANTUS) 10 Unit(s) SubCutaneous at bedtime  insulin lispro (ADMELOG) corrective regimen sliding scale   SubCutaneous every 6 hours  lactated ringers. 1000 milliLiter(s) IV Continuous <Continuous>  valproate sodium IVPB 500 milliGRAM(s) IV Intermittent every 12 hours      ----------------------------------------------------------------------------------------  PHYSICAL EXAM  Constitutional - NAD, Uncomfortable  HEENT - Head bandaged  Neck - Supple, No limited ROM  Chest - Breathing comfortably, No wheezing  Cardiovascular - S1S2   Abdomen - Soft   Extremities - No C/C/E, No calf tenderness   Neurologic Exam -                    Cognitive - Somnolent     Communication - Nonverbal, Moans     Cranial Nerves - Left lip droop     Motor - Able to move her right arm and leg spontaneously                    LEFT    UE - ShAB 0/5, EF 0/5, EE 0/5,  0/5                    RIGHT UE - ShAB 1/5, EF 3/5, EE 3/5,  1/5                    LEFT    LE - HF 0/5, KE 0/5, DF 0/5                    RIGHT LE - HF 3/5, KE 3/5, DF 3/5      Sensory - Does not withdraw on the left  Psychiatric - Mood restless  ----------------------------------------------------------------------------------------  ASSESSMENT/PLAN  81yFemale with functional deficits after acute CVA  Right MCA CVA - ASA, Depakote  DM2 - Lantus  Pain - Tylenol  DVT PPX - SCDs, Lovenox  Rehab - Patient medically being optimized. At high risk of aspiration. Expect patient to need long term services at [facility]/LTCF level.     Continue bedside therapy as well as OOB throughout the day with mobilization by staff to maintain cardiopulmonary function and prevention of secondary complications related to debility.     Discussed with rehab team.

## 2021-06-22 LAB
ALBUMIN SERPL ELPH-MCNC: 3.7 G/DL — SIGNIFICANT CHANGE UP (ref 3.3–5.2)
ANION GAP SERPL CALC-SCNC: 12 MMOL/L — SIGNIFICANT CHANGE UP (ref 5–17)
BUN SERPL-MCNC: 16.8 MG/DL — SIGNIFICANT CHANGE UP (ref 8–20)
CALCIUM SERPL-MCNC: 8.3 MG/DL — LOW (ref 8.6–10.2)
CHLORIDE SERPL-SCNC: 102 MMOL/L — SIGNIFICANT CHANGE UP (ref 98–107)
CO2 SERPL-SCNC: 26 MMOL/L — SIGNIFICANT CHANGE UP (ref 22–29)
CREAT SERPL-MCNC: 0.48 MG/DL — LOW (ref 0.5–1.3)
GLUCOSE BLDC GLUCOMTR-MCNC: 134 MG/DL — HIGH (ref 70–99)
GLUCOSE BLDC GLUCOMTR-MCNC: 136 MG/DL — HIGH (ref 70–99)
GLUCOSE BLDC GLUCOMTR-MCNC: 157 MG/DL — HIGH (ref 70–99)
GLUCOSE BLDC GLUCOMTR-MCNC: 164 MG/DL — HIGH (ref 70–99)
GLUCOSE BLDC GLUCOMTR-MCNC: 165 MG/DL — HIGH (ref 70–99)
GLUCOSE SERPL-MCNC: 132 MG/DL — HIGH (ref 70–99)
GRAM STN FLD: SIGNIFICANT CHANGE UP
HCT VFR BLD CALC: 41.1 % — SIGNIFICANT CHANGE UP (ref 34.5–45)
HGB BLD-MCNC: 13.9 G/DL — SIGNIFICANT CHANGE UP (ref 11.5–15.5)
MAGNESIUM SERPL-MCNC: 2.1 MG/DL — SIGNIFICANT CHANGE UP (ref 1.6–2.6)
MCHC RBC-ENTMCNC: 29.1 PG — SIGNIFICANT CHANGE UP (ref 27–34)
MCHC RBC-ENTMCNC: 33.8 GM/DL — SIGNIFICANT CHANGE UP (ref 32–36)
MCV RBC AUTO: 86 FL — SIGNIFICANT CHANGE UP (ref 80–100)
PHOSPHATE SERPL-MCNC: 3.5 MG/DL — SIGNIFICANT CHANGE UP (ref 2.4–4.7)
PLATELET # BLD AUTO: 200 K/UL — SIGNIFICANT CHANGE UP (ref 150–400)
POTASSIUM SERPL-MCNC: 3.2 MMOL/L — LOW (ref 3.5–5.3)
POTASSIUM SERPL-SCNC: 3.2 MMOL/L — LOW (ref 3.5–5.3)
RBC # BLD: 4.78 M/UL — SIGNIFICANT CHANGE UP (ref 3.8–5.2)
RBC # FLD: 13.6 % — SIGNIFICANT CHANGE UP (ref 10.3–14.5)
SODIUM SERPL-SCNC: 140 MMOL/L — SIGNIFICANT CHANGE UP (ref 135–145)
SPECIMEN SOURCE: SIGNIFICANT CHANGE UP
VALPROATE SERPL-MCNC: 64.5 UG/ML — SIGNIFICANT CHANGE UP (ref 50–100)
WBC # BLD: 9.67 K/UL — SIGNIFICANT CHANGE UP (ref 3.8–10.5)
WBC # FLD AUTO: 9.67 K/UL — SIGNIFICANT CHANGE UP (ref 3.8–10.5)

## 2021-06-22 PROCEDURE — 99233 SBSQ HOSP IP/OBS HIGH 50: CPT

## 2021-06-22 PROCEDURE — 99291 CRITICAL CARE FIRST HOUR: CPT

## 2021-06-22 PROCEDURE — 99232 SBSQ HOSP IP/OBS MODERATE 35: CPT

## 2021-06-22 PROCEDURE — 93010 ELECTROCARDIOGRAM REPORT: CPT

## 2021-06-22 RX ORDER — DILTIAZEM HCL 120 MG
10 CAPSULE, EXT RELEASE 24 HR ORAL ONCE
Refills: 0 | Status: COMPLETED | OUTPATIENT
Start: 2021-06-22 | End: 2021-06-22

## 2021-06-22 RX ORDER — SODIUM CHLORIDE 9 MG/ML
500 INJECTION INTRAMUSCULAR; INTRAVENOUS; SUBCUTANEOUS ONCE
Refills: 0 | Status: COMPLETED | OUTPATIENT
Start: 2021-06-22 | End: 2021-06-22

## 2021-06-22 RX ORDER — DOXAZOSIN MESYLATE 4 MG
4 TABLET ORAL AT BEDTIME
Refills: 0 | Status: DISCONTINUED | OUTPATIENT
Start: 2021-06-22 | End: 2021-06-22

## 2021-06-22 RX ORDER — SODIUM CHLORIDE 9 MG/ML
1000 INJECTION INTRAMUSCULAR; INTRAVENOUS; SUBCUTANEOUS
Refills: 0 | Status: DISCONTINUED | OUTPATIENT
Start: 2021-06-22 | End: 2021-06-24

## 2021-06-22 RX ORDER — ASPIRIN/CALCIUM CARB/MAGNESIUM 324 MG
81 TABLET ORAL DAILY
Refills: 0 | Status: DISCONTINUED | OUTPATIENT
Start: 2021-06-22 | End: 2021-06-27

## 2021-06-22 RX ORDER — NOREPINEPHRINE BITARTRATE/D5W 8 MG/250ML
0.05 PLASTIC BAG, INJECTION (ML) INTRAVENOUS
Qty: 8 | Refills: 0 | Status: DISCONTINUED | OUTPATIENT
Start: 2021-06-22 | End: 2021-06-23

## 2021-06-22 RX ORDER — DOXAZOSIN MESYLATE 4 MG
4 TABLET ORAL AT BEDTIME
Refills: 0 | Status: DISCONTINUED | OUTPATIENT
Start: 2021-06-22 | End: 2021-06-23

## 2021-06-22 RX ORDER — ACETAMINOPHEN 500 MG
1000 TABLET ORAL ONCE
Refills: 0 | Status: COMPLETED | OUTPATIENT
Start: 2021-06-22 | End: 2021-06-22

## 2021-06-22 RX ORDER — ATORVASTATIN CALCIUM 80 MG/1
40 TABLET, FILM COATED ORAL AT BEDTIME
Refills: 0 | Status: DISCONTINUED | OUTPATIENT
Start: 2021-06-22 | End: 2021-06-27

## 2021-06-22 RX ORDER — DILTIAZEM HCL 120 MG
10 CAPSULE, EXT RELEASE 24 HR ORAL ONCE
Refills: 0 | Status: DISCONTINUED | OUTPATIENT
Start: 2021-06-22 | End: 2021-06-23

## 2021-06-22 RX ORDER — CLOPIDOGREL BISULFATE 75 MG/1
75 TABLET, FILM COATED ORAL DAILY
Refills: 0 | Status: DISCONTINUED | OUTPATIENT
Start: 2021-06-22 | End: 2021-06-27

## 2021-06-22 RX ORDER — PHENYLEPHRINE HYDROCHLORIDE 10 MG/ML
0.1 INJECTION INTRAVENOUS
Qty: 40 | Refills: 0 | Status: DISCONTINUED | OUTPATIENT
Start: 2021-06-22 | End: 2021-06-22

## 2021-06-22 RX ADMIN — Medication 2: at 05:50

## 2021-06-22 RX ADMIN — Medication 2: at 17:43

## 2021-06-22 RX ADMIN — Medication 81 MILLIGRAM(S): at 11:28

## 2021-06-22 RX ADMIN — PIPERACILLIN AND TAZOBACTAM 25 GRAM(S): 4; .5 INJECTION, POWDER, LYOPHILIZED, FOR SOLUTION INTRAVENOUS at 14:23

## 2021-06-22 RX ADMIN — Medication 400 MILLIGRAM(S): at 12:30

## 2021-06-22 RX ADMIN — PHENYLEPHRINE HYDROCHLORIDE 2.52 MICROGRAM(S)/KG/MIN: 10 INJECTION INTRAVENOUS at 23:31

## 2021-06-22 RX ADMIN — Medication 650 MILLIGRAM(S): at 05:03

## 2021-06-22 RX ADMIN — Medication 27.5 MILLIGRAM(S): at 17:38

## 2021-06-22 RX ADMIN — CLOPIDOGREL BISULFATE 75 MILLIGRAM(S): 75 TABLET, FILM COATED ORAL at 11:28

## 2021-06-22 RX ADMIN — PIPERACILLIN AND TAZOBACTAM 25 GRAM(S): 4; .5 INJECTION, POWDER, LYOPHILIZED, FOR SOLUTION INTRAVENOUS at 05:23

## 2021-06-22 RX ADMIN — CHLORHEXIDINE GLUCONATE 15 MILLILITER(S): 213 SOLUTION TOPICAL at 17:38

## 2021-06-22 RX ADMIN — Medication 2: at 23:52

## 2021-06-22 RX ADMIN — PROPOFOL 4.03 MICROGRAM(S)/KG/MIN: 10 INJECTION, EMULSION INTRAVENOUS at 03:45

## 2021-06-22 RX ADMIN — Medication 650 MILLIGRAM(S): at 22:25

## 2021-06-22 RX ADMIN — CHLORHEXIDINE GLUCONATE 15 MILLILITER(S): 213 SOLUTION TOPICAL at 05:21

## 2021-06-22 RX ADMIN — Medication 1000 MILLIGRAM(S): at 12:45

## 2021-06-22 RX ADMIN — PIPERACILLIN AND TAZOBACTAM 25 GRAM(S): 4; .5 INJECTION, POWDER, LYOPHILIZED, FOR SOLUTION INTRAVENOUS at 22:30

## 2021-06-22 RX ADMIN — Medication 650 MILLIGRAM(S): at 03:51

## 2021-06-22 RX ADMIN — Medication 27.5 MILLIGRAM(S): at 05:21

## 2021-06-22 RX ADMIN — ENOXAPARIN SODIUM 40 MILLIGRAM(S): 100 INJECTION SUBCUTANEOUS at 17:38

## 2021-06-22 RX ADMIN — Medication 4 MILLIGRAM(S): at 22:29

## 2021-06-22 RX ADMIN — ATORVASTATIN CALCIUM 40 MILLIGRAM(S): 80 TABLET, FILM COATED ORAL at 22:27

## 2021-06-22 RX ADMIN — SODIUM CHLORIDE 500 MILLILITER(S): 9 INJECTION INTRAMUSCULAR; INTRAVENOUS; SUBCUTANEOUS at 23:00

## 2021-06-22 RX ADMIN — INSULIN GLARGINE 10 UNIT(S): 100 INJECTION, SOLUTION SUBCUTANEOUS at 23:52

## 2021-06-22 RX ADMIN — Medication 10 MILLIGRAM(S): at 23:00

## 2021-06-22 RX ADMIN — SCOPALAMINE 1 PATCH: 1 PATCH, EXTENDED RELEASE TRANSDERMAL at 07:00

## 2021-06-22 NOTE — PROGRESS NOTE ADULT - ASSESSMENT
81yr woman with R MCA stroke with left sided weakness, with respiratory failure in the setting of PNA     Problem/Recommendation - 1:  Problem: Acute cerebrovascular accident (CVA) due to thrombosis of right middle cerebral artery. Recommendation: Patient out of window for TPA   cont management per NSICU - ASA.     Problem/Recommendation - 2:  ·  Problem: Pneumonia.  Recommendation:   IV Abx  Intubated     Problem/Recommendation - 3:  ·  Problem: Acute left-sided weakness.  Recommendation:   Assist with care   PMR consulted.   will need to monitor patient's progress     Problem/Recommendation - 4:  ·  Problem: Encounter for palliative care.  Recommendation: Palliative Care consulted to assist with goals of care.  Patient intubated last pm  Directive is for short term intubation to allow assistance in this tenuous period post CVA and now with PNA.    Family has been informed if after some time and  unable to wean off vent,  can elect withdrawal and comfort care.  Will cont to support and follow.

## 2021-06-22 NOTE — PROGRESS NOTE ADULT - SUBJECTIVE AND OBJECTIVE BOX
Neurology Progress Note  [facility] at [place] [address] [place] [address]  [phone]    Interval History:  Patient intubated overnight due to worsening respiratory status thought to be 2/2 PNA.    HPI 6/20/21:  Patient unable to provide history. History obtained from chart.  82 yo woman with medical conditions as outlined below who was found down yesterday. She presented with L facial droop, left sided weakness, and dysarthria. CT head showed R MCA infarct, CTA head showed R M2 occlusion, and CTA neck showed occlusion of the right ICA. She was admitted to NSICU for q 1 h neurochecks and hemicrani watch last night.     MEDICATIONS  (STANDING):  aspirin  chewable 81 milliGRAM(s) Oral daily  atorvastatin 40 milliGRAM(s) Oral at bedtime  chlorhexidine 0.12% Liquid 15 milliLiter(s) Oral Mucosa every 12 hours  clopidogrel Tablet 75 milliGRAM(s) Oral daily  dextrose 40% Gel 15 Gram(s) Oral once  dextrose 5%. 1000 milliLiter(s) (50 mL/Hr) IV Continuous <Continuous>  dextrose 5%. 1000 milliLiter(s) (100 mL/Hr) IV Continuous <Continuous>  dextrose 50% Injectable 25 Gram(s) IV Push once  dextrose 50% Injectable 12.5 Gram(s) IV Push once  dextrose 50% Injectable 25 Gram(s) IV Push once  doxazosin 4 milliGRAM(s) Oral at bedtime  enoxaparin Injectable 40 milliGRAM(s) SubCutaneous <User Schedule>  glucagon  Injectable 1 milliGRAM(s) IntraMuscular once  insulin glargine Injectable (LANTUS) 10 Unit(s) SubCutaneous at bedtime  insulin lispro (ADMELOG) corrective regimen sliding scale   SubCutaneous every 6 hours  piperacillin/tazobactam IVPB.. 3.375 Gram(s) IV Intermittent every 8 hours  propofol Infusion 10 MICROgram(s)/kG/Min (4.03 mL/Hr) IV Continuous <Continuous>  sodium chloride 0.9%. 1000 milliLiter(s) (50 mL/Hr) IV Continuous <Continuous>  valproate sodium IVPB 500 milliGRAM(s) IV Intermittent every 12 hours    MEDICATIONS  (PRN):  acetaminophen    Suspension .. 650 milliGRAM(s) Oral every 6 hours PRN Temp greater or equal to 38C (100.4F), Mild Pain (1 - 3)  haloperidol    Injectable 2 milliGRAM(s) IV Push every 6 hours PRN Agitation  labetalol Injectable 10 milliGRAM(s) IV Push every 2 hours PRN SBP >180    Physical Exam  Vital Signs Last 24 Hrs  T(C): 38 (22 Jun 2021 12:00), Max: 38.8 (21 Jun 2021 18:00)  T(F): 100.4 (22 Jun 2021 12:00), Max: 101.8 (21 Jun 2021 18:00)  HR: 102 (22 Jun 2021 12:23) (62 - 120)  BP: 139/85 (22 Jun 2021 12:00) (121/73 - 239/137)  BP(mean): 102 (22 Jun 2021 12:00) (80 - 163)  RR: 17 (22 Jun 2021 12:00) (15 - 29)  SpO2: 100% (22 Jun 2021 12:23) (89% - 100%)  Mental status: lying in bed intubated on sedation  CN: PERRL, right gaze preference, left facial droop  Motor:  LUE 0/5  LLE 0/5  Withdraws RUE and RLE at least 3/5  Gait: deferred      LABS:                          13.9   9.67  )-----------( 200      ( 22 Jun 2021 04:16 )             41.1     06-22    140  |  102  |  16.8  ----------------------------<  132<H>  3.2<L>   |  26.0  |  0.48<L>    Ca    8.3<L>      22 Jun 2021 04:16  Phos  3.5     06-22  Mg     2.1     06-22    TPro  x   /  Alb  3.7  /  TBili  x   /  DBili  x   /  AST  x   /  ALT  x   /  AlkPhos  x   06-22        Brain CT without contrast:  1.  Acute right MCA territory infarct is better seen on CT perfusion.  2.  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    CT perfusion:  CBF<30% volume: 16 ml, right MCA territory, suggesting core infarct.  Tmax>6.0s volume: 38 ml  Mismatch volume: 22 ml, right MCA territory, suggesting ischemic penumbra.  Mismatch ratio: 2.4    CTA head and neck:  1.  Occlusion of the right ICA from the proximal cervical segment through the supraclinoid segment.  2.  Right M2 occlusion.      TTE:  Summary:   1. Left ventricular ejection fraction, by visual estimation, is 60 to 65%.   2. Normal global left ventricular systolic function.   3. The mitral in-flow pattern reveals no discernable A-wave, therefore no comment on diastolic function can be made.   4. Normal right ventricular size and function.   5. Mildly enlarged left atrium.   6. Mild mitral annular calcification.   7. Thickening and calcification of the anterior and posterior mitral valve leaflets.   8. Mild-moderate tricuspid regurgitation.   9. Mild aortic regurgitation.  10. Estimated pulmonary artery systolic pressure is 51.1 mmHg assuming a right atrial pressure of 3 mmHg, which is consistent with moderate pulmonary hypertension.

## 2021-06-22 NOTE — PROGRESS NOTE ADULT - SUBJECTIVE AND OBJECTIVE BOX
Chief complaint:   Patient is a 81y old  Female who presents with a chief complaint of Right MCA CVA (21 Jun 2021 16:51)    HPI:  82 y/o female, PMH DM, HTN, presents to ED as Code Stroke after she was found down on her front lawn by a neighbor. Unknown down time, reported LKW 10:50 am. Initial fingerstick for EMS 60, pt given amp of d50 with improvement in glucose level. Pt presenting with L sided facial droop, L sided weakness, dysarthria. Out of window for TPA. CTH with acute right MCA infarct, CTA Right M2 occlusion. CTP with 16 ml core infarct, right MCA territory.Mismatch volume: 22 ml, right MCA territory, suggesting ischemic penumbra. Mismatch ratio: 2.4. 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    24hr EVENTS:  intubated for airway protection    ROS: [x ]  Unable to assess due to mental status   All other systems negative    -----------------------------------------------------------------------------------------------------------------------------------------------------------------------------------  ICU Vital Signs Last 24 Hrs  T(C): 37.1 (22 Jun 2021 08:00), Max: 38.8 (21 Jun 2021 18:00)  T(F): 98.8 (22 Jun 2021 08:00), Max: 101.8 (21 Jun 2021 18:00)  HR: 88 (22 Jun 2021 09:25) (62 - 120)  BP: 155/68 (22 Jun 2021 09:00) (121/73 - 239/137)  BP(mean): 92 (22 Jun 2021 09:00) (80 - 163)  ABP: --  ABP(mean): --  RR: 17 (22 Jun 2021 09:00) (15 - 30)  SpO2: 100% (22 Jun 2021 09:25) (89% - 100%)      I&O's Summary    21 Jun 2021 07:01  -  22 Jun 2021 07:00  --------------------------------------------------------  IN: 1779 mL / OUT: 1885 mL / NET: -106 mL    22 Jun 2021 07:01  -  22 Jun 2021 09:47  --------------------------------------------------------  IN: 135 mL / OUT: 325 mL / NET: -190 mL        MEDICATIONS  (STANDING):  aspirin Suppository 300 milliGRAM(s) Rectal daily  chlorhexidine 0.12% Liquid 15 milliLiter(s) Oral Mucosa every 12 hours  dextrose 40% Gel 15 Gram(s) Oral once  dextrose 5%. 1000 milliLiter(s) (50 mL/Hr) IV Continuous <Continuous>  dextrose 5%. 1000 milliLiter(s) (100 mL/Hr) IV Continuous <Continuous>  dextrose 50% Injectable 25 Gram(s) IV Push once  dextrose 50% Injectable 12.5 Gram(s) IV Push once  dextrose 50% Injectable 25 Gram(s) IV Push once  enoxaparin Injectable 40 milliGRAM(s) SubCutaneous <User Schedule>  glucagon  Injectable 1 milliGRAM(s) IntraMuscular once  insulin glargine Injectable (LANTUS) 10 Unit(s) SubCutaneous at bedtime  insulin lispro (ADMELOG) corrective regimen sliding scale   SubCutaneous every 6 hours  lactated ringers. 1000 milliLiter(s) (50 mL/Hr) IV Continuous <Continuous>  piperacillin/tazobactam IVPB.. 3.375 Gram(s) IV Intermittent every 8 hours  propofol Infusion 10 MICROgram(s)/kG/Min (4.03 mL/Hr) IV Continuous <Continuous>  scopolamine 1 mG/72 Hr(s) Patch 1 Patch Transdermal every 72 hours  valproate sodium IVPB 500 milliGRAM(s) IV Intermittent every 12 hours      RESPIRATORY:  Mode: AC/ CMV (Assist Control/ Continuous Mandatory Ventilation)  RR (machine): 14  TV (machine): 350  FiO2: 50  PEEP: 8  MAP: 12  PIP: 21        IMAGING:   Recent imaging studies were reviewed.    LAB RESULTS:                          13.9   9.67  )-----------( 200      ( 22 Jun 2021 04:16 )             41.1           06-22    140  |  102  |  16.8  ----------------------------<  132<H>  3.2<L>   |  26.0  |  0.48<L>    Ca    8.3<L>      22 Jun 2021 04:16  Phos  3.5     06-22  Mg     2.1     06-22    TPro  x   /  Alb  3.7  /  TBili  x   /  DBili  x   /  AST  x   /  ALT  x   /  AlkPhos  x   06-22      Culture - Sputum (collected 06-22-21 @ 00:44)  Source: .Sputum Sputum  Gram Stain (06-22-21 @ 06:46):    Few polymorphonuclear leukocytes per low power field    Rare Squamous epithelial cells per low power field    Few Gram positive cocci in pairs per oil power field    Few Gram Variable Rods per oil power field      -----------------------------------------------------------------------------------------------------------------------------------------------------------------------------------    PHYSICAL EXAM:  General: Calm, intubated  HEENT: LANCE  Neuro:  -Mental status- No acute distress  -CN- PERRL 3mm, EOMI, tongue midline, face symmetric    CV: RRR  Pulm: clear to auscultation  Abd: Soft, nontender, nondistended  Ext: no noted edema in lower ext  Skin: warm, dry       Chief complaint:   Patient is a 81y old  Female who presents with a chief complaint of Right MCA CVA (21 Jun 2021 16:51)    HPI:  80 y/o female, PMH DM, HTN, presents to ED as Code Stroke after she was found down on her front lawn by a neighbor. Unknown down time, reported LKW 10:50 am. Initial fingerstick for EMS 60, pt given amp of d50 with improvement in glucose level. Pt presenting with L sided facial droop, L sided weakness, dysarthria. Out of window for TPA. CTH with acute right MCA infarct, CTA Right M2 occlusion. CTP with 16 ml core infarct, right MCA territory.Mismatch volume: 22 ml, right MCA territory, suggesting ischemic penumbra. Mismatch ratio: 2.4. 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    24hr EVENTS:  intubated for airway protection    ROS: [x ]  Unable to assess due to mental status   All other systems negative    -----------------------------------------------------------------------------------------------------------------------------------------------------------------------------------  ICU Vital Signs Last 24 Hrs  T(C): 37.1 (22 Jun 2021 08:00), Max: 38.8 (21 Jun 2021 18:00)  T(F): 98.8 (22 Jun 2021 08:00), Max: 101.8 (21 Jun 2021 18:00)  HR: 88 (22 Jun 2021 09:25) (62 - 120)  BP: 155/68 (22 Jun 2021 09:00) (121/73 - 239/137)  BP(mean): 92 (22 Jun 2021 09:00) (80 - 163)  ABP: --  ABP(mean): --  RR: 17 (22 Jun 2021 09:00) (15 - 30)  SpO2: 100% (22 Jun 2021 09:25) (89% - 100%)      I&O's Summary    21 Jun 2021 07:01  -  22 Jun 2021 07:00  --------------------------------------------------------  IN: 1779 mL / OUT: 1885 mL / NET: -106 mL    22 Jun 2021 07:01  -  22 Jun 2021 09:47  --------------------------------------------------------  IN: 135 mL / OUT: 325 mL / NET: -190 mL        MEDICATIONS  (STANDING):  aspirin Suppository 300 milliGRAM(s) Rectal daily  chlorhexidine 0.12% Liquid 15 milliLiter(s) Oral Mucosa every 12 hours  dextrose 40% Gel 15 Gram(s) Oral once  dextrose 5%. 1000 milliLiter(s) (50 mL/Hr) IV Continuous <Continuous>  dextrose 5%. 1000 milliLiter(s) (100 mL/Hr) IV Continuous <Continuous>  dextrose 50% Injectable 25 Gram(s) IV Push once  dextrose 50% Injectable 12.5 Gram(s) IV Push once  dextrose 50% Injectable 25 Gram(s) IV Push once  enoxaparin Injectable 40 milliGRAM(s) SubCutaneous <User Schedule>  glucagon  Injectable 1 milliGRAM(s) IntraMuscular once  insulin glargine Injectable (LANTUS) 10 Unit(s) SubCutaneous at bedtime  insulin lispro (ADMELOG) corrective regimen sliding scale   SubCutaneous every 6 hours  lactated ringers. 1000 milliLiter(s) (50 mL/Hr) IV Continuous <Continuous>  piperacillin/tazobactam IVPB.. 3.375 Gram(s) IV Intermittent every 8 hours  propofol Infusion 10 MICROgram(s)/kG/Min (4.03 mL/Hr) IV Continuous <Continuous>  scopolamine 1 mG/72 Hr(s) Patch 1 Patch Transdermal every 72 hours  valproate sodium IVPB 500 milliGRAM(s) IV Intermittent every 12 hours      RESPIRATORY:  Mode: AC/ CMV (Assist Control/ Continuous Mandatory Ventilation)  RR (machine): 14  TV (machine): 350  FiO2: 50  PEEP: 8  MAP: 12  PIP: 21        IMAGING:   Recent imaging studies were reviewed.    LAB RESULTS:                          13.9   9.67  )-----------( 200      ( 22 Jun 2021 04:16 )             41.1           06-22    140  |  102  |  16.8  ----------------------------<  132<H>  3.2<L>   |  26.0  |  0.48<L>    Ca    8.3<L>      22 Jun 2021 04:16  Phos  3.5     06-22  Mg     2.1     06-22    TPro  x   /  Alb  3.7  /  TBili  x   /  DBili  x   /  AST  x   /  ALT  x   /  AlkPhos  x   06-22      Culture - Sputum (collected 06-22-21 @ 00:44)  Source: .Sputum Sputum  Gram Stain (06-22-21 @ 06:46):    Few polymorphonuclear leukocytes per low power field    Rare Squamous epithelial cells per low power field    Few Gram positive cocci in pairs per oil power field    Few Gram Variable Rods per oil power field      -----------------------------------------------------------------------------------------------------------------------------------------------------------------------------------    PHYSICAL EXAM:  General: Calm, intubated  HEENT: LANCE  Neuro:  -Mental status- No acute distress, no EO, no FC, R gaze pref  -CN- PERRL 3mm, EOMI, tongue midline, L facial  spont RUE and RLE  LUE ext  LLE TF    CV: RRR  Pulm: clear to auscultation  Abd: Soft, nontender, nondistended  Ext: no noted edema in lower ext  Skin: warm, dry

## 2021-06-22 NOTE — PROGRESS NOTE ADULT - SUBJECTIVE AND OBJECTIVE BOX
CC:  follow up GOC  INTERVAL HPI/OVERNIGHT EVENTS:  intubated last pm  PRESENT SYMPTOMS: SOURCE:  Patient/Family/Team    PAIN SCALE:  0 = none  1 = mild   2 = moderate  3 = severe    Pain:    na  intubated  Dyspnea:  [ ] YES [ ] NO  Anxiety:  [ ] YES [ ] NO  Fatigue: [ ] YES [ ] NO  Nausea: [ ] YES [ ] NO  Loss of Appetite: [ ] YES [ ] NO  Other symptoms: __________    MEDICATIONS  (STANDING):  aspirin Suppository 300 milliGRAM(s) Rectal daily  chlorhexidine 0.12% Liquid 15 milliLiter(s) Oral Mucosa every 12 hours  dextrose 40% Gel 15 Gram(s) Oral once  dextrose 5%. 1000 milliLiter(s) (50 mL/Hr) IV Continuous <Continuous>  dextrose 5%. 1000 milliLiter(s) (100 mL/Hr) IV Continuous <Continuous>  dextrose 50% Injectable 25 Gram(s) IV Push once  dextrose 50% Injectable 12.5 Gram(s) IV Push once  dextrose 50% Injectable 25 Gram(s) IV Push once  enoxaparin Injectable 40 milliGRAM(s) SubCutaneous <User Schedule>  glucagon  Injectable 1 milliGRAM(s) IntraMuscular once  insulin glargine Injectable (LANTUS) 10 Unit(s) SubCutaneous at bedtime  insulin lispro (ADMELOG) corrective regimen sliding scale   SubCutaneous every 6 hours  lactated ringers. 1000 milliLiter(s) (50 mL/Hr) IV Continuous <Continuous>  piperacillin/tazobactam IVPB.. 3.375 Gram(s) IV Intermittent every 8 hours  propofol Infusion 10 MICROgram(s)/kG/Min (4.03 mL/Hr) IV Continuous <Continuous>  scopolamine 1 mG/72 Hr(s) Patch 1 Patch Transdermal every 72 hours  valproate sodium IVPB 500 milliGRAM(s) IV Intermittent every 12 hours    MEDICATIONS  (PRN):  acetaminophen    Suspension .. 650 milliGRAM(s) Oral every 6 hours PRN Temp greater or equal to 38C (100.4F), Mild Pain (1 - 3)  haloperidol    Injectable 2 milliGRAM(s) IV Push every 6 hours PRN Agitation  labetalol Injectable 10 milliGRAM(s) IV Push every 2 hours PRN SBP >180      Allergies    Allergy Status Unknown    Intolerances    Karnofsky Performance Score/Palliative Performance Status Version 2:  20    %    Vital Signs Last 24 Hrs  T(C): 37.1 (2021 08:00), Max: 38.8 (2021 18:00)  T(F): 98.8 (2021 08:00), Max: 101.8 (2021 18:00)  HR: 88 (2021 09:25) (62 - 120)  BP: 155/68 (2021 09:00) (121/73 - 239/137)  BP(mean): 92 (2021 09:00) (80 - 163)  RR: 17 (2021 09:00) (15 - 30)  SpO2: 100% (2021 09:25) (89% - 100%)    PHYSICAL EXAM:    General: F intubated NAD    HEENT: [ x] normal  [ ] dry mouth  [x ] ET tube  Lungs: [x ] comfortable [ ] tachypnea/labored breathing  [ ] excessive secretions    CV: [x ] normal  [ ] tachycardia    GI: [x ] normal  [ ] distended  [ ] tender  [ ] no BS               [ ] PEG/NG/OG tube    : [x] normal  [ ] incontinent  [ ] oliguria/anuria  [ ] diallo    MSK: [ ] normal  [ x] weakness  [ ] edema             [ ] ambulatory  [x ] bedbound/wheelchair bound    Skin: [x ] normal  [ ] pressure ulcers- Stage_____  [ ] no rash    LABS:                        13.9   9.67  )-----------( 200      ( 2021 04:16 )             41.1     -    140  |  102  |  16.8  ----------------------------<  132<H>  3.2<L>   |  26.0  |  0.48<L>    Ca    8.3<L>      2021 04:16  Phos  3.5     06-  Mg     2.1     -    TPro  x   /  Alb  3.7  /  TBili  x   /  DBili  x   /  AST  x   /  ALT  x   /  AlkPhos  x         Urinalysis Basic - ( 2021 18:48 )    Color: Yellow / Appearance: Clear / S.025 / pH: x  Gluc: x / Ketone: Small  / Bili: Negative / Urobili: Negative mg/dL   Blood: x / Protein: 100 mg/dL / Nitrite: Negative   Leuk Esterase: Negative / RBC: 11-25 /HPF / WBC 0-2   Sq Epi: x / Non Sq Epi: Occasional / Bacteria: Occasional      I&O's Summary    2021 07:  -  2021 07:00  --------------------------------------------------------  IN: 1779 mL / OUT: 1885 mL / NET: -106 mL    2021 07:  -  2021 09:43  --------------------------------------------------------  IN: 135 mL / OUT: 325 mL / NET: -190 mL        RADIOLOGY & ADDITIONAL STUDIES:  < from: Xray Chest 1 View-PORTABLE IMMEDIATE (Xray Chest 1 View-PORTABLE IMMEDIATE .) (21 @ 18:19) >     EXAM:  XR CHEST PORTABLE IMMED 1V                          PROCEDURE DATE:  2021          INTERPRETATION:  Chest one view    HISTORY: Intubation    COMPARISON STUDY: Earlier the same afternoon    Frontal expiratory view of the chest shows the heart to be similar in size. Endotracheal tube reaches the lower trachea. Nasogastric tube has been placed.    The lungs show clearing of the lower left lung with small right effusion and there is no evidence of pneumothorax nor left pleural effusion.    IMPRESSION:  Postintubation.    Thank you for the courtesy of this referral.    < end of copied text >

## 2021-06-22 NOTE — PROGRESS NOTE ADULT - ASSESSMENT
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   Out of Alteplase window for TX and not thrombectomy candidate per IR     NEURO:  Neuro check q 4hrs  ASA and plavix (CHANCE trial)  R wrist restraint  Maintain Na 140-150 for cytotoxic edema - NS  VPA with cortical hyperexcitability  palliative consult- family wants to care for patient at home, DNR   Activity: [X] OOB as tolerated  [X] PT [X] OT X[] splint    PULM: intubated for airway protection  temporary trial  Monitor O2 sats maintain above 92 %  chest PT    CV:  SBP goal- Permissive HTN to SBP for another 24 hrs   - 180  EF 60%    RENAL: LR  Urinary retention - diallo - failed TOV  start cardura    Fluids: IVF    GI:  Diet: start TF  GI prophylaxis [] not indicated   Bowel regimen  [X] senna     ENDO: Hyperglycemia   Lantus dose ( 25 U q day home ) - 10 u q day  SSI  Atorvastatin for HLD  Goal euglycemia (-180)    HEME/ONC:  VTE prophylaxis: [X] SCDs [X] chemoprophylaxis- Lovenox 40mh     ID: monitor for fever  cont zosyn 5days for PNA    SOCIAL/FAMILY:  [X] updated at bedside     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   Out of Alteplase window for TX and not thrombectomy candidate per IR     NEURO:  Neuro check q 4hrs  ASA and plavix (CHANCE trial)  R wrist restraint  Maintain Na 140-150 for cytotoxic edema - NS  VPA with cortical hyperexcitability  reported adan pauses with precedex- low dose propofol for vent synchrony  palliative consult- family wants to care for patient at home, DNR   Activity: [X] OOB as tolerated  [X] PT [X] OT X[] splint    PULM: intubated for airway protection  temporary trial  Monitor O2 sats maintain above 92 %  chest PT  CPAP trials    CV:  SBP goal- Permissive HTN for perfusion  - 180  EF 60%    RENAL: LR  Urinary retention - diallo - failed TOV  start cardura    Fluids: IVF  Na 140-150 goal on NS    GI:  Diet: start TF  GI prophylaxis [] not indicated   Bowel regimen  [X] senna     ENDO: Hyperglycemia   Lantus dose ( 25 U q day home ) - 10 u q day  SSI  Atorvastatin for HLD  Goal euglycemia (-180)    HEME/ONC:  VTE prophylaxis: [X] SCDs [X] chemoprophylaxis- Lovenox 40mh     ID: monitor for fever  cont zosyn 5days for PNA    SOCIAL/FAMILY:  [X] updated at bedside     CODE STATUS:  [X] Full Code     DISPOSITION:  [X] ICU     [X] Patient is at high risk of neurologic deterioration/death due to:  brain swelling and risk of herniation

## 2021-06-22 NOTE — PROGRESS NOTE ADULT - ASSESSMENT
82 yo woman with large R MCA infarct    R MCA infarct  aspirin suppository  MRI brain w/o contrast when stable  SBP goal 140-160s  Supportive care      Will continue to follow

## 2021-06-23 LAB
ALBUMIN SERPL ELPH-MCNC: 3.2 G/DL — LOW (ref 3.3–5.2)
ALBUMIN SERPL ELPH-MCNC: 3.2 G/DL — LOW (ref 3.3–5.2)
ALP SERPL-CCNC: 71 U/L — SIGNIFICANT CHANGE UP (ref 40–120)
ALT FLD-CCNC: 36 U/L — HIGH
ANION GAP SERPL CALC-SCNC: 10 MMOL/L — SIGNIFICANT CHANGE UP (ref 5–17)
APPEARANCE UR: CLEAR — SIGNIFICANT CHANGE UP
AST SERPL-CCNC: 31 U/L — SIGNIFICANT CHANGE UP
BACTERIA # UR AUTO: ABNORMAL
BASOPHILS # BLD AUTO: 0.01 K/UL — SIGNIFICANT CHANGE UP (ref 0–0.2)
BASOPHILS NFR BLD AUTO: 0.2 % — SIGNIFICANT CHANGE UP (ref 0–2)
BILIRUB SERPL-MCNC: 0.6 MG/DL — SIGNIFICANT CHANGE UP (ref 0.4–2)
BILIRUB UR-MCNC: ABNORMAL
BUN SERPL-MCNC: 23.6 MG/DL — HIGH (ref 8–20)
CALCIUM SERPL-MCNC: 8.2 MG/DL — LOW (ref 8.6–10.2)
CHLORIDE SERPL-SCNC: 103 MMOL/L — SIGNIFICANT CHANGE UP (ref 98–107)
CO2 SERPL-SCNC: 25 MMOL/L — SIGNIFICANT CHANGE UP (ref 22–29)
COLOR SPEC: ABNORMAL
CREAT SERPL-MCNC: 0.52 MG/DL — SIGNIFICANT CHANGE UP (ref 0.5–1.3)
CULTURE RESULTS: SIGNIFICANT CHANGE UP
DIFF PNL FLD: ABNORMAL
EOSINOPHIL # BLD AUTO: 0.09 K/UL — SIGNIFICANT CHANGE UP (ref 0–0.5)
EOSINOPHIL NFR BLD AUTO: 1.4 % — SIGNIFICANT CHANGE UP (ref 0–6)
EPI CELLS # UR: SIGNIFICANT CHANGE UP
GLUCOSE BLDC GLUCOMTR-MCNC: 175 MG/DL — HIGH (ref 70–99)
GLUCOSE BLDC GLUCOMTR-MCNC: 180 MG/DL — HIGH (ref 70–99)
GLUCOSE BLDC GLUCOMTR-MCNC: 181 MG/DL — HIGH (ref 70–99)
GLUCOSE BLDC GLUCOMTR-MCNC: 270 MG/DL — HIGH (ref 70–99)
GLUCOSE SERPL-MCNC: 229 MG/DL — HIGH (ref 70–99)
GLUCOSE UR QL: NEGATIVE — SIGNIFICANT CHANGE UP
HCT VFR BLD CALC: 39.5 % — SIGNIFICANT CHANGE UP (ref 34.5–45)
HGB BLD-MCNC: 13.4 G/DL — SIGNIFICANT CHANGE UP (ref 11.5–15.5)
IMM GRANULOCYTES NFR BLD AUTO: 0.2 % — SIGNIFICANT CHANGE UP (ref 0–1.5)
KETONES UR-MCNC: ABNORMAL
LACTATE SERPL-SCNC: 1.9 MMOL/L — SIGNIFICANT CHANGE UP (ref 0.5–2)
LEUKOCYTE ESTERASE UR-ACNC: ABNORMAL
LYMPHOCYTES # BLD AUTO: 0.67 K/UL — LOW (ref 1–3.3)
LYMPHOCYTES # BLD AUTO: 10.3 % — LOW (ref 13–44)
MAGNESIUM SERPL-MCNC: 2.1 MG/DL — SIGNIFICANT CHANGE UP (ref 1.6–2.6)
MCHC RBC-ENTMCNC: 29.4 PG — SIGNIFICANT CHANGE UP (ref 27–34)
MCHC RBC-ENTMCNC: 33.9 GM/DL — SIGNIFICANT CHANGE UP (ref 32–36)
MCV RBC AUTO: 86.6 FL — SIGNIFICANT CHANGE UP (ref 80–100)
MONOCYTES # BLD AUTO: 0.44 K/UL — SIGNIFICANT CHANGE UP (ref 0–0.9)
MONOCYTES NFR BLD AUTO: 6.8 % — SIGNIFICANT CHANGE UP (ref 2–14)
NEUTROPHILS # BLD AUTO: 5.28 K/UL — SIGNIFICANT CHANGE UP (ref 1.8–7.4)
NEUTROPHILS NFR BLD AUTO: 81.1 % — HIGH (ref 43–77)
NITRITE UR-MCNC: POSITIVE
PH UR: 5 — SIGNIFICANT CHANGE UP (ref 5–8)
PHOSPHATE SERPL-MCNC: 3.7 MG/DL — SIGNIFICANT CHANGE UP (ref 2.4–4.7)
PLATELET # BLD AUTO: 171 K/UL — SIGNIFICANT CHANGE UP (ref 150–400)
POTASSIUM SERPL-MCNC: 3.1 MMOL/L — LOW (ref 3.5–5.3)
POTASSIUM SERPL-SCNC: 3.1 MMOL/L — LOW (ref 3.5–5.3)
PROCALCITONIN SERPL-MCNC: 0.11 NG/ML — HIGH (ref 0.02–0.1)
PROT SERPL-MCNC: 5.7 G/DL — LOW (ref 6.6–8.7)
PROT UR-MCNC: 30 MG/DL
RBC # BLD: 4.56 M/UL — SIGNIFICANT CHANGE UP (ref 3.8–5.2)
RBC # FLD: 13.3 % — SIGNIFICANT CHANGE UP (ref 10.3–14.5)
RBC CASTS # UR COMP ASSIST: ABNORMAL /HPF (ref 0–4)
SODIUM SERPL-SCNC: 138 MMOL/L — SIGNIFICANT CHANGE UP (ref 135–145)
SP GR SPEC: 1.01 — SIGNIFICANT CHANGE UP (ref 1.01–1.02)
SPECIMEN SOURCE: SIGNIFICANT CHANGE UP
UROBILINOGEN FLD QL: 12
VALPROATE SERPL-MCNC: 49.2 UG/ML — LOW (ref 50–100)
VALPROATE SERPL-MCNC: 62.5 UG/ML — SIGNIFICANT CHANGE UP (ref 50–100)
WBC # BLD: 6.5 K/UL — SIGNIFICANT CHANGE UP (ref 3.8–10.5)
WBC # FLD AUTO: 6.5 K/UL — SIGNIFICANT CHANGE UP (ref 3.8–10.5)
WBC UR QL: SIGNIFICANT CHANGE UP

## 2021-06-23 PROCEDURE — 99232 SBSQ HOSP IP/OBS MODERATE 35: CPT

## 2021-06-23 PROCEDURE — 99233 SBSQ HOSP IP/OBS HIGH 50: CPT

## 2021-06-23 PROCEDURE — 71045 X-RAY EXAM CHEST 1 VIEW: CPT | Mod: 26

## 2021-06-23 PROCEDURE — 36620 INSERTION CATHETER ARTERY: CPT

## 2021-06-23 PROCEDURE — 99291 CRITICAL CARE FIRST HOUR: CPT

## 2021-06-23 RX ORDER — POTASSIUM CHLORIDE 20 MEQ
40 PACKET (EA) ORAL EVERY 4 HOURS
Refills: 0 | Status: COMPLETED | OUTPATIENT
Start: 2021-06-23 | End: 2021-06-23

## 2021-06-23 RX ORDER — METOPROLOL TARTRATE 50 MG
12.5 TABLET ORAL
Refills: 0 | Status: DISCONTINUED | OUTPATIENT
Start: 2021-06-23 | End: 2021-06-27

## 2021-06-23 RX ORDER — SODIUM CHLORIDE 9 MG/ML
1000 INJECTION INTRAMUSCULAR; INTRAVENOUS; SUBCUTANEOUS ONCE
Refills: 0 | Status: COMPLETED | OUTPATIENT
Start: 2021-06-23 | End: 2021-06-23

## 2021-06-23 RX ORDER — FAMOTIDINE 10 MG/ML
20 INJECTION INTRAVENOUS
Refills: 0 | Status: DISCONTINUED | OUTPATIENT
Start: 2021-06-23 | End: 2021-06-27

## 2021-06-23 RX ORDER — INSULIN GLARGINE 100 [IU]/ML
20 INJECTION, SOLUTION SUBCUTANEOUS AT BEDTIME
Refills: 0 | Status: DISCONTINUED | OUTPATIENT
Start: 2021-06-23 | End: 2021-06-24

## 2021-06-23 RX ORDER — SODIUM CHLORIDE 9 MG/ML
500 INJECTION INTRAMUSCULAR; INTRAVENOUS; SUBCUTANEOUS ONCE
Refills: 0 | Status: COMPLETED | OUTPATIENT
Start: 2021-06-23 | End: 2021-06-23

## 2021-06-23 RX ADMIN — ATORVASTATIN CALCIUM 40 MILLIGRAM(S): 80 TABLET, FILM COATED ORAL at 20:00

## 2021-06-23 RX ADMIN — Medication 2: at 17:38

## 2021-06-23 RX ADMIN — Medication 40 MILLIEQUIVALENT(S): at 05:33

## 2021-06-23 RX ADMIN — Medication 40 MILLIEQUIVALENT(S): at 03:03

## 2021-06-23 RX ADMIN — Medication 6.29 MICROGRAM(S)/KG/MIN: at 00:11

## 2021-06-23 RX ADMIN — Medication 2: at 11:51

## 2021-06-23 RX ADMIN — Medication 6: at 05:34

## 2021-06-23 RX ADMIN — SODIUM CHLORIDE 500 MILLILITER(S): 9 INJECTION INTRAMUSCULAR; INTRAVENOUS; SUBCUTANEOUS at 02:53

## 2021-06-23 RX ADMIN — SODIUM CHLORIDE 50 MILLILITER(S): 9 INJECTION INTRAMUSCULAR; INTRAVENOUS; SUBCUTANEOUS at 02:00

## 2021-06-23 RX ADMIN — CHLORHEXIDINE GLUCONATE 15 MILLILITER(S): 213 SOLUTION TOPICAL at 05:31

## 2021-06-23 RX ADMIN — Medication 81 MILLIGRAM(S): at 11:12

## 2021-06-23 RX ADMIN — PIPERACILLIN AND TAZOBACTAM 25 GRAM(S): 4; .5 INJECTION, POWDER, LYOPHILIZED, FOR SOLUTION INTRAVENOUS at 15:04

## 2021-06-23 RX ADMIN — Medication 27.5 MILLIGRAM(S): at 05:35

## 2021-06-23 RX ADMIN — CHLORHEXIDINE GLUCONATE 15 MILLILITER(S): 213 SOLUTION TOPICAL at 17:37

## 2021-06-23 RX ADMIN — Medication 12.5 MILLIGRAM(S): at 17:40

## 2021-06-23 RX ADMIN — SODIUM CHLORIDE 1000 MILLILITER(S): 9 INJECTION INTRAMUSCULAR; INTRAVENOUS; SUBCUTANEOUS at 05:00

## 2021-06-23 RX ADMIN — PROPOFOL 4.03 MICROGRAM(S)/KG/MIN: 10 INJECTION, EMULSION INTRAVENOUS at 02:00

## 2021-06-23 RX ADMIN — Medication 650 MILLIGRAM(S): at 20:01

## 2021-06-23 RX ADMIN — INSULIN GLARGINE 20 UNIT(S): 100 INJECTION, SOLUTION SUBCUTANEOUS at 23:52

## 2021-06-23 RX ADMIN — Medication 2: at 23:52

## 2021-06-23 RX ADMIN — PIPERACILLIN AND TAZOBACTAM 25 GRAM(S): 4; .5 INJECTION, POWDER, LYOPHILIZED, FOR SOLUTION INTRAVENOUS at 21:44

## 2021-06-23 RX ADMIN — PIPERACILLIN AND TAZOBACTAM 25 GRAM(S): 4; .5 INJECTION, POWDER, LYOPHILIZED, FOR SOLUTION INTRAVENOUS at 05:33

## 2021-06-23 RX ADMIN — FAMOTIDINE 20 MILLIGRAM(S): 10 INJECTION INTRAVENOUS at 17:38

## 2021-06-23 RX ADMIN — CLOPIDOGREL BISULFATE 75 MILLIGRAM(S): 75 TABLET, FILM COATED ORAL at 11:12

## 2021-06-23 RX ADMIN — ENOXAPARIN SODIUM 40 MILLIGRAM(S): 100 INJECTION SUBCUTANEOUS at 17:38

## 2021-06-23 RX ADMIN — Medication 27.5 MILLIGRAM(S): at 19:37

## 2021-06-23 NOTE — PROGRESS NOTE ADULT - SUBJECTIVE AND OBJECTIVE BOX
Chief complaint:   Patient is a 81y old  Female who presents with a chief complaint of Right MCA CVA (21 Jun 2021 16:51)    HPI:  82 y/o female, PMH DM, HTN, presents to ED as Code Stroke after she was found down on her front lawn by a neighbor. Unknown down time, reported LKW 10:50 am. Initial fingerstick for EMS 60, pt given amp of d50 with improvement in glucose level. Pt presenting with L sided facial droop, L sided weakness, dysarthria. Out of window for TPA. CTH with acute right MCA infarct, CTA Right M2 occlusion. CTP with 16 ml core infarct, right MCA territory.Mismatch volume: 22 ml, right MCA territory, suggesting ischemic penumbra. Mismatch ratio: 2.4. 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      6/21/21- intubated for airway protection    ROS: [x ]  Unable to assess due to mental status   All other systems negative    -----------------------------------------------------------------------------------------------------------------------------------------------------------------------------------  ICU Vital Signs Last 24 Hrs  T(C): 37.2 (23 Jun 2021 08:00), Max: 38.1 (22 Jun 2021 22:00)  T(F): 99 (23 Jun 2021 08:00), Max: 100.6 (22 Jun 2021 22:00)  HR: 114 (23 Jun 2021 09:00) (88 - 117)  BP: 115/54 (23 Jun 2021 01:05) (73/59 - 173/98)  BP(mean): 74 (23 Jun 2021 01:05) (57 - 120)  ABP: 113/63 (23 Jun 2021 09:00) (101/58 - 150/72)  ABP(mean): 81 (23 Jun 2021 09:00) (72 - 101)  RR: 20 (23 Jun 2021 09:00) (14 - 23)  SpO2: 99% (23 Jun 2021 09:00) (92% - 100%)      22 Jun 2021 07:01  -  23 Jun 2021 07:00  --------------------------------------------------------  IN:    Enteral Tube Flush: 310 mL    Glucerna 1.5: 650 mL    IV PiggyBack: 200 mL    IV PiggyBack: 55 mL    Lactated Ringers: 100 mL    Norepinephrine: 42.8 mL    Phenylephrine: 30 mL    Propofol: 84 mL    sodium chloride 0.9%: 1100 mL    Sodium Chloride 0.9% Bolus: 1000 mL    Sodium Chloride 0.9% Bolus: 1000 mL  Total IN: 4571.8 mL    OUT:    Indwelling Catheter - Urethral (mL): 1395 mL  Total OUT: 1395 mL    Total NET: 3176.8 mL      23 Jun 2021 07:01  -  23 Jun 2021 09:14  --------------------------------------------------------  IN:    Glucerna 1.5: 100 mL    IV PiggyBack: 25 mL    sodium chloride 0.9%: 100 mL  Total IN: 225 mL    OUT:    Indwelling Catheter - Urethral (mL): 55 mL    Norepinephrine: 0 mL    Propofol: 0 mL  Total OUT: 55 mL    Total NET: 170 mL    MEDICATIONS  (STANDING):  aspirin  chewable 81 milliGRAM(s) Oral daily  atorvastatin 40 milliGRAM(s) Oral at bedtime  chlorhexidine 0.12% Liquid 15 milliLiter(s) Oral Mucosa every 12 hours  clopidogrel Tablet 75 milliGRAM(s) Oral daily  dextrose 40% Gel 15 Gram(s) Oral once  dextrose 5%. 1000 milliLiter(s) (50 mL/Hr) IV Continuous <Continuous>  dextrose 5%. 1000 milliLiter(s) (100 mL/Hr) IV Continuous <Continuous>  dextrose 50% Injectable 25 Gram(s) IV Push once  dextrose 50% Injectable 12.5 Gram(s) IV Push once  dextrose 50% Injectable 25 Gram(s) IV Push once  doxazosin 4 milliGRAM(s) Oral at bedtime  enoxaparin Injectable 40 milliGRAM(s) SubCutaneous <User Schedule>  glucagon  Injectable 1 milliGRAM(s) IntraMuscular once  insulin glargine Injectable (LANTUS) 10 Unit(s) SubCutaneous at bedtime  insulin lispro (ADMELOG) corrective regimen sliding scale   SubCutaneous every 6 hours  norepinephrine Infusion 0.05 MICROgram(s)/kG/Min (6.29 mL/Hr) IV Continuous <Continuous>  piperacillin/tazobactam IVPB.. 3.375 Gram(s) IV Intermittent every 8 hours  propofol Infusion 10 MICROgram(s)/kG/Min (4.03 mL/Hr) IV Continuous <Continuous>  sodium chloride 0.9%. 1000 milliLiter(s) (50 mL/Hr) IV Continuous <Continuous>  valproate sodium IVPB 500 milliGRAM(s) IV Intermittent every 12 hours    MEDICATIONS  (PRN):  acetaminophen    Suspension .. 650 milliGRAM(s) Oral every 6 hours PRN Temp greater or equal to 38C (100.4F), Mild Pain (1 - 3)  haloperidol    Injectable 2 milliGRAM(s) IV Push every 6 hours PRN Agitation  labetalol Injectable 10 milliGRAM(s) IV Push every 2 hours PRN SBP >180        RESPIRATORY:  Mode: AC/ CMV (Assist Control/ Continuous Mandatory Ventilation)  RR (machine): 14  TV (machine): 350  FiO2: 50  PEEP: 8  MAP: 12  PIP: 21       Xray Chest 1 View-PORTABLE IMMEDIATE (Xray Chest 1 View-PORTABLE IMMEDIATE .) (06.21.21 @ 18:19) >    INTERPRETATION:  Chest one view    HISTORY: Intubation    COMPARISON STUDY: Earlier the same afternoon    Frontal expiratory view of the chest shows the heart to be similar in size. Endotracheal tube reaches the lower trachea. Nasogastric tube has been placed.    The lungs show clearing of the lower left lung with small right effusion and there is no evidence of pneumothorax nor left pleural effusion.    IMPRESSION:  Postintubation.    CT Head No Cont (06.20.21 @ 16:32) >  IMPRESSION: Redemonstration of an evolving acute/subacute infarct within the right MCA distribution with associated cytotoxic edema and without hemorrhagic transformation.    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            LAB RESULTS:                          13.4   6.50  )-----------( 171      ( 23 Jun 2021 01:34 )             39.5         06-23    138  |  103  |  23.6<H>  ----------------------------<  229<H>  3.1<L>   |  25.0  |  0.52    Ca    8.2<L>      23 Jun 2021 01:34  Phos  3.7     06-23  Mg     2.1     06-23    TPro  x   /  Alb  3.2<L>  /  TBili  x   /  DBili  x   /  AST  x   /  ALT  x   /  AlkPhos  x   06-23      Culture - Sputum (collected 22 Jun 2021 00:44)  Source: .Sputum Sputum  Gram Stain (22 Jun 2021 06:46):    Few polymorphonuclear leukocytes per low power field    Rare Squamous epithelial cells per low power field    Few Gram positive cocci in pairs per oil power field    Few Gram Variable Rods per oil power field  Preliminary Report (22 Jun 2021 19:40):    Normal Respiratory Miladys present          -----------------------------------------------------------------------------------------------------------------------------------------------------------------------------------    PHYSICAL EXAM:  General: Calm, intubated  HEENT: MMM  Neuro:  -Mental status- No acute distress, no EO, no FC, R gaze pref  -CN- PERRL 3mm, EOMI, tongue midline, L facial  spont RUE and RLE  LUE ext  LLE TF    CV: RRR  Pulm: clear to auscultation  Abd: Soft, nontender, nondistended  Ext: no noted edema in lower ext  Skin: warm, dry       Chief complaint:   Patient is a 81y old  Female who presents with a chief complaint of Right MCA CVA (21 Jun 2021 16:51)    HPI:  80 y/o female, PMH DM, HTN, presents to ED as Code Stroke after she was found down on her front lawn by a neighbor. Unknown down time, reported LKW 10:50 am. Initial fingerstick for EMS 60, pt given amp of d50 with improvement in glucose level. Pt presenting with L sided facial droop, L sided weakness, dysarthria. Out of window for TPA. CTH with acute right MCA infarct, CTA Right M2 occlusion. CTP with 16 ml core infarct, right MCA territory.Mismatch volume: 22 ml, right MCA territory, suggesting ischemic penumbra. Mismatch ratio: 2.4. 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    Overnight - Hypotension / fever  6/21/21- intubated for airway protection    ROS: [x ]  Unable to assess due to mental status   All other systems negative    -----------------------------------------------------------------------------------------------------------------------------------------------------------------------------------  ICU Vital Signs Last 24 Hrs  T(C): 37.2 (23 Jun 2021 08:00), Max: 38.1 (22 Jun 2021 22:00)  T(F): 99 (23 Jun 2021 08:00), Max: 100.6 (22 Jun 2021 22:00)  HR: 114 (23 Jun 2021 09:00) (88 - 117)  BP: 115/54 (23 Jun 2021 01:05) (73/59 - 173/98)  BP(mean): 74 (23 Jun 2021 01:05) (57 - 120)  ABP: 113/63 (23 Jun 2021 09:00) (101/58 - 150/72)  ABP(mean): 81 (23 Jun 2021 09:00) (72 - 101)  RR: 20 (23 Jun 2021 09:00) (14 - 23)  SpO2: 99% (23 Jun 2021 09:00) (92% - 100%)      22 Jun 2021 07:01  -  23 Jun 2021 07:00  --------------------------------------------------------  IN:    Enteral Tube Flush: 310 mL    Glucerna 1.5: 650 mL    IV PiggyBack: 200 mL    IV PiggyBack: 55 mL    Lactated Ringers: 100 mL    Norepinephrine: 42.8 mL    Phenylephrine: 30 mL    Propofol: 84 mL    sodium chloride 0.9%: 1100 mL    Sodium Chloride 0.9% Bolus: 1000 mL    Sodium Chloride 0.9% Bolus: 1000 mL  Total IN: 4571.8 mL    OUT:    Indwelling Catheter - Urethral (mL): 1395 mL  Total OUT: 1395 mL    Total NET: 3176.8 mL      23 Jun 2021 07:01  -  23 Jun 2021 09:14  --------------------------------------------------------  IN:    Glucerna 1.5: 100 mL    IV PiggyBack: 25 mL    sodium chloride 0.9%: 100 mL  Total IN: 225 mL    OUT:    Indwelling Catheter - Urethral (mL): 55 mL    Norepinephrine: 0 mL    Propofol: 0 mL  Total OUT: 55 mL    Total NET: 170 mL    MEDICATIONS  (STANDING):  aspirin  chewable 81 milliGRAM(s) Oral daily  atorvastatin 40 milliGRAM(s) Oral at bedtime  chlorhexidine 0.12% Liquid 15 milliLiter(s) Oral Mucosa every 12 hours  clopidogrel Tablet 75 milliGRAM(s) Oral daily  dextrose 40% Gel 15 Gram(s) Oral once  dextrose 5%. 1000 milliLiter(s) (50 mL/Hr) IV Continuous <Continuous>  dextrose 5%. 1000 milliLiter(s) (100 mL/Hr) IV Continuous <Continuous>  dextrose 50% Injectable 25 Gram(s) IV Push once  dextrose 50% Injectable 12.5 Gram(s) IV Push once  dextrose 50% Injectable 25 Gram(s) IV Push once  doxazosin 4 milliGRAM(s) Oral at bedtime  enoxaparin Injectable 40 milliGRAM(s) SubCutaneous <User Schedule>  glucagon  Injectable 1 milliGRAM(s) IntraMuscular once  insulin glargine Injectable (LANTUS) 10 Unit(s) SubCutaneous at bedtime  insulin lispro (ADMELOG) corrective regimen sliding scale   SubCutaneous every 6 hours  norepinephrine Infusion 0.05 MICROgram(s)/kG/Min (6.29 mL/Hr) IV Continuous <Continuous>  piperacillin/tazobactam IVPB.. 3.375 Gram(s) IV Intermittent every 8 hours  propofol Infusion 10 MICROgram(s)/kG/Min (4.03 mL/Hr) IV Continuous <Continuous>  sodium chloride 0.9%. 1000 milliLiter(s) (50 mL/Hr) IV Continuous <Continuous>  valproate sodium IVPB 500 milliGRAM(s) IV Intermittent every 12 hours    MEDICATIONS  (PRN):  acetaminophen    Suspension .. 650 milliGRAM(s) Oral every 6 hours PRN Temp greater or equal to 38C (100.4F), Mild Pain (1 - 3)  haloperidol    Injectable 2 milliGRAM(s) IV Push every 6 hours PRN Agitation  labetalol Injectable 10 milliGRAM(s) IV Push every 2 hours PRN SBP >180        RESPIRATORY:  Mode: AC/ CMV (Assist Control/ Continuous Mandatory Ventilation)  RR (machine): 14  TV (machine): 350  FiO2: 50  PEEP: 8  MAP: 12  PIP: 21       Xray Chest 1 View-PORTABLE IMMEDIATE (Xray Chest 1 View-PORTABLE IMMEDIATE .) (06.21.21 @ 18:19) >    INTERPRETATION:  Chest one view    HISTORY: Intubation    COMPARISON STUDY: Earlier the same afternoon    Frontal expiratory view of the chest shows the heart to be similar in size. Endotracheal tube reaches the lower trachea. Nasogastric tube has been placed.    The lungs show clearing of the lower left lung with small right effusion and there is no evidence of pneumothorax nor left pleural effusion.    IMPRESSION:  Postintubation.    CT Head No Cont (06.20.21 @ 16:32) >  IMPRESSION: Redemonstration of an evolving acute/subacute infarct within the right MCA distribution with associated cytotoxic edema and without hemorrhagic transformation.    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            LAB RESULTS:                          13.4   6.50  )-----------( 171      ( 23 Jun 2021 01:34 )             39.5         06-23    138  |  103  |  23.6<H>  ----------------------------<  229<H>  3.1<L>   |  25.0  |  0.52    Ca    8.2<L>      23 Jun 2021 01:34  Phos  3.7     06-23  Mg     2.1     06-23    TPro  x   /  Alb  3.2<L>  /  TBili  x   /  DBili  x   /  AST  x   /  ALT  x   /  AlkPhos  x   06-23    CAPILLARY BLOOD GLUCOSE      POCT Blood Glucose.: 181 mg/dL (23 Jun 2021 11:45)  POCT Blood Glucose.: 270 mg/dL (23 Jun 2021 05:33)  POCT Blood Glucose.: 165 mg/dL (22 Jun 2021 23:48)  POCT Blood Glucose.: 164 mg/dL (22 Jun 2021 17:39)        Culture - Sputum (collected 22 Jun 2021 00:44)  Source: .Sputum Sputum  Gram Stain (22 Jun 2021 06:46):    Few polymorphonuclear leukocytes per low power field    Rare Squamous epithelial cells per low power field    Few Gram positive cocci in pairs per oil power field    Few Gram Variable Rods per oil power field  Preliminary Report (22 Jun 2021 19:40):    Normal Respiratory Miladys present          -----------------------------------------------------------------------------------------------------------------------------------------------------------------------------------    PHYSICAL EXAM:  General: Calm, intubated  HEENT: MMM  Neuro:  -Mental status- No acute distress, no EO, no FC, R gaze pref  -CN- PERRL 3mm, EOMI, tongue midline, L facial  spont RUE and RLE  LUE ext  LLE TF    CV: RRR  Pulm: clear to auscultation  Abd: Soft, nontender, nondistended  Ext: no noted edema in lower ext  Skin: warm, dry       Chief complaint:   Patient is a 81y old  Female who presents with a chief complaint of Right MCA CVA (21 Jun 2021 16:51)    HPI:  82 y/o female, PMH DM, HTN, presents to ED as Code Stroke after she was found down on her front lawn by a neighbor. Unknown down time, reported LKW 10:50 am. Initial fingerstick for EMS 60, pt given amp of d50 with improvement in glucose level. Pt presenting with L sided facial droop, L sided weakness, dysarthria. Out of window for TPA. CTH with acute right MCA infarct, CTA Right M2 occlusion. CTP with 16 ml core infarct, right MCA territory.Mismatch volume: 22 ml, right MCA territory, suggesting ischemic penumbra. Mismatch ratio: 2.4. 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    Overnight - Hypotension / fever  6/21/21- intubated for airway protection    ROS: [x ]  Unable to assess due to mental status   All other systems negative    -----------------------------------------------------------------------------------------------------------------------------------------------------------------------------------  ICU Vital Signs Last 24 Hrs  T(C): 37.2 (23 Jun 2021 08:00), Max: 38.1 (22 Jun 2021 22:00)  T(F): 99 (23 Jun 2021 08:00), Max: 100.6 (22 Jun 2021 22:00)  HR: 114 (23 Jun 2021 09:00) (88 - 117)  BP: 115/54 (23 Jun 2021 01:05) (73/59 - 173/98)  BP(mean): 74 (23 Jun 2021 01:05) (57 - 120)  ABP: 113/63 (23 Jun 2021 09:00) (101/58 - 150/72)  ABP(mean): 81 (23 Jun 2021 09:00) (72 - 101)  RR: 20 (23 Jun 2021 09:00) (14 - 23)  SpO2: 99% (23 Jun 2021 09:00) (92% - 100%)      22 Jun 2021 07:01  -  23 Jun 2021 07:00  --------------------------------------------------------  IN:    Enteral Tube Flush: 310 mL    Glucerna 1.5: 650 mL    IV PiggyBack: 200 mL    IV PiggyBack: 55 mL    Lactated Ringers: 100 mL    Norepinephrine: 42.8 mL    Phenylephrine: 30 mL    Propofol: 84 mL    sodium chloride 0.9%: 1100 mL    Sodium Chloride 0.9% Bolus: 1000 mL    Sodium Chloride 0.9% Bolus: 1000 mL  Total IN: 4571.8 mL    OUT:    Indwelling Catheter - Urethral (mL): 1395 mL  Total OUT: 1395 mL    Total NET: 3176.8 mL      23 Jun 2021 07:01  -  23 Jun 2021 09:14  --------------------------------------------------------  IN:    Glucerna 1.5: 100 mL    IV PiggyBack: 25 mL    sodium chloride 0.9%: 100 mL  Total IN: 225 mL    OUT:    Indwelling Catheter - Urethral (mL): 55 mL    Norepinephrine: 0 mL    Propofol: 0 mL  Total OUT: 55 mL    Total NET: 170 mL    MEDICATIONS  (STANDING):  aspirin  chewable 81 milliGRAM(s) Oral daily  atorvastatin 40 milliGRAM(s) Oral at bedtime  chlorhexidine 0.12% Liquid 15 milliLiter(s) Oral Mucosa every 12 hours  clopidogrel Tablet 75 milliGRAM(s) Oral daily  dextrose 40% Gel 15 Gram(s) Oral once  dextrose 5%. 1000 milliLiter(s) (50 mL/Hr) IV Continuous <Continuous>  dextrose 5%. 1000 milliLiter(s) (100 mL/Hr) IV Continuous <Continuous>  dextrose 50% Injectable 25 Gram(s) IV Push once  dextrose 50% Injectable 12.5 Gram(s) IV Push once  dextrose 50% Injectable 25 Gram(s) IV Push once  doxazosin 4 milliGRAM(s) Oral at bedtime  enoxaparin Injectable 40 milliGRAM(s) SubCutaneous <User Schedule>  glucagon  Injectable 1 milliGRAM(s) IntraMuscular once  insulin glargine Injectable (LANTUS) 10 Unit(s) SubCutaneous at bedtime  insulin lispro (ADMELOG) corrective regimen sliding scale   SubCutaneous every 6 hours  norepinephrine Infusion 0.05 MICROgram(s)/kG/Min (6.29 mL/Hr) IV Continuous <Continuous>  piperacillin/tazobactam IVPB.. 3.375 Gram(s) IV Intermittent every 8 hours  propofol Infusion 10 MICROgram(s)/kG/Min (4.03 mL/Hr) IV Continuous <Continuous>  sodium chloride 0.9%. 1000 milliLiter(s) (50 mL/Hr) IV Continuous <Continuous>  valproate sodium IVPB 500 milliGRAM(s) IV Intermittent every 12 hours    MEDICATIONS  (PRN):  acetaminophen    Suspension .. 650 milliGRAM(s) Oral every 6 hours PRN Temp greater or equal to 38C (100.4F), Mild Pain (1 - 3)  haloperidol    Injectable 2 milliGRAM(s) IV Push every 6 hours PRN Agitation  labetalol Injectable 10 milliGRAM(s) IV Push every 2 hours PRN SBP >180        RESPIRATORY:  Mode: AC/ CMV (Assist Control/ Continuous Mandatory Ventilation)  RR (machine): 14  TV (machine): 350  FiO2: 50  PEEP: 8  MAP: 12  PIP: 21       Xray Chest 1 View-PORTABLE IMMEDIATE (Xray Chest 1 View-PORTABLE IMMEDIATE .) (06.21.21 @ 18:19) >    INTERPRETATION:  Chest one view    HISTORY: Intubation    COMPARISON STUDY: Earlier the same afternoon    Frontal expiratory view of the chest shows the heart to be similar in size. Endotracheal tube reaches the lower trachea. Nasogastric tube has been placed.    The lungs show clearing of the lower left lung with small right effusion and there is no evidence of pneumothorax nor left pleural effusion.    IMPRESSION:  Postintubation.    CT Head No Cont (06.20.21 @ 16:32) >  IMPRESSION: Redemonstration of an evolving acute/subacute infarct within the right MCA distribution with associated cytotoxic edema and without hemorrhagic transformation.    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            LAB RESULTS:                          13.4   6.50  )-----------( 171      ( 23 Jun 2021 01:34 )             39.5         06-23    138  |  103  |  23.6<H>  ----------------------------<  229<H>  3.1<L>   |  25.0  |  0.52    Ca    8.2<L>      23 Jun 2021 01:34  Phos  3.7     06-23  Mg     2.1     06-23    TPro  x   /  Alb  3.2<L>  /  TBili  x   /  DBili  x   /  AST  x   /  ALT  x   /  AlkPhos  x   06-23    CAPILLARY BLOOD GLUCOSE      POCT Blood Glucose.: 181 mg/dL (23 Jun 2021 11:45)  POCT Blood Glucose.: 270 mg/dL (23 Jun 2021 05:33)  POCT Blood Glucose.: 165 mg/dL (22 Jun 2021 23:48)  POCT Blood Glucose.: 164 mg/dL (22 Jun 2021 17:39)        Culture - Sputum (collected 22 Jun 2021 00:44)  Source: .Sputum Sputum  Gram Stain (22 Jun 2021 06:46):    Few polymorphonuclear leukocytes per low power field    Rare Squamous epithelial cells per low power field    Few Gram positive cocci in pairs per oil power field    Few Gram Variable Rods per oil power field  Preliminary Report (22 Jun 2021 19:40):    Normal Respiratory Miladys present          -----------------------------------------------------------------------------------------------------------------------------------------------------------------------------------    PHYSICAL EXAM:  General: Calm, intubated  HEENT: MMM  Neuro:  -Mental status- No acute distress, no EO, no FC, R gaze pref  -CN- PERRL 3mm, EOMI, tongue midline, L facial  spont RUE and RLE  LUE min withdrawal  LLE min withdrawal    CV: RRR  Pulm: clear to auscultation  Abd: Soft, nontender, nondistended  Ext: no noted edema in lower ext  Skin: warm, dry

## 2021-06-23 NOTE — PROGRESS NOTE ADULT - ASSESSMENT
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   Out of Alteplase window for TX and not thrombectomy candidate per IR     NEURO:  Neuro check q 4hrs  ASA and plavix (CHANCE trial)  R wrist restraint  Maintain Na 140-150 for cytotoxic edema - NS  VPA with cortical hyperexcitability  reported adan pauses with precedex- low dose propofol for vent synchrony  palliative consult- family wants to care for patient at home, DNR   Activity: [X] OOB as tolerated  [X] PT [X] OT X[] splint    PULM: intubated for airway protection  temporary trial  Monitor O2 sats maintain above 92 %  chest PT  CPAP trials    CV:  SBP goal- Permissive HTN for perfusion  - 180  EF 60%    RENAL: LR  Urinary retention - diallo - failed TOV  start cardura    Fluids: IVF  Na 140-150 goal on NS    GI:  Diet: start TF  GI prophylaxis [] not indicated   Bowel regimen  [X] senna     ENDO: Hyperglycemia   Lantus dose ( 25 U q day home ) - 10 u q day  SSI  Atorvastatin for HLD  Goal euglycemia (-180)    HEME/ONC:  VTE prophylaxis: [X] SCDs [X] chemoprophylaxis- Lovenox 40mh     ID: monitor for fever  cont zosyn 5days for PNA    SOCIAL/FAMILY:  [X] updated at bedside     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   Out of Alteplase window for TX and not thrombectomy candidate per IR     NEURO:  Neuro check q 4hrs  ASA and plavix (CHANCE trial)  R wrist restraint  Maintain Na 140-150 for cytotoxic edema - NS  VPA with cortical hyperexcitability  reported adan pauses with precedex- low dose propofol for vent synchrony  palliative consult- family wants to care for patient at home, DNR   Activity: [X] OOB as tolerated  [X] PT [X] OT X[] splint    PULM: intubated for airway protection  Monitor O2 sats maintain above 92 %  chest PT  CPAP trials    CV:  - 180  EF 60%    RENAL: LR  Urinary retention - diallo placed 6/20/21 - failed TOV   UA - not C/W UTI  Fluids: IVF  Na 135-145 goal on NS    GI:  Diet: start TF  GI prophylaxis [X] pepcid 20mg q 12  Bowel regimen  [X] senna     ENDO: Hyperglycemia   Lantus dose ( 25 U q day home ) - 10 u q day  SSI  Atorvastatin for HLD  Goal euglycemia (-180)    HEME/ONC:  VTE prophylaxis: [X] SCDs [X] chemoprophylaxis- Lovenox 40mh   ASA / Plavix    ID: monitor for fever  cont zosyn 5days for PNA    SOCIAL/FAMILY:  [X] updated at bedside     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   Out of Alteplase window for TX and not thrombectomy candidate per IR     NEURO:  Neuro check q 4hrs  ASA and plavix (CHANCE trial)  R wrist restraint  Maintain Na 140-150 for cytotoxic edema - NS  VPA with cortical hyperexcitability  reported adan pauses with precedex- low dose propofol for vent synchrony  palliative consult- family wants to care for patient at home, DNR   Activity: [X] OOB as tolerated  [X] PT [X] OT X[] splint    PULM: intubated for airway protection  Monitor O2 sats maintain above 92 %  chest PT  CPAP trials    CV:  - 180  EF 60%    RENAL: LR  Urinary retention - diallo placed 6/20/21 - failed TOV   UA - not C/W UTI  Fluids: IVF  Na 135-145 goal on NS    GI:  Diet: start TF  GI prophylaxis [X] pepcid 20mg q 12  Bowel regimen  [X] senna     ENDO: Hyperglycemia / Hypokalemia  Suppl K  Lantus dose ( 25 U q day home ) - 20 u q day  SSI- low dose  Atorvastatin for HLD  Goal euglycemia (-180)    HEME/ONC:  VTE prophylaxis: [X] SCDs [X] chemoprophylaxis- Lovenox 40mh   ASA / Plavix    ID: monitor for fever  cont zosyn 5days for PNA    SOCIAL/FAMILY:  [X] updated at bedside     CODE STATUS:  [X] Full Code     DISPOSITION:  [X] ICU     [X] Patient is at high risk of neurologic deterioration/death due to:  brain swelling and risk of herniation

## 2021-06-23 NOTE — PROGRESS NOTE ADULT - ASSESSMENT
The patient is a 81y Female who is followed by neurology because of stroke    Right MCA stroke  now intubated due to respiratory failure from PNA  continue ASA, plavix and lipitor  control bllod glucose  keep normotensive  vent management per NSICU      will follow with you    Aureliano Guzman MD PhD   [id number]

## 2021-06-23 NOTE — PROGRESS NOTE ADULT - SUBJECTIVE AND OBJECTIVE BOX
Patient since been intubated.  Moving right side intermittently.  Keeps eyes closed.  As per discussion with RN, family will trial intubation for improvement.     REVIEW OF SYSTEMS  Constitutional - +fever,  +fatigue  Neurological - +loss of strength    FUNCTIONAL PROGRESS  Total A    VITALS  T(C): 37.2 (21 @ 08:00), Max: 38.1 (21 @ 22:00)  HR: 119 (21 @ 10:10) (88 - 119)  BP: 115/54 (21 @ 01:05) (73/59 - 153/98)  RR: 22 (21 @ 10:00) (14 - 23)  SpO2: 99% (21 @ 10:10) (92% - 100%)  Wt(kg): --    MEDICATIONS   acetaminophen    Suspension .. 650 milliGRAM(s) every 6 hours PRN  aspirin  chewable 81 milliGRAM(s) daily  atorvastatin 40 milliGRAM(s) at bedtime  chlorhexidine 0.12% Liquid 15 milliLiter(s) every 12 hours  clopidogrel Tablet 75 milliGRAM(s) daily  dextrose 40% Gel 15 Gram(s) once  dextrose 5%. 1000 milliLiter(s) <Continuous>  dextrose 5%. 1000 milliLiter(s) <Continuous>  dextrose 50% Injectable 25 Gram(s) once  dextrose 50% Injectable 12.5 Gram(s) once  dextrose 50% Injectable 25 Gram(s) once  doxazosin 4 milliGRAM(s) at bedtime  enoxaparin Injectable 40 milliGRAM(s) <User Schedule>  glucagon  Injectable 1 milliGRAM(s) once  haloperidol    Injectable 2 milliGRAM(s) every 6 hours PRN  insulin glargine Injectable (LANTUS) 10 Unit(s) at bedtime  insulin lispro (ADMELOG) corrective regimen sliding scale   every 6 hours  labetalol Injectable 10 milliGRAM(s) every 2 hours PRN  norepinephrine Infusion 0.05 MICROgram(s)/kG/Min <Continuous>  piperacillin/tazobactam IVPB.. 3.375 Gram(s) every 8 hours  propofol Infusion 10 MICROgram(s)/kG/Min <Continuous>  sodium chloride 0.9%. 1000 milliLiter(s) <Continuous>  valproate sodium IVPB 500 milliGRAM(s) every 12 hours      RECENT LABS/IMAGING                          13.4   6.50  )-----------( 171      ( 2021 01:34 )             39.5     -    138  |  103  |  23.6<H>  ----------------------------<  229<H>  3.1<L>   |  25.0  |  0.52    Ca    8.2<L>      2021 01:34  Phos  3.7       Mg     2.1         TPro  x   /  Alb  3.2<L>  /  TBili  x   /  DBili  x   /  AST  x   /  ALT  x   /  AlkPhos  x         Urinalysis Basic - ( 2021 02:10 )    Color: Marybeth / Appearance: Clear / S.015 / pH: x  Gluc: x / Ketone: Moderate  / Bili: Small / Urobili: 12   Blood: x / Protein: 30 mg/dL / Nitrite: Positive   Leuk Esterase: Trace / RBC: 3-5 /HPF / WBC 3-5   Sq Epi: x / Non Sq Epi: Occasional / Bacteria: Occasional              Brain CT without contrast  - 1.  Acute right MCA territory infarct is better seen on CT perfusion. 2.  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    CT perfusion   - CBF<30% volume: 16 ml, right MCA territory, suggesting core infarct.  Tmax>6.0s volume: 38 ml  Mismatch volume: 22 ml, right MCA territory, suggesting ischemic penumbra.  Mismatch ratio: 2.4    CTA head and neck   - 1.  Occlusion of the right ICA from the proximal cervical segment through the supraclinoid segment. 2.  Right M2 occlusion.    HEAD CT  - Evolving right MCA territorial infarct. No obvious acute intracranial hemorrhage. If clinically indicated, short-term follow-up or MRI may be obtained for further evaluation.    HEAD CT  - Redemonstration of an evolving acute/subacute infarct within the right MCA distribution with associated cytotoxic edema and without hemorrhagic transformation. 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    EEG  - Abnormal EEG study.  1. Cortical hyperexcitability in the right temporal region.   2. Structural and cortical dysfunction in the right hemisphere, max frontotemporally.  3. Moderate nonspecific diffuse or multifocal cerebral dysfunction.   4. No seizure seen.      ----------------------------------------------------------------------------------------  PHYSICAL EXAM  Constitutional - NAD, Appears comfortable   Extremities - Mild edema  Neurologic Exam -                    Cognitive - Intubated, keeps eyes closed     Communication - Intubated     Cranial Nerves - Left lip droop     Motor - Moves right side intermittently      Sensory - Does not withdraw on the left  Psychiatric - Calm  ----------------------------------------------------------------------------------------  ASSESSMENT/PLAN  81yFemale with functional deficits after acute CVA  Right MCA CVA - ASA, Plavix, Lipitor  DM2 - Lantus  HTN - Cardura, Labetalol  Pain - Tylenol  DVT PPX - SCDs, Lovenox  Rehab - Patient with significant debility related to CVA and now intubated. Expect patient to need long term services at [facility]/LTCF level. Will sign off at this time. Thank you for allowing me to be part of your patient's care. Please reconsult PMR for additional rehab recommendations or dispo needs if functional status changes. Discussed with rehab team.

## 2021-06-23 NOTE — PROCEDURE NOTE - NSPROCDETAILS_GEN_ALL_CORE
location identified, draped/prepped, sterile technique used, needle inserted/introduced/positive blood return obtained via catheter/connected to a pressurized flush line/sutured in place/hemostasis with direct pressure, dressing applied

## 2021-06-23 NOTE — PROGRESS NOTE ADULT - SUBJECTIVE AND OBJECTIVE BOX
[facility]                                     Neurology at [place]                                 Megan Moulton, & Wally                                  [address]                                        [place], [place], [address]                                             [phone]      CC/HPI:  Patient unable to provide history. History obtained from chart.  80 yo woman with medical conditions as outlined below who was found down yesterday. She presented with L facial droop, left sided weakness, and dysarthria. CT head showed R MCA infarct, CTA head showed R M2 occlusion, and CTA neck showed occlusion of the right ICA. She was admitted to NSICU for q 1 h neurochecks and hemicrani watch last night. (TK)    Interval HPI: No change, remains intubated    Review of systems (neurology): Denies headache or dizziness. Denies weakness/numbness.  Denies speech/language deficits. Denies diplopia/blurred vision.  Denies confusion    MEDICATIONS  (STANDING):  aspirin  chewable 81 milliGRAM(s) Oral daily  atorvastatin 40 milliGRAM(s) Oral at bedtime  chlorhexidine 0.12% Liquid 15 milliLiter(s) Oral Mucosa every 12 hours  clopidogrel Tablet 75 milliGRAM(s) Oral daily  dextrose 40% Gel 15 Gram(s) Oral once  dextrose 5%. 1000 milliLiter(s) (50 mL/Hr) IV Continuous <Continuous>  dextrose 5%. 1000 milliLiter(s) (100 mL/Hr) IV Continuous <Continuous>  dextrose 50% Injectable 25 Gram(s) IV Push once  dextrose 50% Injectable 12.5 Gram(s) IV Push once  dextrose 50% Injectable 25 Gram(s) IV Push once  enoxaparin Injectable 40 milliGRAM(s) SubCutaneous <User Schedule>  famotidine    Tablet 20 milliGRAM(s) Oral two times a day  glucagon  Injectable 1 milliGRAM(s) IntraMuscular once  insulin glargine Injectable (LANTUS) 20 Unit(s) SubCutaneous at bedtime  insulin lispro (ADMELOG) corrective regimen sliding scale   SubCutaneous every 6 hours  metoprolol tartrate 12.5 milliGRAM(s) Oral two times a day  piperacillin/tazobactam IVPB.. 3.375 Gram(s) IV Intermittent every 8 hours  propofol Infusion 10 MICROgram(s)/kG/Min (4.03 mL/Hr) IV Continuous <Continuous>  sodium chloride 0.9%. 1000 milliLiter(s) (50 mL/Hr) IV Continuous <Continuous>  valproate sodium IVPB 500 milliGRAM(s) IV Intermittent every 12 hours    MEDICATIONS  (PRN):  acetaminophen    Suspension .. 650 milliGRAM(s) Oral every 6 hours PRN Temp greater or equal to 38C (100.4F), Mild Pain (1 - 3)  haloperidol    Injectable 2 milliGRAM(s) IV Push every 6 hours PRN Agitation  labetalol Injectable 10 milliGRAM(s) IV Push every 2 hours PRN SBP >180      Vital Signs Last 24 Hrs  T(C): 37.6 (23 Jun 2021 11:00), Max: 38.1 (22 Jun 2021 22:00)  T(F): 99.7 (23 Jun 2021 11:00), Max: 100.6 (22 Jun 2021 22:00)  HR: 95 (23 Jun 2021 15:00) (88 - 128)  BP: 115/54 (23 Jun 2021 01:05) (73/59 - 141/98)  BP(mean): 74 (23 Jun 2021 01:05) (57 - 110)  RR: 21 (23 Jun 2021 15:00) (14 - 22)  SpO2: 99% (23 Jun 2021 15:00) (92% - 100%)    Detailed Neurologic Exam:    Mental status: The patient is intubated and not speaking.  Unable to assess orientation or language.    Cranial nerves: Pupils equal and react symmetrically to light. t. Extraocular motion is full with dolls eyes maneuvers. There is no ptosis. Facial sensation is not able to be assessed. Facial musculature is grossly symmetric.  Unable to assess palate, shoulder and tongue.    Motor: There is normal bulk and tone.  There is no tremor.  moves weakly to stimuli on right, left UE>LE paresis    Sensation: Grimace to pinch in 4 extremities, withdraws on right    Reflexes: muted throughout and plantar responses are flexor right, triple flexion left    Cerebellar: unable to assess  dysmetria on finger to nose testing.    Gait: unable to assess due to condition    Labs:                          13.4   6.50  )-----------( 171      ( 23 Jun 2021 01:34 )             39.5     06-23    138  |  103  |  23.6<H>  ----------------------------<  229<H>  3.1<L>   |  25.0  |  0.52    Ca    8.2<L>      23 Jun 2021 01:34  Phos  3.7     06-23  Mg     2.1     06-23    TPro  x   /  Alb  3.2<L>  /  TBili  x   /  DBili  x   /  AST  x   /  ALT  x   /  AlkPhos  x   06-23    LIVER FUNCTIONS - ( 23 Jun 2021 01:36 )  Alb: 3.2 g/dL / Pro: x     / ALK PHOS: x     / ALT: x     / AST: x     / GGT: x               Radiology (studies independently reviewed unless otherwise noted):

## 2021-06-24 LAB
ANION GAP SERPL CALC-SCNC: 11 MMOL/L — SIGNIFICANT CHANGE UP (ref 5–17)
BUN SERPL-MCNC: 21.5 MG/DL — HIGH (ref 8–20)
CALCIUM SERPL-MCNC: 8 MG/DL — LOW (ref 8.6–10.2)
CHLORIDE SERPL-SCNC: 106 MMOL/L — SIGNIFICANT CHANGE UP (ref 98–107)
CO2 SERPL-SCNC: 24 MMOL/L — SIGNIFICANT CHANGE UP (ref 22–29)
CREAT SERPL-MCNC: 0.47 MG/DL — LOW (ref 0.5–1.3)
CULTURE RESULTS: NO GROWTH — SIGNIFICANT CHANGE UP
GLUCOSE BLDC GLUCOMTR-MCNC: 168 MG/DL — HIGH (ref 70–99)
GLUCOSE BLDC GLUCOMTR-MCNC: 177 MG/DL — HIGH (ref 70–99)
GLUCOSE BLDC GLUCOMTR-MCNC: 187 MG/DL — HIGH (ref 70–99)
GLUCOSE BLDC GLUCOMTR-MCNC: 193 MG/DL — HIGH (ref 70–99)
GLUCOSE SERPL-MCNC: 182 MG/DL — HIGH (ref 70–99)
HCT VFR BLD CALC: 37.3 % — SIGNIFICANT CHANGE UP (ref 34.5–45)
HGB BLD-MCNC: 12.6 G/DL — SIGNIFICANT CHANGE UP (ref 11.5–15.5)
MAGNESIUM SERPL-MCNC: 2.1 MG/DL — SIGNIFICANT CHANGE UP (ref 1.6–2.6)
MCHC RBC-ENTMCNC: 29.7 PG — SIGNIFICANT CHANGE UP (ref 27–34)
MCHC RBC-ENTMCNC: 33.8 GM/DL — SIGNIFICANT CHANGE UP (ref 32–36)
MCV RBC AUTO: 88 FL — SIGNIFICANT CHANGE UP (ref 80–100)
PHOSPHATE SERPL-MCNC: 2.8 MG/DL — SIGNIFICANT CHANGE UP (ref 2.4–4.7)
PLATELET # BLD AUTO: 167 K/UL — SIGNIFICANT CHANGE UP (ref 150–400)
POTASSIUM SERPL-MCNC: 3.9 MMOL/L — SIGNIFICANT CHANGE UP (ref 3.5–5.3)
POTASSIUM SERPL-SCNC: 3.9 MMOL/L — SIGNIFICANT CHANGE UP (ref 3.5–5.3)
RBC # BLD: 4.24 M/UL — SIGNIFICANT CHANGE UP (ref 3.8–5.2)
RBC # FLD: 13.5 % — SIGNIFICANT CHANGE UP (ref 10.3–14.5)
SODIUM SERPL-SCNC: 141 MMOL/L — SIGNIFICANT CHANGE UP (ref 135–145)
SPECIMEN SOURCE: SIGNIFICANT CHANGE UP
WBC # BLD: 5.61 K/UL — SIGNIFICANT CHANGE UP (ref 3.8–10.5)
WBC # FLD AUTO: 5.61 K/UL — SIGNIFICANT CHANGE UP (ref 3.8–10.5)

## 2021-06-24 PROCEDURE — 70450 CT HEAD/BRAIN W/O DYE: CPT | Mod: 26

## 2021-06-24 PROCEDURE — 99232 SBSQ HOSP IP/OBS MODERATE 35: CPT

## 2021-06-24 PROCEDURE — 99291 CRITICAL CARE FIRST HOUR: CPT

## 2021-06-24 RX ORDER — DOXAZOSIN MESYLATE 4 MG
2 TABLET ORAL ONCE
Refills: 0 | Status: COMPLETED | OUTPATIENT
Start: 2021-06-24 | End: 2021-06-24

## 2021-06-24 RX ORDER — INSULIN GLARGINE 100 [IU]/ML
25 INJECTION, SOLUTION SUBCUTANEOUS AT BEDTIME
Refills: 0 | Status: DISCONTINUED | OUTPATIENT
Start: 2021-06-24 | End: 2021-06-25

## 2021-06-24 RX ORDER — DOXAZOSIN MESYLATE 4 MG
2 TABLET ORAL ONCE
Refills: 0 | Status: DISCONTINUED | OUTPATIENT
Start: 2021-06-24 | End: 2021-06-24

## 2021-06-24 RX ORDER — POTASSIUM PHOSPHATE, MONOBASIC POTASSIUM PHOSPHATE, DIBASIC 236; 224 MG/ML; MG/ML
15 INJECTION, SOLUTION INTRAVENOUS ONCE
Refills: 0 | Status: COMPLETED | OUTPATIENT
Start: 2021-06-24 | End: 2021-06-24

## 2021-06-24 RX ORDER — MULTIVIT-MIN/FERROUS GLUCONATE 9 MG/15 ML
15 LIQUID (ML) ORAL DAILY
Refills: 0 | Status: DISCONTINUED | OUTPATIENT
Start: 2021-06-24 | End: 2021-06-24

## 2021-06-24 RX ORDER — FUROSEMIDE 40 MG
20 TABLET ORAL ONCE
Refills: 0 | Status: COMPLETED | OUTPATIENT
Start: 2021-06-24 | End: 2021-06-24

## 2021-06-24 RX ORDER — CHLORHEXIDINE GLUCONATE 213 G/1000ML
1 SOLUTION TOPICAL DAILY
Refills: 0 | Status: DISCONTINUED | OUTPATIENT
Start: 2021-06-24 | End: 2021-07-02

## 2021-06-24 RX ADMIN — Medication 650 MILLIGRAM(S): at 18:23

## 2021-06-24 RX ADMIN — ENOXAPARIN SODIUM 40 MILLIGRAM(S): 100 INJECTION SUBCUTANEOUS at 18:19

## 2021-06-24 RX ADMIN — Medication 2: at 12:05

## 2021-06-24 RX ADMIN — INSULIN GLARGINE 25 UNIT(S): 100 INJECTION, SOLUTION SUBCUTANEOUS at 21:33

## 2021-06-24 RX ADMIN — Medication 12.5 MILLIGRAM(S): at 06:09

## 2021-06-24 RX ADMIN — Medication 27.5 MILLIGRAM(S): at 20:42

## 2021-06-24 RX ADMIN — FAMOTIDINE 20 MILLIGRAM(S): 10 INJECTION INTRAVENOUS at 18:20

## 2021-06-24 RX ADMIN — PROPOFOL 4.03 MICROGRAM(S)/KG/MIN: 10 INJECTION, EMULSION INTRAVENOUS at 02:28

## 2021-06-24 RX ADMIN — CHLORHEXIDINE GLUCONATE 15 MILLILITER(S): 213 SOLUTION TOPICAL at 06:08

## 2021-06-24 RX ADMIN — SCOPALAMINE 1 PATCH: 1 PATCH, EXTENDED RELEASE TRANSDERMAL at 18:15

## 2021-06-24 RX ADMIN — Medication 81 MILLIGRAM(S): at 12:06

## 2021-06-24 RX ADMIN — Medication 2: at 06:22

## 2021-06-24 RX ADMIN — SODIUM CHLORIDE 50 MILLILITER(S): 9 INJECTION INTRAMUSCULAR; INTRAVENOUS; SUBCUTANEOUS at 00:00

## 2021-06-24 RX ADMIN — FAMOTIDINE 20 MILLIGRAM(S): 10 INJECTION INTRAVENOUS at 06:09

## 2021-06-24 RX ADMIN — Medication 2: at 23:31

## 2021-06-24 RX ADMIN — PIPERACILLIN AND TAZOBACTAM 25 GRAM(S): 4; .5 INJECTION, POWDER, LYOPHILIZED, FOR SOLUTION INTRAVENOUS at 14:37

## 2021-06-24 RX ADMIN — Medication 12.5 MILLIGRAM(S): at 18:20

## 2021-06-24 RX ADMIN — Medication 20 MILLIGRAM(S): at 12:04

## 2021-06-24 RX ADMIN — PROPOFOL 4.03 MICROGRAM(S)/KG/MIN: 10 INJECTION, EMULSION INTRAVENOUS at 20:42

## 2021-06-24 RX ADMIN — Medication 650 MILLIGRAM(S): at 18:22

## 2021-06-24 RX ADMIN — Medication 1 TABLET(S): at 12:03

## 2021-06-24 RX ADMIN — CHLORHEXIDINE GLUCONATE 1 APPLICATION(S): 213 SOLUTION TOPICAL at 12:06

## 2021-06-24 RX ADMIN — ATORVASTATIN CALCIUM 40 MILLIGRAM(S): 80 TABLET, FILM COATED ORAL at 21:33

## 2021-06-24 RX ADMIN — POTASSIUM PHOSPHATE, MONOBASIC POTASSIUM PHOSPHATE, DIBASIC 62.5 MILLIMOLE(S): 236; 224 INJECTION, SOLUTION INTRAVENOUS at 06:22

## 2021-06-24 RX ADMIN — Medication 2: at 18:18

## 2021-06-24 RX ADMIN — CHLORHEXIDINE GLUCONATE 15 MILLILITER(S): 213 SOLUTION TOPICAL at 18:20

## 2021-06-24 RX ADMIN — Medication 27.5 MILLIGRAM(S): at 07:47

## 2021-06-24 RX ADMIN — PIPERACILLIN AND TAZOBACTAM 25 GRAM(S): 4; .5 INJECTION, POWDER, LYOPHILIZED, FOR SOLUTION INTRAVENOUS at 21:32

## 2021-06-24 RX ADMIN — Medication 2 MILLIGRAM(S): at 18:19

## 2021-06-24 RX ADMIN — PIPERACILLIN AND TAZOBACTAM 25 GRAM(S): 4; .5 INJECTION, POWDER, LYOPHILIZED, FOR SOLUTION INTRAVENOUS at 06:07

## 2021-06-24 RX ADMIN — CLOPIDOGREL BISULFATE 75 MILLIGRAM(S): 75 TABLET, FILM COATED ORAL at 12:04

## 2021-06-24 NOTE — PROGRESS NOTE ADULT - SUBJECTIVE AND OBJECTIVE BOX
[facility]                                     Neurology at [place]                                 Megan Moulton, & Wally                                  [address]                                        [place], [place], [address]                                             [phone]      CC/HPI:  Patient unable to provide history. History obtained from chart.  82 yo woman with medical conditions as outlined below who was found down yesterday. She presented with L facial droop, left sided weakness, and dysarthria. CT head showed R MCA infarct, CTA head showed R M2 occlusion, and CTA neck showed occlusion of the right ICA. She was admitted to NSICU for q 1 h neurochecks and hemicrani watch last night. (TK)    Interval HPI: No change, remains intubated, moves right side    Review of systems (neurology): intubated    MEDICATIONS  (STANDING):  aspirin  chewable 81 milliGRAM(s) Oral daily  atorvastatin 40 milliGRAM(s) Oral at bedtime  chlorhexidine 0.12% Liquid 15 milliLiter(s) Oral Mucosa every 12 hours  clopidogrel Tablet 75 milliGRAM(s) Oral daily  dextrose 40% Gel 15 Gram(s) Oral once  dextrose 5%. 1000 milliLiter(s) (50 mL/Hr) IV Continuous <Continuous>  dextrose 5%. 1000 milliLiter(s) (100 mL/Hr) IV Continuous <Continuous>  dextrose 50% Injectable 25 Gram(s) IV Push once  dextrose 50% Injectable 12.5 Gram(s) IV Push once  dextrose 50% Injectable 25 Gram(s) IV Push once  enoxaparin Injectable 40 milliGRAM(s) SubCutaneous <User Schedule>  famotidine    Tablet 20 milliGRAM(s) Oral two times a day  glucagon  Injectable 1 milliGRAM(s) IntraMuscular once  insulin glargine Injectable (LANTUS) 20 Unit(s) SubCutaneous at bedtime  insulin lispro (ADMELOG) corrective regimen sliding scale   SubCutaneous every 6 hours  metoprolol tartrate 12.5 milliGRAM(s) Oral two times a day  piperacillin/tazobactam IVPB.. 3.375 Gram(s) IV Intermittent every 8 hours  propofol Infusion 10 MICROgram(s)/kG/Min (4.03 mL/Hr) IV Continuous <Continuous>  sodium chloride 0.9%. 1000 milliLiter(s) (50 mL/Hr) IV Continuous <Continuous>  valproate sodium IVPB 500 milliGRAM(s) IV Intermittent every 12 hours    MEDICATIONS  (PRN):  acetaminophen    Suspension .. 650 milliGRAM(s) Oral every 6 hours PRN Temp greater or equal to 38C (100.4F), Mild Pain (1 - 3)  haloperidol    Injectable 2 milliGRAM(s) IV Push every 6 hours PRN Agitation  labetalol Injectable 10 milliGRAM(s) IV Push every 2 hours PRN SBP >180      Vital Signs Last 24 Hrs  T(C): 37.6 (23 Jun 2021 11:00), Max: 38.1 (22 Jun 2021 22:00)  T(F): 99.7 (23 Jun 2021 11:00), Max: 100.6 (22 Jun 2021 22:00)  HR: 95 (23 Jun 2021 15:00) (88 - 128)  BP: 115/54 (23 Jun 2021 01:05) (73/59 - 141/98)  BP(mean): 74 (23 Jun 2021 01:05) (57 - 110)  RR: 21 (23 Jun 2021 15:00) (14 - 22)  SpO2: 99% (23 Jun 2021 15:00) (92% - 100%)    Detailed Neurologic Exam:    Mental status: The patient is intubated and not speaking.  Unable to assess orientation or language.    Cranial nerves: Pupils equal and react symmetrically to light. t. Extraocular motion is full with dolls eyes maneuvers. There is no ptosis. Facial sensation is not able to be assessed. Facial musculature is grossly symmetric.  Unable to assess palate, shoulder and tongue.    Motor: There is normal bulk and tone.  There is no tremor.  moves weakly to stimuli on right, left UE>LE paresis    Sensation: Grimace to pinch in 4 extremities, withdraws on right    Reflexes: muted throughout and plantar responses are flexor right, triple flexion left    Cerebellar: unable to assess  dysmetria on finger to nose testing.    Gait: unable to assess due to condition    Labs:                          13.4   6.50  )-----------( 171      ( 23 Jun 2021 01:34 )             39.5     06-23    138  |  103  |  23.6<H>  ----------------------------<  229<H>  3.1<L>   |  25.0  |  0.52    Ca    8.2<L>      23 Jun 2021 01:34  Phos  3.7     06-23  Mg     2.1     06-23    TPro  x   /  Alb  3.2<L>  /  TBili  x   /  DBili  x   /  AST  x   /  ALT  x   /  AlkPhos  x   06-23    LIVER FUNCTIONS - ( 23 Jun 2021 01:36 )  Alb: 3.2 g/dL / Pro: x     / ALK PHOS: x     / ALT: x     / AST: x     / GGT: x               Radiology (studies independently reviewed unless otherwise noted):    CT head right MCA/thalamic strokes, no blood or mass

## 2021-06-24 NOTE — CHART NOTE - NSCHARTNOTEFT_GEN_A_CORE
Source: Patient [ ]  Family [ ]   other [x ] EMR and discussed in A.M. rounds     Enteral /Parenteral Nutrition: Diet, NPO with Tube Feed:   Tube Feeding Modality: Orogastric  Glucerna 1.5 Peter (GLUCERNA1.5)  Total Volume for 24 Hours (mL): 960  Continuous  Starting Tube Feed Rate {mL per Hour}: 10  Increase Tube Feed Rate by (mL): 10     Every 4 hours  Until Goal Tube Feed Rate (mL per Hour): 40  Tube Feed Duration (in Hours): 24  Tube Feed Start Time: 10:00 (06-22-21 @ 09:57)      Current Weight:   6/20: 64.9 kg  6/19: 67 kg     % Weight Change: Unsure of accuracy of weights secondary to inconsistency; will continue to monitor weights for trends. (2+ edema to L arm/hand)     Pertinent Medications: MEDICATIONS  (STANDING):  aspirin  chewable 81 milliGRAM(s) Oral daily  atorvastatin 40 milliGRAM(s) Oral at bedtime  chlorhexidine 0.12% Liquid 15 milliLiter(s) Oral Mucosa every 12 hours  clopidogrel Tablet 75 milliGRAM(s) Oral daily  dextrose 40% Gel 15 Gram(s) Oral once  dextrose 5%. 1000 milliLiter(s) (50 mL/Hr) IV Continuous <Continuous>  dextrose 5%. 1000 milliLiter(s) (100 mL/Hr) IV Continuous <Continuous>  dextrose 50% Injectable 25 Gram(s) IV Push once  dextrose 50% Injectable 12.5 Gram(s) IV Push once  dextrose 50% Injectable 25 Gram(s) IV Push once  enoxaparin Injectable 40 milliGRAM(s) SubCutaneous <User Schedule>  famotidine    Tablet 20 milliGRAM(s) Oral two times a day  glucagon  Injectable 1 milliGRAM(s) IntraMuscular once  insulin glargine Injectable (LANTUS) 20 Unit(s) SubCutaneous at bedtime  insulin lispro (ADMELOG) corrective regimen sliding scale   SubCutaneous every 6 hours  metoprolol tartrate 12.5 milliGRAM(s) Oral two times a day  piperacillin/tazobactam IVPB.. 3.375 Gram(s) IV Intermittent every 8 hours  propofol Infusion 10 MICROgram(s)/kG/Min (4.03 mL/Hr) IV Continuous <Continuous>  sodium chloride 0.9%. 1000 milliLiter(s) (50 mL/Hr) IV Continuous <Continuous>  valproate sodium IVPB 500 milliGRAM(s) IV Intermittent every 12 hours    MEDICATIONS  (PRN):  acetaminophen    Suspension .. 650 milliGRAM(s) Oral every 6 hours PRN Temp greater or equal to 38C (100.4F), Mild Pain (1 - 3)  haloperidol    Injectable 2 milliGRAM(s) IV Push every 6 hours PRN Agitation  labetalol Injectable 10 milliGRAM(s) IV Push every 2 hours PRN SBP >180    Pertinent Labs: CBC Full  -  ( 24 Jun 2021 04:12 )  WBC Count : 5.61 K/uL  RBC Count : 4.24 M/uL  Hemoglobin : 12.6 g/dL  Hematocrit : 37.3 %  Platelet Count - Automated : 167 K/uL  Mean Cell Volume : 88.0 fl  Mean Cell Hemoglobin : 29.7 pg  Mean Cell Hemoglobin Concentration : 33.8 gm/dL  Auto Neutrophil # : x  Auto Lymphocyte # : x  Auto Monocyte # : x  Auto Eosinophil # : x  Auto Basophil # : x  Auto Neutrophil % : x  Auto Lymphocyte % : x  Auto Monocyte % : x  Auto Eosinophil % : x  Auto Basophil % : x        Skin: No breakdown noted    Nutrition focused physical exam conducted - found signs of malnutrition [ ]absent [x ]present    Subcutaneous fat loss: mild [x ] Orbital fat pads region, [ ]Buccal fat region, [ ]Triceps region,  [ ]Ribs region    Muscle wasting: mild [x ]Temples region, [ ]Clavicle region, [x ]Shoulder region, [ ]Scapula region, [ ]Interosseous region,  [ ]thigh region, [ ]Calf region    Estimated Needs:   [x ] no change since previous assessment  [ ] recalculated:     Current Nutrition Diagnosis:  Pt remains at high nutrition risk secondary to moderate (acute) protein calorie malnutrition related to inability to meet sufficient protein energy requirements in setting of Right MCA CVA, resp failure requiring vent support as evidenced by physical signs of mild muscle mass and mild fat loss and + edema. Pt receiving enteral feeds of Glucerna 1.5@ 40ml/hr([id number]) 800ml/day; 1200kcal, 67gm protein. Not yet meeting estimated nutrition needs. Recommendations below:     Recommendations:   1. RX: MVI daily   2. Check weight daily to monitor trends   3. Consider increasing Glucerna to 50ml/hr (x20 hours) 1000ml/day; 1500 kcal, 84gm protein to better meet pt's estimated nutrition needs.   4. Monitor Glucose levels     Monitoring and Evaluation:   [ ] PO intake [x ] Tolerance to diet prescription [X] Weights  [X] Follow up per protocol [X] Labs:

## 2021-06-24 NOTE — PROGRESS NOTE ADULT - ASSESSMENT
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   Out of Alteplase window for TX and not thrombectomy candidate per IR     NEURO:  Neuro check q 4hrs  ASA and plavix (CHANCE trial)  R wrist restraint  Maintain Na 135-145 for cytotoxic edema   VPA with cortical hyperexcitability  reported adan pauses with precedex- low dose propofol for vent synchrony  palliative consult- family wants to care for patient at home, DNR   Activity: [X] OOB as tolerated  [X] PT [X] OT X[] splint    PULM: intubated for airway protection  Monitor O2 sats maintain above 92 %  chest PT  CPAP trials    CV:  - <150  EF 60%    RENAL: LR  Urinary retention - diallo placed 6/20/21 - failed TOV   UA - not C/W UTI  Fluids: IVF  Na 135-145 goal on NS    GI:  Diet: start TF  GI prophylaxis [X] pepcid 20mg q 12  Bowel regimen  [X] senna     ENDO: Hyperglycemia / Hypokalemia  Suppl K  Lantus dose ( 25 U q day home ) - 20 u q day  SSI- low dose  Atorvastatin for HLD  Goal euglycemia (-180)    HEME/ONC:  VTE prophylaxis: [X] SCDs [X] chemoprophylaxis- Lovenox 40mh   ASA / Plavix    ID: monitor for fever   zosyn day 4/5 for PNA    SOCIAL/FAMILY:  [X] updated at bedside     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   Out of Alteplase window for TX and not thrombectomy candidate per IR     NEURO:  Neuro check q 4hrs  ASA and plavix (CHANCE trial)  CT as above   R wrist restraint  Maintain Na 135-145 for cytotoxic edema   VPA with cortical hyperexcitability- corrected 98  ( 49 )  reported adan pauses with precedex- low dose propofol for vent synchrony  palliative consult- family wants to care for patient at home, DNR   Activity: [X] OOB as tolerated  [X] PT [X] OT X[] splint    PULM: intubated for airway protection  Monitor O2 sats maintain above 92 %  chest PT  CPAP trials    CV:  - <150  EF 60%    RENAL:  Urinary retention - diallo placed 6/20/21 - failed TOV - retrial - cardura 2mg q day.  UA - not C/W UTI  Na 135-145 goal on NS  Lasix 20mg IV X1     GI:  Diet: start TF- glycerna 50 cc/hr   GI prophylaxis [X] pepcid 20mg q 12  Bowel regimen  [X] senna- alst BM 6/23/21     ENDO: Hyperglycemia / Hypokalemia  Suppl K  Lantus dose ( 25 U q day home ) - 25 u q day  SSI- low dose  Atorvastatin for HLD  Goal euglycemia (-180)    HEME/ONC:  VTE prophylaxis: [X] SCDs [X] chemoprophylaxis- Lovenox 40mh   ASA / Plavix    ID: monitor for fever   zosyn day 4/5 for PNA  F/U urine and blood     SOCIAL/FAMILY:  [X] updated at bedside     CODE STATUS:  [X] Full Code     DISPOSITION:  [X] ICU     [X] Patient is at high risk of neurologic deterioration/death due to:  brain swelling and risk of herniation

## 2021-06-24 NOTE — PROGRESS NOTE ADULT - ASSESSMENT
The patient is a 81y Female who is followed by neurology because of stroke    Right MCA stroke/right thalamic stroke  now intubated due to respiratory failure from PNA  continue ASA, plavix and lipitor  control blood glucose  keep normotensive  vent management per NSICU      will follow with you    Aureliano Guzman MD PhD   [id number]

## 2021-06-24 NOTE — PROGRESS NOTE ADULT - SUBJECTIVE AND OBJECTIVE BOX
Chief complaint:   Patient is a 81y old  Female who presents with a chief complaint of Right MCA CVA (21 Jun 2021 16:51)    HPI:  82 y/o female, PMH DM, HTN, presents to ED as Code Stroke after she was found down on her front lawn by a neighbor. Unknown down time, reported LKW 10:50 am. Initial fingerstick for EMS 60, pt given amp of d50 with improvement in glucose level. Pt presenting with L sided facial droop, L sided weakness, dysarthria. Out of window for TPA. CTH with acute right MCA infarct, CTA Right M2 occlusion. CTP with 16 ml core infarct, right MCA territory.Mismatch volume: 22 ml, right MCA territory, suggesting ischemic penumbra. Mismatch ratio: 2.4. 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    Overnight - Hypotension / fever  6/21/21- intubated for airway protection    ROS: [x ]  Unable to assess due to mental status   All other systems negative    ICU Vital Signs Last 24 Hrs  T(C): 37.4 (24 Jun 2021 07:35), Max: 38.1 (23 Jun 2021 19:00)  T(F): 99.3 (24 Jun 2021 07:35), Max: 100.6 (23 Jun 2021 19:00)  HR: 103 (24 Jun 2021 09:00) (91 - 128)  BP: 141/99 (24 Jun 2021 08:00) (141/99 - 141/99)  BP(mean): 110 (24 Jun 2021 08:00) (110 - 110)  ABP: 158/79 (24 Jun 2021 09:00) (103/56 - 162/78)  ABP(mean): 107 (24 Jun 2021 09:00) (72 - 115)  RR: 17 (24 Jun 2021 09:00) (16 - 26)  SpO2: 99% (24 Jun 2021 09:00) (92% - 100%)      23 Jun 2021 07:01  -  24 Jun 2021 07:00  --------------------------------------------------------  IN:    Enteral Tube Flush: 120 mL    Glucerna 1.5: 1200 mL    IV PiggyBack: 125 mL    IV PiggyBack: 55 mL    IV PiggyBack: 300 mL    Propofol: 90 mL    sodium chloride 0.9%: 1200 mL  Total IN: 3090 mL    OUT:    Indwelling Catheter - Urethral (mL): 1078 mL    Norepinephrine: 0 mL  Total OUT: 1078 mL    Total NET: 2012 mL      24 Jun 2021 07:01  -  24 Jun 2021 09:49  --------------------------------------------------------  IN:    Glucerna 1.5: 100 mL    IV PiggyBack: 125 mL    IV PiggyBack: 50 mL    IV PiggyBack: 50 mL    Propofol: 8 mL    sodium chloride 0.9%: 100 mL  Total IN: 433 mL    OUT:    Indwelling Catheter - Urethral (mL): 175 mL  Total OUT: 175 mL    Total NET: 258 mL          MEDICATIONS  (STANDING):  aspirin  chewable 81 milliGRAM(s) Oral daily  atorvastatin 40 milliGRAM(s) Oral at bedtime  chlorhexidine 0.12% Liquid 15 milliLiter(s) Oral Mucosa every 12 hours  clopidogrel Tablet 75 milliGRAM(s) Oral daily  dextrose 40% Gel 15 Gram(s) Oral once  dextrose 5%. 1000 milliLiter(s) (50 mL/Hr) IV Continuous <Continuous>  dextrose 5%. 1000 milliLiter(s) (100 mL/Hr) IV Continuous <Continuous>  dextrose 50% Injectable 25 Gram(s) IV Push once  dextrose 50% Injectable 12.5 Gram(s) IV Push once  dextrose 50% Injectable 25 Gram(s) IV Push once  enoxaparin Injectable 40 milliGRAM(s) SubCutaneous <User Schedule>  famotidine    Tablet 20 milliGRAM(s) Oral two times a day  glucagon  Injectable 1 milliGRAM(s) IntraMuscular once  insulin glargine Injectable (LANTUS) 20 Unit(s) SubCutaneous at bedtime  insulin lispro (ADMELOG) corrective regimen sliding scale   SubCutaneous every 6 hours  metoprolol tartrate 12.5 milliGRAM(s) Oral two times a day  piperacillin/tazobactam IVPB.. 3.375 Gram(s) IV Intermittent every 8 hours  propofol Infusion 10 MICROgram(s)/kG/Min (4.03 mL/Hr) IV Continuous <Continuous>  sodium chloride 0.9%. 1000 milliLiter(s) (50 mL/Hr) IV Continuous <Continuous>  valproate sodium IVPB 500 milliGRAM(s) IV Intermittent every 12 hours    MEDICATIONS  (PRN):  acetaminophen    Suspension .. 650 milliGRAM(s) Oral every 6 hours PRN Temp greater or equal to 38C (100.4F), Mild Pain (1 - 3)  haloperidol    Injectable 2 milliGRAM(s) IV Push every 6 hours PRN Agitation  labetalol Injectable 10 milliGRAM(s) IV Push every 2 hours PRN SBP >180          RESPIRATORY:  Mode: AC/ CMV (Assist Control/ Continuous Mandatory Ventilation)  RR (machine): 14  TV (machine): 350  FiO2: 50  PEEP: 8  MAP: 12  PIP: 21    < from: CT Head No Cont (06.24.21 @ 05:04) >  IMPRESSION:  Subacute right MCA territory infarct with trace hemorrhagic conversion. New versus more conspicuous low-attenuation anterior right thalamus compatible with ischemic change.        CT Head No Cont (06.24.21 @ 05:04) >  IMPRESSION:  Subacute right MCA territory infarct with trace hemorrhagic conversion. New versus more conspicuous low-attenuation anterior right thalamus compatible with ischemic change.           Xray Chest 1 View-PORTABLE IMMEDIATE (Xray Chest 1 View-PORTABLE IMMEDIATE .) (06.21.21 @ 18:19) >    INTERPRETATION:  Chest one view    HISTORY: Intubation    COMPARISON STUDY: Earlier the same afternoon    Frontal expiratory view of the chest shows the heart to be similar in size. Endotracheal tube reaches the lower trachea. Nasogastric tube has been placed.    The lungs show clearing of the lower left lung with small right effusion and there is no evidence of pneumothorax nor left pleural effusion.    IMPRESSION:  Postintubation.    CT Head No Cont (06.20.21 @ 16:32) >  IMPRESSION: Redemonstration of an evolving acute/subacute infarct within the right MCA distribution with associated cytotoxic edema and without hemorrhagic transformation.    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        ECHO-    1. Left ventricular ejection fraction, by visual estimation, is 60 to 65%.   2. Normal global left ventricular systolic function.   3. The mitral in-flow pattern reveals no discernable A-wave, therefore no comment on diastolic function can be made.   4. Normal right ventricular size and function.   5. Mildly enlarged left atrium.   6. Mild mitral annular calcification.   7. Thickening and calcification of the anterior and posterior mitral valve leaflets.   8. Mild-moderate tricuspid regurgitation.   9. Mild aortic regurgitation.  10. Estimated pulmonary artery systolic pressure is 51.1 mmHg assuming a right atrial pressure of 3 mmHg, which is consistent with moderate pulmonary hypertension.    LAB RESULTS:                          12.6   5.61  )-----------( 167      ( 24 Jun 2021 04:12 )             37.3     Ananya acid - 49    06-24    141  |  106  |  21.5<H>  ----------------------------<  182<H>  3.9   |  24.0  |  0.47<L>    Ca    8.0<L>      24 Jun 2021 04:12  Phos  2.8     06-24  Mg     2.1     06-24    TPro  x   /  Alb  3.2<L>  /  TBili  x   /  DBili  x   /  AST  x   /  ALT  x   /  AlkPhos  x   06-23    CAPILLARY BLOOD GLUCOSE      POCT Blood Glucose.: 168 mg/dL (24 Jun 2021 06:20)  POCT Blood Glucose.: 175 mg/dL (23 Jun 2021 23:43)  POCT Blood Glucose.: 180 mg/dL (23 Jun 2021 17:36)  POCT Blood Glucose.: 181 mg/dL (23 Jun 2021 11:45)        Culture - Sputum (collected 22 Jun 2021 00:44)  Source: .Sputum Sputum  Gram Stain (22 Jun 2021 06:46):    Few polymorphonuclear leukocytes per low power field    Rare Squamous epithelial cells per low power field    Few Gram positive cocci in pairs per oil power field    Few Gram Variable Rods per oil power field  Final Report (23 Jun 2021 18:41):    Normal Respiratory Miladys present    Culture - Blood (collected 21 Jun 2021 18:47)  Source: .Blood Blood-Peripheral  Preliminary Report (23 Jun 2021 19:01):    No growth at 48 hours    Culture - Blood (collected 21 Jun 2021 18:46)  Source: .Blood Blood-Peripheral  Preliminary Report (23 Jun 2021 19:01):    No growth at 48 hours      PHYSICAL EXAM:  General: Calm, intubated  HEENT: MMM  Neuro:  -Mental status- No acute distress, no EO, no FC, R gaze pref  -CN- PERRL 3mm, EOMI, tongue midline, L facial  spont RUE and RLE  LUE min withdrawal  LLE min withdrawal    CV: RRR  Pulm: clear to auscultation  Abd: Soft, nontender, nondistended  Ext: no noted edema in lower ext  Skin: warm, dry       Chief complaint:   Patient is a 81y old  Female who presents with a chief complaint of Right MCA CVA (21 Jun 2021 16:51)    HPI:  82 y/o female, PMH DM, HTN, presents to ED as Code Stroke after she was found down on her front lawn by a neighbor. Unknown down time, reported LKW 10:50 am. Initial fingerstick for EMS 60, pt given amp of d50 with improvement in glucose level. Pt presenting with L sided facial droop, L sided weakness, dysarthria. Out of window for TPA. CTH with acute right MCA infarct, CTA Right M2 occlusion. CTP with 16 ml core infarct, right MCA territory.Mismatch volume: 22 ml, right MCA territory, suggesting ischemic penumbra. Mismatch ratio: 2.4. 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    Overnight - Hypotension / fever  6/21/21- intubated for airway protection    ROS: [x ]  Unable to assess due to mental status   All other systems negative    ICU Vital Signs Last 24 Hrs  T(C): 37.4 (24 Jun 2021 07:35), Max: 38.1 (23 Jun 2021 19:00)  T(F): 99.3 (24 Jun 2021 07:35), Max: 100.6 (23 Jun 2021 19:00)  HR: 103 (24 Jun 2021 09:00) (91 - 128)  BP: 141/99 (24 Jun 2021 08:00) (141/99 - 141/99)  BP(mean): 110 (24 Jun 2021 08:00) (110 - 110)  ABP: 158/79 (24 Jun 2021 09:00) (103/56 - 162/78)  ABP(mean): 107 (24 Jun 2021 09:00) (72 - 115)  RR: 17 (24 Jun 2021 09:00) (16 - 26)  SpO2: 99% (24 Jun 2021 09:00) (92% - 100%)      23 Jun 2021 07:01  -  24 Jun 2021 07:00  --------------------------------------------------------  IN:    Enteral Tube Flush: 120 mL    Glucerna 1.5: 1200 mL    IV PiggyBack: 125 mL    IV PiggyBack: 55 mL    IV PiggyBack: 300 mL    Propofol: 90 mL    sodium chloride 0.9%: 1200 mL  Total IN: 3090 mL    OUT:    Indwelling Catheter - Urethral (mL): 1078 mL    Norepinephrine: 0 mL  Total OUT: 1078 mL    Total NET: 2012 mL      24 Jun 2021 07:01  -  24 Jun 2021 09:49  --------------------------------------------------------  IN:    Glucerna 1.5: 100 mL    IV PiggyBack: 125 mL    IV PiggyBack: 50 mL    IV PiggyBack: 50 mL    Propofol: 8 mL    sodium chloride 0.9%: 100 mL  Total IN: 433 mL    OUT:    Indwelling Catheter - Urethral (mL): 175 mL  Total OUT: 175 mL    Total NET: 258 mL          MEDICATIONS  (STANDING):  aspirin  chewable 81 milliGRAM(s) Oral daily  atorvastatin 40 milliGRAM(s) Oral at bedtime  chlorhexidine 0.12% Liquid 15 milliLiter(s) Oral Mucosa every 12 hours  clopidogrel Tablet 75 milliGRAM(s) Oral daily  dextrose 40% Gel 15 Gram(s) Oral once  dextrose 5%. 1000 milliLiter(s) (50 mL/Hr) IV Continuous <Continuous>  dextrose 5%. 1000 milliLiter(s) (100 mL/Hr) IV Continuous <Continuous>  dextrose 50% Injectable 25 Gram(s) IV Push once  dextrose 50% Injectable 12.5 Gram(s) IV Push once  dextrose 50% Injectable 25 Gram(s) IV Push once  enoxaparin Injectable 40 milliGRAM(s) SubCutaneous <User Schedule>  famotidine    Tablet 20 milliGRAM(s) Oral two times a day  glucagon  Injectable 1 milliGRAM(s) IntraMuscular once  insulin glargine Injectable (LANTUS) 20 Unit(s) SubCutaneous at bedtime  insulin lispro (ADMELOG) corrective regimen sliding scale   SubCutaneous every 6 hours  metoprolol tartrate 12.5 milliGRAM(s) Oral two times a day  piperacillin/tazobactam IVPB.. 3.375 Gram(s) IV Intermittent every 8 hours  propofol Infusion 10 MICROgram(s)/kG/Min (4.03 mL/Hr) IV Continuous <Continuous>  sodium chloride 0.9%. 1000 milliLiter(s) (50 mL/Hr) IV Continuous <Continuous>  valproate sodium IVPB 500 milliGRAM(s) IV Intermittent every 12 hours    MEDICATIONS  (PRN):  acetaminophen    Suspension .. 650 milliGRAM(s) Oral every 6 hours PRN Temp greater or equal to 38C (100.4F), Mild Pain (1 - 3)  haloperidol    Injectable 2 milliGRAM(s) IV Push every 6 hours PRN Agitation  labetalol Injectable 10 milliGRAM(s) IV Push every 2 hours PRN SBP >180          RESPIRATORY:  Mode: AC/ CMV (Assist Control/ Continuous Mandatory Ventilation)  RR (machine): 14  TV (machine): 350  FiO2: 50  PEEP: 8  MAP: 12  PIP: 21     CT Head No Cont (06.24.21 @ 05:04) >  IMPRESSION:  Subacute right MCA territory infarct with trace hemorrhagic conversion. New versus more conspicuous low-attenuation anterior right thalamus compatible with ischemic change.        CT Head No Cont (06.24.21 @ 05:04) >  IMPRESSION:  Subacute right MCA territory infarct with trace hemorrhagic conversion. New versus more conspicuous low-attenuation anterior right thalamus compatible with ischemic change.           Xray Chest 1 View-PORTABLE IMMEDIATE (Xray Chest 1 View-PORTABLE IMMEDIATE .) (06.21.21 @ 18:19) >    INTERPRETATION:  Chest one view    HISTORY: Intubation    COMPARISON STUDY: Earlier the same afternoon    Frontal expiratory view of the chest shows the heart to be similar in size. Endotracheal tube reaches the lower trachea. Nasogastric tube has been placed.    The lungs show clearing of the lower left lung with small right effusion and there is no evidence of pneumothorax nor left pleural effusion.    IMPRESSION:  Postintubation.    CT Head No Cont (06.20.21 @ 16:32) >  IMPRESSION: Redemonstration of an evolving acute/subacute infarct within the right MCA distribution with associated cytotoxic edema and without hemorrhagic transformation.    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        ECHO-    1. Left ventricular ejection fraction, by visual estimation, is 60 to 65%.   2. Normal global left ventricular systolic function.   3. The mitral in-flow pattern reveals no discernable A-wave, therefore no comment on diastolic function can be made.   4. Normal right ventricular size and function.   5. Mildly enlarged left atrium.   6. Mild mitral annular calcification.   7. Thickening and calcification of the anterior and posterior mitral valve leaflets.   8. Mild-moderate tricuspid regurgitation.   9. Mild aortic regurgitation.  10. Estimated pulmonary artery systolic pressure is 51.1 mmHg assuming a right atrial pressure of 3 mmHg, which is consistent with moderate pulmonary hypertension.    LAB RESULTS:                          12.6   5.61  )-----------( 167      ( 24 Jun 2021 04:12 )             37.3     Ananya acid - 49    06-24    141  |  106  |  21.5<H>  ----------------------------<  182<H>  3.9   |  24.0  |  0.47<L>    Ca    8.0<L>      24 Jun 2021 04:12  Phos  2.8     06-24  Mg     2.1     06-24    TPro  x   /  Alb  3.2<L>  /  TBili  x   /  DBili  x   /  AST  x   /  ALT  x   /  AlkPhos  x   06-23    CAPILLARY BLOOD GLUCOSE      POCT Blood Glucose.: 168 mg/dL (24 Jun 2021 06:20)  POCT Blood Glucose.: 175 mg/dL (23 Jun 2021 23:43)  POCT Blood Glucose.: 180 mg/dL (23 Jun 2021 17:36)  POCT Blood Glucose.: 181 mg/dL (23 Jun 2021 11:45)        Culture - Sputum (collected 22 Jun 2021 00:44)  Source: .Sputum Sputum  Gram Stain (22 Jun 2021 06:46):    Few polymorphonuclear leukocytes per low power field    Rare Squamous epithelial cells per low power field    Few Gram positive cocci in pairs per oil power field    Few Gram Variable Rods per oil power field  Final Report (23 Jun 2021 18:41):    Normal Respiratory Miladys present    Culture - Blood (collected 21 Jun 2021 18:47)  Source: .Blood Blood-Peripheral  Preliminary Report (23 Jun 2021 19:01):    No growth at 48 hours    Culture - Blood (collected 21 Jun 2021 18:46)  Source: .Blood Blood-Peripheral  Preliminary Report (23 Jun 2021 19:01):    No growth at 48 hours      PHYSICAL EXAM:  General: Calm, intubated  HEENT: MMM  Neuro:  -Mental status- No acute distress, no EO, no FC, R gaze pref  -CN- PERRL 3mm, EOMI, tongue midline, L facial  spont RUE and RLE  LUE min withdrawal  LLE min withdrawal    CV: RRR  Pulm: clear to auscultation  Abd: Soft, nontender, nondistended  Ext: no noted edema in lower ext  Skin: warm, dry

## 2021-06-24 NOTE — DIETITIAN NUTRITION RISK NOTIFICATION - TREATMENT: THE FOLLOWING DIET HAS BEEN RECOMMENDED
Diet, NPO with Tube Feed:   Tube Feeding Modality: Orogastric  Glucerna 1.5 Peter (GLUCERNA1.5)  Total Volume for 24 Hours (mL): 960  Continuous  Starting Tube Feed Rate {mL per Hour}: 10  Increase Tube Feed Rate by (mL): 10     Every 4 hours  Until Goal Tube Feed Rate (mL per Hour): 40  Tube Feed Duration (in Hours): 24  Tube Feed Start Time: 10:00 (06-22-21 @ 09:57) [Active]

## 2021-06-25 LAB
ANION GAP SERPL CALC-SCNC: 12 MMOL/L — SIGNIFICANT CHANGE UP (ref 5–17)
BUN SERPL-MCNC: 20.9 MG/DL — HIGH (ref 8–20)
CALCIUM SERPL-MCNC: 8.4 MG/DL — LOW (ref 8.6–10.2)
CHLORIDE SERPL-SCNC: 99 MMOL/L — SIGNIFICANT CHANGE UP (ref 98–107)
CO2 SERPL-SCNC: 30 MMOL/L — HIGH (ref 22–29)
CREAT SERPL-MCNC: 0.44 MG/DL — LOW (ref 0.5–1.3)
GLUCOSE BLDC GLUCOMTR-MCNC: 179 MG/DL — HIGH (ref 70–99)
GLUCOSE BLDC GLUCOMTR-MCNC: 235 MG/DL — HIGH (ref 70–99)
GLUCOSE BLDC GLUCOMTR-MCNC: 275 MG/DL — HIGH (ref 70–99)
GLUCOSE BLDC GLUCOMTR-MCNC: 295 MG/DL — HIGH (ref 70–99)
GLUCOSE SERPL-MCNC: 203 MG/DL — HIGH (ref 70–99)
HCT VFR BLD CALC: 40.2 % — SIGNIFICANT CHANGE UP (ref 34.5–45)
HGB BLD-MCNC: 13.4 G/DL — SIGNIFICANT CHANGE UP (ref 11.5–15.5)
MAGNESIUM SERPL-MCNC: 2.2 MG/DL — SIGNIFICANT CHANGE UP (ref 1.6–2.6)
MCHC RBC-ENTMCNC: 29.3 PG — SIGNIFICANT CHANGE UP (ref 27–34)
MCHC RBC-ENTMCNC: 33.3 GM/DL — SIGNIFICANT CHANGE UP (ref 32–36)
MCV RBC AUTO: 87.8 FL — SIGNIFICANT CHANGE UP (ref 80–100)
PHOSPHATE SERPL-MCNC: 3.5 MG/DL — SIGNIFICANT CHANGE UP (ref 2.4–4.7)
PLATELET # BLD AUTO: 199 K/UL — SIGNIFICANT CHANGE UP (ref 150–400)
POTASSIUM SERPL-MCNC: 3.8 MMOL/L — SIGNIFICANT CHANGE UP (ref 3.5–5.3)
POTASSIUM SERPL-SCNC: 3.8 MMOL/L — SIGNIFICANT CHANGE UP (ref 3.5–5.3)
RBC # BLD: 4.58 M/UL — SIGNIFICANT CHANGE UP (ref 3.8–5.2)
RBC # FLD: 13.2 % — SIGNIFICANT CHANGE UP (ref 10.3–14.5)
SODIUM SERPL-SCNC: 141 MMOL/L — SIGNIFICANT CHANGE UP (ref 135–145)
WBC # BLD: 3.97 K/UL — SIGNIFICANT CHANGE UP (ref 3.8–10.5)
WBC # FLD AUTO: 3.97 K/UL — SIGNIFICANT CHANGE UP (ref 3.8–10.5)

## 2021-06-25 PROCEDURE — 99291 CRITICAL CARE FIRST HOUR: CPT

## 2021-06-25 PROCEDURE — 99233 SBSQ HOSP IP/OBS HIGH 50: CPT

## 2021-06-25 PROCEDURE — 99232 SBSQ HOSP IP/OBS MODERATE 35: CPT

## 2021-06-25 PROCEDURE — 71045 X-RAY EXAM CHEST 1 VIEW: CPT | Mod: 26

## 2021-06-25 RX ORDER — HUMAN INSULIN 100 [IU]/ML
12 INJECTION, SUSPENSION SUBCUTANEOUS EVERY 6 HOURS
Refills: 0 | Status: DISCONTINUED | OUTPATIENT
Start: 2021-06-25 | End: 2021-06-26

## 2021-06-25 RX ORDER — FUROSEMIDE 40 MG
20 TABLET ORAL ONCE
Refills: 0 | Status: COMPLETED | OUTPATIENT
Start: 2021-06-25 | End: 2021-06-25

## 2021-06-25 RX ORDER — DOXAZOSIN MESYLATE 4 MG
2 TABLET ORAL AT BEDTIME
Refills: 0 | Status: DISCONTINUED | OUTPATIENT
Start: 2021-06-25 | End: 2021-06-27

## 2021-06-25 RX ORDER — DEXAMETHASONE 0.5 MG/5ML
4 ELIXIR ORAL EVERY 6 HOURS
Refills: 0 | Status: COMPLETED | OUTPATIENT
Start: 2021-06-25 | End: 2021-06-28

## 2021-06-25 RX ORDER — POTASSIUM CHLORIDE 20 MEQ
40 PACKET (EA) ORAL ONCE
Refills: 0 | Status: COMPLETED | OUTPATIENT
Start: 2021-06-25 | End: 2021-06-25

## 2021-06-25 RX ORDER — DEXMEDETOMIDINE HYDROCHLORIDE IN 0.9% SODIUM CHLORIDE 4 UG/ML
0.1 INJECTION INTRAVENOUS
Qty: 200 | Refills: 0 | Status: DISCONTINUED | OUTPATIENT
Start: 2021-06-25 | End: 2021-06-27

## 2021-06-25 RX ADMIN — CHLORHEXIDINE GLUCONATE 1 APPLICATION(S): 213 SOLUTION TOPICAL at 11:57

## 2021-06-25 RX ADMIN — CLOPIDOGREL BISULFATE 75 MILLIGRAM(S): 75 TABLET, FILM COATED ORAL at 11:54

## 2021-06-25 RX ADMIN — Medication 10 MILLIGRAM(S): at 07:49

## 2021-06-25 RX ADMIN — Medication 2: at 05:21

## 2021-06-25 RX ADMIN — Medication 40 MILLIEQUIVALENT(S): at 09:07

## 2021-06-25 RX ADMIN — FAMOTIDINE 20 MILLIGRAM(S): 10 INJECTION INTRAVENOUS at 17:50

## 2021-06-25 RX ADMIN — Medication 1 TABLET(S): at 11:54

## 2021-06-25 RX ADMIN — PIPERACILLIN AND TAZOBACTAM 25 GRAM(S): 4; .5 INJECTION, POWDER, LYOPHILIZED, FOR SOLUTION INTRAVENOUS at 23:17

## 2021-06-25 RX ADMIN — PIPERACILLIN AND TAZOBACTAM 25 GRAM(S): 4; .5 INJECTION, POWDER, LYOPHILIZED, FOR SOLUTION INTRAVENOUS at 05:03

## 2021-06-25 RX ADMIN — CHLORHEXIDINE GLUCONATE 15 MILLILITER(S): 213 SOLUTION TOPICAL at 17:46

## 2021-06-25 RX ADMIN — Medication 12.5 MILLIGRAM(S): at 17:58

## 2021-06-25 RX ADMIN — HUMAN INSULIN 12 UNIT(S): 100 INJECTION, SUSPENSION SUBCUTANEOUS at 17:48

## 2021-06-25 RX ADMIN — Medication 6: at 17:48

## 2021-06-25 RX ADMIN — Medication 12.5 MILLIGRAM(S): at 05:04

## 2021-06-25 RX ADMIN — Medication 20 MILLIGRAM(S): at 01:41

## 2021-06-25 RX ADMIN — Medication 27.5 MILLIGRAM(S): at 20:16

## 2021-06-25 RX ADMIN — Medication 81 MILLIGRAM(S): at 11:54

## 2021-06-25 RX ADMIN — CHLORHEXIDINE GLUCONATE 15 MILLILITER(S): 213 SOLUTION TOPICAL at 05:03

## 2021-06-25 RX ADMIN — Medication 2 MILLIGRAM(S): at 22:06

## 2021-06-25 RX ADMIN — Medication 4 MILLIGRAM(S): at 17:58

## 2021-06-25 RX ADMIN — FAMOTIDINE 20 MILLIGRAM(S): 10 INJECTION INTRAVENOUS at 05:04

## 2021-06-25 RX ADMIN — ENOXAPARIN SODIUM 40 MILLIGRAM(S): 100 INJECTION SUBCUTANEOUS at 17:58

## 2021-06-25 RX ADMIN — HUMAN INSULIN 12 UNIT(S): 100 INJECTION, SUSPENSION SUBCUTANEOUS at 12:22

## 2021-06-25 RX ADMIN — DEXMEDETOMIDINE HYDROCHLORIDE IN 0.9% SODIUM CHLORIDE 1.68 MICROGRAM(S)/KG/HR: 4 INJECTION INTRAVENOUS at 11:53

## 2021-06-25 RX ADMIN — Medication 4 MILLIGRAM(S): at 11:57

## 2021-06-25 RX ADMIN — Medication 27.5 MILLIGRAM(S): at 09:06

## 2021-06-25 RX ADMIN — Medication 650 MILLIGRAM(S): at 13:33

## 2021-06-25 RX ADMIN — Medication 4: at 11:54

## 2021-06-25 RX ADMIN — DEXMEDETOMIDINE HYDROCHLORIDE IN 0.9% SODIUM CHLORIDE 1.68 MICROGRAM(S)/KG/HR: 4 INJECTION INTRAVENOUS at 17:45

## 2021-06-25 RX ADMIN — PIPERACILLIN AND TAZOBACTAM 25 GRAM(S): 4; .5 INJECTION, POWDER, LYOPHILIZED, FOR SOLUTION INTRAVENOUS at 11:57

## 2021-06-25 RX ADMIN — ATORVASTATIN CALCIUM 40 MILLIGRAM(S): 80 TABLET, FILM COATED ORAL at 22:06

## 2021-06-25 NOTE — PROGRESS NOTE ADULT - SUBJECTIVE AND OBJECTIVE BOX
Chief complaint:   Patient is a 81y old  Female who presents with a chief complaint of Right MCA CVA (21 Jun 2021 16:51)    HPI:  80 y/o female, PMH DM, HTN, presents to ED as Code Stroke after she was found down on her front lawn by a neighbor. Unknown down time, reported LKW 10:50 am. Initial fingerstick for EMS 60, pt given amp of d50 with improvement in glucose level. Pt presenting with L sided facial droop, L sided weakness, dysarthria. Out of window for TPA. CTH with acute right MCA infarct, CTA Right M2 occlusion. CTP with 16 ml core infarct, right MCA territory.Mismatch volume: 22 ml, right MCA territory, suggesting ischemic penumbra. Mismatch ratio: 2.4. 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    Overnight - Hypotension / fever  6/21/21- intubated for airway protection    ROS: [x ]  Unable to assess due to mental status   All other systems negative    ICU Vital Signs Last 24 Hrs  T(C): 37.7 (25 Jun 2021 08:00), Max: 38.1 (24 Jun 2021 19:00)  T(F): 99.9 (25 Jun 2021 08:00), Max: 100.6 (24 Jun 2021 19:00)  HR: 95 (25 Jun 2021 09:00) (84 - 123)  BP: 133/65 (25 Jun 2021 08:00) (114/58 - 142/80)  BP(mean): 79 (25 Jun 2021 08:00) (75 - 96)  ABP: 121/66 (25 Jun 2021 09:00) (107/52 - 162/80)  ABP(mean): 85 (25 Jun 2021 09:00) (72 - 110)  RR: 14 (25 Jun 2021 09:00) (14 - 20)  SpO2: 95% (25 Jun 2021 09:00) (95% - 99%)          24 Jun 2021 07:01  -  25 Jun 2021 07:00  --------------------------------------------------------  IN:    Enteral Tube Flush: 110 mL    Glucerna 1.5: 1200 mL    IV PiggyBack: 105 mL    IV PiggyBack: 225 mL    IV PiggyBack: 187.5 mL    Propofol: 84 mL    sodium chloride 0.9%: 300 mL  Total IN: 2211.5 mL    OUT:    Indwelling Catheter - Urethral (mL): 3495 mL  Total OUT: 3495 mL    Total NET: -1283.5 mL      25 Jun 2021 07:01  -  25 Jun 2021 09:21  --------------------------------------------------------  IN:    Glucerna 1.5: 100 mL    IV PiggyBack: 25 mL    Propofol: 8 mL  Total IN: 133 mL    OUT:    Indwelling Catheter - Urethral (mL): 100 mL  Total OUT: 100 mL    Total NET: 33 mL      MEDICATIONS  (STANDING):  aspirin  chewable 81 milliGRAM(s) Oral daily  atorvastatin 40 milliGRAM(s) Oral at bedtime  chlorhexidine 0.12% Liquid 15 milliLiter(s) Oral Mucosa every 12 hours  chlorhexidine 2% Cloths 1 Application(s) Topical daily  clopidogrel Tablet 75 milliGRAM(s) Oral daily  dextrose 40% Gel 15 Gram(s) Oral once  dextrose 5%. 1000 milliLiter(s) (50 mL/Hr) IV Continuous <Continuous>  dextrose 5%. 1000 milliLiter(s) (100 mL/Hr) IV Continuous <Continuous>  dextrose 50% Injectable 25 Gram(s) IV Push once  dextrose 50% Injectable 12.5 Gram(s) IV Push once  dextrose 50% Injectable 25 Gram(s) IV Push once  enoxaparin Injectable 40 milliGRAM(s) SubCutaneous <User Schedule>  famotidine    Tablet 20 milliGRAM(s) Oral two times a day  glucagon  Injectable 1 milliGRAM(s) IntraMuscular once  insulin glargine Injectable (LANTUS) 25 Unit(s) SubCutaneous at bedtime  insulin lispro (ADMELOG) corrective regimen sliding scale   SubCutaneous every 6 hours  metoprolol tartrate 12.5 milliGRAM(s) Oral two times a day  multivitamin 1 Tablet(s) Oral daily  piperacillin/tazobactam IVPB.. 3.375 Gram(s) IV Intermittent every 8 hours  propofol Infusion 10 MICROgram(s)/kG/Min (4.03 mL/Hr) IV Continuous <Continuous>  valproate sodium IVPB 500 milliGRAM(s) IV Intermittent every 12 hours    MEDICATIONS  (PRN):  acetaminophen    Suspension .. 650 milliGRAM(s) Oral every 6 hours PRN Temp greater or equal to 38C (100.4F), Mild Pain (1 - 3)  haloperidol    Injectable 2 milliGRAM(s) IV Push every 6 hours PRN Agitation  labetalol Injectable 10 milliGRAM(s) IV Push every 2 hours PRN SBP >180       CT Head No Cont (06.24.21 @ 05:04) >  IMPRESSION:  Subacute right MCA territory infarct with trace hemorrhagic conversion. New versus more conspicuous low-attenuation anterior right thalamus compatible with ischemic change.          RESPIRATORY:  Mode: AC/ CMV (Assist Control/ Continuous Mandatory Ventilation)  RR (machine): 14  TV (machine): 350  FiO2: 50  PEEP: 8  MAP: 12  PIP: 21     CT Head No Cont (06.24.21 @ 05:04) >  IMPRESSION:  Subacute right MCA territory infarct with trace hemorrhagic conversion. New versus more conspicuous low-attenuation anterior right thalamus compatible with ischemic change.        CT Head No Cont (06.24.21 @ 05:04) >  IMPRESSION:  Subacute right MCA territory infarct with trace hemorrhagic conversion. New versus more conspicuous low-attenuation anterior right thalamus compatible with ischemic change.           Xray Chest 1 View-PORTABLE IMMEDIATE (Xray Chest 1 View-PORTABLE IMMEDIATE .) (06.21.21 @ 18:19) >    INTERPRETATION:  Chest one view    HISTORY: Intubation    COMPARISON STUDY: Earlier the same afternoon    Frontal expiratory view of the chest shows the heart to be similar in size. Endotracheal tube reaches the lower trachea. Nasogastric tube has been placed.    The lungs show clearing of the lower left lung with small right effusion and there is no evidence of pneumothorax nor left pleural effusion.    IMPRESSION:  Postintubation.    CT Head No Cont (06.20.21 @ 16:32) >  IMPRESSION: Redemonstration of an evolving acute/subacute infarct within the right MCA distribution with associated cytotoxic edema and without hemorrhagic transformation.    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        ECHO-    1. Left ventricular ejection fraction, by visual estimation, is 60 to 65%.   2. Normal global left ventricular systolic function.   3. The mitral in-flow pattern reveals no discernable A-wave, therefore no comment on diastolic function can be made.   4. Normal right ventricular size and function.   5. Mildly enlarged left atrium.   6. Mild mitral annular calcification.   7. Thickening and calcification of the anterior and posterior mitral valve leaflets.   8. Mild-moderate tricuspid regurgitation.   9. Mild aortic regurgitation.  10. Estimated pulmonary artery systolic pressure is 51.1 mmHg assuming a right atrial pressure of 3 mmHg, which is consistent with moderate pulmonary hypertension.    LAB RESULTS:                          13.4   3.97  )-----------( 199      ( 25 Jun 2021 04:42 )             40.2       Ananya acid - 49    06-25    141  |  99  |  20.9<H>  ----------------------------<  203<H>  3.8   |  30.0<H>  |  0.44<L>    Ca    8.4<L>      25 Jun 2021 04:42  Phos  3.5     06-25  Mg     2.2     06-25      CAPILLARY BLOOD GLUCOSE  POCT Blood Glucose.: 179 mg/dL (25 Jun 2021 05:13)  POCT Blood Glucose.: 177 mg/dL (24 Jun 2021 23:30)  POCT Blood Glucose.: 187 mg/dL (24 Jun 2021 18:17)  POCT Blood Glucose.: 193 mg/dL (24 Jun 2021 11:20)        Culture - Urine (collected 23 Jun 2021 16:24)  Source: .Urine Catheterized  Final Report (24 Jun 2021 12:27):    No growth    Culture - Blood (collected 23 Jun 2021 02:12)  Source: .Blood Blood-Peripheral  Preliminary Report (25 Jun 2021 03:00):    No growth at 48 hours    Culture - Blood (collected 23 Jun 2021 02:12)  Source: .Blood Blood-Peripheral  Preliminary Report (25 Jun 2021 03:00):    No growth at 48 hours      Culture - Sputum (collected 22 Jun 2021 00:44)  Source: .Sputum Sputum  Gram Stain (22 Jun 2021 06:46):    Few polymorphonuclear leukocytes per low power field    Rare Squamous epithelial cells per low power field    Few Gram positive cocci in pairs per oil power field    Few Gram Variable Rods per oil power field  Final Report (23 Jun 2021 18:41):    Normal Respiratory Miladys present    Culture - Blood (collected 21 Jun 2021 18:47)  Source: .Blood Blood-Peripheral  Preliminary Report (23 Jun 2021 19:01):    No growth at 48 hours    Culture - Blood (collected 21 Jun 2021 18:46)  Source: .Blood Blood-Peripheral  Preliminary Report (23 Jun 2021 19:01):    No growth at 48 hours      PHYSICAL EXAM:  General: Calm, intubated  HEENT: MMM  Neuro:  -Mental status- No acute distress, no EO, no FC, R gaze pref  -CN- PERRL 3mm, EOMI, tongue midline, L facial  spont RUE and RLE  LUE min withdrawal  LLE min withdrawal    CV: RRR  Pulm: clear to auscultation  Abd: Soft, nontender, nondistended  Ext: no noted edema in lower ext  Skin: warm, dry

## 2021-06-25 NOTE — PROGRESS NOTE ADULT - SUBJECTIVE AND OBJECTIVE BOX
[facility]                                        Neurology at [place]                                 [facility]                                  [address]                                        [place], [place], [address]                                             [phone]        CC: stroke    HPI:   80 yo woman with medical conditions as outlined below who was found down yesterday. She presented with L facial droop, left sided weakness, and dysarthria. 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 She was admitted to NS-ICU for q 1 h neuro-checks and hemicraniectomy watch.    Interim history:  Remains in ICU on mechanical ventilator.    ROS:   Unobtainable due to patient's condition.     MEDICATIONS  (STANDING):  aspirin  chewable 81 milliGRAM(s) Oral daily  atorvastatin 40 milliGRAM(s) Oral at bedtime  chlorhexidine 0.12% Liquid 15 milliLiter(s) Oral Mucosa every 12 hours  chlorhexidine 2% Cloths 1 Application(s) Topical daily  clopidogrel Tablet 75 milliGRAM(s) Oral daily  dexAMETHasone  Injectable 4 milliGRAM(s) IV Push every 6 hours  dexMEDEtomidine Infusion 0.1 MICROgram(s)/kG/Hr (1.68 mL/Hr) IV Continuous <Continuous>  dextrose 40% Gel 15 Gram(s) Oral once  dextrose 5%. 1000 milliLiter(s) (50 mL/Hr) IV Continuous <Continuous>  dextrose 5%. 1000 milliLiter(s) (100 mL/Hr) IV Continuous <Continuous>  dextrose 50% Injectable 25 Gram(s) IV Push once  dextrose 50% Injectable 12.5 Gram(s) IV Push once  dextrose 50% Injectable 25 Gram(s) IV Push once  doxazosin 2 milliGRAM(s) Oral at bedtime  enoxaparin Injectable 40 milliGRAM(s) SubCutaneous <User Schedule>  famotidine    Tablet 20 milliGRAM(s) Oral two times a day  glucagon  Injectable 1 milliGRAM(s) IntraMuscular once  insulin lispro (ADMELOG) corrective regimen sliding scale   SubCutaneous every 6 hours  insulin NPH human recombinant 12 Unit(s) SubCutaneous every 6 hours  metoprolol tartrate 12.5 milliGRAM(s) Oral two times a day  multivitamin 1 Tablet(s) Oral daily  piperacillin/tazobactam IVPB.. 3.375 Gram(s) IV Intermittent every 8 hours  valproate sodium IVPB 500 milliGRAM(s) IV Intermittent every 12 hours      Vital Signs Last 24 Hrs  T(C): 37.7 (25 Jun 2021 08:00), Max: 38.1 (24 Jun 2021 19:00)  T(F): 99.9 (25 Jun 2021 08:00), Max: 100.6 (24 Jun 2021 19:00)  HR: 100 (25 Jun 2021 11:00) (84 - 118)  BP: 133/65 (25 Jun 2021 08:00) (114/58 - 142/80)  BP(mean): 79 (25 Jun 2021 08:00) (75 - 96)  RR: 18 (25 Jun 2021 11:00) (14 - 20)  SpO2: 98% (25 Jun 2021 11:00) (95% - 99%)    Detailed Neurologic Exam:    Mental status: The patient is intubated and not speaking.  Unable to assess orientation or language.    Cranial nerves: Pupils equal and react symmetrically to light. t. Extraocular motion is full with dolls eyes maneuvers. Facial sensation is not able to be assessed. Facial musculature is grossly symmetric.  Unable to assess palate, shoulder and tongue.    Motor: There is normal bulk and tone.  There is no tremor.  moves weakly to stimuli on right, left UE>LE paresis    Sensation: Grimace to pinch in 4 extremities, withdraws on right    Reflexes: muted throughout and plantar responses are flexor right, triple flexion left    Cerebellar: unable to assess  dysmetria on finger to nose testing.    Labs:     06-25    141  |  99  |  20.9<H>  ----------------------------<  203<H>  3.8   |  30.0<H>  |  0.44<L>    Ca    8.4<L>      25 Jun 2021 04:42  Phos  3.5     06-25  Mg     2.2     06-25                              13.4   3.97  )-----------( 199      ( 25 Jun 2021 04:42 )             40.2       Rad:   CT head right middle cerebral artery/thalamic strokes, no blood or mass

## 2021-06-25 NOTE — PROGRESS NOTE ADULT - ASSESSMENT
81y Female who is followed by neurology because of stroke    Right MCA stroke/right thalamic stroke  Intubated due to respiratory failure from pneumonia.  Continue ASA, Plavix and Lipitor.  Control blood glucose  Keep normotensive  Vent management per NS-ICU.

## 2021-06-25 NOTE — PROGRESS NOTE ADULT - ASSESSMENT
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   Out of Alteplase window for TX and not thrombectomy candidate per IR     NEURO:  Neuro check q 4hrs  ASA and plavix (CHANCE trial)  CT as above   R wrist restraint  Maintain Na 135-145 for cytotoxic edema   VPA with cortical hyperexcitability- corrected 98  ( 49 )  Trial  precedex- low dose   palliative consult- family wants to care for patient at home, DNR   Activity: [X] OOB as tolerated  [X] PT [X] OT X[] splint    PULM: intubated for airway protection  Optimize pul status and met with family 6/24/21 and plan extubation with no intention to reintubate and if does well will plan PEG  Monitor O2 sats maintain above 92 %  chest PT  CPAP trials    CV:  - <150  EF 60%    RENAL:  Urinary retention - diallo placed 6/20/21 - failed TOV - retrial - cardura 2mg q day and D/C diallo today  UA - not C/W UTI  Na 135-145 goal on NS  IVL    GI:  Diet: start TF- glycerna 50 cc/hr   GI prophylaxis [X] pepcid 20mg q 12  Bowel regimen  [X] senna- alst BM 6/23/21     ENDO:  S/P Hyperglycemia /  S/P Hypokalemia  Suppl K  Lantus dose ( 25 U q day home ) - 25 u q day  SSI- low dose  Atorvastatin for HLD  Goal euglycemia (-180)    HEME/ONC:  VTE prophylaxis: [X] SCDs [X] chemoprophylaxis- Lovenox 40mh   ASA / Plavix    ID: monitor for fever   zosyn day 5/5 for PNA- completed 6/25/21  F/U urine and blood     SOCIAL/FAMILY:  [X] updated at bedside family - 6/24/21 - discussed GOC - plan only PEG if successfully extubated.    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   Out of Alteplase window for TX and not thrombectomy candidate per IR     NEURO:  Neuro check q 4hrs  ASA and plavix (CHANCE trial)  CT as above   R wrist restraint  Maintain Na 135-145 for cytotoxic edema   VPA with cortical hyperexcitability- corrected 98  ( 49 )  Trial  precedex- low dose   palliative consult- family wants to care for patient at home, DNR   Activity: [X] OOB as tolerated  [X] PT [X] OT X[] splint    PULM: intubated for airway protection; no cuff leak  Decadron 4mg q6   Optimize pul status and met with family 6/24/21 and plan extubation with no intention to reintubate and if does well will plan PEG  Monitor O2 sats maintain above 92 %  chest PT  CPAP trials    CV:  - <150  EF 60%    RENAL:  Urinary retention - diallo placed 6/20/21 - failed TOV - retrial - cardura 2mg q day and D/C diallo today  UA - not C/W UTI  Na 135-145 goal on NS  IVL    GI:  Diet: start TF- glycerna 50 cc/hr   GI prophylaxis [X] pepcid 20mg q 12  Bowel regimen  [X] senna- alst BM 6/23/21     ENDO:  S/P Hyperglycemia /  S/P Hypokalemia  Suppl K  D/C Lantus - NPH 12 u Q6   SSI- low dose  Atorvastatin for HLD  Goal euglycemia (-180)    HEME/ONC:  VTE prophylaxis: [X] SCDs [X] chemoprophylaxis- Lovenox 40mh   ASA / Plavix    ID: monitor for fever   zosyn day 5/5 for PNA- completed 6/25/21  F/U urine and blood     SOCIAL/FAMILY:  [X] updated at bedside family - 6/24/21 - discussed GOC - plan only PEG if successfully extubated.    CODE STATUS:  [X] Full Code     DISPOSITION:  [X] ICU     [X] Patient is at high risk of neurologic deterioration/death due to:  brain swelling and risk of herniation

## 2021-06-25 NOTE — PROGRESS NOTE ADULT - SUBJECTIVE AND OBJECTIVE BOX
CC:  follow up GOC   INTERVAL HPI/OVERNIGHT EVENTS:  fever today  PRESENT SYMPTOMS: SOURCE:  Patient/Family/Team    PAIN SCALE:  0 = none  1 = mild   2 = moderate  3 = severe    Pain:   na on vent  Dyspnea:  [ ] YES [ ] NO  Anxiety:  [ ] YES [ ] NO  Fatigue: [ ] YES [ ] NO  Nausea: [ ] YES [ ] NO  Loss of Appetite: [ ] YES [ ] NO  Other symptoms: __________    MEDICATIONS  (STANDING):  aspirin  chewable 81 milliGRAM(s) Oral daily  atorvastatin 40 milliGRAM(s) Oral at bedtime  chlorhexidine 0.12% Liquid 15 milliLiter(s) Oral Mucosa every 12 hours  chlorhexidine 2% Cloths 1 Application(s) Topical daily  clopidogrel Tablet 75 milliGRAM(s) Oral daily  dexAMETHasone  Injectable 4 milliGRAM(s) IV Push every 6 hours  dexMEDEtomidine Infusion 0.1 MICROgram(s)/kG/Hr (1.68 mL/Hr) IV Continuous <Continuous>  dextrose 40% Gel 15 Gram(s) Oral once  dextrose 5%. 1000 milliLiter(s) (50 mL/Hr) IV Continuous <Continuous>  dextrose 5%. 1000 milliLiter(s) (100 mL/Hr) IV Continuous <Continuous>  dextrose 50% Injectable 25 Gram(s) IV Push once  dextrose 50% Injectable 12.5 Gram(s) IV Push once  dextrose 50% Injectable 25 Gram(s) IV Push once  doxazosin 2 milliGRAM(s) Oral at bedtime  enoxaparin Injectable 40 milliGRAM(s) SubCutaneous <User Schedule>  famotidine    Tablet 20 milliGRAM(s) Oral two times a day  glucagon  Injectable 1 milliGRAM(s) IntraMuscular once  insulin lispro (ADMELOG) corrective regimen sliding scale   SubCutaneous every 6 hours  insulin NPH human recombinant 12 Unit(s) SubCutaneous every 6 hours  metoprolol tartrate 12.5 milliGRAM(s) Oral two times a day  multivitamin 1 Tablet(s) Oral daily  piperacillin/tazobactam IVPB.. 3.375 Gram(s) IV Intermittent every 8 hours  valproate sodium IVPB 500 milliGRAM(s) IV Intermittent every 12 hours    MEDICATIONS  (PRN):  acetaminophen    Suspension .. 650 milliGRAM(s) Oral every 6 hours PRN Temp greater or equal to 38C (100.4F), Mild Pain (1 - 3)  haloperidol    Injectable 2 milliGRAM(s) IV Push every 6 hours PRN Agitation  labetalol Injectable 10 milliGRAM(s) IV Push every 2 hours PRN SBP >180      Allergies    Allergy Status Unknown    Intolerances          Karnofsky Performance Score/Palliative Performance Status Version 2:         %    Vital Signs Last 24 Hrs  T(C): 38.1 (25 Jun 2021 13:00), Max: 38.1 (24 Jun 2021 19:00)  T(F): 100.6 (25 Jun 2021 13:00), Max: 100.6 (24 Jun 2021 19:00)  HR: 92 (25 Jun 2021 14:00) (84 - 118)  BP: 117/106 (25 Jun 2021 12:00) (114/58 - 142/80)  BP(mean): 112 (25 Jun 2021 12:00) (75 - 112)  RR: 14 (25 Jun 2021 14:00) (14 - 20)  SpO2: 96% (25 Jun 2021 14:00) (95% - 99%)    PHYSICAL EXAM:    General: F intubated NAD  HEENT: [ x] normal  [ ] dry mouth  [ ] ET tube/trach    Lungs: [x ] comfortable [ ] tachypnea/labored breathing  [ ] excessive secretions    CV: [x ] normal  [ ] tachycardia    GI: [ x] normal  [ ] distended  [ ] tender  [ ] no BS               [ ] PEG/NG/OG tube    : [ x] normal  [ ] incontinent  [ ] oliguria/anuria  [ ] diallo    MSK: [ ] normal  [x ] weakness  [ ] edema             [ ] ambulatory  [x ] bedbound/wheelchair bound    Skin: [x ] normal  [ ] pressure ulcers- Stage_____  [ ] no rash    LABS:                        13.4   3.97  )-----------( 199      ( 25 Jun 2021 04:42 )             40.2     06-25    141  |  99  |  20.9<H>  ----------------------------<  203<H>  3.8   |  30.0<H>  |  0.44<L>    Ca    8.4<L>      25 Jun 2021 04:42  Phos  3.5     06-25  Mg     2.2     06-25          I&O's Summary    24 Jun 2021 07:01  -  25 Jun 2021 07:00  --------------------------------------------------------  IN: 2211.5 mL / OUT: 3495 mL / NET: -1283.5 mL    25 Jun 2021 07:01  -  25 Jun 2021 15:17  --------------------------------------------------------  IN: 640.4 mL / OUT: 485 mL / NET: 155.4 mL        RADIOLOGY & ADDITIONAL STUDIES:

## 2021-06-25 NOTE — PROGRESS NOTE ADULT - ASSESSMENT
81yr woman with R MCA stroke with left sided weakness, with respiratory failure in the setting of PNA     Problem/Recommendation - 1:  Problem: Acute cerebrovascular accident (CVA) due to thrombosis of right middle cerebral artery. Recommendation: Patient out of window for TPA   cont management per NSICU - ASA.     Problem/Recommendation - 2:  ·  Problem: Pneumonia.  Recommendation:   IV Abx  Intubated     Problem/Recommendation - 3:  ·  Problem: Acute left-sided weakness.  Recommendation:   Assist with care   PMR consulted.   will need to monitor patient's progress     Problem/Recommendation - 4:  ·  Problem: Encounter for palliative care.  Recommendation: Palliative Care consulted to assist with goals of care.  Spoke to ANNIA Bailon - plan is for extubation on Sunday. Family has decided no Reintubation.    Met with daughters at bedside. They are aware of plan for extubation on Sunday and hoping for that she will do okay.  They affirm No Reintubation. They do not want her to have a trach.     Continued support

## 2021-06-26 LAB
ANION GAP SERPL CALC-SCNC: 14 MMOL/L — SIGNIFICANT CHANGE UP (ref 5–17)
BUN SERPL-MCNC: 29.6 MG/DL — HIGH (ref 8–20)
CALCIUM SERPL-MCNC: 8.8 MG/DL — SIGNIFICANT CHANGE UP (ref 8.6–10.2)
CHLORIDE SERPL-SCNC: 98 MMOL/L — SIGNIFICANT CHANGE UP (ref 98–107)
CO2 SERPL-SCNC: 25 MMOL/L — SIGNIFICANT CHANGE UP (ref 22–29)
CREAT SERPL-MCNC: 0.57 MG/DL — SIGNIFICANT CHANGE UP (ref 0.5–1.3)
CULTURE RESULTS: SIGNIFICANT CHANGE UP
CULTURE RESULTS: SIGNIFICANT CHANGE UP
GLUCOSE BLDC GLUCOMTR-MCNC: 243 MG/DL — HIGH (ref 70–99)
GLUCOSE BLDC GLUCOMTR-MCNC: 273 MG/DL — HIGH (ref 70–99)
GLUCOSE BLDC GLUCOMTR-MCNC: 281 MG/DL — HIGH (ref 70–99)
GLUCOSE SERPL-MCNC: 318 MG/DL — HIGH (ref 70–99)
MAGNESIUM SERPL-MCNC: 2.6 MG/DL — SIGNIFICANT CHANGE UP (ref 1.6–2.6)
PHOSPHATE SERPL-MCNC: 3.6 MG/DL — SIGNIFICANT CHANGE UP (ref 2.4–4.7)
POTASSIUM SERPL-MCNC: 4.6 MMOL/L — SIGNIFICANT CHANGE UP (ref 3.5–5.3)
POTASSIUM SERPL-SCNC: 4.6 MMOL/L — SIGNIFICANT CHANGE UP (ref 3.5–5.3)
SODIUM SERPL-SCNC: 136 MMOL/L — SIGNIFICANT CHANGE UP (ref 135–145)
SPECIMEN SOURCE: SIGNIFICANT CHANGE UP
SPECIMEN SOURCE: SIGNIFICANT CHANGE UP
TRIGL SERPL-MCNC: 172 MG/DL — HIGH

## 2021-06-26 PROCEDURE — 71045 X-RAY EXAM CHEST 1 VIEW: CPT | Mod: 26

## 2021-06-26 PROCEDURE — 99232 SBSQ HOSP IP/OBS MODERATE 35: CPT

## 2021-06-26 PROCEDURE — 99291 CRITICAL CARE FIRST HOUR: CPT

## 2021-06-26 RX ORDER — LABETALOL HCL 100 MG
10 TABLET ORAL
Refills: 0 | Status: DISCONTINUED | OUTPATIENT
Start: 2021-06-26 | End: 2021-07-08

## 2021-06-26 RX ORDER — HUMAN INSULIN 100 [IU]/ML
18 INJECTION, SUSPENSION SUBCUTANEOUS EVERY 6 HOURS
Refills: 0 | Status: DISCONTINUED | OUTPATIENT
Start: 2021-06-26 | End: 2021-06-27

## 2021-06-26 RX ADMIN — Medication 4 MILLIGRAM(S): at 00:03

## 2021-06-26 RX ADMIN — Medication 27.5 MILLIGRAM(S): at 20:10

## 2021-06-26 RX ADMIN — Medication 4: at 18:24

## 2021-06-26 RX ADMIN — HUMAN INSULIN 18 UNIT(S): 100 INJECTION, SUSPENSION SUBCUTANEOUS at 11:29

## 2021-06-26 RX ADMIN — CLOPIDOGREL BISULFATE 75 MILLIGRAM(S): 75 TABLET, FILM COATED ORAL at 11:28

## 2021-06-26 RX ADMIN — CHLORHEXIDINE GLUCONATE 15 MILLILITER(S): 213 SOLUTION TOPICAL at 18:24

## 2021-06-26 RX ADMIN — FAMOTIDINE 20 MILLIGRAM(S): 10 INJECTION INTRAVENOUS at 18:24

## 2021-06-26 RX ADMIN — Medication 12.5 MILLIGRAM(S): at 18:24

## 2021-06-26 RX ADMIN — Medication 1 TABLET(S): at 11:28

## 2021-06-26 RX ADMIN — CHLORHEXIDINE GLUCONATE 1 APPLICATION(S): 213 SOLUTION TOPICAL at 11:28

## 2021-06-26 RX ADMIN — Medication 4 MILLIGRAM(S): at 06:00

## 2021-06-26 RX ADMIN — HUMAN INSULIN 12 UNIT(S): 100 INJECTION, SUSPENSION SUBCUTANEOUS at 06:00

## 2021-06-26 RX ADMIN — Medication 6: at 00:02

## 2021-06-26 RX ADMIN — FAMOTIDINE 20 MILLIGRAM(S): 10 INJECTION INTRAVENOUS at 06:00

## 2021-06-26 RX ADMIN — ENOXAPARIN SODIUM 40 MILLIGRAM(S): 100 INJECTION SUBCUTANEOUS at 18:24

## 2021-06-26 RX ADMIN — HUMAN INSULIN 12 UNIT(S): 100 INJECTION, SUSPENSION SUBCUTANEOUS at 00:03

## 2021-06-26 RX ADMIN — Medication 6: at 06:00

## 2021-06-26 RX ADMIN — Medication 2 MILLIGRAM(S): at 23:01

## 2021-06-26 RX ADMIN — HUMAN INSULIN 18 UNIT(S): 100 INJECTION, SUSPENSION SUBCUTANEOUS at 18:25

## 2021-06-26 RX ADMIN — Medication 27.5 MILLIGRAM(S): at 09:31

## 2021-06-26 RX ADMIN — Medication 4 MILLIGRAM(S): at 18:24

## 2021-06-26 RX ADMIN — ATORVASTATIN CALCIUM 40 MILLIGRAM(S): 80 TABLET, FILM COATED ORAL at 23:00

## 2021-06-26 RX ADMIN — CHLORHEXIDINE GLUCONATE 15 MILLILITER(S): 213 SOLUTION TOPICAL at 07:32

## 2021-06-26 RX ADMIN — Medication 12.5 MILLIGRAM(S): at 06:00

## 2021-06-26 RX ADMIN — Medication 81 MILLIGRAM(S): at 11:28

## 2021-06-26 RX ADMIN — Medication 6: at 11:29

## 2021-06-26 RX ADMIN — Medication 4 MILLIGRAM(S): at 11:28

## 2021-06-26 NOTE — PROVIDER CONTACT NOTE (OTHER) - BACKGROUND
Patient straight cathed at 11:30p after bladder scan >596
Goodwin removed 12 noon today, scan at 5p showed >200 mLs, plan to rescan in 6 hours

## 2021-06-26 NOTE — PROGRESS NOTE ADULT - SUBJECTIVE AND OBJECTIVE BOX
[facility]                                        [facility]                                 Megan Moulton & Wally                                  [address]                                        [place], [place], [address]                                             [phone]        CC: stroke    HPI:   82 yo woman with medical conditions as outlined below who was found down yesterday. She presented with L facial droop, left sided weakness, and dysarthria. 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 She was admitted to NS-ICU for q 1 h neuro-checks and hemicraniectomy watch.    Interim history:  Remains in ICU on mechanical ventilator.    ROS:   Unobtainable due to patient's condition.     MEDICATIONS  (STANDING):  aspirin  chewable 81 milliGRAM(s) Oral daily  atorvastatin 40 milliGRAM(s) Oral at bedtime  chlorhexidine 0.12% Liquid 15 milliLiter(s) Oral Mucosa every 12 hours  chlorhexidine 2% Cloths 1 Application(s) Topical daily  clopidogrel Tablet 75 milliGRAM(s) Oral daily  dexAMETHasone  Injectable 4 milliGRAM(s) IV Push every 6 hours  dexMEDEtomidine Infusion 0.1 MICROgram(s)/kG/Hr (1.68 mL/Hr) IV Continuous <Continuous>  dextrose 40% Gel 15 Gram(s) Oral once  dextrose 5%. 1000 milliLiter(s) (50 mL/Hr) IV Continuous <Continuous>  doxazosin 2 milliGRAM(s) Oral at bedtime  enoxaparin Injectable 40 milliGRAM(s) SubCutaneous <User Schedule>  famotidine    Tablet 20 milliGRAM(s) Oral two times a day  glucagon  Injectable 1 milliGRAM(s) IntraMuscular once  insulin lispro (ADMELOG) corrective regimen sliding scale   SubCutaneous every 6 hours  insulin NPH human recombinant 18 Unit(s) SubCutaneous every 6 hours  metoprolol tartrate 12.5 milliGRAM(s) Oral two times a day  multivitamin 1 Tablet(s) Oral daily  valproate sodium IVPB 500 milliGRAM(s) IV Intermittent every 12 hours      Vital Signs Last 24 Hrs  T(C): 37.2 (26 Jun 2021 07:29), Max: 38.1 (25 Jun 2021 13:00)  T(F): 99 (26 Jun 2021 07:29), Max: 100.6 (25 Jun 2021 13:00)  HR: 97 (26 Jun 2021 10:00) (79 - 114)  BP: 117/106 (25 Jun 2021 12:00) (117/106 - 117/106)  BP(mean): 112 (25 Jun 2021 12:00) (112 - 112)  RR: 23 (26 Jun 2021 10:00) (14 - 24)  SpO2: 94% (26 Jun 2021 10:00) (94% - 100%)    Detailed Neurologic Exam:    Mental status: The patient is intubated and not speaking.  Unable to assess orientation or language.    Cranial nerves: Pupils equal and react symmetrically to light. t. Extraocular motion is full with dolls eyes maneuvers. Facial sensation is not able to be assessed. Facial musculature is grossly symmetric.  Unable to assess palate, shoulder and tongue.    Motor: There is normal bulk and tone.  There is no tremor.  moves weakly to stimuli on right, left UE>LE paresis    Sensation: Grimace to pinch in 4 extremities, withdraws on right    Reflexes: muted throughout and plantar responses are flexor right, triple flexion left    Cerebellar: unable to assess  dysmetria on finger to nose testing.    Labs:     06-26    136  |  98  |  29.6<H>  ----------------------------<  318<H>  4.6   |  25.0  |  0.57    Ca    8.8      26 Jun 2021 03:37  Phos  3.6     06-26  Mg     2.6     06-26                              13.4   3.97  )-----------( 199      ( 25 Jun 2021 04:42 )             40.2       Rad:   CT head right middle cerebral artery/thalamic strokes, no blood or mass

## 2021-06-26 NOTE — PROGRESS NOTE ADULT - SUBJECTIVE AND OBJECTIVE BOX
Chief complaint:   Patient is a 81y old  Female who presents with a chief complaint of Right MCA CVA (21 Jun 2021 16:51)    HPI:  82 y/o female, PMH DM, HTN, presents to ED as Code Stroke after she was found down on her front lawn by a neighbor. Unknown down time, reported LKW 10:50 am. Initial fingerstick for EMS 60, pt given amp of d50 with improvement in glucose level. Pt presenting with L sided facial droop, L sided weakness, dysarthria. Out of window for TPA. CTH with acute right MCA infarct, CTA Right M2 occlusion. CTP with 16 ml core infarct, right MCA territory.Mismatch volume: 22 ml, right MCA territory, suggesting ischemic penumbra. Mismatch ratio: 2.4. 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    Overnight - Hypotension / fever  6/21/21- intubated for airway protection    ROS: [x ]  Unable to assess due to mental status   All other systems negative    ICU Vital Signs Last 24 Hrs  T(C): 37.7 (25 Jun 2021 08:00), Max: 38.1 (24 Jun 2021 19:00)  T(F): 99.9 (25 Jun 2021 08:00), Max: 100.6 (24 Jun 2021 19:00)  HR: 95 (25 Jun 2021 09:00) (84 - 123)  BP: 133/65 (25 Jun 2021 08:00) (114/58 - 142/80)  BP(mean): 79 (25 Jun 2021 08:00) (75 - 96)  ABP: 121/66 (25 Jun 2021 09:00) (107/52 - 162/80)  ABP(mean): 85 (25 Jun 2021 09:00) (72 - 110)  RR: 14 (25 Jun 2021 09:00) (14 - 20)  SpO2: 95% (25 Jun 2021 09:00) (95% - 99%)      25 Jun 2021 07:01  -  26 Jun 2021 07:00  --------------------------------------------------------  IN:    Dexmedetomidine: 70.4 mL    Enteral Tube Flush: 280 mL    Glucerna 1.5: 1150 mL    IV PiggyBack: 200 mL    IV PiggyBack: 110 mL    Propofol: 16 mL  Total IN: 1826.4 mL    OUT:    Indwelling Catheter - Urethral (mL): 485 mL    Intermittent Catheterization - Urethral (mL): 600 mL  Total OUT: 1085 mL    Total NET: 741.4 mL          24 Jun 2021 07:01  -  25 Jun 2021 07:0    Total NET: -1283.5 mL    MEDICATIONS  (STANDING):  aspirin  chewable 81 milliGRAM(s) Oral daily  atorvastatin 40 milliGRAM(s) Oral at bedtime  chlorhexidine 0.12% Liquid 15 milliLiter(s) Oral Mucosa every 12 hours  chlorhexidine 2% Cloths 1 Application(s) Topical daily  clopidogrel Tablet 75 milliGRAM(s) Oral daily  dexAMETHasone  Injectable 4 milliGRAM(s) IV Push every 6 hours  dexMEDEtomidine Infusion 0.1 MICROgram(s)/kG/Hr (1.68 mL/Hr) IV Continuous <Continuous>  dextrose 40% Gel 15 Gram(s) Oral once  dextrose 5%. 1000 milliLiter(s) (50 mL/Hr) IV Continuous <Continuous>  dextrose 5%. 1000 milliLiter(s) (100 mL/Hr) IV Continuous <Continuous>  dextrose 50% Injectable 25 Gram(s) IV Push once  dextrose 50% Injectable 12.5 Gram(s) IV Push once  dextrose 50% Injectable 25 Gram(s) IV Push once  doxazosin 2 milliGRAM(s) Oral at bedtime  enoxaparin Injectable 40 milliGRAM(s) SubCutaneous <User Schedule>  famotidine    Tablet 20 milliGRAM(s) Oral two times a day  glucagon  Injectable 1 milliGRAM(s) IntraMuscular once  insulin lispro (ADMELOG) corrective regimen sliding scale   SubCutaneous every 6 hours  insulin NPH human recombinant 12 Unit(s) SubCutaneous every 6 hours  metoprolol tartrate 12.5 milliGRAM(s) Oral two times a day  multivitamin 1 Tablet(s) Oral daily  valproate sodium IVPB 500 milliGRAM(s) IV Intermittent every 12 hours    MEDICATIONS  (PRN):  acetaminophen    Suspension .. 650 milliGRAM(s) Oral every 6 hours PRN Temp greater or equal to 38C (100.4F), Mild Pain (1 - 3)  haloperidol    Injectable 2 milliGRAM(s) IV Push every 6 hours PRN Agitation  labetalol Injectable 10 milliGRAM(s) IV Push every 2 hours PRN SBP >180       CT Head No Cont (06.24.21 @ 05:04) >  IMPRESSION:  Subacute right MCA territory infarct with trace hemorrhagic conversion. New versus more conspicuous low-attenuation anterior right thalamus compatible with ischemic change.          RESPIRATORY:  Mode: AC/ CMV (Assist Control/ Continuous Mandatory Ventilation)  RR (machine): 14  TV (machine): 350  FiO2: 50  PEEP: 8  MAP: 12  PIP: 21                ECHO-    1. Left ventricular ejection fraction, by visual estimation, is 60 to 65%.   2. Normal global left ventricular systolic function.   3. The mitral in-flow pattern reveals no discernable A-wave, therefore no comment on diastolic function can be made.   4. Normal right ventricular size and function.   5. Mildly enlarged left atrium.   6. Mild mitral annular calcification.   7. Thickening and calcification of the anterior and posterior mitral valve leaflets.   8. Mild-moderate tricuspid regurgitation.   9. Mild aortic regurgitation.  10. Estimated pulmonary artery systolic pressure is 51.1 mmHg assuming a right atrial pressure of 3 mmHg, which is consistent with moderate pulmonary hypertension.    LAB RESULTS:                          13.4   3.97  )-----------( 199      ( 25 Jun 2021 04:42 )             40.2       Ananya acid - 49    06-25    141  |  99  |  20.9<H>  ----------------------------<  203<H>  3.8   |  30.0<H>  |  0.44<L>    Ca    8.4<L>      25 Jun 2021 04:42  Phos  3.5     06-25  Mg     2.2     06-25      CAPILLARY BLOOD GLUCOSE      POCT Blood Glucose.: 281 mg/dL (26 Jun 2021 05:34)  POCT Blood Glucose.: 295 mg/dL (25 Jun 2021 23:55)  POCT Blood Glucose.: 275 mg/dL (25 Jun 2021 17:18)  POCT Blood Glucose.: 235 mg/dL (25 Jun 2021 11:32)        Culture - Urine (collected 23 Jun 2021 16:24)  Source: .Urine Catheterized  Final Report (24 Jun 2021 12:27):    No growth          PHYSICAL EXAM:  General: Calm, intubated  HEENT: MMM  Neuro:  -Mental status- No acute distress, no EO, no FC, R gaze pref  -CN- PERRL 3mm, EOMI, tongue midline, L facial  spont RUE and RLE  LUE min withdrawal  LLE min withdrawal    CV: RRR  Pulm: clear to auscultation  Abd: Soft, nontender, nondistended  Ext: no noted edema in lower ext  Skin: warm, dry       Chief complaint:   Patient is a 81y old  Female who presents with a chief complaint of Right MCA CVA (21 Jun 2021 16:51)    HPI:  82 y/o female, PMH DM, HTN, presents to ED as Code Stroke after she was found down on her front lawn by a neighbor. Unknown down time, reported LKW 10:50 am. Initial fingerstick for EMS 60, pt given amp of d50 with improvement in glucose level. Pt presenting with L sided facial droop, L sided weakness, dysarthria. Out of window for TPA. CTH with acute right MCA infarct, CTA Right M2 occlusion. CTP with 16 ml core infarct, right MCA territory.Mismatch volume: 22 ml, right MCA territory, suggesting ischemic penumbra. Mismatch ratio: 2.4. 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    Overnight -No events  6/21/21- intubated for airway protection    ROS: [x ]  Unable to assess due to mental status   All other systems negative    ICU Vital Signs Last 24 Hrs  T(C): 37.7 (25 Jun 2021 08:00), Max: 38.1 (24 Jun 2021 19:00)  T(F): 99.9 (25 Jun 2021 08:00), Max: 100.6 (24 Jun 2021 19:00)  HR: 95 (25 Jun 2021 09:00) (84 - 123)  BP: 133/65 (25 Jun 2021 08:00) (114/58 - 142/80)  BP(mean): 79 (25 Jun 2021 08:00) (75 - 96)  ABP: 121/66 (25 Jun 2021 09:00) (107/52 - 162/80)  ABP(mean): 85 (25 Jun 2021 09:00) (72 - 110)  RR: 14 (25 Jun 2021 09:00) (14 - 20)  SpO2: 95% (25 Jun 2021 09:00) (95% - 99%)      25 Jun 2021 07:01  -  26 Jun 2021 07:00  --------------------------------------------------------  IN:    Dexmedetomidine: 70.4 mL    Enteral Tube Flush: 280 mL    Glucerna 1.5: 1150 mL    IV PiggyBack: 200 mL    IV PiggyBack: 110 mL    Propofol: 16 mL  Total IN: 1826.4 mL    OUT:    Indwelling Catheter - Urethral (mL): 485 mL    Intermittent Catheterization - Urethral (mL): 600 mL  Total OUT: 1085 mL    Total NET: 741.4 mL          24 Jun 2021 07:01  -  25 Jun 2021 07:0    Total NET: -1283.5 mL    MEDICATIONS  (STANDING):  aspirin  chewable 81 milliGRAM(s) Oral daily  atorvastatin 40 milliGRAM(s) Oral at bedtime  chlorhexidine 0.12% Liquid 15 milliLiter(s) Oral Mucosa every 12 hours  chlorhexidine 2% Cloths 1 Application(s) Topical daily  clopidogrel Tablet 75 milliGRAM(s) Oral daily  dexAMETHasone  Injectable 4 milliGRAM(s) IV Push every 6 hours  dexMEDEtomidine Infusion 0.1 MICROgram(s)/kG/Hr (1.68 mL/Hr) IV Continuous <Continuous>  dextrose 40% Gel 15 Gram(s) Oral once  dextrose 5%. 1000 milliLiter(s) (50 mL/Hr) IV Continuous <Continuous>  dextrose 5%. 1000 milliLiter(s) (100 mL/Hr) IV Continuous <Continuous>  dextrose 50% Injectable 25 Gram(s) IV Push once  dextrose 50% Injectable 12.5 Gram(s) IV Push once  dextrose 50% Injectable 25 Gram(s) IV Push once  doxazosin 2 milliGRAM(s) Oral at bedtime  enoxaparin Injectable 40 milliGRAM(s) SubCutaneous <User Schedule>  famotidine    Tablet 20 milliGRAM(s) Oral two times a day  glucagon  Injectable 1 milliGRAM(s) IntraMuscular once  insulin lispro (ADMELOG) corrective regimen sliding scale   SubCutaneous every 6 hours  insulin NPH human recombinant 12 Unit(s) SubCutaneous every 6 hours  metoprolol tartrate 12.5 milliGRAM(s) Oral two times a day  multivitamin 1 Tablet(s) Oral daily  valproate sodium IVPB 500 milliGRAM(s) IV Intermittent every 12 hours    MEDICATIONS  (PRN):  acetaminophen    Suspension .. 650 milliGRAM(s) Oral every 6 hours PRN Temp greater or equal to 38C (100.4F), Mild Pain (1 - 3)  haloperidol    Injectable 2 milliGRAM(s) IV Push every 6 hours PRN Agitation  labetalol Injectable 10 milliGRAM(s) IV Push every 2 hours PRN SBP >180       CT Head No Cont (06.24.21 @ 05:04) >  IMPRESSION:  Subacute right MCA territory infarct with trace hemorrhagic conversion. New versus more conspicuous low-attenuation anterior right thalamus compatible with ischemic change.          RESPIRATORY:  Mode: AC/ CMV (Assist Control/ Continuous Mandatory Ventilation)  RR (machine): 14  TV (machine): 350  FiO2: 50  PEEP: 8  MAP: 12  PIP: 21                ECHO-    1. Left ventricular ejection fraction, by visual estimation, is 60 to 65%.   2. Normal global left ventricular systolic function.   3. The mitral in-flow pattern reveals no discernable A-wave, therefore no comment on diastolic function can be made.   4. Normal right ventricular size and function.   5. Mildly enlarged left atrium.   6. Mild mitral annular calcification.   7. Thickening and calcification of the anterior and posterior mitral valve leaflets.   8. Mild-moderate tricuspid regurgitation.   9. Mild aortic regurgitation.  10. Estimated pulmonary artery systolic pressure is 51.1 mmHg assuming a right atrial pressure of 3 mmHg, which is consistent with moderate pulmonary hypertension.    LAB RESULTS:                          13.4   3.97  )-----------( 199      ( 25 Jun 2021 04:42 )             40.2       Ananya acid - 49    06-25    141  |  99  |  20.9<H>  ----------------------------<  203<H>  3.8   |  30.0<H>  |  0.44<L>    Ca    8.4<L>      25 Jun 2021 04:42  Phos  3.5     06-25  Mg     2.2     06-25      CAPILLARY BLOOD GLUCOSE      POCT Blood Glucose.: 281 mg/dL (26 Jun 2021 05:34)  POCT Blood Glucose.: 295 mg/dL (25 Jun 2021 23:55)  POCT Blood Glucose.: 275 mg/dL (25 Jun 2021 17:18)  POCT Blood Glucose.: 235 mg/dL (25 Jun 2021 11:32)        Culture - Urine (collected 23 Jun 2021 16:24)  Source: .Urine Catheterized  Final Report (24 Jun 2021 12:27):    No growth          PHYSICAL EXAM:  General: Calm, intubated  HEENT: MMM  Neuro:  -Mental status- No acute distress, no EO, no FC, R gaze pref  -CN- PERRL 3mm, EOMI, tongue midline, L facial  spont RUE and RLE  LUE min withdrawal  LLE min withdrawal    CV: RRR  Pulm: clear to auscultation  Abd: Soft, nontender, nondistended  Ext: no noted edema in lower ext  Skin: warm, dry       Chief complaint:   Patient is a 81y old  Female who presents with a chief complaint of Right MCA CVA (21 Jun 2021 16:51)    HPI:  82 y/o female, PMH DM, HTN, presents to ED as Code Stroke after she was found down on her front lawn by a neighbor. Unknown down time, reported LKW 10:50 am. Initial fingerstick for EMS 60, pt given amp of d50 with improvement in glucose level. Pt presenting with L sided facial droop, L sided weakness, dysarthria. Out of window for TPA. CTH with acute right MCA infarct, CTA Right M2 occlusion. CTP with 16 ml core infarct, right MCA territory.Mismatch volume: 22 ml, right MCA territory, suggesting ischemic penumbra. Mismatch ratio: 2.4. 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    Overnight -No events  6/21/21- intubated for airway protection    ROS: [x ]  Unable to assess due to mental status   All other systems negative    ICU Vital Signs Last 24 Hrs  T(C): 37.7 (25 Jun 2021 08:00), Max: 38.1 (24 Jun 2021 19:00)  T(F): 99.9 (25 Jun 2021 08:00), Max: 100.6 (24 Jun 2021 19:00)  HR: 95 (25 Jun 2021 09:00) (84 - 123)  BP: 133/65 (25 Jun 2021 08:00) (114/58 - 142/80)  BP(mean): 79 (25 Jun 2021 08:00) (75 - 96)  ABP: 121/66 (25 Jun 2021 09:00) (107/52 - 162/80)  ABP(mean): 85 (25 Jun 2021 09:00) (72 - 110)  RR: 14 (25 Jun 2021 09:00) (14 - 20)  SpO2: 95% (25 Jun 2021 09:00) (95% - 99%)      25 Jun 2021 07:01  -  26 Jun 2021 07:00  --------------------------------------------------------  IN:    Dexmedetomidine: 70.4 mL    Enteral Tube Flush: 280 mL    Glucerna 1.5: 1150 mL    IV PiggyBack: 200 mL    IV PiggyBack: 110 mL    Propofol: 16 mL  Total IN: 1826.4 mL    OUT:    Indwelling Catheter - Urethral (mL): 485 mL    Intermittent Catheterization - Urethral (mL): 600 mL  Total OUT: 1085 mL    Total NET: 741.4 mL          24 Jun 2021 07:01  -  25 Jun 2021 07:0    Total NET: -1283.5 mL    MEDICATIONS  (STANDING):  aspirin  chewable 81 milliGRAM(s) Oral daily  atorvastatin 40 milliGRAM(s) Oral at bedtime  chlorhexidine 0.12% Liquid 15 milliLiter(s) Oral Mucosa every 12 hours  chlorhexidine 2% Cloths 1 Application(s) Topical daily  clopidogrel Tablet 75 milliGRAM(s) Oral daily  dexAMETHasone  Injectable 4 milliGRAM(s) IV Push every 6 hours  dexMEDEtomidine Infusion 0.1 MICROgram(s)/kG/Hr (1.68 mL/Hr) IV Continuous <Continuous>  dextrose 40% Gel 15 Gram(s) Oral once  dextrose 5%. 1000 milliLiter(s) (50 mL/Hr) IV Continuous <Continuous>  dextrose 5%. 1000 milliLiter(s) (100 mL/Hr) IV Continuous <Continuous>  dextrose 50% Injectable 25 Gram(s) IV Push once  dextrose 50% Injectable 12.5 Gram(s) IV Push once  dextrose 50% Injectable 25 Gram(s) IV Push once  doxazosin 2 milliGRAM(s) Oral at bedtime  enoxaparin Injectable 40 milliGRAM(s) SubCutaneous <User Schedule>  famotidine    Tablet 20 milliGRAM(s) Oral two times a day  glucagon  Injectable 1 milliGRAM(s) IntraMuscular once  insulin lispro (ADMELOG) corrective regimen sliding scale   SubCutaneous every 6 hours  insulin NPH human recombinant 12 Unit(s) SubCutaneous every 6 hours  metoprolol tartrate 12.5 milliGRAM(s) Oral two times a day  multivitamin 1 Tablet(s) Oral daily  valproate sodium IVPB 500 milliGRAM(s) IV Intermittent every 12 hours    MEDICATIONS  (PRN):  acetaminophen    Suspension .. 650 milliGRAM(s) Oral every 6 hours PRN Temp greater or equal to 38C (100.4F), Mild Pain (1 - 3)  haloperidol    Injectable 2 milliGRAM(s) IV Push every 6 hours PRN Agitation  labetalol Injectable 10 milliGRAM(s) IV Push every 2 hours PRN SBP >180       CT Head No Cont (06.24.21 @ 05:04) >  IMPRESSION:  Subacute right MCA territory infarct with trace hemorrhagic conversion. New versus more conspicuous low-attenuation anterior right thalamus compatible with ischemic change.          RESPIRATORY:  Mode: AC/ CMV (Assist Control/ Continuous Mandatory Ventilation)  RR (machine): 14  TV (machine): 350  FiO2: 50  PEEP: 8  MAP: 12  PIP: 21                ECHO-    1. Left ventricular ejection fraction, by visual estimation, is 60 to 65%.   2. Normal global left ventricular systolic function.   3. The mitral in-flow pattern reveals no discernable A-wave, therefore no comment on diastolic function can be made.   4. Normal right ventricular size and function.   5. Mildly enlarged left atrium.   6. Mild mitral annular calcification.   7. Thickening and calcification of the anterior and posterior mitral valve leaflets.   8. Mild-moderate tricuspid regurgitation.   9. Mild aortic regurgitation.  10. Estimated pulmonary artery systolic pressure is 51.1 mmHg assuming a right atrial pressure of 3 mmHg, which is consistent with moderate pulmonary hypertension.    LAB RESULTS:                          13.4   3.97  )-----------( 199      ( 25 Jun 2021 04:42 )             40.2       Ananya acid - 49    06-25    141  |  99  |  20.9<H>  ----------------------------<  203<H>  3.8   |  30.0<H>  |  0.44<L>    Ca    8.4<L>      25 Jun 2021 04:42  Phos  3.5     06-25  Mg     2.2     06-25      CAPILLARY BLOOD GLUCOSE      POCT Blood Glucose.: 281 mg/dL (26 Jun 2021 05:34)  POCT Blood Glucose.: 295 mg/dL (25 Jun 2021 23:55)  POCT Blood Glucose.: 275 mg/dL (25 Jun 2021 17:18)  POCT Blood Glucose.: 235 mg/dL (25 Jun 2021 11:32)        Culture - Urine (collected 23 Jun 2021 16:24)  Source: .Urine Catheterized  Final Report (24 Jun 2021 12:27):    No growth          PHYSICAL EXAM:  General: Calm, intubated  HEENT: MMM  Neuro:  -Mental status- No acute distress, no EO, reluctant FC, R gaze pref  -CN- PERRL 3mm, EOMI, tongue midline, L facial  spont RUE and RLE  LUE min withdrawal  LLE - no movement  CV: RRR  Pulm: clear to auscultation  Abd: Soft, nontender, nondistended  Ext: no noted edema in lower ext  Skin: warm, dry

## 2021-06-26 NOTE — PROGRESS NOTE ADULT - ASSESSMENT
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   Out of Alteplase window for TX and not thrombectomy candidate per IR     NEURO:  Neuro check q 4hrs  ASA and plavix (CHANCE trial)  CT as above   R wrist restraint  Maintain Na 135-145 for cytotoxic edema   VPA with cortical hyperexcitability- corrected 98  ( 49 )  Trial  precedex- low dose   palliative consult- family wants to care for patient at home, DNR   Activity: [X] OOB as tolerated  [X] PT [X] OT X[] splint    PULM: intubated for airway protection; no cuff leak  Decadron 4mg q6 - started 6/25/21  Optimize pul status and met with family 6/24/21 and plan extubation with no intention to reintubate and if does well will plan PEG  Monitor O2 sats maintain above 92 %  chest PT  CPAP trials    CV:  - <150  EF 60%    RENAL:  Urinary retention - diallo placed 6/20/21 - failed TOV - retrial - cardura 2mg q day and D/C diallo today  UA - not C/W UTI  Na 135-145 goal on NS  IVL    GI:  Diet: start TF- glycerna 50 cc/hr   GI prophylaxis [X] pepcid 20mg q 12  Bowel regimen  [X] senna- alst BM 6/23/21     ENDO:  S/P Hyperglycemia /  S/P Hypokalemia  Suppl K  D/C Lantus - NPH 12 u Q6   SSI- low dose  Atorvastatin for HLD  Goal euglycemia (-180)    HEME/ONC:  VTE prophylaxis: [X] SCDs [X] chemoprophylaxis- Lovenox 40mh   ASA / Plavix    ID: monitor for fever   zosyn day 5/5 for PNA- completed 6/25/21  F/U urine and blood     SOCIAL/FAMILY:  [X] updated at bedside family - 6/24/21 - discussed GOC - plan only PEG if successfully extubated.    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   Out of Alteplase window for TX and not thrombectomy candidate per IR     NEURO:  Neuro check q 4hrs  ASA and plavix (CHANCE trial)  CT as above   R wrist restraint  Maintain Na 135-145 for cytotoxic edema   VPA with cortical hyperexcitability- corrected 98  ( 49 )  Trial  precedex- low dose   palliative consult- family wants to care for patient at home, DNR   Activity: [X] OOB as tolerated  [X] PT [X] OT X[] splint    PULM: intubated for airway protection; no cuff leak  Decadron 4mg q6 - started 6/25/21  Optimize pul status and met with family 6/24/21 and plan extubation with no intention to reintubate and if does well will plan PEG  Monitor O2 sats maintain above 92 %  chest PT  CPAP trials    CV:  - <150  EF 60%    RENAL:  Urinary retention - diallo placed 6/20/21 - failed TOV - retrial - cardura 2mg q day and D/C diallo today  UA - not C/W UTI  Na 135-145 goal on NS  IVL    GI:  Diet: start TF- glycerna 50 cc/hr   GI prophylaxis [X] pepcid 20mg q 12  Bowel regimen  [X] senna- alst BM 6/23/21     ENDO:  S/P Hyperglycemia /  S/P Hypokalemia  Suppl K  D/C Lantus - NPH 18 u Q6   SSI- low dose  Atorvastatin for HLD  Goal euglycemia (-180)    HEME/ONC:  VTE prophylaxis: [X] SCDs [X] chemoprophylaxis- Lovenox 40mh   ASA / Plavix    ID: monitor for fever   zosyn day 5/5 for PNA- completed 6/25/21  F/U urine and blood     SOCIAL/FAMILY:  [X] updated at bedside family - 6/24/21 - discussed GOC - plan only PEG if successfully extubated.    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   Out of Alteplase window for TX and not thrombectomy candidate per IR     NEURO:  Neuro check q 4hrs  ASA and plavix (CHANCE trial)  CT as above   R wrist restraint  Maintain Na 135-145 for cytotoxic edema   VPA with cortical hyperexcitability- corrected 98  ( 49 )  Trial  precedex- low dose   palliative consult- family wants to care for patient at home, DNR   Activity: [X] OOB as tolerated  [X] PT [X] OT X[] splint    PULM: intubated for airway protection; no cuff leak  Decadron 4mg q6 - started 6/25/21  Optimize pul status and met with family 6/24/21 and plan extubation with no intention to reintubate and if does well will plan PEG  Monitor O2 sats maintain above 92 %  chest PT  CPAP trials  CXR in am    CV:  - <150  EF 60%    RENAL:  Urinary retention - diallo placed 6/20/21 - failed TOV - retrial - cardura 2mg q day and D/C diallo today  UA - not C/W UTI  Na 135-145 goal on NS  IVL    GI:  Diet: start TF- glycerna 50 cc/hr   GI prophylaxis [X] pepcid 20mg q 12  Bowel regimen  [X] senna- alst BM 6/23/21     ENDO:  S/P Hyperglycemia /  S/P Hypokalemia  Suppl K  D/C Lantus - NPH 18 u Q6   SSI- low dose  Atorvastatin for HLD  Goal euglycemia (-180)    HEME/ONC:  VTE prophylaxis: [X] SCDs [X] chemoprophylaxis- Lovenox 40mh   ASA / Plavix    ID: monitor for fever   zosyn day 5/5 for PNA- completed 6/25/21  F/U urine and blood     SOCIAL/FAMILY:  [X] updated at bedside family - 6/24/21 - discussed GOC - plan only PEG if successfully extubated.    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   Out of Alteplase window for TX and not thrombectomy candidate per IR     NEURO:  Neuro check q 4hrs  ASA and plavix (CHANCE trial)  CT as above   R wrist restraint  Maintain Na 135-145 for cytotoxic edema   VPA with cortical hyperexcitability- corrected 98  ( 49 )  Trial  precedex- low dose   palliative consult- family wants to care for patient at home, DNR   Activity: [X] OOB as tolerated  [X] PT [X] OT X[] splint    PULM: intubated for airway protection; no cuff leak  Decadron 4mg q6 - started 6/25/21  Optimize pul status and met with family 6/24/21 and plan extubation with no intention to reintubate and if does well will plan PEG  Monitor O2 sats maintain above 92 %  chest PT  CXR in am   CPAP trials      CV:  - <150  EF 60%    RENAL:  Urinary retention - diallo placed 6/20/21 - failed TOV - retrial - cardura 2mg q day and D/C diallo today  UA - not C/W UTI  Na 135-145 goal on NS  IVL    GI:  Diet: start TF- glycerna 50 cc/hr   GI prophylaxis [X] pepcid 20mg q 12  Bowel regimen  [X] senna- alst BM 6/23/21     ENDO:  S/P Hyperglycemia /  S/P Hypokalemia  Suppl K  D/C Lantus - NPH 18 u Q6   SSI- low dose  Atorvastatin for HLD  Goal euglycemia (-180)    HEME/ONC:  VTE prophylaxis: [X] SCDs [X] chemoprophylaxis- Lovenox 40mh   ASA / Plavix    ID: monitor for fever   zosyn day 5/5 for PNA- completed 6/25/21  F/U urine and blood     SOCIAL/FAMILY:  [X] updated at bedside family - 6/24/21 - discussed GOC - plan only PEG if successfully extubated.    CODE STATUS:  [X] Full Code     DISPOSITION:  [X] ICU     [X] Patient is at high risk of neurologic deterioration/death due to:  brain swelling and risk of herniation

## 2021-06-27 LAB
ANION GAP SERPL CALC-SCNC: 12 MMOL/L — SIGNIFICANT CHANGE UP (ref 5–17)
BASE EXCESS BLDA CALC-SCNC: 7.2 MMOL/L — HIGH (ref -2–3)
BLOOD GAS COMMENTS ARTERIAL: SIGNIFICANT CHANGE UP
BUN SERPL-MCNC: 42.7 MG/DL — HIGH (ref 8–20)
CALCIUM SERPL-MCNC: 8.8 MG/DL — SIGNIFICANT CHANGE UP (ref 8.6–10.2)
CHLORIDE SERPL-SCNC: 98 MMOL/L — SIGNIFICANT CHANGE UP (ref 98–107)
CO2 SERPL-SCNC: 28 MMOL/L — SIGNIFICANT CHANGE UP (ref 22–29)
CREAT SERPL-MCNC: 0.57 MG/DL — SIGNIFICANT CHANGE UP (ref 0.5–1.3)
GAS PNL BLDA: SIGNIFICANT CHANGE UP
GLUCOSE BLDC GLUCOMTR-MCNC: 139 MG/DL — HIGH (ref 70–99)
GLUCOSE BLDC GLUCOMTR-MCNC: 140 MG/DL — HIGH (ref 70–99)
GLUCOSE BLDC GLUCOMTR-MCNC: 146 MG/DL — HIGH (ref 70–99)
GLUCOSE BLDC GLUCOMTR-MCNC: 239 MG/DL — HIGH (ref 70–99)
GLUCOSE SERPL-MCNC: 215 MG/DL — HIGH (ref 70–99)
HCO3 BLDA-SCNC: 30 MMOL/L — HIGH (ref 21–28)
HOROWITZ INDEX BLDA+IHG-RTO: 40 — SIGNIFICANT CHANGE UP
MAGNESIUM SERPL-MCNC: 2.4 MG/DL — SIGNIFICANT CHANGE UP (ref 1.6–2.6)
PCO2 BLDA: 39 MMHG — HIGH (ref 32–35)
PH BLDA: 7.5 — HIGH (ref 7.35–7.45)
PHOSPHATE SERPL-MCNC: 3.8 MG/DL — SIGNIFICANT CHANGE UP (ref 2.4–4.7)
PO2 BLDA: 77 MMHG — LOW (ref 83–108)
POTASSIUM SERPL-MCNC: 4.4 MMOL/L — SIGNIFICANT CHANGE UP (ref 3.5–5.3)
POTASSIUM SERPL-SCNC: 4.4 MMOL/L — SIGNIFICANT CHANGE UP (ref 3.5–5.3)
SAO2 % BLDA: 98.2 % — HIGH (ref 94–98)
SODIUM SERPL-SCNC: 138 MMOL/L — SIGNIFICANT CHANGE UP (ref 135–145)

## 2021-06-27 PROCEDURE — 99291 CRITICAL CARE FIRST HOUR: CPT

## 2021-06-27 PROCEDURE — 71045 X-RAY EXAM CHEST 1 VIEW: CPT | Mod: 26

## 2021-06-27 RX ORDER — ASPIRIN/CALCIUM CARB/MAGNESIUM 324 MG
300 TABLET ORAL DAILY
Refills: 0 | Status: DISCONTINUED | OUTPATIENT
Start: 2021-06-27 | End: 2021-06-28

## 2021-06-27 RX ORDER — DOXAZOSIN MESYLATE 4 MG
4 TABLET ORAL AT BEDTIME
Refills: 0 | Status: DISCONTINUED | OUTPATIENT
Start: 2021-06-27 | End: 2021-06-27

## 2021-06-27 RX ORDER — SODIUM CHLORIDE 9 MG/ML
1000 INJECTION INTRAMUSCULAR; INTRAVENOUS; SUBCUTANEOUS
Refills: 0 | Status: DISCONTINUED | OUTPATIENT
Start: 2021-06-27 | End: 2021-06-29

## 2021-06-27 RX ORDER — HUMAN INSULIN 100 [IU]/ML
8 INJECTION, SUSPENSION SUBCUTANEOUS EVERY 6 HOURS
Refills: 0 | Status: DISCONTINUED | OUTPATIENT
Start: 2021-06-27 | End: 2021-07-03

## 2021-06-27 RX ORDER — METOPROLOL TARTRATE 50 MG
5 TABLET ORAL EVERY 6 HOURS
Refills: 0 | Status: DISCONTINUED | OUTPATIENT
Start: 2021-06-27 | End: 2021-07-04

## 2021-06-27 RX ORDER — ROBINUL 0.2 MG/ML
0.4 INJECTION INTRAMUSCULAR; INTRAVENOUS ONCE
Refills: 0 | Status: COMPLETED | OUTPATIENT
Start: 2021-06-27 | End: 2021-06-27

## 2021-06-27 RX ORDER — KETOROLAC TROMETHAMINE 30 MG/ML
30 SYRINGE (ML) INJECTION ONCE
Refills: 0 | Status: DISCONTINUED | OUTPATIENT
Start: 2021-06-27 | End: 2021-06-27

## 2021-06-27 RX ADMIN — CHLORHEXIDINE GLUCONATE 1 APPLICATION(S): 213 SOLUTION TOPICAL at 12:00

## 2021-06-27 RX ADMIN — Medication 81 MILLIGRAM(S): at 13:52

## 2021-06-27 RX ADMIN — Medication 30 MILLIGRAM(S): at 17:23

## 2021-06-27 RX ADMIN — Medication 12.5 MILLIGRAM(S): at 05:56

## 2021-06-27 RX ADMIN — ROBINUL 0.4 MILLIGRAM(S): 0.2 INJECTION INTRAMUSCULAR; INTRAVENOUS at 14:52

## 2021-06-27 RX ADMIN — Medication 4 MILLIGRAM(S): at 05:55

## 2021-06-27 RX ADMIN — CLOPIDOGREL BISULFATE 75 MILLIGRAM(S): 75 TABLET, FILM COATED ORAL at 13:52

## 2021-06-27 RX ADMIN — ENOXAPARIN SODIUM 40 MILLIGRAM(S): 100 INJECTION SUBCUTANEOUS at 17:23

## 2021-06-27 RX ADMIN — Medication 4 MILLIGRAM(S): at 12:00

## 2021-06-27 RX ADMIN — Medication 27.5 MILLIGRAM(S): at 08:35

## 2021-06-27 RX ADMIN — Medication 4 MILLIGRAM(S): at 00:28

## 2021-06-27 RX ADMIN — CHLORHEXIDINE GLUCONATE 15 MILLILITER(S): 213 SOLUTION TOPICAL at 05:55

## 2021-06-27 RX ADMIN — SODIUM CHLORIDE 50 MILLILITER(S): 9 INJECTION INTRAMUSCULAR; INTRAVENOUS; SUBCUTANEOUS at 17:23

## 2021-06-27 RX ADMIN — Medication 4: at 00:29

## 2021-06-27 RX ADMIN — HUMAN INSULIN 18 UNIT(S): 100 INJECTION, SUSPENSION SUBCUTANEOUS at 00:37

## 2021-06-27 RX ADMIN — Medication 4 MILLIGRAM(S): at 17:23

## 2021-06-27 RX ADMIN — Medication 27.5 MILLIGRAM(S): at 20:05

## 2021-06-27 RX ADMIN — Medication 1 TABLET(S): at 13:53

## 2021-06-27 RX ADMIN — FAMOTIDINE 20 MILLIGRAM(S): 10 INJECTION INTRAVENOUS at 05:55

## 2021-06-27 NOTE — PROGRESS NOTE ADULT - ASSESSMENT
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   Out of Alteplase window for TX and not thrombectomy candidate per IR     NEURO:  Neuro check q 4hrs  ASA and plavix (CHANCE trial)  CT as above   R wrist restraint  VPA with cortical hyperexcitability- corrected 98  ( 49 )  Trial  precedex- low dose   palliative consult- family wants to care for patient at home, DNR   Activity: [X] OOB as tolerated  [X] PT [X] OT X[] splint    PULM: intubated for airway protection; no cuff leak  Decadron 4mg q6 - started 6/25/21  Optimize pul status and met with family 6/24/21 and plan extubation with no intention to reintubate and if does well will plan PEG  Monitor O2 sats maintain above 92 %  chest PT   CPAP trials      CV:  - <150  EF 60%    RENAL:  Urinary retention - diallo placed 6/20/21 - failed TOV - retrial - cardura 2mg q day and D/C diallo today  UA - not C/W UTI  IVL    GI:  Diet: start TF- glycerna 50 cc/hr   GI prophylaxis [X] pepcid 20mg q 12  Bowel regimen  [X] senna- alst BM 6/23/21     ENDO:  S/P Hyperglycemia /  S/P Hypokalemia  Suppl K  D/C Lantus - NPH 18 u Q6   SSI- low dose  Atorvastatin for HLD  Goal euglycemia (-180)    HEME/ONC:  VTE prophylaxis: [X] SCDs [X] chemoprophylaxis- Lovenox 40mh   ASA / Plavix    ID: monitor for fever   zosyn day 5/5 for PNA- completed 6/25/21  F/U urine and blood - no growth.    SOCIAL/FAMILY:  [X] updated at bedside family - 6/24/21 - discussed GOC - plan only PEG if successfully extubated.    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   Out of Alteplase window for TX and not thrombectomy candidate per IR     NEURO:  Neuro check q 4hrs  ASA and plavix (CHANCE trial)  CT as above   R wrist restraint  VPA with cortical hyperexcitability- corrected 98  ( 49 )  Trial  precedex- low dose   palliative consult- family wants to care for patient at home, DNR   Activity: [X] OOB as tolerated  [X] PT [X] OT X[] splint    PULM: intubated for airway protection; no cuff leak  Decadron 4mg q6 - started 6/25/21; last dose 6/27/21  Optimize pul status and met with family 6/24/21 and plan extubation today with no intention to reintubate and if does well will plan PEG  Monitor O2 sats maintain above 92 %  chest PT   CPAP trials      CV:  - <150  EF 60%    RENAL:  Urinary retention - diallo placed 6/20/21 - failed TOV - retrial - cardura 4mg q day and  Failed TOV 6/26/21  UA - not C/W UTI  NS at 50 cc/hr     GI:  Diet: start TF- glycerna 50 cc/hr   GI prophylaxis [X] pepcid 20mg q 12  Bowel regimen  [X] senna- alst BM 6/23/21     ENDO:  S/P Hyperglycemia /  S/P Hypokalemia  Suppl K  D/C Lantus - NPH 18 u Q6   SSI- low dose  Atorvastatin for HLD  Goal euglycemia (-180)    HEME/ONC:  VTE prophylaxis: [X] SCDs [X] chemoprophylaxis- Lovenox 40mh   ASA / Plavix    ID: monitor for fever   zosyn day 5/5 for PNA- completed 6/25/21  F/U urine and blood - no growth.    SOCIAL/FAMILY:  [X] updated at bedside family - 6/24/21 - discussed GOC - plan only PEG if successfully extubated.    CODE STATUS:  [X] Full Code     DISPOSITION:  [X] ICU     [X] Patient is at high risk of neurologic deterioration/death due to:  brain swelling and risk of herniation

## 2021-06-27 NOTE — PROGRESS NOTE ADULT - SUBJECTIVE AND OBJECTIVE BOX
Chief complaint:   Patient is a 81y old  Female who presents with a chief complaint of Right MCA CVA (21 Jun 2021 16:51)    HPI:  80 y/o female, PMH DM, HTN, presents to ED as Code Stroke after she was found down on her front lawn by a neighbor. Unknown down time, reported LKW 10:50 am. Initial fingerstick for EMS 60, pt given amp of d50 with improvement in glucose level. Pt presenting with L sided facial droop, L sided weakness, dysarthria. Out of window for TPA. CTH with acute right MCA infarct, CTA Right M2 occlusion. CTP with 16 ml core infarct, right MCA territory.Mismatch volume: 22 ml, right MCA territory, suggesting ischemic penumbra. Mismatch ratio: 2.4. 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    Overnight -No events  6/21/21- intubated for airway protection    ROS: [x ]  Unable to assess due to mental status   All other systems negative    ICU Vital Signs Last 24 Hrs  T(C): 36.9 (27 Jun 2021 07:22), Max: 37.5 (27 Jun 2021 00:00)  T(F): 98.4 (27 Jun 2021 07:22), Max: 99.5 (27 Jun 2021 00:00)  HR: 87 (27 Jun 2021 08:00) (73 - 112)  BP: 122/86 (27 Jun 2021 08:00) (114/78 - 133/89)  BP(mean): 93 (27 Jun 2021 08:00) (83 - 101)  ABP: 142/73 (27 Jun 2021 08:00) (104/57 - 145/75)  ABP(mean): 98 (27 Jun 2021 08:00) (74 - 100)  RR: 20 (27 Jun 2021 08:00) (19 - 24)  SpO2: 97% (27 Jun 2021 08:00) (93% - 100%)        26 Jun 2021 07:01  -  27 Jun 2021 07:00  --------------------------------------------------------  IN:    Dexmedetomidine: 44.2 mL    Enteral Tube Flush: 480 mL    Glucerna 1.5: 1050 mL    IV PiggyBack: 105 mL  Total IN: 1679.2 mL    OUT:    Indwelling Catheter - Urethral (mL): 650 mL    Voided (mL): 500 mL  Total OUT: 1150 mL    Total NET: 529.2 mL        25 Jun 2021 07:01  -  26 Jun 2021 07:00    Total NET: 741.4 mL          24 Jun 2021 07:01  -  25 Jun 2021 07:0    Total NET: -1283.5 mL    MEDICATIONS  (STANDING):  MEDICATIONS  (STANDING):  aspirin  chewable 81 milliGRAM(s) Oral daily  atorvastatin 40 milliGRAM(s) Oral at bedtime  chlorhexidine 0.12% Liquid 15 milliLiter(s) Oral Mucosa every 12 hours  chlorhexidine 2% Cloths 1 Application(s) Topical daily  clopidogrel Tablet 75 milliGRAM(s) Oral daily  dexAMETHasone  Injectable 4 milliGRAM(s) IV Push every 6 hours  dexMEDEtomidine Infusion 0.1 MICROgram(s)/kG/Hr (1.68 mL/Hr) IV Continuous <Continuous>  dextrose 40% Gel 15 Gram(s) Oral once  dextrose 5%. 1000 milliLiter(s) (50 mL/Hr) IV Continuous <Continuous>  dextrose 5%. 1000 milliLiter(s) (100 mL/Hr) IV Continuous <Continuous>  dextrose 50% Injectable 25 Gram(s) IV Push once  dextrose 50% Injectable 12.5 Gram(s) IV Push once  dextrose 50% Injectable 25 Gram(s) IV Push once  doxazosin 2 milliGRAM(s) Oral at bedtime  enoxaparin Injectable 40 milliGRAM(s) SubCutaneous <User Schedule>  famotidine    Tablet 20 milliGRAM(s) Oral two times a day  glucagon  Injectable 1 milliGRAM(s) IntraMuscular once  insulin lispro (ADMELOG) corrective regimen sliding scale   SubCutaneous every 6 hours  insulin NPH human recombinant 8 Unit(s) SubCutaneous every 6 hours  metoprolol tartrate 12.5 milliGRAM(s) Oral two times a day  multivitamin 1 Tablet(s) Oral daily  valproate sodium IVPB 500 milliGRAM(s) IV Intermittent every 12 hours    MEDICATIONS  (PRN):  acetaminophen    Suspension .. 650 milliGRAM(s) Oral every 6 hours PRN Temp greater or equal to 38C (100.4F), Mild Pain (1 - 3)  haloperidol    Injectable 2 milliGRAM(s) IV Push every 6 hours PRN Agitation  labetalol Injectable 10 milliGRAM(s) IV Push every 2 hours PRN SBP >165    Xray Chest 1 View- PORTABLE-Routine (06.26.21 @ 07:19) >  IMPRESSION:   Residual LEFT retrocardiac airspace consolidation and/or effusion..  ET tube tip above tracheal bifurcation.  NG tube tip beyond GE junction.      CT Head No Cont (06.24.21 @ 05:04) >  IMPRESSION:  Subacute right MCA territory infarct with trace hemorrhagic conversion. New versus more conspicuous low-attenuation anterior right thalamus compatible with ischemic change.      RESPIRATORY:  Mode: AC/ CMV (Assist Control/ Continuous Mandatory Ventilation)  RR (machine): 14  TV (machine): 350  FiO2: 50  PEEP: 8  MAP: 12  PIP: 21      ECHO-    1. Left ventricular ejection fraction, by visual estimation, is 60 to 65%.   2. Normal global left ventricular systolic function.   3. The mitral in-flow pattern reveals no discernable A-wave, therefore no comment on diastolic function can be made.   4. Normal right ventricular size and function.   5. Mildly enlarged left atrium.   6. Mild mitral annular calcification.   7. Thickening and calcification of the anterior and posterior mitral valve leaflets.   8. Mild-moderate tricuspid regurgitation.   9. Mild aortic regurgitation.  10. Estimated pulmonary artery systolic pressure is 51.1 mmHg assuming a right atrial pressure of 3 mmHg, which is consistent with moderate pulmonary hypertension.    LAB RESULTS:                              13.4   3.97  )-----------( 199      ( 25 Jun 2021 04:42 )             40.2       Ananya acid - 49    06-27    138  |  98  |  42.7<H>  ----------------------------<  215<H>  4.4   |  28.0  |  0.57    Ca    8.8      27 Jun 2021 04:00  Phos  3.8     06-27  Mg     2.4     06-27 06-25    141  |  99  |  20.9<H>  ----------------------------<  203<H>  3.8   |  30.0<H>  |  0.44<L>    Ca    8.4<L>      25 Jun 2021 04:42  Phos  3.5     06-25  Mg     2.2     06-25      CAPILLARY BLOOD GLUCOSE  POCT Blood Glucose.: 139 mg/dL (27 Jun 2021 06:15)  POCT Blood Glucose.: 239 mg/dL (27 Jun 2021 00:20)  POCT Blood Glucose.: 243 mg/dL (26 Jun 2021 18:16)  POCT Blood Glucose.: 273 mg/dL (26 Jun 2021 11:22)          Culture - Urine (collected 23 Jun 2021 16:24)  Source: .Urine Catheterized  Final Report (24 Jun 2021 12:27):    No growth          PHYSICAL EXAM:  General: Calm, intubated  HEENT: MMM  Neuro:  -Mental status- No acute distress, no EO, reluctant FC, R gaze pref  -CN- PERRL 3mm, EOMI, tongue midline, L facial  spont RUE and RLE  LUE min withdrawal  LLE - no movement  CV: RRR  Pulm: clear to auscultation  Abd: Soft, nontender, nondistended  Ext: no noted edema in lower ext  Skin: warm, dry

## 2021-06-27 NOTE — CHART NOTE - NSCHARTNOTEFT_GEN_A_CORE
Source: Patient [ x]  Family [ ]   other [ x]    Current Diet: Diet, NPO with Tube Feed:   Tube Feeding Modality: Orogastric  Glucerna 1.5 Peter (GLUCERNA1.5)  Total Volume for 24 Hours (mL): 1200  Continuous  Starting Tube Feed Rate {mL per Hour}: 10  Increase Tube Feed Rate by (mL): 10     Every 4 hours  Until Goal Tube Feed Rate (mL per Hour): 50  Tube Feed Duration (in Hours): 24  Tube Feed Start Time: 10:00 (06-24-21 @ 11:39)    Enteral /Parenteral Nutrition: Glucerna 1.5 at goal rate of 50 ml/hr (x20 hrs) to provide 1000 ml, 1500 kcal, 83g protein, 759 ml free water, and 100% of RDIs for vitamins/minerals. Additional free water per MD discretion.     Current Weight:   (6/20) 143 lbs  -Obtain daily wts, continue to monitor. No edema noted.     Pertinent Medications: MEDICATIONS  (STANDING):  aspirin  chewable 81 milliGRAM(s) Oral daily  atorvastatin 40 milliGRAM(s) Oral at bedtime  chlorhexidine 0.12% Liquid 15 milliLiter(s) Oral Mucosa every 12 hours  chlorhexidine 2% Cloths 1 Application(s) Topical daily  clopidogrel Tablet 75 milliGRAM(s) Oral daily  dexAMETHasone  Injectable 4 milliGRAM(s) IV Push every 6 hours  dexMEDEtomidine Infusion 0.1 MICROgram(s)/kG/Hr (1.68 mL/Hr) IV Continuous <Continuous>  dextrose 40% Gel 15 Gram(s) Oral once  dextrose 5%. 1000 milliLiter(s) (50 mL/Hr) IV Continuous <Continuous>  dextrose 5%. 1000 milliLiter(s) (100 mL/Hr) IV Continuous <Continuous>  dextrose 50% Injectable 25 Gram(s) IV Push once  dextrose 50% Injectable 12.5 Gram(s) IV Push once  dextrose 50% Injectable 25 Gram(s) IV Push once  doxazosin 2 milliGRAM(s) Oral at bedtime  enoxaparin Injectable 40 milliGRAM(s) SubCutaneous <User Schedule>  famotidine    Tablet 20 milliGRAM(s) Oral two times a day  glucagon  Injectable 1 milliGRAM(s) IntraMuscular once  insulin lispro (ADMELOG) corrective regimen sliding scale   SubCutaneous every 6 hours  insulin NPH human recombinant 8 Unit(s) SubCutaneous every 6 hours  metoprolol tartrate 12.5 milliGRAM(s) Oral two times a day  multivitamin 1 Tablet(s) Oral daily  valproate sodium IVPB 500 milliGRAM(s) IV Intermittent every 12 hours    MEDICATIONS  (PRN):  acetaminophen    Suspension .. 650 milliGRAM(s) Oral every 6 hours PRN Temp greater or equal to 38C (100.4F), Mild Pain (1 - 3)  haloperidol    Injectable 2 milliGRAM(s) IV Push every 6 hours PRN Agitation  labetalol Injectable 10 milliGRAM(s) IV Push every 2 hours PRN SBP >165    Pertinent Labs: 06-27 Na138 mmol/L Glu 215 mg/dL<H> K+ 4.4 mmol/L Cr  0.57 mg/dL BUN 42.7 mg/dL<H> Phos 3.8 mg/dL Alb n/a   PAB n/a       Skin: No breakdown noted    Nutrition focused physical exam conducted - found signs of malnutrition [ ]absent [x ]present    Subcutaneous fat loss: mild [x ] Orbital fat pads region, [ ]Buccal fat region, [ ]Triceps region,  [ ]Ribs region    Muscle wasting: mild [x ]Temples region, [ ]Clavicle region, [x ]Shoulder region, [ ]Scapula region, [ ]Interosseous region,  [ ]thigh region, [ ]Calf region    Estimated Needs:   [x ] no change since previous assessment  [ ] recalculated:     Current Nutrition Diagnosis: Pt remains at high nutrition risk secondary to moderate (acute) protein calorie malnutrition related to inability to meet sufficient protein energy requirements in setting of Right MCA CVA, resp failure requiring vent support as evidenced by physical signs of mild muscle mass and mild fat loss and + edema. Tube feed currently held, plan to extubate today per RN. Pt was tolerating tube feeding well prior to being held. Will continue to follow up. Last documented BM 6/27.     Recommendations:   1. Continue MVI daily   2. Check weight daily to monitor trends   3. Consider SLP assessment prior to po diet initiation. If po diet not feasible/appropriate continue tube feeding as currently ordered.   4. Monitor labs closely     Monitoring and Evaluation:   [ ] PO intake [x ] Tolerance to diet prescription [X] Weights  [X] Follow up per protocol [X] Labs: hold antihypertensives during initial sepsis response

## 2021-06-28 LAB
ANION GAP SERPL CALC-SCNC: 13 MMOL/L — SIGNIFICANT CHANGE UP (ref 5–17)
BUN SERPL-MCNC: 41.2 MG/DL — HIGH (ref 8–20)
CALCIUM SERPL-MCNC: 8.3 MG/DL — LOW (ref 8.6–10.2)
CHLORIDE SERPL-SCNC: 98 MMOL/L — SIGNIFICANT CHANGE UP (ref 98–107)
CO2 SERPL-SCNC: 26 MMOL/L — SIGNIFICANT CHANGE UP (ref 22–29)
CREAT SERPL-MCNC: 0.53 MG/DL — SIGNIFICANT CHANGE UP (ref 0.5–1.3)
CULTURE RESULTS: SIGNIFICANT CHANGE UP
CULTURE RESULTS: SIGNIFICANT CHANGE UP
GLUCOSE BLDC GLUCOMTR-MCNC: 110 MG/DL — HIGH (ref 70–99)
GLUCOSE BLDC GLUCOMTR-MCNC: 135 MG/DL — HIGH (ref 70–99)
GLUCOSE BLDC GLUCOMTR-MCNC: 151 MG/DL — HIGH (ref 70–99)
GLUCOSE BLDC GLUCOMTR-MCNC: 188 MG/DL — HIGH (ref 70–99)
GLUCOSE SERPL-MCNC: 169 MG/DL — HIGH (ref 70–99)
HCT VFR BLD CALC: 40.8 % — SIGNIFICANT CHANGE UP (ref 34.5–45)
HGB BLD-MCNC: 14.2 G/DL — SIGNIFICANT CHANGE UP (ref 11.5–15.5)
MAGNESIUM SERPL-MCNC: 2.2 MG/DL — SIGNIFICANT CHANGE UP (ref 1.6–2.6)
MCHC RBC-ENTMCNC: 29.6 PG — SIGNIFICANT CHANGE UP (ref 27–34)
MCHC RBC-ENTMCNC: 34.8 GM/DL — SIGNIFICANT CHANGE UP (ref 32–36)
MCV RBC AUTO: 85 FL — SIGNIFICANT CHANGE UP (ref 80–100)
PHOSPHATE SERPL-MCNC: 5.1 MG/DL — HIGH (ref 2.4–4.7)
PLATELET # BLD AUTO: 220 K/UL — SIGNIFICANT CHANGE UP (ref 150–400)
POTASSIUM SERPL-MCNC: 4.7 MMOL/L — SIGNIFICANT CHANGE UP (ref 3.5–5.3)
POTASSIUM SERPL-SCNC: 4.7 MMOL/L — SIGNIFICANT CHANGE UP (ref 3.5–5.3)
RBC # BLD: 4.8 M/UL — SIGNIFICANT CHANGE UP (ref 3.8–5.2)
RBC # FLD: 12.7 % — SIGNIFICANT CHANGE UP (ref 10.3–14.5)
SODIUM SERPL-SCNC: 137 MMOL/L — SIGNIFICANT CHANGE UP (ref 135–145)
SPECIMEN SOURCE: SIGNIFICANT CHANGE UP
SPECIMEN SOURCE: SIGNIFICANT CHANGE UP
WBC # BLD: 7.98 K/UL — SIGNIFICANT CHANGE UP (ref 3.8–10.5)
WBC # FLD AUTO: 7.98 K/UL — SIGNIFICANT CHANGE UP (ref 3.8–10.5)

## 2021-06-28 PROCEDURE — 99232 SBSQ HOSP IP/OBS MODERATE 35: CPT

## 2021-06-28 PROCEDURE — 99291 CRITICAL CARE FIRST HOUR: CPT

## 2021-06-28 PROCEDURE — 71045 X-RAY EXAM CHEST 1 VIEW: CPT | Mod: 26

## 2021-06-28 RX ORDER — DOXAZOSIN MESYLATE 4 MG
4 TABLET ORAL AT BEDTIME
Refills: 0 | Status: DISCONTINUED | OUTPATIENT
Start: 2021-06-28 | End: 2021-06-30

## 2021-06-28 RX ORDER — ASPIRIN/CALCIUM CARB/MAGNESIUM 324 MG
81 TABLET ORAL DAILY
Refills: 0 | Status: DISCONTINUED | OUTPATIENT
Start: 2021-06-28 | End: 2021-06-28

## 2021-06-28 RX ORDER — ATORVASTATIN CALCIUM 80 MG/1
40 TABLET, FILM COATED ORAL AT BEDTIME
Refills: 0 | Status: DISCONTINUED | OUTPATIENT
Start: 2021-06-28 | End: 2021-07-01

## 2021-06-28 RX ORDER — ASPIRIN/CALCIUM CARB/MAGNESIUM 324 MG
81 TABLET ORAL DAILY
Refills: 0 | Status: DISCONTINUED | OUTPATIENT
Start: 2021-06-28 | End: 2021-06-30

## 2021-06-28 RX ORDER — CLOPIDOGREL BISULFATE 75 MG/1
75 TABLET, FILM COATED ORAL DAILY
Refills: 0 | Status: DISCONTINUED | OUTPATIENT
Start: 2021-06-28 | End: 2021-06-30

## 2021-06-28 RX ADMIN — Medication 27.5 MILLIGRAM(S): at 07:37

## 2021-06-28 RX ADMIN — Medication 4 MILLIGRAM(S): at 00:12

## 2021-06-28 RX ADMIN — Medication 5 MILLIGRAM(S): at 05:31

## 2021-06-28 RX ADMIN — Medication 4 MILLIGRAM(S): at 22:15

## 2021-06-28 RX ADMIN — ATORVASTATIN CALCIUM 40 MILLIGRAM(S): 80 TABLET, FILM COATED ORAL at 22:18

## 2021-06-28 RX ADMIN — Medication 4 MILLIGRAM(S): at 05:31

## 2021-06-28 RX ADMIN — Medication 2: at 05:42

## 2021-06-28 RX ADMIN — Medication 2: at 11:14

## 2021-06-28 RX ADMIN — Medication 5 MILLIGRAM(S): at 22:26

## 2021-06-28 RX ADMIN — ENOXAPARIN SODIUM 40 MILLIGRAM(S): 100 INJECTION SUBCUTANEOUS at 17:42

## 2021-06-28 RX ADMIN — Medication 10 MILLIGRAM(S): at 06:19

## 2021-06-28 RX ADMIN — Medication 27.5 MILLIGRAM(S): at 20:21

## 2021-06-28 RX ADMIN — CHLORHEXIDINE GLUCONATE 1 APPLICATION(S): 213 SOLUTION TOPICAL at 11:13

## 2021-06-28 RX ADMIN — Medication 300 MILLIGRAM(S): at 11:14

## 2021-06-28 NOTE — PROGRESS NOTE ADULT - SUBJECTIVE AND OBJECTIVE BOX
80 y/o female, PMH DM, HTN, presents to ED as Code Stroke after she was found down on her front lawn by a neighbor. Unknown down time, reported LKW 10:50 am. Initial fingerstick for EMS 60, pt given amp of d50 with improvement in glucose level. Pt presenting with L sided facial droop, L sided weakness, dysarthria. Out of window for TPA. CTH with acute right MCA infarct, CTA Right M2 occlusion. CTP with 16 ml core infarct, right MCA territory.Mismatch volume: 22 ml, right MCA territory, suggesting ischemic penumbra. Mismatch ratio: 2.4. 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  Extubated 6/27.  Overnight -No events reported, maintained her airways.  ROS: [x ]  Unable to assess due to mental status   All other systems negative    ICU Vital Signs Last 24 Hrs  T(C): 36.9 (27 Jun 2021 07:22), Max: 37.5 (27 Jun 2021 00:00)  T(F): 98.4 (27 Jun 2021 07:22), Max: 99.5 (27 Jun 2021 00:00)  HR: 87 (27 Jun 2021 08:00) (73 - 112)  BP: 122/86 (27 Jun 2021 08:00) (114/78 - 133/89)  BP(mean): 93 (27 Jun 2021 08:00) (83 - 101)  ABP: 142/73 (27 Jun 2021 08:00) (104/57 - 145/75)  ABP(mean): 98 (27 Jun 2021 08:00) (74 - 100)  RR: 20 (27 Jun 2021 08:00) (19 - 24)  SpO2: 97% (27 Jun 2021 08:00) (93% - 100%)        VS reviewed, labs, imaging reviewed.      PHYSICAL EXAM:  General: Calm, cooperative.  Neuro:  -Mental status- No acute distress, no EO but alert and awake, intermittently FC, R gaze pref. PERRL 3mm.  spont RUE and RLE  LUE ext.  LLE - wdrl.  CV: S1S2 present  Pulm: clear to auscultation  Abd: Soft, nontender, nondistended  Ext: no noted edema in lower ext  Skin: warm, dry

## 2021-06-28 NOTE — PROGRESS NOTE ADULT - ASSESSMENT
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    NEURO:  Neuro check q 4hrs  ASA and plavix (CHANCE trial)  R wrist restraint  VPA with cortical hyperexcitability- corrected 98  ( 49 )  palliative consult appreciated  Activity: [X] OOB as tolerated  [X] PT [X] OT X[] splint    PULM:   no intention to reintubate  Monitor O2 sats, maintain above 92 %  chest PT     CV:  -150    RENAL:  Urinary retention - diallo replaced 6/26/21 - start Cardura, re-eval in 48 hrs   NS at 50 cc/hr     GI:  Diet: NGT placement, start TF- glycerna 50 cc/hr   Bowel regimen  [X] senna, Miralax    ENDO:   cont NPH regimen  SSI- low dose  Atorvastatin for HLD  Goal euglycemia (-180)    HEME/ONC:  VTE prophylaxis: [X] SCDs [X] chemoprophylaxis- Lovenox 40mh   ASA / Plavix    ID: monitor for fever   zosyn for PNA- completed 6/25/21  F/U urine and blood - no growth.    SOCIAL/FAMILY:  discussed GOC - family opts for PEG as pt is extubated and maintains her airways    CODE STATUS:  [X] Full Code     DISPOSITION:  [X] ICU     [X] Patient is at high risk of neurologic deterioration/death due to:  brain swelling and risk of herniation

## 2021-06-28 NOTE — PROGRESS NOTE ADULT - SUBJECTIVE AND OBJECTIVE BOX
[facility]                                        [facility] [place]                                 Megan Moulton & Wally                                  [address]                                        [place], [place], [address]                                             [phone]        CC: Stroke    HPI:   80 yo woman with medical conditions as outlined below who was found down yesterday. She presented with L facial droop, left sided weakness, and dysarthria. 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 She was admitted to NS-ICU for q 1 h neuro-checks and hemicraniectomy watch.    Interim history:  Extubated 6/27.  Remains in ICU.     ROS:   Unobtainable due to patient's condition.     MEDICATIONS  (STANDING):  aspirin Suppository 300 milliGRAM(s) Rectal daily  chlorhexidine 2% Cloths 1 Application(s) Topical daily  dextrose 40% Gel 15 Gram(s) Oral once  dextrose 5%. 1000 milliLiter(s) (50 mL/Hr) IV Continuous <Continuous>  dextrose 5%. 1000 milliLiter(s) (100 mL/Hr) IV Continuous <Continuous>  dextrose 50% Injectable 25 Gram(s) IV Push once  dextrose 50% Injectable 12.5 Gram(s) IV Push once  dextrose 50% Injectable 25 Gram(s) IV Push once  enoxaparin Injectable 40 milliGRAM(s) SubCutaneous <User Schedule>  glucagon  Injectable 1 milliGRAM(s) IntraMuscular once  insulin lispro (ADMELOG) corrective regimen sliding scale   SubCutaneous every 6 hours  insulin NPH human recombinant 8 Unit(s) SubCutaneous every 6 hours  sodium chloride 0.9%. 1000 milliLiter(s) (50 mL/Hr) IV Continuous <Continuous>  valproate sodium IVPB 500 milliGRAM(s) IV Intermittent every 12 hours      Vital Signs Last 24 Hrs  T(C): 37 (28 Jun 2021 07:53), Max: 37.3 (27 Jun 2021 19:36)  T(F): 98.6 (28 Jun 2021 07:53), Max: 99.1 (27 Jun 2021 19:36)  HR: 94 (28 Jun 2021 10:00) (75 - 132)  BP: 139/85 (28 Jun 2021 10:00) (109/96 - 160/122)  BP(mean): 102 (28 Jun 2021 10:00) (85 - 135)  RR: 26 (28 Jun 2021 10:00) (18 - 26)  SpO2: 95% (28 Jun 2021 10:00) (94% - 99%)    Detailed Neurologic Exam:    Mental status: Lethargic. Opens eyes very slightly to voice/stimuli. Not following instructions.     Cranial nerves: Pupils equal and react symmetrically to light. There is no visual field deficit to threat. Extraocular motion is full with no nystagmus. Facial sensation is intact. Facial musculature is symmetric. Palate elevates symmetrically. Tongue is midline.    Belen/Sensory: There is normal bulk and tone.  There is no tremor.  Moves right side to stimuli.   Left arm flaccid. No movement to stimuli.   Minimal toe movement to stimuli (? reflex).     Reflexes: Trace throughout and plantar responses are flexor on right. ? Triple flexion on left.    Cerebellar: Cannot be tested.     Labs:     06-28    137  |  98  |  41.2<H>  ----------------------------<  169<H>  4.7   |  26.0  |  0.53    Ca    8.3<L>      28 Jun 2021 04:30  Phos  5.1     06-28  Mg     2.2     06-28                              14.2   7.98  )-----------( 220      ( 28 Jun 2021 04:30 )             40.8       Rad:   CT head right middle cerebral artery/thalamic strokes, no blood or mass

## 2021-06-28 NOTE — PROGRESS NOTE ADULT - ASSESSMENT
81y Female who is followed by neurology because of stroke    Right MCA stroke/right thalamic stroke  On Aspirin suppository.   Control blood glucose  Keep normotensive  May need PEG.

## 2021-06-29 ENCOUNTER — TRANSCRIPTION ENCOUNTER (OUTPATIENT)
Age: 81
End: 2021-06-29

## 2021-06-29 LAB
ANION GAP SERPL CALC-SCNC: 13 MMOL/L — SIGNIFICANT CHANGE UP (ref 5–17)
BUN SERPL-MCNC: 38.5 MG/DL — HIGH (ref 8–20)
CALCIUM SERPL-MCNC: 8.3 MG/DL — LOW (ref 8.6–10.2)
CHLORIDE SERPL-SCNC: 99 MMOL/L — SIGNIFICANT CHANGE UP (ref 98–107)
CO2 SERPL-SCNC: 24 MMOL/L — SIGNIFICANT CHANGE UP (ref 22–29)
CREAT SERPL-MCNC: 0.48 MG/DL — LOW (ref 0.5–1.3)
GLUCOSE BLDC GLUCOMTR-MCNC: 113 MG/DL — HIGH (ref 70–99)
GLUCOSE BLDC GLUCOMTR-MCNC: 126 MG/DL — HIGH (ref 70–99)
GLUCOSE BLDC GLUCOMTR-MCNC: 128 MG/DL — HIGH (ref 70–99)
GLUCOSE BLDC GLUCOMTR-MCNC: 141 MG/DL — HIGH (ref 70–99)
GLUCOSE BLDC GLUCOMTR-MCNC: 146 MG/DL — HIGH (ref 70–99)
GLUCOSE SERPL-MCNC: 138 MG/DL — HIGH (ref 70–99)
HCT VFR BLD CALC: 41 % — SIGNIFICANT CHANGE UP (ref 34.5–45)
HGB BLD-MCNC: 13.7 G/DL — SIGNIFICANT CHANGE UP (ref 11.5–15.5)
MAGNESIUM SERPL-MCNC: 2.2 MG/DL — SIGNIFICANT CHANGE UP (ref 1.6–2.6)
MCHC RBC-ENTMCNC: 28.9 PG — SIGNIFICANT CHANGE UP (ref 27–34)
MCHC RBC-ENTMCNC: 33.4 GM/DL — SIGNIFICANT CHANGE UP (ref 32–36)
MCV RBC AUTO: 86.5 FL — SIGNIFICANT CHANGE UP (ref 80–100)
PHOSPHATE SERPL-MCNC: 4.6 MG/DL — SIGNIFICANT CHANGE UP (ref 2.4–4.7)
PLATELET # BLD AUTO: 204 K/UL — SIGNIFICANT CHANGE UP (ref 150–400)
POTASSIUM SERPL-MCNC: 4.2 MMOL/L — SIGNIFICANT CHANGE UP (ref 3.5–5.3)
POTASSIUM SERPL-SCNC: 4.2 MMOL/L — SIGNIFICANT CHANGE UP (ref 3.5–5.3)
RBC # BLD: 4.74 M/UL — SIGNIFICANT CHANGE UP (ref 3.8–5.2)
RBC # FLD: 12.5 % — SIGNIFICANT CHANGE UP (ref 10.3–14.5)
SODIUM SERPL-SCNC: 136 MMOL/L — SIGNIFICANT CHANGE UP (ref 135–145)
WBC # BLD: 6.96 K/UL — SIGNIFICANT CHANGE UP (ref 3.8–10.5)
WBC # FLD AUTO: 6.96 K/UL — SIGNIFICANT CHANGE UP (ref 3.8–10.5)

## 2021-06-29 PROCEDURE — 99231 SBSQ HOSP IP/OBS SF/LOW 25: CPT

## 2021-06-29 PROCEDURE — 99291 CRITICAL CARE FIRST HOUR: CPT

## 2021-06-29 PROCEDURE — 99497 ADVNCD CARE PLAN 30 MIN: CPT | Mod: 25

## 2021-06-29 PROCEDURE — 99233 SBSQ HOSP IP/OBS HIGH 50: CPT

## 2021-06-29 RX ORDER — AMANTADINE HCL 100 MG
100 CAPSULE ORAL ONCE
Refills: 0 | Status: COMPLETED | OUTPATIENT
Start: 2021-06-29 | End: 2021-06-29

## 2021-06-29 RX ORDER — AMANTADINE HCL 100 MG
100 CAPSULE ORAL
Refills: 0 | Status: DISCONTINUED | OUTPATIENT
Start: 2021-06-29 | End: 2021-06-29

## 2021-06-29 RX ORDER — MEMANTINE HYDROCHLORIDE 10 MG/1
10 TABLET ORAL
Refills: 0 | Status: DISCONTINUED | OUTPATIENT
Start: 2021-06-29 | End: 2021-06-29

## 2021-06-29 RX ORDER — MEMANTINE HYDROCHLORIDE 10 MG/1
10 TABLET ORAL
Refills: 0 | Status: DISCONTINUED | OUTPATIENT
Start: 2021-06-29 | End: 2021-07-01

## 2021-06-29 RX ORDER — AMANTADINE HCL 100 MG
100 CAPSULE ORAL
Refills: 0 | Status: DISCONTINUED | OUTPATIENT
Start: 2021-06-29 | End: 2021-07-01

## 2021-06-29 RX ADMIN — CHLORHEXIDINE GLUCONATE 1 APPLICATION(S): 213 SOLUTION TOPICAL at 11:50

## 2021-06-29 RX ADMIN — Medication 27.5 MILLIGRAM(S): at 08:14

## 2021-06-29 RX ADMIN — Medication 4 MILLIGRAM(S): at 21:25

## 2021-06-29 RX ADMIN — CLOPIDOGREL BISULFATE 75 MILLIGRAM(S): 75 TABLET, FILM COATED ORAL at 11:52

## 2021-06-29 RX ADMIN — Medication 100 MILLIGRAM(S): at 21:25

## 2021-06-29 RX ADMIN — ENOXAPARIN SODIUM 40 MILLIGRAM(S): 100 INJECTION SUBCUTANEOUS at 18:03

## 2021-06-29 RX ADMIN — HUMAN INSULIN 8 UNIT(S): 100 INJECTION, SUSPENSION SUBCUTANEOUS at 01:35

## 2021-06-29 RX ADMIN — Medication 27.5 MILLIGRAM(S): at 21:25

## 2021-06-29 RX ADMIN — HUMAN INSULIN 8 UNIT(S): 100 INJECTION, SUSPENSION SUBCUTANEOUS at 06:21

## 2021-06-29 RX ADMIN — Medication 81 MILLIGRAM(S): at 11:50

## 2021-06-29 RX ADMIN — Medication 5 MILLIGRAM(S): at 19:17

## 2021-06-29 RX ADMIN — HUMAN INSULIN 8 UNIT(S): 100 INJECTION, SUSPENSION SUBCUTANEOUS at 18:04

## 2021-06-29 RX ADMIN — ATORVASTATIN CALCIUM 40 MILLIGRAM(S): 80 TABLET, FILM COATED ORAL at 21:25

## 2021-06-29 RX ADMIN — Medication 0: at 06:21

## 2021-06-29 RX ADMIN — MEMANTINE HYDROCHLORIDE 10 MILLIGRAM(S): 10 TABLET ORAL at 21:25

## 2021-06-29 RX ADMIN — HUMAN INSULIN 8 UNIT(S): 100 INJECTION, SUSPENSION SUBCUTANEOUS at 11:51

## 2021-06-29 NOTE — PROGRESS NOTE ADULT - SUBJECTIVE AND OBJECTIVE BOX
Chief complaint:   Patient is a 81y old  Female who presents with a chief complaint of Right MCA CVA (28 Jun 2021 17:38)    HPI:  80 y/o female, PMH DM, HTN, presents to ED as Code Stroke after she was found down on her front lawn by a neighbor. Unknown down time, reported LKW 10:50 am. Initial fingerstick for EMS 60, pt given amp of d50 with improvement in glucose level. Pt presenting with L sided facial droop, L sided weakness, dysarthria. Out of window for TPA. CTH with acute right MCA infarct, CTA Right M2 occlusion. CTP with 16 ml core infarct, right MCA territory.Mismatch volume: 22 ml, right MCA territory, suggesting ischemic penumbra. Mismatch ratio: 2.4. 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    NIHSS= 18  mRs= 0     1A: Level of consciousness     +1= Arouses to minor stimulation  1B: Ask month and age       0= Both questions right  1C: "Blink eyes" and "Squeeze Hands"       0= Performs both   2: Horizontal EOMs       +1= Partial gaze palsy; gaze is abnormal in one or both eyes, but forced deviation or total gaze paresis is not present  3: Visual fields     +2= Complete hemianopsia  4: Facial palsy (use grimace if obtunded)      +3=Complete paralysis of one or both sides (absence of facial movement in the upper and lower face)  5A: Left arm motor drift (count out loud and use fingers to show count)      +3= No effort against gravity  5B: Right arm motor drift         0= No drift x 10 seconds  6A: Left leg motor drift       +3= No effort against gravity  6B: Right leg motor drift       0= No drift x 10 seconds  7: Limb ataxia (FNF/heel-shin)       0= No ataxia   8: Sensation       +2= severe sensory loss left side  9: Language/aphasia- describe the scene (on jeanine); name the items; read the sentences (on jeanine)       +1= mild-moderate aphasia some obvious changes without significant limitation  10: Dysarthria- read the words        +2= Severe dysarthria: unintelligible slurring or out of proportion to dysphasia  11: Extinction/inattention       0= No abnormality  Total: 18 (19 Jun 2021 16:30)        24hr EVENTS:      ROS: [ ]  Unable to assess due to mental status   All other systems negative    -----------------------------------------------------------------------------------------------------------------------------------------------------------------------------------  ICU Vital Signs Last 24 Hrs  T(C): 37.1 (29 Jun 2021 07:54), Max: 37.2 (28 Jun 2021 19:57)  T(F): 98.8 (29 Jun 2021 07:54), Max: 99 (28 Jun 2021 19:57)  HR: 80 (29 Jun 2021 08:00) (65 - 108)  BP: 145/71 (29 Jun 2021 08:00) (118/66 - 157/114)  BP(mean): 93 (29 Jun 2021 08:00) (83 - 128)  ABP: 152/81 (29 Jun 2021 08:00) (106/55 - 176/93)  ABP(mean): 107 (29 Jun 2021 08:00) (74 - 124)  RR: 21 (29 Jun 2021 08:00) (18 - 27)  SpO2: 97% (29 Jun 2021 08:00) (90% - 99%)      I&O's Summary    28 Jun 2021 07:01  -  29 Jun 2021 07:00  --------------------------------------------------------  IN: 1720 mL / OUT: 1320 mL / NET: 400 mL    29 Jun 2021 07:01 - 29 Jun 2021 08:57  --------------------------------------------------------  IN: 130 mL / OUT: 0 mL / NET: 130 mL        MEDICATIONS  (STANDING):  aspirin  chewable 81 milliGRAM(s) Oral daily  atorvastatin 40 milliGRAM(s) Oral at bedtime  chlorhexidine 2% Cloths 1 Application(s) Topical daily  clopidogrel Tablet 75 milliGRAM(s) Oral daily  dextrose 40% Gel 15 Gram(s) Oral once  dextrose 5%. 1000 milliLiter(s) (50 mL/Hr) IV Continuous <Continuous>  dextrose 5%. 1000 milliLiter(s) (100 mL/Hr) IV Continuous <Continuous>  dextrose 50% Injectable 25 Gram(s) IV Push once  dextrose 50% Injectable 12.5 Gram(s) IV Push once  dextrose 50% Injectable 25 Gram(s) IV Push once  doxazosin 4 milliGRAM(s) Oral at bedtime  enoxaparin Injectable 40 milliGRAM(s) SubCutaneous <User Schedule>  glucagon  Injectable 1 milliGRAM(s) IntraMuscular once  insulin lispro (ADMELOG) corrective regimen sliding scale   SubCutaneous every 6 hours  insulin NPH human recombinant 8 Unit(s) SubCutaneous every 6 hours  sodium chloride 0.9%. 1000 milliLiter(s) (50 mL/Hr) IV Continuous <Continuous>  valproate sodium IVPB 500 milliGRAM(s) IV Intermittent every 12 hours      RESPIRATORY:        IMAGING:   Recent imaging studies were reviewed.    LAB RESULTS:                          13.7   6.96  )-----------( 204      ( 29 Jun 2021 03:48 )             41.0           06-29    136  |  99  |  38.5<H>  ----------------------------<  138<H>  4.2   |  24.0  |  0.48<L>    Ca    8.3<L>      29 Jun 2021 03:48  Phos  4.6     06-29  Mg     2.2     06-29      -----------------------------------------------------------------------------------------------------------------------------------------------------------------------------------    PHYSICAL EXAM:  General: Calm, cooperative.  Neuro:  -Mental status- No acute distress, no EO but alert and awake, intermittently FC, R gaze pref. PERRL 3mm.  spont RUE and RLE  LUE ext.  LLE - wdrl.  CV: S1S2 present  Pulm: clear to auscultation  Abd: Soft, nontender, nondistended  Ext: no noted edema in lower ext  Skin: warm, dry         Chief complaint:   Patient is a 81y old  Female who presents with a chief complaint of Right MCA CVA (28 Jun 2021 17:38)    HPI:  80 y/o female, PMH DM, HTN, presents to ED as Code Stroke after she was found down on her front lawn by a neighbor. Unknown down time, reported LKW 10:50 am. Initial fingerstick for EMS 60, pt given amp of d50 with improvement in glucose level. Pt presenting with L sided facial droop, L sided weakness, dysarthria. Out of window for TPA. CTH with acute right MCA infarct, CTA Right M2 occlusion. CTP with 16 ml core infarct, right MCA territory.Mismatch volume: 22 ml, right MCA territory, suggesting ischemic penumbra. Mismatch ratio: 2.4. 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    NIHSS= 18  mRs= 0     24hr EVENTS:  extubated, cardura    ROS: [x ]  Unable to assess due to mental status   All other systems negative    -----------------------------------------------------------------------------------------------------------------------------------------------------------------------------------  ICU Vital Signs Last 24 Hrs  T(C): 37.1 (29 Jun 2021 07:54), Max: 37.2 (28 Jun 2021 19:57)  T(F): 98.8 (29 Jun 2021 07:54), Max: 99 (28 Jun 2021 19:57)  HR: 80 (29 Jun 2021 08:00) (65 - 108)  BP: 145/71 (29 Jun 2021 08:00) (118/66 - 157/114)  BP(mean): 93 (29 Jun 2021 08:00) (83 - 128)  ABP: 152/81 (29 Jun 2021 08:00) (106/55 - 176/93)  ABP(mean): 107 (29 Jun 2021 08:00) (74 - 124)  RR: 21 (29 Jun 2021 08:00) (18 - 27)  SpO2: 97% (29 Jun 2021 08:00) (90% - 99%)      I&O's Summary    28 Jun 2021 07:01  -  29 Jun 2021 07:00  --------------------------------------------------------  IN: 1720 mL / OUT: 1320 mL / NET: 400 mL    29 Jun 2021 07:01  -  29 Jun 2021 08:57  --------------------------------------------------------  IN: 130 mL / OUT: 0 mL / NET: 130 mL      MEDICATIONS  (STANDING):  aspirin  chewable 81 milliGRAM(s) Oral daily  atorvastatin 40 milliGRAM(s) Oral at bedtime  chlorhexidine 2% Cloths 1 Application(s) Topical daily  clopidogrel Tablet 75 milliGRAM(s) Oral daily  dextrose 40% Gel 15 Gram(s) Oral once  dextrose 5%. 1000 milliLiter(s) (50 mL/Hr) IV Continuous <Continuous>  dextrose 5%. 1000 milliLiter(s) (100 mL/Hr) IV Continuous <Continuous>  dextrose 50% Injectable 25 Gram(s) IV Push once  dextrose 50% Injectable 12.5 Gram(s) IV Push once  dextrose 50% Injectable 25 Gram(s) IV Push once  doxazosin 4 milliGRAM(s) Oral at bedtime  enoxaparin Injectable 40 milliGRAM(s) SubCutaneous <User Schedule>  glucagon  Injectable 1 milliGRAM(s) IntraMuscular once  insulin lispro (ADMELOG) corrective regimen sliding scale   SubCutaneous every 6 hours  insulin NPH human recombinant 8 Unit(s) SubCutaneous every 6 hours  sodium chloride 0.9%. 1000 milliLiter(s) (50 mL/Hr) IV Continuous <Continuous>  valproate sodium IVPB 500 milliGRAM(s) IV Intermittent every 12 hours        IMAGING:   Recent imaging studies were reviewed.    LAB RESULTS:                          13.7   6.96  )-----------( 204      ( 29 Jun 2021 03:48 )             41.0           06-29    136  |  99  |  38.5<H>  ----------------------------<  138<H>  4.2   |  24.0  |  0.48<L>    Ca    8.3<L>      29 Jun 2021 03:48  Phos  4.6     06-29  Mg     2.2     06-29      -----------------------------------------------------------------------------------------------------------------------------------------------------------------------------------    PHYSICAL EXAM:  General: Calm, cooperative.  Neuro:  -Mental status- No acute distress, no EO but alert and awake, intermittently FC, R gaze pref. PERRL 3mm.  writes that she wants to go home  spont RUE and RLE  LUE ext.  LLE - wdrl.  CV: S1S2 present  Pulm: clear to auscultation  Abd: Soft, nontender, nondistended  Ext: no noted edema in lower ext  Skin: warm, dry

## 2021-06-29 NOTE — GOALS OF CARE CONVERSATION - ADVANCED CARE PLANNING - WHAT MATTERS MOST
Giving their mother the best chance at recovery after CVA without extraordinary measures of hemicrani should swelling occur. Also opting for DNR/DNI status at this time.
Quality of life - wants to give a chance how mother will do

## 2021-06-29 NOTE — PROGRESS NOTE ADULT - SUBJECTIVE AND OBJECTIVE BOX
I fit Erendira's KAITLIN with a left resting hand brace to prevent contractures of her wrist and fingers. Extra padding was added to the straps to prevent edge pressure. Arm socks were provided as a washable interface. Brace interior should be wiped clean daily.  [facility]  [phone]

## 2021-06-29 NOTE — CONSULT NOTE ADULT - SUBJECTIVE AND OBJECTIVE BOX
SURGICAL INTENSIVE CARE CONSULT    HPI:  80 y/o female, PMH DM, HTN, presents to ED as Code Stroke after she was found down on her front lawn by a neighbor. Unknown down time, reported LKW 10:50 am. Initial fingerstick for EMS 60, pt given amp of d50 with improvement in glucose level. Pt presenting with L sided facial droop, L sided weakness, dysarthria. Out of window for TPA. CTH with acute right MCA infarct, CTA Right M2 occlusion. CTP with 16 ml core infarct, right MCA territory.Mismatch volume: 22 ml, right MCA territory, suggesting ischemic penumbra. Mismatch ratio: 2.4. 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    SICU Consult: 81 year old female with PMH DM, HTN who presented to the ED with right MCA CVA. Patient was intubated for respiratory failure and new PNA started on abx, extubated 6/27 and maintained her airways since. SICU consulted for PEG tube placement for long term nutrition. Patients daughters present at the bedside. Report no history of abdominal surgery only PSH significant for L4-5 laminectomy. Denies pain, nausea, vomiting. Denies any other acute complaints at this time.       PAST MEDICAL HISTORY:  Diabetes  Hypertension        PAST SURGICAL HISTORY: L4-5 laminectomy      ALLERGIES: NKDA    SOCIAL HISTORY: non smoker, non drinker, no history of drug use    HOME MEDICATIONS:    MEDICATIONS  (STANDING):  aspirin  chewable 81 milliGRAM(s) Oral daily  atorvastatin 40 milliGRAM(s) Oral at bedtime  chlorhexidine 2% Cloths 1 Application(s) Topical daily  clopidogrel Tablet 75 milliGRAM(s) Oral daily  dextrose 40% Gel 15 Gram(s) Oral once  dextrose 5%. 1000 milliLiter(s) (50 mL/Hr) IV Continuous <Continuous>  dextrose 5%. 1000 milliLiter(s) (100 mL/Hr) IV Continuous <Continuous>  dextrose 50% Injectable 25 Gram(s) IV Push once  dextrose 50% Injectable 12.5 Gram(s) IV Push once  dextrose 50% Injectable 25 Gram(s) IV Push once  doxazosin 4 milliGRAM(s) Oral at bedtime  enoxaparin Injectable 40 milliGRAM(s) SubCutaneous <User Schedule>  glucagon  Injectable 1 milliGRAM(s) IntraMuscular once  insulin lispro (ADMELOG) corrective regimen sliding scale   SubCutaneous every 6 hours  insulin NPH human recombinant 8 Unit(s) SubCutaneous every 6 hours  valproate sodium IVPB 500 milliGRAM(s) IV Intermittent every 12 hours    MEDICATIONS  (PRN):  labetalol Injectable 10 milliGRAM(s) IV Push every 2 hours PRN SBP >165  metoprolol tartrate Injectable 5 milliGRAM(s) IV Push every 6 hours PRN SBP>150      VITALS & I/Os:  Vital Signs Last 24 Hrs  T(C): 37 (29 Jun 2021 13:00), Max: 37.2 (28 Jun 2021 19:57)  T(F): 98.6 (29 Jun 2021 13:00), Max: 99 (28 Jun 2021 19:57)  HR: 89 (29 Jun 2021 15:00) (65 - 100)  BP: 121/76 (29 Jun 2021 12:00) (118/66 - 157/114)  BP(mean): 92 (29 Jun 2021 12:00) (83 - 128)  RR: 18 (29 Jun 2021 15:00) (15 - 27)  SpO2: 95% (29 Jun 2021 13:00) (90% - 99%)  CAPILLARY BLOOD GLUCOSE      POCT Blood Glucose.: 146 mg/dL (29 Jun 2021 11:21)  POCT Blood Glucose.: 141 mg/dL (29 Jun 2021 06:13)  POCT Blood Glucose.: 126 mg/dL (29 Jun 2021 01:34)  POCT Blood Glucose.: 110 mg/dL (28 Jun 2021 17:37)      I&O's Summary    28 Jun 2021 07:01  -  29 Jun 2021 07:00  --------------------------------------------------------  IN: 1720 mL / OUT: 1410 mL / NET: 310 mL    29 Jun 2021 07:01  -  29 Jun 2021 16:03  --------------------------------------------------------  IN: 580 mL / OUT: 480 mL / NET: 100 mL        GENERAL: Alert, well developed, in no acute distress.  MENTAL STATUS: A&O x 1.   HEENT: PERRLA. EOMI. MMM.  Trachea midline.  RESPIRATORY: CTAB. No wheezing, rales or rhonchi.  CARDIOVASCULAR: RRR. No audible murmurs, rubs or gallops.   GASTROINTESTINAL: Abdomen soft, NT, ND, -R/-G.    NEUROLOGIC:  Left sided neurological deficits. Moves right upper and lower extremity spontaneously.   INTGUMENTARY: No overt rashes or lesions, petechia or purpura. Good turgor. No edema.  MUSCULOSKELETAL: No cyanosis or clubbing. No gross deformities.       LABS:                        13.7   6.96  )-----------( 204      ( 29 Jun 2021 03:48 )             41.0     06-29    136  |  99  |  38.5<H>  ----------------------------<  138<H>  4.2   |  24.0  |  0.48<L>    Ca    8.3<L>      29 Jun 2021 03:48  Phos  4.6     06-29  Mg     2.2     06-29      Lactate: aspirin  chewable 81 milliGRAM(s) Oral daily  atorvastatin 40 milliGRAM(s) Oral at bedtime  chlorhexidine 2% Cloths 1 Application(s) Topical daily  clopidogrel Tablet 75 milliGRAM(s) Oral daily  dextrose 40% Gel 15 Gram(s) Oral once  dextrose 5%. 1000 milliLiter(s) IV Continuous <Continuous>  dextrose 5%. 1000 milliLiter(s) IV Continuous <Continuous>  dextrose 50% Injectable 25 Gram(s) IV Push once  dextrose 50% Injectable 12.5 Gram(s) IV Push once  dextrose 50% Injectable 25 Gram(s) IV Push once  doxazosin 4 milliGRAM(s) Oral at bedtime  enoxaparin Injectable 40 milliGRAM(s) SubCutaneous <User Schedule>  glucagon  Injectable 1 milliGRAM(s) IntraMuscular once  insulin lispro (ADMELOG) corrective regimen sliding scale   SubCutaneous every 6 hours  insulin NPH human recombinant 8 Unit(s) SubCutaneous every 6 hours  labetalol Injectable 10 milliGRAM(s) IV Push every 2 hours PRN  metoprolol tartrate Injectable 5 milliGRAM(s) IV Push every 6 hours PRN  valproate sodium IVPB 500 milliGRAM(s) IV Intermittent every 12 hours       IMAGING:    Xray Chest 1 View-PORTABLE IMMEDIATE (Xray Chest 1 View-PORTABLE IMMEDIATE .) (06.28.21 @ 19:13)  IMPRESSION: Endotracheal tube removed. Slight hazy density is presently seen at the  right base. Persistent left base process.    CT Head No Cont (06.24.21 @ 05:04)  IMPRESSION:  Subacute right MCA territory infarct with trace hemorrhagic conversion. New versus more conspicuous low-attenuation anterior right thalamus compatible with ischemic change.

## 2021-06-29 NOTE — PROGRESS NOTE ADULT - ASSESSMENT
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    NEURO:  Neuro check q 4hrs  ASA and plavix (CHANCE trial)  R wrist restraint  VPA with cortical hyperexcitability- corrected 98  ( 49 )  palliative consult appreciated  Activity: [X] OOB as tolerated  [X] PT [X] OT X[] splint    PULM:   DNI  Monitor O2 sats, maintain above 92 %  chest PT     CV:  -150    RENAL:  Urinary retention - diallo replaced 6/26/21 -  Cardura, re-eval in 48 hrs   NS at 50 cc/hr     GI:  Diet: NGT placement, start TF- glycerna 50 cc/hr   Bowel regimen  [X] senna, Miralax    ENDO:   cont NPH regimen  SSI- low dose  Atorvastatin for HLD  Goal euglycemia (-180)    HEME/ONC:  VTE prophylaxis: [X] SCDs [X] chemoprophylaxis- Lovenox 40mh   ASA / Plavix    ID: monitor for fever   zosyn for PNA- completed 6/25/21  F/U urine and blood - no growth.    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    NEURO:  Neuro check q 4hrs  ASA and plavix (CHANCE trial)  R wrist restraint  VPA with cortical hyperexcitability  palliative consult appreciated  Activity: [X] OOB as tolerated  [X] PT [X] OT X[] splint    PULM:   DNI  Monitor O2 sats, maintain above 92 %  chest PT     CV:  -150    RENAL:  Urinary retention - diallo replaced 6/26/21 -  Cardura, re-eval in 48 hrs   stable electrolytes    GI:  Diet: NGT placement, start TF- glycerna 50 cc/hr   Bowel regimen  [X] senna, Miralax  consult for PEG   sp/sw eval    ENDO:   cont NPH regimen  SSI- low dose  Atorvastatin for HLD  Goal euglycemia (-180)    HEME/ONC:  VTE prophylaxis: [X] SCDs [X] chemoprophylaxis- Lovenox 40mh   ASA / Plavix    ID: monitor for fever  zosyn for PNA- completed 6/25/21  F/U urine and blood - no growth.    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    NEURO:  Neuro check q 4hrs  ASA and plavix (CHANCE trial)  R wrist restraint  VPA with cortical hyperexcitability  palliative consult appreciated  start amantadine 100mg and namenda  Activity: [X] OOB as tolerated  [X] PT [X] OT X[] splint    PULM:   DNI  Monitor O2 sats, maintain above 92 %  chest PT     CV:  -150    RENAL:  Urinary retention - diallo replaced 6/26/21 -  Cardura, re-eval in 48 hrs   stable electrolytes    GI:  Diet: NGT placement, TF- glucerna 50 cc/hr   Bowel regimen  [X] senna, Miralax  consult for PEG   sp/sw eval    ENDO:   cont NPH regimen  SSI- low dose  Atorvastatin for HLD  Goal euglycemia (-180)    HEME/ONC:  VTE prophylaxis: [X] SCDs [X] chemoprophylaxis- Lovenox 40mh   ASA / Plavix    ID: monitor for fever  zosyn for PNA- completed 6/25/21  F/U urine and blood - no growth.    SOCIAL/FAMILY:  discussed GOC - family opts for PEG as pt is extubated and maintains her airways    CODE STATUS:  [X] Full Code     DISPOSITION:  [X] ICU     [X] Patient is at high risk of neurologic deterioration/death due to:  brain swelling and risk of herniation

## 2021-06-29 NOTE — CONSULT NOTE ADULT - ATTENDING COMMENTS
Patient seen and examined at the bedside  procedure , risk , benfits and alternatives dioscusse with daughter, consent obetened  PEG at bedside 6/30/2021

## 2021-06-29 NOTE — GOALS OF CARE CONVERSATION - ADVANCED CARE PLANNING - CONVERSATION DETAILS
Family was informed of current condition. Although extubated, it seems neurological status has declined compared to last week.   PEG tube that was originally discussed with the hope of maybe regaining some ability to swallow,  that may not be the case and possibly be more permanent.  Daughter stated they already knew this given how she was responding yesterday.  They wish to proceed with PEG and see how she does.  They were informed if after some given time her QOL is not in line with what they believe she would have wanted, can opt to focus on her comfort.    Family requesting specific rehab. Informed medical floor  will help coordinate
81 year old female with a mRs of 0 presented yesterday with right MCA CVA. Discussed with patient's daughters Tammy and Esthela and son Eliezer that patient will not be able to pass swallow evaluation at this time in setting of severe dysarthria and facial asymmetry. Patient has been agitated at times and will likely not keep an NGT in place. Explained that patient will likely need PEG tube for nutrition prior to rehabilitation services. Explained that patient will most likely be left with severe disability from left sided weakness. In setting of patient is alert and awake, following commands and communicating, children are opting to proceed with PEG at this time. Discussed Code Status, they are opting for DNR/DNI at this time. Also discussed the option of hemicrani should there be extension of CVA with increased swelling. Children have decided they will NOT want surgery should swelling occur. They would lik eto proceed with all other medical and rehabilitative services to treat their mother at this time.

## 2021-06-29 NOTE — PROGRESS NOTE ADULT - SUBJECTIVE AND OBJECTIVE BOX
CC:  follow up GOC  INTERVAL HPI/OVERNIGHT EVENTS:    PRESENT SYMPTOMS: SOURCE:  Patient/Family/Team    PAIN SCALE:  0 = none  1 = mild   2 = moderate  3 = severe    Pain:     Dyspnea:  [ ] YES [x ] NO  Anxiety:  [ ] YES [x ] NO  Fatigue: [ x] YES [ ] NO  Nausea: [ ] YES [ ] NO  na  Loss of Appetite: [x ] YES [ ] NO  Other symptoms: __________    MEDICATIONS  (STANDING):  aspirin  chewable 81 milliGRAM(s) Oral daily  atorvastatin 40 milliGRAM(s) Oral at bedtime  chlorhexidine 2% Cloths 1 Application(s) Topical daily  clopidogrel Tablet 75 milliGRAM(s) Oral daily  dextrose 40% Gel 15 Gram(s) Oral once  dextrose 5%. 1000 milliLiter(s) (50 mL/Hr) IV Continuous <Continuous>  dextrose 5%. 1000 milliLiter(s) (100 mL/Hr) IV Continuous <Continuous>  dextrose 50% Injectable 25 Gram(s) IV Push once  dextrose 50% Injectable 12.5 Gram(s) IV Push once  dextrose 50% Injectable 25 Gram(s) IV Push once  doxazosin 4 milliGRAM(s) Oral at bedtime  enoxaparin Injectable 40 milliGRAM(s) SubCutaneous <User Schedule>  glucagon  Injectable 1 milliGRAM(s) IntraMuscular once  insulin lispro (ADMELOG) corrective regimen sliding scale   SubCutaneous every 6 hours  insulin NPH human recombinant 8 Unit(s) SubCutaneous every 6 hours  valproate sodium IVPB 500 milliGRAM(s) IV Intermittent every 12 hours    MEDICATIONS  (PRN):  labetalol Injectable 10 milliGRAM(s) IV Push every 2 hours PRN SBP >165  metoprolol tartrate Injectable 5 milliGRAM(s) IV Push every 6 hours PRN SBP>150      Allergies    Allergy Status Unknown    Intolerances      Karnofsky Performance Score/Palliative Performance Status Version 2: 30  %    Vital Signs Last 24 Hrs  T(C): 37 (29 Jun 2021 13:00), Max: 37.2 (28 Jun 2021 19:57)  T(F): 98.6 (29 Jun 2021 13:00), Max: 99 (28 Jun 2021 19:57)  HR: 94 (29 Jun 2021 11:00) (65 - 108)  BP: 145/71 (29 Jun 2021 08:00) (118/66 - 157/114)  BP(mean): 93 (29 Jun 2021 08:00) (83 - 128)  RR: 15 (29 Jun 2021 11:00) (15 - 27)  SpO2: 98% (29 Jun 2021 11:00) (90% - 99%)    PHYSICAL EXAM:    General: Lethargic NAD  HEENT: [ x] normal  [ ] dry mouth  [ ] ET tube/trach    Lungs: [ x] comfortable [ ] tachypnea/labored breathing  [ ] excessive secretions    CV: [ x] normal  [ ] tachycardia    GI: [x ] normal  [ ] distended  [ ] tender  [ ] no BS               [ ] PEG/NG/OG tube    : [x ] normal  [ ] incontinent  [ ] oliguria/anuria  [ ] diallo    MSK: [ ] normal  [x ] weakness  [ ] edema             [ ] ambulatory  [ x] bedbound/wheelchair bound    Skin: [x ] normal  [ ] pressure ulcers- Stage_____  [ ] no rash    LABS:                        13.7   6.96  )-----------( 204      ( 29 Jun 2021 03:48 )             41.0     06-29    136  |  99  |  38.5<H>  ----------------------------<  138<H>  4.2   |  24.0  |  0.48<L>    Ca    8.3<L>      29 Jun 2021 03:48  Phos  4.6     06-29  Mg     2.2     06-29      I&O's Summary    28 Jun 2021 07:01  -  29 Jun 2021 07:00  --------------------------------------------------------  IN: 1720 mL / OUT: 1410 mL / NET: 310 mL    29 Jun 2021 07:01  -  29 Jun 2021 12:46  --------------------------------------------------------  IN: 170 mL / OUT: 180 mL / NET: -10 mL        RADIOLOGY & ADDITIONAL STUDIES:     CC:  follow up GOC  INTERVAL HPI/OVERNIGHT EVENTS:  Discussed with Marika MILNER - Although extubated, patient's clinical exam has declined     PRESENT SYMPTOMS: SOURCE:  Patient/Family/Team    PAIN SCALE:  0 = none  1 = mild   2 = moderate  3 = severe    Pain:     Dyspnea:  [ ] YES [x ] NO  Anxiety:  [ ] YES [x ] NO  Fatigue: [ x] YES [ ] NO  Nausea: [ ] YES [ ] NO  na  Loss of Appetite: [x ] YES [ ] NO  Other symptoms: __________    MEDICATIONS  (STANDING):  aspirin  chewable 81 milliGRAM(s) Oral daily  atorvastatin 40 milliGRAM(s) Oral at bedtime  chlorhexidine 2% Cloths 1 Application(s) Topical daily  clopidogrel Tablet 75 milliGRAM(s) Oral daily  dextrose 40% Gel 15 Gram(s) Oral once  dextrose 5%. 1000 milliLiter(s) (50 mL/Hr) IV Continuous <Continuous>  dextrose 5%. 1000 milliLiter(s) (100 mL/Hr) IV Continuous <Continuous>  dextrose 50% Injectable 25 Gram(s) IV Push once  dextrose 50% Injectable 12.5 Gram(s) IV Push once  dextrose 50% Injectable 25 Gram(s) IV Push once  doxazosin 4 milliGRAM(s) Oral at bedtime  enoxaparin Injectable 40 milliGRAM(s) SubCutaneous <User Schedule>  glucagon  Injectable 1 milliGRAM(s) IntraMuscular once  insulin lispro (ADMELOG) corrective regimen sliding scale   SubCutaneous every 6 hours  insulin NPH human recombinant 8 Unit(s) SubCutaneous every 6 hours  valproate sodium IVPB 500 milliGRAM(s) IV Intermittent every 12 hours    MEDICATIONS  (PRN):  labetalol Injectable 10 milliGRAM(s) IV Push every 2 hours PRN SBP >165  metoprolol tartrate Injectable 5 milliGRAM(s) IV Push every 6 hours PRN SBP>150      Allergies    Allergy Status Unknown    Intolerances      Karnofsky Performance Score/Palliative Performance Status Version 2: 30  %    Vital Signs Last 24 Hrs  T(C): 37 (29 Jun 2021 13:00), Max: 37.2 (28 Jun 2021 19:57)  T(F): 98.6 (29 Jun 2021 13:00), Max: 99 (28 Jun 2021 19:57)  HR: 94 (29 Jun 2021 11:00) (65 - 108)  BP: 145/71 (29 Jun 2021 08:00) (118/66 - 157/114)  BP(mean): 93 (29 Jun 2021 08:00) (83 - 128)  RR: 15 (29 Jun 2021 11:00) (15 - 27)  SpO2: 98% (29 Jun 2021 11:00) (90% - 99%)    PHYSICAL EXAM:    General: Lethargic NAD  HEENT: [ x] normal  [ ] dry mouth  [ ] ET tube/trach    Lungs: [ x] comfortable [ ] tachypnea/labored breathing  [ ] excessive secretions    CV: [ x] normal  [ ] tachycardia    GI: [x ] normal  [ ] distended  [ ] tender  [ ] no BS               [ ] PEG/NG/OG tube    : [x ] normal  [ ] incontinent  [ ] oliguria/anuria  [ ] diallo    MSK: [ ] normal  [x ] weakness  [ ] edema             [ ] ambulatory  [ x] bedbound/wheelchair bound    Skin: [x ] normal  [ ] pressure ulcers- Stage_____  [ ] no rash    LABS:                        13.7   6.96  )-----------( 204      ( 29 Jun 2021 03:48 )             41.0     06-29    136  |  99  |  38.5<H>  ----------------------------<  138<H>  4.2   |  24.0  |  0.48<L>    Ca    8.3<L>      29 Jun 2021 03:48  Phos  4.6     06-29  Mg     2.2     06-29      I&O's Summary    28 Jun 2021 07:01  -  29 Jun 2021 07:00  --------------------------------------------------------  IN: 1720 mL / OUT: 1410 mL / NET: 310 mL    29 Jun 2021 07:01  -  29 Jun 2021 12:46  --------------------------------------------------------  IN: 170 mL / OUT: 180 mL / NET: -10 mL        RADIOLOGY & ADDITIONAL STUDIES:

## 2021-06-29 NOTE — CONSULT NOTE ADULT - ASSESSMENT
81 year old female PMH HTN, DM, right MCA CVA with residual left sided neurological deficits.  Surgical consult for peg tube placement.    -Plan for PEG at bedside with anesthesia  -Consent obtained

## 2021-06-30 LAB
ABO RH CONFIRMATION: SIGNIFICANT CHANGE UP
ANION GAP SERPL CALC-SCNC: 11 MMOL/L — SIGNIFICANT CHANGE UP (ref 5–17)
ANION GAP SERPL CALC-SCNC: 12 MMOL/L — SIGNIFICANT CHANGE UP (ref 5–17)
APTT BLD: 29.4 SEC — SIGNIFICANT CHANGE UP (ref 27.5–35.5)
BLD GP AB SCN SERPL QL: SIGNIFICANT CHANGE UP
BUN SERPL-MCNC: 25.9 MG/DL — HIGH (ref 8–20)
BUN SERPL-MCNC: 30.4 MG/DL — HIGH (ref 8–20)
CALCIUM SERPL-MCNC: 7.5 MG/DL — LOW (ref 8.6–10.2)
CALCIUM SERPL-MCNC: 8.1 MG/DL — LOW (ref 8.6–10.2)
CHLORIDE SERPL-SCNC: 103 MMOL/L — SIGNIFICANT CHANGE UP (ref 98–107)
CHLORIDE SERPL-SCNC: 98 MMOL/L — SIGNIFICANT CHANGE UP (ref 98–107)
CO2 SERPL-SCNC: 22 MMOL/L — SIGNIFICANT CHANGE UP (ref 22–29)
CO2 SERPL-SCNC: 26 MMOL/L — SIGNIFICANT CHANGE UP (ref 22–29)
CREAT SERPL-MCNC: 0.43 MG/DL — LOW (ref 0.5–1.3)
CREAT SERPL-MCNC: 0.46 MG/DL — LOW (ref 0.5–1.3)
GAS PNL BLDA: SIGNIFICANT CHANGE UP
GLUCOSE BLDC GLUCOMTR-MCNC: 108 MG/DL — HIGH (ref 70–99)
GLUCOSE BLDC GLUCOMTR-MCNC: 133 MG/DL — HIGH (ref 70–99)
GLUCOSE BLDC GLUCOMTR-MCNC: 134 MG/DL — HIGH (ref 70–99)
GLUCOSE BLDC GLUCOMTR-MCNC: 142 MG/DL — HIGH (ref 70–99)
GLUCOSE SERPL-MCNC: 113 MG/DL — HIGH (ref 70–99)
GLUCOSE SERPL-MCNC: 140 MG/DL — HIGH (ref 70–99)
HCT VFR BLD CALC: 37.6 % — SIGNIFICANT CHANGE UP (ref 34.5–45)
HCT VFR BLD CALC: 42.6 % — SIGNIFICANT CHANGE UP (ref 34.5–45)
HGB BLD-MCNC: 12.8 G/DL — SIGNIFICANT CHANGE UP (ref 11.5–15.5)
HGB BLD-MCNC: 15 G/DL — SIGNIFICANT CHANGE UP (ref 11.5–15.5)
INR BLD: 1.17 RATIO — HIGH (ref 0.88–1.16)
MAGNESIUM SERPL-MCNC: 2 MG/DL — SIGNIFICANT CHANGE UP (ref 1.6–2.6)
MAGNESIUM SERPL-MCNC: 2.2 MG/DL — SIGNIFICANT CHANGE UP (ref 1.8–2.6)
MCHC RBC-ENTMCNC: 29.4 PG — SIGNIFICANT CHANGE UP (ref 27–34)
MCHC RBC-ENTMCNC: 29.5 PG — SIGNIFICANT CHANGE UP (ref 27–34)
MCHC RBC-ENTMCNC: 34 GM/DL — SIGNIFICANT CHANGE UP (ref 32–36)
MCHC RBC-ENTMCNC: 35.2 GM/DL — SIGNIFICANT CHANGE UP (ref 32–36)
MCV RBC AUTO: 83.7 FL — SIGNIFICANT CHANGE UP (ref 80–100)
MCV RBC AUTO: 86.2 FL — SIGNIFICANT CHANGE UP (ref 80–100)
PHOSPHATE SERPL-MCNC: 3.9 MG/DL — SIGNIFICANT CHANGE UP (ref 2.4–4.7)
PHOSPHATE SERPL-MCNC: 4.2 MG/DL — SIGNIFICANT CHANGE UP (ref 2.4–4.7)
PLATELET # BLD AUTO: 216 K/UL — SIGNIFICANT CHANGE UP (ref 150–400)
PLATELET # BLD AUTO: 257 K/UL — SIGNIFICANT CHANGE UP (ref 150–400)
POTASSIUM SERPL-MCNC: 4 MMOL/L — SIGNIFICANT CHANGE UP (ref 3.5–5.3)
POTASSIUM SERPL-MCNC: 4.6 MMOL/L — SIGNIFICANT CHANGE UP (ref 3.5–5.3)
POTASSIUM SERPL-SCNC: 4 MMOL/L — SIGNIFICANT CHANGE UP (ref 3.5–5.3)
POTASSIUM SERPL-SCNC: 4.6 MMOL/L — SIGNIFICANT CHANGE UP (ref 3.5–5.3)
PROTHROM AB SERPL-ACNC: 13.5 SEC — SIGNIFICANT CHANGE UP (ref 10.6–13.6)
RBC # BLD: 4.36 M/UL — SIGNIFICANT CHANGE UP (ref 3.8–5.2)
RBC # BLD: 5.09 M/UL — SIGNIFICANT CHANGE UP (ref 3.8–5.2)
RBC # FLD: 12.7 % — SIGNIFICANT CHANGE UP (ref 10.3–14.5)
RBC # FLD: 12.7 % — SIGNIFICANT CHANGE UP (ref 10.3–14.5)
SODIUM SERPL-SCNC: 135 MMOL/L — SIGNIFICANT CHANGE UP (ref 135–145)
SODIUM SERPL-SCNC: 135 MMOL/L — SIGNIFICANT CHANGE UP (ref 135–145)
WBC # BLD: 13.24 K/UL — HIGH (ref 3.8–10.5)
WBC # BLD: 7.36 K/UL — SIGNIFICANT CHANGE UP (ref 3.8–10.5)
WBC # FLD AUTO: 13.24 K/UL — HIGH (ref 3.8–10.5)
WBC # FLD AUTO: 7.36 K/UL — SIGNIFICANT CHANGE UP (ref 3.8–10.5)

## 2021-06-30 PROCEDURE — 74178 CT ABD&PLV WO CNTR FLWD CNTR: CPT | Mod: 26

## 2021-06-30 PROCEDURE — 43246 EGD PLACE GASTROSTOMY TUBE: CPT

## 2021-06-30 PROCEDURE — 93010 ELECTROCARDIOGRAM REPORT: CPT

## 2021-06-30 PROCEDURE — 99291 CRITICAL CARE FIRST HOUR: CPT

## 2021-06-30 PROCEDURE — 99233 SBSQ HOSP IP/OBS HIGH 50: CPT

## 2021-06-30 RX ORDER — SODIUM CHLORIDE 9 MG/ML
1000 INJECTION INTRAMUSCULAR; INTRAVENOUS; SUBCUTANEOUS ONCE
Refills: 0 | Status: COMPLETED | OUTPATIENT
Start: 2021-06-30 | End: 2021-06-30

## 2021-06-30 RX ORDER — DOXAZOSIN MESYLATE 4 MG
4 TABLET ORAL AT BEDTIME
Refills: 0 | Status: DISCONTINUED | OUTPATIENT
Start: 2021-06-30 | End: 2021-07-01

## 2021-06-30 RX ORDER — SODIUM CHLORIDE 9 MG/ML
500 INJECTION INTRAMUSCULAR; INTRAVENOUS; SUBCUTANEOUS ONCE
Refills: 0 | Status: COMPLETED | OUTPATIENT
Start: 2021-06-30 | End: 2021-06-30

## 2021-06-30 RX ORDER — CEFAZOLIN SODIUM 1 G
2000 VIAL (EA) INJECTION ONCE
Refills: 0 | Status: COMPLETED | OUTPATIENT
Start: 2021-06-30 | End: 2021-06-30

## 2021-06-30 RX ORDER — ACETAMINOPHEN 500 MG
1000 TABLET ORAL ONCE
Refills: 0 | Status: COMPLETED | OUTPATIENT
Start: 2021-06-30 | End: 2021-06-30

## 2021-06-30 RX ORDER — AMIODARONE HYDROCHLORIDE 400 MG/1
150 TABLET ORAL ONCE
Refills: 0 | Status: COMPLETED | OUTPATIENT
Start: 2021-06-30 | End: 2021-06-30

## 2021-06-30 RX ORDER — PHENYLEPHRINE HYDROCHLORIDE 10 MG/ML
0.1 INJECTION INTRAVENOUS
Qty: 40 | Refills: 0 | Status: DISCONTINUED | OUTPATIENT
Start: 2021-06-30 | End: 2021-07-01

## 2021-06-30 RX ORDER — PANTOPRAZOLE SODIUM 20 MG/1
40 TABLET, DELAYED RELEASE ORAL EVERY 12 HOURS
Refills: 0 | Status: DISCONTINUED | OUTPATIENT
Start: 2021-06-30 | End: 2021-07-08

## 2021-06-30 RX ORDER — SODIUM CHLORIDE 9 MG/ML
1000 INJECTION INTRAMUSCULAR; INTRAVENOUS; SUBCUTANEOUS
Refills: 0 | Status: DISCONTINUED | OUTPATIENT
Start: 2021-06-30 | End: 2021-07-01

## 2021-06-30 RX ADMIN — SODIUM CHLORIDE 50 MILLILITER(S): 9 INJECTION INTRAMUSCULAR; INTRAVENOUS; SUBCUTANEOUS at 22:23

## 2021-06-30 RX ADMIN — SODIUM CHLORIDE 500 MILLILITER(S): 9 INJECTION INTRAMUSCULAR; INTRAVENOUS; SUBCUTANEOUS at 20:30

## 2021-06-30 RX ADMIN — AMIODARONE HYDROCHLORIDE 618 MILLIGRAM(S): 400 TABLET ORAL at 17:30

## 2021-06-30 RX ADMIN — HUMAN INSULIN 8 UNIT(S): 100 INJECTION, SUSPENSION SUBCUTANEOUS at 00:02

## 2021-06-30 RX ADMIN — ENOXAPARIN SODIUM 40 MILLIGRAM(S): 100 INJECTION SUBCUTANEOUS at 18:51

## 2021-06-30 RX ADMIN — SODIUM CHLORIDE 500 MILLILITER(S): 9 INJECTION INTRAMUSCULAR; INTRAVENOUS; SUBCUTANEOUS at 15:01

## 2021-06-30 RX ADMIN — AMIODARONE HYDROCHLORIDE 618 MILLIGRAM(S): 400 TABLET ORAL at 18:18

## 2021-06-30 RX ADMIN — Medication 400 MILLIGRAM(S): at 20:06

## 2021-06-30 RX ADMIN — CHLORHEXIDINE GLUCONATE 1 APPLICATION(S): 213 SOLUTION TOPICAL at 12:58

## 2021-06-30 RX ADMIN — Medication 27.5 MILLIGRAM(S): at 21:15

## 2021-06-30 RX ADMIN — SODIUM CHLORIDE 1000 MILLILITER(S): 9 INJECTION INTRAMUSCULAR; INTRAVENOUS; SUBCUTANEOUS at 16:33

## 2021-06-30 RX ADMIN — PANTOPRAZOLE SODIUM 40 MILLIGRAM(S): 20 TABLET, DELAYED RELEASE ORAL at 12:58

## 2021-06-30 RX ADMIN — Medication 200 MILLIGRAM(S): at 11:00

## 2021-06-30 RX ADMIN — SODIUM CHLORIDE 50 MILLILITER(S): 9 INJECTION INTRAMUSCULAR; INTRAVENOUS; SUBCUTANEOUS at 22:22

## 2021-06-30 RX ADMIN — SODIUM CHLORIDE 50 MILLILITER(S): 9 INJECTION INTRAMUSCULAR; INTRAVENOUS; SUBCUTANEOUS at 22:24

## 2021-06-30 RX ADMIN — Medication 27.5 MILLIGRAM(S): at 08:08

## 2021-06-30 RX ADMIN — Medication 5 MILLIGRAM(S): at 06:31

## 2021-06-30 RX ADMIN — Medication 100 MILLIGRAM(S): at 05:11

## 2021-06-30 RX ADMIN — MEMANTINE HYDROCHLORIDE 10 MILLIGRAM(S): 10 TABLET ORAL at 05:11

## 2021-06-30 NOTE — CHART NOTE - NSCHARTNOTEFT_GEN_A_CORE
HPI:  Pt POD 0 PEG tube placement. Bedside PEG was preformed with anesthesia at bedside. Procedure was uncomplicated. During endoscopy Pt noted to have a Timoteo IIc ulcer in the first portion of the duodenum. Pt on DAP therapy, started on BID Protonix. Post procedure, patient in JOSAFAT with soft BP, MAPs ~ 65 -70. On post op examination, PEG secured with bumper, marker 4 at the level of the skin. Small amount of dried blood/clot noted under bumper. PEG was lavaged with sterile water, no active bleeding noted. CBC sent, Hgb stable at 12.8. Will continue to follow.    Exam:  Neuro: NAD, awake, not following commands, PERRL  Pulm: diminished breath sounds b/l, NC in place  CV: irregular rate, rapid rhythm, A fib with RVR on tele  GI: abd soft, NT, ND, PEG in place at 4 cm at the skin, draining to gravity, ecchymosis LLQ  : diallo in place, draining clear yellow urine  Skin: intact    Plan:  - CBC stable, continue to monitor for s/sx of bleeding  - Okay to start tube feeds 4 hours post peg placement  - Recommend rate control w/ beta blockade for JOSAFAT  - SICU will continue to follow

## 2021-06-30 NOTE — PROGRESS NOTE ADULT - SUBJECTIVE AND OBJECTIVE BOX
Chief complaint:   Patient is a 81y old  Female who presents with a chief complaint of Right MCA CVA (29 Jun 2021 18:15)    HPI:  82 y/o female, PMH DM, HTN, presents to ED as Code Stroke after she was found down on her front lawn by a neighbor. Unknown down time, reported LKW 10:50 am. Initial fingerstick for EMS 60, pt given amp of d50 with improvement in glucose level. Pt presenting with L sided facial droop, L sided weakness, dysarthria. Out of window for TPA. CTH with acute right MCA infarct, CTA Right M2 occlusion. CTP with 16 ml core infarct, right MCA territory.Mismatch volume: 22 ml, right MCA territory, suggesting ischemic penumbra. Mismatch ratio: 2.4. 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    NIHSS= 18  mRs= 0     1A: Level of consciousness     +1= Arouses to minor stimulation  1B: Ask month and age       0= Both questions right  1C: "Blink eyes" and "Squeeze Hands"       0= Performs both   2: Horizontal EOMs       +1= Partial gaze palsy; gaze is abnormal in one or both eyes, but forced deviation or total gaze paresis is not present  3: Visual fields     +2= Complete hemianopsia  4: Facial palsy (use grimace if obtunded)      +3=Complete paralysis of one or both sides (absence of facial movement in the upper and lower face)  5A: Left arm motor drift (count out loud and use fingers to show count)      +3= No effort against gravity  5B: Right arm motor drift         0= No drift x 10 seconds  6A: Left leg motor drift       +3= No effort against gravity  6B: Right leg motor drift       0= No drift x 10 seconds  7: Limb ataxia (FNF/heel-shin)       0= No ataxia   8: Sensation       +2= severe sensory loss left side  9: Language/aphasia- describe the scene (on jeanine); name the items; read the sentences (on jeanine)       +1= mild-moderate aphasia some obvious changes without significant limitation  10: Dysarthria- read the words        +2= Severe dysarthria: unintelligible slurring or out of proportion to dysphasia  11: Extinction/inattention       0= No abnormality  Total: 18 (19 Jun 2021 16:30)        24hr EVENTS:      ROS: [ ]  Unable to assess due to mental status   All other systems negative    -----------------------------------------------------------------------------------------------------------------------------------------------------------------------------------  ICU Vital Signs Last 24 Hrs  T(C): 37.6 (30 Jun 2021 07:23), Max: 37.6 (29 Jun 2021 23:00)  T(F): 99.7 (30 Jun 2021 07:23), Max: 99.7 (29 Jun 2021 23:00)  HR: 97 (30 Jun 2021 08:00) (77 - 126)  BP: 145/86 (29 Jun 2021 16:00) (121/76 - 145/86)  BP(mean): 105 (29 Jun 2021 16:00) (92 - 105)  ABP: 126/66 (30 Jun 2021 08:00) (117/66 - 160/83)  ABP(mean): 86 (30 Jun 2021 08:00) (83 - 111)  RR: 23 (30 Jun 2021 08:00) (14 - 28)  SpO2: 95% (30 Jun 2021 08:00) (92% - 98%)      I&O's Summary    29 Jun 2021 07:01  -  30 Jun 2021 07:00  --------------------------------------------------------  IN: 1045 mL / OUT: 2055 mL / NET: -1010 mL        MEDICATIONS  (STANDING):  amantadine Syrup 100 milliGRAM(s) Oral <User Schedule>  aspirin  chewable 81 milliGRAM(s) Oral daily  atorvastatin 40 milliGRAM(s) Oral at bedtime  chlorhexidine 2% Cloths 1 Application(s) Topical daily  clopidogrel Tablet 75 milliGRAM(s) Oral daily  dextrose 40% Gel 15 Gram(s) Oral once  dextrose 5%. 1000 milliLiter(s) (50 mL/Hr) IV Continuous <Continuous>  dextrose 5%. 1000 milliLiter(s) (100 mL/Hr) IV Continuous <Continuous>  dextrose 50% Injectable 25 Gram(s) IV Push once  dextrose 50% Injectable 12.5 Gram(s) IV Push once  dextrose 50% Injectable 25 Gram(s) IV Push once  doxazosin 4 milliGRAM(s) Oral at bedtime  enoxaparin Injectable 40 milliGRAM(s) SubCutaneous <User Schedule>  glucagon  Injectable 1 milliGRAM(s) IntraMuscular once  insulin lispro (ADMELOG) corrective regimen sliding scale   SubCutaneous every 6 hours  insulin NPH human recombinant 8 Unit(s) SubCutaneous every 6 hours  memantine 10 milliGRAM(s) Oral two times a day  valproate sodium IVPB 500 milliGRAM(s) IV Intermittent every 12 hours      RESPIRATORY:        IMAGING:   Recent imaging studies were reviewed.    LAB RESULTS:                          15.0   7.36  )-----------( 216      ( 30 Jun 2021 03:46 )             42.6       PT/INR - ( 30 Jun 2021 03:46 )   PT: 13.5 sec;   INR: 1.17 ratio         PTT - ( 30 Jun 2021 03:46 )  PTT:29.4 sec    06-30    135  |  98  |  30.4<H>  ----------------------------<  113<H>  4.0   |  26.0  |  0.46<L>    Ca    8.1<L>      30 Jun 2021 03:46  Phos  3.9     06-30  Mg     2.2     06-30        -----------------------------------------------------------------------------------------------------------------------------------------------------------------------------------    PHYSICAL EXAM:  General: Calm, cooperative.  Neuro:  -Mental status- No acute distress, no EO but alert and awake (likely EO apraxia), intermittently FC, R gaze pref. PERRL 3mm.  writes periodically  spont RUE and RLE  LUE ext.  LLE - wdrl.  CV: S1S2 present  Pulm: clear to auscultation  Abd: Soft, nontender, nondistended  Ext: no noted edema in lower ext  Skin: warm, dry     Chief complaint:   Patient is a 81y old  Female who presents with a chief complaint of Right MCA CVA (29 Jun 2021 18:15)    HPI:  82 y/o female, PMH DM, HTN, presents to ED as Code Stroke after she was found down on her front lawn by a neighbor. Unknown down time, reported LKW 10:50 am. Initial fingerstick for EMS 60, pt given amp of d50 with improvement in glucose level. Pt presenting with L sided facial droop, L sided weakness, dysarthria. Out of window for TPA. CTH with acute right MCA infarct, CTA Right M2 occlusion. CTP with 16 ml core infarct, right MCA territory.Mismatch volume: 22 ml, right MCA territory, suggesting ischemic penumbra. Mismatch ratio: 2.4. 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    NIHSS= 18  mRs= 0       24hr EVENTS: no acute issues    ROS: [x]  Unable to assess due to mental status   All other systems negative    -----------------------------------------------------------------------------------------------------------------------------------------------------------------------------------  ICU Vital Signs Last 24 Hrs  T(C): 37.6 (30 Jun 2021 07:23), Max: 37.6 (29 Jun 2021 23:00)  T(F): 99.7 (30 Jun 2021 07:23), Max: 99.7 (29 Jun 2021 23:00)  HR: 97 (30 Jun 2021 08:00) (77 - 126)  BP: 145/86 (29 Jun 2021 16:00) (121/76 - 145/86)  BP(mean): 105 (29 Jun 2021 16:00) (92 - 105)  ABP: 126/66 (30 Jun 2021 08:00) (117/66 - 160/83)  ABP(mean): 86 (30 Jun 2021 08:00) (83 - 111)  RR: 23 (30 Jun 2021 08:00) (14 - 28)  SpO2: 95% (30 Jun 2021 08:00) (92% - 98%)      I&O's Summary    29 Jun 2021 07:01  -  30 Jun 2021 07:00  --------------------------------------------------------  IN: 1045 mL / OUT: 2055 mL / NET: -1010 mL        MEDICATIONS  (STANDING):  amantadine Syrup 100 milliGRAM(s) Oral <User Schedule>  aspirin  chewable 81 milliGRAM(s) Oral daily  atorvastatin 40 milliGRAM(s) Oral at bedtime  chlorhexidine 2% Cloths 1 Application(s) Topical daily  clopidogrel Tablet 75 milliGRAM(s) Oral daily  dextrose 40% Gel 15 Gram(s) Oral once  dextrose 5%. 1000 milliLiter(s) (50 mL/Hr) IV Continuous <Continuous>  dextrose 5%. 1000 milliLiter(s) (100 mL/Hr) IV Continuous <Continuous>  dextrose 50% Injectable 25 Gram(s) IV Push once  dextrose 50% Injectable 12.5 Gram(s) IV Push once  dextrose 50% Injectable 25 Gram(s) IV Push once  doxazosin 4 milliGRAM(s) Oral at bedtime  enoxaparin Injectable 40 milliGRAM(s) SubCutaneous <User Schedule>  glucagon  Injectable 1 milliGRAM(s) IntraMuscular once  insulin lispro (ADMELOG) corrective regimen sliding scale   SubCutaneous every 6 hours  insulin NPH human recombinant 8 Unit(s) SubCutaneous every 6 hours  memantine 10 milliGRAM(s) Oral two times a day  valproate sodium IVPB 500 milliGRAM(s) IV Intermittent every 12 hours      RESPIRATORY:        IMAGING:   Recent imaging studies were reviewed.    LAB RESULTS:                          15.0   7.36  )-----------( 216      ( 30 Jun 2021 03:46 )             42.6       PT/INR - ( 30 Jun 2021 03:46 )   PT: 13.5 sec;   INR: 1.17 ratio         PTT - ( 30 Jun 2021 03:46 )  PTT:29.4 sec    06-30    135  |  98  |  30.4<H>  ----------------------------<  113<H>  4.0   |  26.0  |  0.46<L>    Ca    8.1<L>      30 Jun 2021 03:46  Phos  3.9     06-30  Mg     2.2     06-30        -----------------------------------------------------------------------------------------------------------------------------------------------------------------------------------    PHYSICAL EXAM:  General: Calm, cooperative.  Neuro:  -Mental status- No acute distress, no EO but alert and awake (likely EO apraxia), intermittently FC, R gaze pref. PERRL 3mm.  writes periodically  spont RUE and RLE  LUE ext.  LLE - wdrl.  CV: S1S2 present  Pulm: clear to auscultation  Abd: Soft, nontender, nondistended  Ext: no noted edema in lower ext  Skin: warm, dry

## 2021-06-30 NOTE — PROGRESS NOTE ADULT - SUBJECTIVE AND OBJECTIVE BOX
CC: follow up GOC, symptoms  INTERVAL HPI/OVERNIGHT EVENTS:  none reported  nurse reports patient responsive to questions    PRESENT SYMPTOMS: SOURCE:  Patient/Family/Team    PAIN SCALE:  0 = none  1 = mild   2 = moderate  3 = severe    Pain: appears comfortable    Dyspnea:  [ ] YES [x ] NO  Anxiety:  [ ] YES [ x] NO  Fatigue: [x ] YES [ ] NO  Nausea: [ ] YES [ ] NO  Loss of Appetite: [ ] YES [ ] NO  Other symptoms: __________    MEDICATIONS  (STANDING):  amantadine Syrup 100 milliGRAM(s) Oral <User Schedule>  aspirin  chewable 81 milliGRAM(s) Oral daily  atorvastatin 40 milliGRAM(s) Oral at bedtime  chlorhexidine 2% Cloths 1 Application(s) Topical daily  clopidogrel Tablet 75 milliGRAM(s) Oral daily  dextrose 40% Gel 15 Gram(s) Oral once  dextrose 5%. 1000 milliLiter(s) (50 mL/Hr) IV Continuous <Continuous>  dextrose 5%. 1000 milliLiter(s) (100 mL/Hr) IV Continuous <Continuous>  dextrose 50% Injectable 25 Gram(s) IV Push once  dextrose 50% Injectable 12.5 Gram(s) IV Push once  dextrose 50% Injectable 25 Gram(s) IV Push once  doxazosin 4 milliGRAM(s) Oral at bedtime  enoxaparin Injectable 40 milliGRAM(s) SubCutaneous <User Schedule>  glucagon  Injectable 1 milliGRAM(s) IntraMuscular once  insulin lispro (ADMELOG) corrective regimen sliding scale   SubCutaneous every 6 hours  insulin NPH human recombinant 8 Unit(s) SubCutaneous every 6 hours  memantine 10 milliGRAM(s) Oral two times a day  valproate sodium IVPB 500 milliGRAM(s) IV Intermittent every 12 hours    MEDICATIONS  (PRN):  labetalol Injectable 10 milliGRAM(s) IV Push every 2 hours PRN SBP >165  metoprolol tartrate Injectable 5 milliGRAM(s) IV Push every 6 hours PRN SBP>150      Allergies    Allergy Status Unknown    Intolerances          Karnofsky Performance Score/Palliative Performance Status Version 2:         %    Vital Signs Last 24 Hrs  T(C): 37.6 (30 Jun 2021 07:23), Max: 37.6 (29 Jun 2021 23:00)  T(F): 99.7 (30 Jun 2021 07:23), Max: 99.7 (29 Jun 2021 23:00)  HR: 96 (30 Jun 2021 10:00) (89 - 126)  BP: 145/86 (29 Jun 2021 16:00) (121/76 - 145/86)  BP(mean): 105 (29 Jun 2021 16:00) (92 - 105)  RR: 26 (30 Jun 2021 10:00) (14 - 28)  SpO2: 95% (30 Jun 2021 10:00) (92% - 98%)    PHYSICAL EXAM:    General: [ ] alert  [ ] oriented x ____ [ ] lethargic [ ] agitated                  [ ] cachexia  [ ] nonverbal  [ ] coma    HEENT: [ ] normal  [ ] dry mouth  [ ] ET tube/trach    Lungs: [ ] comfortable [ ] tachypnea/labored breathing  [ ] excessive secretions    CV: [ ] normal  [ ] tachycardia    GI: [ ] normal  [ ] distended  [ ] tender  [ ] no BS               [ ] PEG/NG/OG tube    : [ ] normal  [ ] incontinent  [ ] oliguria/anuria  [ ] diallo    MSK: [ ] normal  [ ] weakness  [ ] edema             [ ] ambulatory  [ ] bedbound/wheelchair bound    Skin: [ ] normal  [ ] pressure ulcers- Stage_____  [ ] no rash    LABS:                        15.0   7.36  )-----------( 216      ( 30 Jun 2021 03:46 )             42.6     06-30    135  |  98  |  30.4<H>  ----------------------------<  113<H>  4.0   |  26.0  |  0.46<L>    Ca    8.1<L>      30 Jun 2021 03:46  Phos  3.9     06-30  Mg     2.2     06-30      PT/INR - ( 30 Jun 2021 03:46 )   PT: 13.5 sec;   INR: 1.17 ratio         PTT - ( 30 Jun 2021 03:46 )  PTT:29.4 sec    I&O's Summary    29 Jun 2021 07:01  -  30 Jun 2021 07:00  --------------------------------------------------------  IN: 1045 mL / OUT: 2055 mL / NET: -1010 mL    30 Jun 2021 07:01  -  30 Jun 2021 10:11  --------------------------------------------------------  IN: 75 mL / OUT: 350 mL / NET: -275 mL        RADIOLOGY & ADDITIONAL STUDIES:

## 2021-06-30 NOTE — PROGRESS NOTE ADULT - ASSESSMENT
81yr woman with R MCA stroke with left sided weakness, with respiratory failure in the setting of PNA.      Problem/Recommendation - 1:  Problem: Acute cerebrovascular accident (CVA) due to thrombosis of right middle cerebral artery. Recommendation: Patient out of window for TPA   cont management per NSICU - ASA.     Problem/Recommendation - 2:  ·  Problem: Pneumonia.  Recommendation:  s/p extubation  s/p abx      Problem/Recommendation - 3:  ·  Problem: Acute left-sided weakness.  Recommendation:   Assist with care   PMR consulted.   will need to monitor patient's progress    Problem/Recommendation 4  Dysphagia  Family has elected PEG which may be more permanent. Family already felt this given her condition.     Problem/Recommendation - 5:  ·  Problem: Encounter for palliative care.  Recommendation:  81yr woman with R MCA stroke with left sided weakness, with respiratory failure in the setting of PNA.      Problem/Recommendation - 1:  Problem: Acute cerebrovascular accident (CVA) due to thrombosis of right middle cerebral artery. Recommendation: Patient out of window for TPA   cont management per NSICU - ASA.     Problem/Recommendation - 2:  ·  Problem: Pneumonia.  Recommendation:  s/p extubation  s/p abx      Problem/Recommendation - 3:  ·  Problem: Acute left-sided weakness.  Recommendation:   Assist with care   PMR consulted.   will need to monitor patient's progress    Problem/Recommendation 4  Dysphagia  Unable to do swallow eval  Family has elected PEG      Problem/Recommendation - 5:  ·  Problem: Encounter for palliative care.  Recommendation:   Family meeting held on 6/29 - They were informed of declined neurological status and PEG tube likely  may be more permanent.   They have elected to proceed with PEG

## 2021-06-30 NOTE — PROGRESS NOTE ADULT - SUBJECTIVE AND OBJECTIVE BOX
consult dictated   no significant edema or trauma to larynx cords mobile minimal bowing small polyp right cord that is probably old

## 2021-06-30 NOTE — PROGRESS NOTE ADULT - ASSESSMENT
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    NEURO:  Neuro check q 4hrs  ASA and plavix (CHANCE trial)  R wrist restraint  VPA with cortical hyperexcitability  palliative consult appreciated  start amantadine 100mg and namenda  Activity: [X] OOB as tolerated  [X] PT [X] OT X[] splint    PULM:   DNI  Monitor O2 sats, maintain above 92 %  chest PT     CV:  -150    RENAL:  Urinary retention - diallo replaced 6/26/21 -  Cardura, re-eval in 48 hrs   stable electrolytes    GI:  Diet: NGT placement, TF- glucerna 50 cc/hr   Bowel regimen  [X] senna, Miralax  consult for PEG   sp/sw eval    ENDO:   cont NPH regimen  SSI- low dose  Atorvastatin for HLD  Goal euglycemia (-180)    HEME/ONC:  VTE prophylaxis: [X] SCDs [X] chemoprophylaxis- Lovenox 40mh   ASA / Plavix    ID: monitor for fever  zosyn for PNA- completed 6/25/21  F/U urine and blood - no growth.    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    NEURO:  Neuro check q 4hrs  ASA and plavix (CHANCE trial)  R mitt  VPA with cortical hyperexcitability  palliative consult appreciated  start amantadine 100mg and namenda  Activity: [X] OOB as tolerated  [X] PT [X] OT X[] splint    PULM:   DNI  Monitor O2 sats, maintain above 92 %  chest PT     CV:  -150    RENAL:  Urinary retention - diallo replaced 6/26/21 -  Cardura, re-eval in 48 hrs   stable electrolytes    GI:  Diet: NPO for PEG  Bowel regimen  [X] senna, Miralax  consult for PEG today    ENDO:   cont NPH regimen  SSI- low dose  Atorvastatin for HLD  Goal euglycemia (-180)    HEME/ONC:  VTE prophylaxis: [X] SCDs [X] chemoprophylaxis- Lovenox 40mh   ASA / Plavix    ID: monitor for fever  zosyn for PNA- completed 6/25/21  F/U urine and blood - no growth.    SOCIAL/FAMILY:  discussed GOC - family opts for PEG as pt is extubated and maintains her airways  consult     CODE STATUS:  [X] Full Code     DISPOSITION:  [X] ICU     [X] Patient is at high risk of neurologic deterioration/death due to:  brain swelling and risk of herniation

## 2021-06-30 NOTE — BRIEF OPERATIVE NOTE - OPERATION/FINDINGS
EGD and PEG placement performed, pylorus cannulated visualized ulcer with necrotic tissue, no active bleeding.

## 2021-06-30 NOTE — CHART NOTE - NSCHARTNOTEFT_GEN_A_CORE
Source: Patient [ ]  Family [ ]   other [x ] EMR and discussed in A.M. Rounds     Enteral /Parenteral Nutrition: Diet, NPO after Midnight:      NPO Start Date: 29-Jun-2021,   NPO Start Time: 23:59 (06-29-21 @ 17:32)  Diet, NPO with Tube Feed:   Tube Feeding Modality: Orogastric  Glucerna 1.5 Peter (GLUCERNA1.5)  Total Volume for 24 Hours (mL): 1200  Continuous  Starting Tube Feed Rate {mL per Hour}: 10  Increase Tube Feed Rate by (mL): 10     Every 4 hours  Until Goal Tube Feed Rate (mL per Hour): 50  Tube Feed Duration (in Hours): 24  Tube Feed Start Time: 10:00 (06-28-21 @ 18:58)      Current Weight:   (6/29) 148 lbs   (6/20) 143 lbs    % Weight Change: 5# weight change in 9 days; unsure of accuracy of weights; will continue to monitor weights for trends. (Generalized edema noted)      Pertinent Medications: MEDICATIONS  (STANDING):  amantadine Syrup 100 milliGRAM(s) Oral <User Schedule>  aspirin  chewable 81 milliGRAM(s) Oral daily  atorvastatin 40 milliGRAM(s) Oral at bedtime  chlorhexidine 2% Cloths 1 Application(s) Topical daily  clopidogrel Tablet 75 milliGRAM(s) Oral daily  dextrose 40% Gel 15 Gram(s) Oral once  dextrose 5%. 1000 milliLiter(s) (50 mL/Hr) IV Continuous <Continuous>  dextrose 5%. 1000 milliLiter(s) (100 mL/Hr) IV Continuous <Continuous>  dextrose 50% Injectable 25 Gram(s) IV Push once  dextrose 50% Injectable 12.5 Gram(s) IV Push once  dextrose 50% Injectable 25 Gram(s) IV Push once  doxazosin 4 milliGRAM(s) Oral at bedtime  enoxaparin Injectable 40 milliGRAM(s) SubCutaneous <User Schedule>  glucagon  Injectable 1 milliGRAM(s) IntraMuscular once  insulin lispro (ADMELOG) corrective regimen sliding scale   SubCutaneous every 6 hours  insulin NPH human recombinant 8 Unit(s) SubCutaneous every 6 hours  memantine 10 milliGRAM(s) Oral two times a day  valproate sodium IVPB 500 milliGRAM(s) IV Intermittent every 12 hours    MEDICATIONS  (PRN):  labetalol Injectable 10 milliGRAM(s) IV Push every 2 hours PRN SBP >165  metoprolol tartrate Injectable 5 milliGRAM(s) IV Push every 6 hours PRN SBP>150    Pertinent Labs: CBC Full  -  ( 30 Jun 2021 03:46 )  WBC Count : 7.36 K/uL  RBC Count : 5.09 M/uL  Hemoglobin : 15.0 g/dL  Hematocrit : 42.6 %  Platelet Count - Automated : 216 K/uL  Mean Cell Volume : 83.7 fl  Mean Cell Hemoglobin : 29.5 pg  Mean Cell Hemoglobin Concentration : 35.2 gm/dL  Auto Neutrophil # : x  Auto Lymphocyte # : x  Auto Monocyte # : x  Auto Eosinophil # : x  Auto Basophil # : x  Auto Neutrophil % : x  Auto Lymphocyte % : x  Auto Monocyte % : x  Auto Eosinophil % : x  Auto Basophil % : x        Skin: No breakdown noted     Nutrition focused physical exam conducted - found signs of malnutrition [ ]absent [x ]present    Subcutaneous fat loss: mild [x ] Orbital fat pads region, [ ]Buccal fat region, [ ]Triceps region,  [ ]Ribs region    Muscle wasting: mild [x ]Temples region, [ ]Clavicle region, [x ]Shoulder region, [ ]Scapula region, [ ]Interosseous region,  [ ]thigh region, [ ]Calf region    Estimated Needs:   [x ] no change since previous assessment  [ ] recalculated:     Brief Hospital Course:   Pt is S/P R 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 Ongoing GOC, possible PEG pending noted.       Current Nutrition Diagnosis:  Pt remains at high nutrition risk secondary to moderate (acute) protein calorie malnutrition related to inability to meet sufficient protein energy requirements in setting of Right MCA CVA, resp failure requiring vent support as evidenced by physical signs of mild muscle mass and mild fat loss and + edema. Pt is now extubated (6/27) was receiving enteral feeds of Glucerna @ 50ml/hr; however currently on hold for possible PEG placement. Recommendations below:     Recommendations:   1. Continue MVI daily   2. Check weight daily to monitor trends   3. Continue with Glucerna @ 50ml/hr (x20 hrs) to provide 1000 ml, 1500 kcal, 83g protein, 759 ml free water, and 100% of RDIs for vitamins/minerals. Additional free water per MD discretion.  (via NG or PEG) once decision made.         Monitoring and Evaluation:   [ ] PO intake [x ] Tolerance to diet prescription [X] Weights  [X] Follow up per protocol [X] Labs:

## 2021-07-01 LAB
ANION GAP SERPL CALC-SCNC: 10 MMOL/L — SIGNIFICANT CHANGE UP (ref 5–17)
APTT BLD: 25.5 SEC — LOW (ref 27.5–35.5)
BUN SERPL-MCNC: 23.3 MG/DL — HIGH (ref 8–20)
CALCIUM SERPL-MCNC: 7.2 MG/DL — LOW (ref 8.6–10.2)
CHLORIDE SERPL-SCNC: 103 MMOL/L — SIGNIFICANT CHANGE UP (ref 98–107)
CO2 SERPL-SCNC: 21 MMOL/L — LOW (ref 22–29)
CREAT SERPL-MCNC: 0.39 MG/DL — LOW (ref 0.5–1.3)
GLUCOSE BLDC GLUCOMTR-MCNC: 117 MG/DL — HIGH (ref 70–99)
GLUCOSE BLDC GLUCOMTR-MCNC: 121 MG/DL — HIGH (ref 70–99)
GLUCOSE BLDC GLUCOMTR-MCNC: 127 MG/DL — HIGH (ref 70–99)
GLUCOSE SERPL-MCNC: 125 MG/DL — HIGH (ref 70–99)
HCT VFR BLD CALC: 34 % — LOW (ref 34.5–45)
HCT VFR BLD CALC: 37.8 % — SIGNIFICANT CHANGE UP (ref 34.5–45)
HGB BLD-MCNC: 11.5 G/DL — SIGNIFICANT CHANGE UP (ref 11.5–15.5)
HGB BLD-MCNC: 12.8 G/DL — SIGNIFICANT CHANGE UP (ref 11.5–15.5)
INR BLD: 1.27 RATIO — HIGH (ref 0.88–1.16)
MAGNESIUM SERPL-MCNC: 2 MG/DL — SIGNIFICANT CHANGE UP (ref 1.6–2.6)
MCHC RBC-ENTMCNC: 28.2 PG — SIGNIFICANT CHANGE UP (ref 27–34)
MCHC RBC-ENTMCNC: 28.2 PG — SIGNIFICANT CHANGE UP (ref 27–34)
MCHC RBC-ENTMCNC: 33.8 GM/DL — SIGNIFICANT CHANGE UP (ref 32–36)
MCHC RBC-ENTMCNC: 33.9 GM/DL — SIGNIFICANT CHANGE UP (ref 32–36)
MCV RBC AUTO: 83.3 FL — SIGNIFICANT CHANGE UP (ref 80–100)
MCV RBC AUTO: 83.3 FL — SIGNIFICANT CHANGE UP (ref 80–100)
PHOSPHATE SERPL-MCNC: 3.8 MG/DL — SIGNIFICANT CHANGE UP (ref 2.4–4.7)
PLATELET # BLD AUTO: 209 K/UL — SIGNIFICANT CHANGE UP (ref 150–400)
PLATELET # BLD AUTO: 211 K/UL — SIGNIFICANT CHANGE UP (ref 150–400)
POTASSIUM SERPL-MCNC: 4.2 MMOL/L — SIGNIFICANT CHANGE UP (ref 3.5–5.3)
POTASSIUM SERPL-SCNC: 4.2 MMOL/L — SIGNIFICANT CHANGE UP (ref 3.5–5.3)
PROTHROM AB SERPL-ACNC: 14.6 SEC — HIGH (ref 10.6–13.6)
RBC # BLD: 4.08 M/UL — SIGNIFICANT CHANGE UP (ref 3.8–5.2)
RBC # BLD: 4.54 M/UL — SIGNIFICANT CHANGE UP (ref 3.8–5.2)
RBC # FLD: 14.8 % — HIGH (ref 10.3–14.5)
RBC # FLD: 15.5 % — HIGH (ref 10.3–14.5)
SODIUM SERPL-SCNC: 134 MMOL/L — LOW (ref 135–145)
WBC # BLD: 12.64 K/UL — HIGH (ref 3.8–10.5)
WBC # BLD: 13.88 K/UL — HIGH (ref 3.8–10.5)
WBC # FLD AUTO: 12.64 K/UL — HIGH (ref 3.8–10.5)
WBC # FLD AUTO: 13.88 K/UL — HIGH (ref 3.8–10.5)

## 2021-07-01 PROCEDURE — 99232 SBSQ HOSP IP/OBS MODERATE 35: CPT

## 2021-07-01 PROCEDURE — 99291 CRITICAL CARE FIRST HOUR: CPT

## 2021-07-01 PROCEDURE — 71045 X-RAY EXAM CHEST 1 VIEW: CPT | Mod: 26

## 2021-07-01 PROCEDURE — 99024 POSTOP FOLLOW-UP VISIT: CPT

## 2021-07-01 RX ORDER — DOXAZOSIN MESYLATE 4 MG
4 TABLET ORAL AT BEDTIME
Refills: 0 | Status: DISCONTINUED | OUTPATIENT
Start: 2021-07-01 | End: 2021-07-08

## 2021-07-01 RX ORDER — POLYETHYLENE GLYCOL 3350 17 G/17G
17 POWDER, FOR SOLUTION ORAL DAILY
Refills: 0 | Status: DISCONTINUED | OUTPATIENT
Start: 2021-07-01 | End: 2021-07-08

## 2021-07-01 RX ORDER — SODIUM CHLORIDE 9 MG/ML
1000 INJECTION INTRAMUSCULAR; INTRAVENOUS; SUBCUTANEOUS
Refills: 0 | Status: DISCONTINUED | OUTPATIENT
Start: 2021-07-01 | End: 2021-07-04

## 2021-07-01 RX ORDER — MEMANTINE HYDROCHLORIDE 10 MG/1
10 TABLET ORAL
Refills: 0 | Status: DISCONTINUED | OUTPATIENT
Start: 2021-07-01 | End: 2021-07-08

## 2021-07-01 RX ORDER — AMANTADINE HCL 100 MG
100 CAPSULE ORAL
Refills: 0 | Status: DISCONTINUED | OUTPATIENT
Start: 2021-07-01 | End: 2021-07-08

## 2021-07-01 RX ORDER — SENNA PLUS 8.6 MG/1
2 TABLET ORAL AT BEDTIME
Refills: 0 | Status: DISCONTINUED | OUTPATIENT
Start: 2021-07-01 | End: 2021-07-08

## 2021-07-01 RX ORDER — VALPROIC ACID (AS SODIUM SALT) 250 MG/5ML
500 SOLUTION, ORAL ORAL EVERY 12 HOURS
Refills: 0 | Status: DISCONTINUED | OUTPATIENT
Start: 2021-07-01 | End: 2021-07-08

## 2021-07-01 RX ORDER — ATORVASTATIN CALCIUM 80 MG/1
40 TABLET, FILM COATED ORAL AT BEDTIME
Refills: 0 | Status: DISCONTINUED | OUTPATIENT
Start: 2021-07-01 | End: 2021-07-08

## 2021-07-01 RX ADMIN — MEMANTINE HYDROCHLORIDE 10 MILLIGRAM(S): 10 TABLET ORAL at 17:21

## 2021-07-01 RX ADMIN — PANTOPRAZOLE SODIUM 40 MILLIGRAM(S): 20 TABLET, DELAYED RELEASE ORAL at 05:44

## 2021-07-01 RX ADMIN — Medication 1 DROP(S): at 21:42

## 2021-07-01 RX ADMIN — ATORVASTATIN CALCIUM 40 MILLIGRAM(S): 80 TABLET, FILM COATED ORAL at 21:26

## 2021-07-01 RX ADMIN — PANTOPRAZOLE SODIUM 40 MILLIGRAM(S): 20 TABLET, DELAYED RELEASE ORAL at 17:20

## 2021-07-01 RX ADMIN — ENOXAPARIN SODIUM 40 MILLIGRAM(S): 100 INJECTION SUBCUTANEOUS at 17:21

## 2021-07-01 RX ADMIN — POLYETHYLENE GLYCOL 3350 17 GRAM(S): 17 POWDER, FOR SOLUTION ORAL at 14:14

## 2021-07-01 RX ADMIN — Medication 27.5 MILLIGRAM(S): at 08:30

## 2021-07-01 RX ADMIN — Medication 4 MILLIGRAM(S): at 21:27

## 2021-07-01 RX ADMIN — SENNA PLUS 2 TABLET(S): 8.6 TABLET ORAL at 21:27

## 2021-07-01 RX ADMIN — Medication 500 MILLIGRAM(S): at 20:15

## 2021-07-01 RX ADMIN — MEMANTINE HYDROCHLORIDE 10 MILLIGRAM(S): 10 TABLET ORAL at 05:44

## 2021-07-01 RX ADMIN — Medication 100 MILLIGRAM(S): at 14:14

## 2021-07-01 RX ADMIN — CHLORHEXIDINE GLUCONATE 1 APPLICATION(S): 213 SOLUTION TOPICAL at 14:14

## 2021-07-01 RX ADMIN — Medication 100 MILLIGRAM(S): at 05:44

## 2021-07-01 RX ADMIN — Medication 1 DROP(S): at 14:14

## 2021-07-01 NOTE — PROGRESS NOTE ADULT - SUBJECTIVE AND OBJECTIVE BOX
CC:  follow up GOC   INTERVAL HPI/OVERNIGHT EVENTS:    PRESENT SYMPTOMS: SOURCE:  Patient/Family/Team    PAIN SCALE:  0 = none  1 = mild   2 = moderate  3 = severe    Pain:     Dyspnea:  [ ] YES [ ] NO  Anxiety:  [ ] YES [ ] NO  Fatigue: [ ] YES [ ] NO  Nausea: [ ] YES [ ] NO  Loss of Appetite: [ ] YES [ ] NO  Other symptoms: __________    MEDICATIONS  (STANDING):  amantadine Syrup 100 milliGRAM(s) Oral <User Schedule>  artificial tears (preservative free) Ophthalmic Solution 1 Drop(s) Both EYES three times a day  atorvastatin 40 milliGRAM(s) Oral at bedtime  chlorhexidine 2% Cloths 1 Application(s) Topical daily  dextrose 40% Gel 15 Gram(s) Oral once  dextrose 5%. 1000 milliLiter(s) (50 mL/Hr) IV Continuous <Continuous>  dextrose 5%. 1000 milliLiter(s) (100 mL/Hr) IV Continuous <Continuous>  dextrose 50% Injectable 25 Gram(s) IV Push once  dextrose 50% Injectable 12.5 Gram(s) IV Push once  dextrose 50% Injectable 25 Gram(s) IV Push once  doxazosin 4 milliGRAM(s) Oral at bedtime  enoxaparin Injectable 40 milliGRAM(s) SubCutaneous <User Schedule>  glucagon  Injectable 1 milliGRAM(s) IntraMuscular once  insulin lispro (ADMELOG) corrective regimen sliding scale   SubCutaneous every 6 hours  insulin NPH human recombinant 8 Unit(s) SubCutaneous every 6 hours  memantine 10 milliGRAM(s) Oral two times a day  pantoprazole  Injectable 40 milliGRAM(s) IV Push every 12 hours  polyethylene glycol 3350 17 Gram(s) Oral daily  senna 2 Tablet(s) Oral at bedtime  valproic  acid Syrup 500 milliGRAM(s) Oral every 12 hours    MEDICATIONS  (PRN):  labetalol Injectable 10 milliGRAM(s) IV Push every 2 hours PRN SBP >165  metoprolol tartrate Injectable 5 milliGRAM(s) IV Push every 6 hours PRN SBP>150      Allergies    Allergy Status Unknown    Intolerances          Karnofsky Performance Score/Palliative Performance Status Version 2:         %    Vital Signs Last 24 Hrs  T(C): 37.3 (01 Jul 2021 12:03), Max: 37.8 (30 Jun 2021 20:01)  T(F): 99.1 (01 Jul 2021 12:03), Max: 100 (30 Jun 2021 20:01)  HR: 65 (01 Jul 2021 12:00) (65 - 145)  BP: 121/49 (01 Jul 2021 12:00) (77/64 - 143/55)  BP(mean): 70 (01 Jul 2021 12:00) (55 - 120)  RR: 23 (01 Jul 2021 12:00) (19 - 29)  SpO2: 97% (01 Jul 2021 12:00) (95% - 100%)    PHYSICAL EXAM:    General: [ ] alert  [ ] oriented x ____ [ ] lethargic [ ] agitated                  [ ] cachexia  [ ] nonverbal  [ ] coma    HEENT: [ ] normal  [ ] dry mouth  [ ] ET tube/trach    Lungs: [ ] comfortable [ ] tachypnea/labored breathing  [ ] excessive secretions    CV: [ ] normal  [ ] tachycardia    GI: [ ] normal  [ ] distended  [ ] tender  [ ] no BS               [ ] PEG/NG/OG tube    : [ ] normal  [ ] incontinent  [ ] oliguria/anuria  [ ] diallo    MSK: [ ] normal  [ ] weakness  [ ] edema             [ ] ambulatory  [ ] bedbound/wheelchair bound    Skin: [ ] normal  [ ] pressure ulcers- Stage_____  [ ] no rash    LABS:                        11.5   12.64 )-----------( 211      ( 01 Jul 2021 04:24 )             34.0     07-01    134<L>  |  103  |  23.3<H>  ----------------------------<  125<H>  4.2   |  21.0<L>  |  0.39<L>    Ca    7.2<L>      01 Jul 2021 04:24  Phos  3.8     07-01  Mg     2.0     07-01      PT/INR - ( 01 Jul 2021 04:24 )   PT: 14.6 sec;   INR: 1.27 ratio         PTT - ( 01 Jul 2021 04:24 )  PTT:25.5 sec    I&O's Summary    30 Jun 2021 07:01  -  01 Jul 2021 07:00  --------------------------------------------------------  IN: 4189.2 mL / OUT: 1691 mL / NET: 2498.2 mL    01 Jul 2021 07:01  -  01 Jul 2021 12:39  --------------------------------------------------------  IN: 250 mL / OUT: 250 mL / NET: 0 mL        RADIOLOGY & ADDITIONAL STUDIES:     CC:  follow up GOC   INTERVAL HPI/OVERNIGT  EVENTS:  s/p PEG  events noted    PRESENT SYMPTOMS: SOURCE:  Patient/Family/Team    PAIN SCALE:  0 = none  1 = mild   2 = moderate  3 = severe    Pain: no sign    Dyspnea:  [ ] YES [ x] NO  Anxiety:  [ ] YES [ x] NO  Fatigue: [ x] YES [ ] NO  Nausea: [ ] YES [ ] NO  na  Loss of Appetite: [ ] YES [ ] NO  na NPO  Other symptoms: __________    MEDICATIONS  (STANDING):  amantadine Syrup 100 milliGRAM(s) Oral <User Schedule>  artificial tears (preservative free) Ophthalmic Solution 1 Drop(s) Both EYES three times a day  atorvastatin 40 milliGRAM(s) Oral at bedtime  chlorhexidine 2% Cloths 1 Application(s) Topical daily  dextrose 40% Gel 15 Gram(s) Oral once  dextrose 5%. 1000 milliLiter(s) (50 mL/Hr) IV Continuous <Continuous>  dextrose 5%. 1000 milliLiter(s) (100 mL/Hr) IV Continuous <Continuous>  dextrose 50% Injectable 25 Gram(s) IV Push once  dextrose 50% Injectable 12.5 Gram(s) IV Push once  dextrose 50% Injectable 25 Gram(s) IV Push once  doxazosin 4 milliGRAM(s) Oral at bedtime  enoxaparin Injectable 40 milliGRAM(s) SubCutaneous <User Schedule>  glucagon  Injectable 1 milliGRAM(s) IntraMuscular once  insulin lispro (ADMELOG) corrective regimen sliding scale   SubCutaneous every 6 hours  insulin NPH human recombinant 8 Unit(s) SubCutaneous every 6 hours  memantine 10 milliGRAM(s) Oral two times a day  pantoprazole  Injectable 40 milliGRAM(s) IV Push every 12 hours  polyethylene glycol 3350 17 Gram(s) Oral daily  senna 2 Tablet(s) Oral at bedtime  valproic  acid Syrup 500 milliGRAM(s) Oral every 12 hours    MEDICATIONS  (PRN):  labetalol Injectable 10 milliGRAM(s) IV Push every 2 hours PRN SBP >165  metoprolol tartrate Injectable 5 milliGRAM(s) IV Push every 6 hours PRN SBP>150      Allergies    Allergy Status Unknown    Intolerances      Karnofsky Performance Score/Palliative Performance Status Version 2:  20   %    Vital Signs Last 24 Hrs  T(C): 37.3 (01 Jul 2021 12:03), Max: 37.8 (30 Jun 2021 20:01)  T(F): 99.1 (01 Jul 2021 12:03), Max: 100 (30 Jun 2021 20:01)  HR: 65 (01 Jul 2021 12:00) (65 - 145)  BP: 121/49 (01 Jul 2021 12:00) (77/64 - 143/55)  BP(mean): 70 (01 Jul 2021 12:00) (55 - 120)  RR: 23 (01 Jul 2021 12:00) (19 - 29)  SpO2: 97% (01 Jul 2021 12:00) (95% - 100%)    PHYSICAL EXAM:    General: F -  NAD eyes closed NAD  HEENT: [ x] normal  [ ] dry mouth  [ ] ET tube/trach    Lungs: [ x] comfortable [ ] tachypnea/labored breathing  [ ] excessive secretions    CV: [ x] normal  [ ] tachycardia    GI: [x ] normal  [ ] distended  [ ] tender  [ ] no BS               [ x] PEG    : [x ] normal  [x ] incontinent  [ ] oliguria/anuria  [ ] diallo    MSK: [ ] normal  [ x] weakness  [ ] edema             [ ] ambulatory  [ x] bedbound/wheelchair bound    Skin: [ x] normal  [ ] pressure ulcers- Stage_____  [ ] no rash    LABS:                        11.5   12.64 )-----------( 211      ( 01 Jul 2021 04:24 )             34.0     07-01    134<L>  |  103  |  23.3<H>  ----------------------------<  125<H>  4.2   |  21.0<L>  |  0.39<L>    Ca    7.2<L>      01 Jul 2021 04:24  Phos  3.8     07-01  Mg     2.0     07-01      PT/INR - ( 01 Jul 2021 04:24 )   PT: 14.6 sec;   INR: 1.27 ratio         PTT - ( 01 Jul 2021 04:24 )  PTT:25.5 sec    I&O's Summary    30 Jun 2021 07:01  -  01 Jul 2021 07:00  --------------------------------------------------------  IN: 4189.2 mL / OUT: 1691 mL / NET: 2498.2 mL    01 Jul 2021 07:01  -  01 Jul 2021 12:39  --------------------------------------------------------  IN: 250 mL / OUT: 250 mL / NET: 0 mL        RADIOLOGY & ADDITIONAL STUDIES:  < from: Xray Chest 1 View-PORTABLE IMMEDIATE (Xray Chest 1 View-PORTABLE IMMEDIATE .) (06.28.21 @ 19:13) >     EXAM:  XR CHEST PORTABLE IMMED 1V                          PROCEDURE DATE:  06/28/2021          INTERPRETATION:  AP chest on June 28, 2021 at 6:24 PM. Patient being followed for NG tube.    Heart magnified by technique.    Significant infiltrate proceeding inferiorly off the left hilum is again seen roughly similar to June 27. Endotracheal tube on June 27 is been removed. NG tube remains with tip down in this descending duodenum.    Present film also shows a slight hazy density right base likely fluid.    IMPRESSION: Endotracheal tube removed. Slight hazy density is presently seen at the  right base. Persistent left base process.    < end of copied text >

## 2021-07-01 NOTE — PROGRESS NOTE ADULT - ASSESSMENT
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    NEURO:  Neuro check q 4hrs  ASA and plavix held  R mitt  VPA with cortical hyperexcitability  palliative consult appreciated  start amantadine 100mg and namenda  Activity: [X] OOB as tolerated  [X] PT [X] OT X[] splint    PULM:   DNI  Monitor O2 sats, maintain above 92 %  chest PT     CV:  -150    RENAL:  Urinary retention - diallo replaced 6/26/21 -  Cardura, re-eval in 48 hrs   stable electrolytes    GI:  Diet: PEG  small hematoma at PEG site  Bowel regimen  [X] senna, Miralax  consult for PEG today    ENDO:   cont NPH regimen  SSI- low dose  Atorvastatin for HLD  Goal euglycemia (-180)    HEME/ONC:  VTE prophylaxis: [X] SCDs [X] chemoprophylaxis- Lovenox 40mh   ASA / Plavix  s/p 1 unit PRBCs 7/1    ID: monitor for fever  zosyn for PNA- completed 6/25/21  F/U urine and blood - no growth.    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    NEURO:  Neuro check q 4hrs  ASA and plavix held  R restraint  VPA with cortical hyperexcitability  palliative consult appreciated  amantadine 100mg and namenda  Activity: [X] OOB as tolerated  [X] PT [X] OT X[] splint    PULM:   DNI  Monitor O2 sats, maintain above 92 %  chest PT     CV:  -150    RENAL:  Urinary retention - diallo replaced 6/26/21 -  Cardura, re-eval in 48 hrs   stable electrolytes    GI:  Diet: PEG  small hematoma at PEG site, monitor  Bowel regimen  [X] senna, Miralax  consult for PEG today    ENDO:   cont NPH regimen  SSI- low dose  Atorvastatin for HLD  Goal euglycemia (-180)    HEME/ONC:  VTE prophylaxis: [X] SCDs [X] chemoprophylaxis- Lovenox 40mh   ASA / Plavix  s/p 1 unit PRBCs 7/1    ID: monitor for fever  zosyn for PNA- completed 6/25/21  F/U urine and blood - no growth.    SOCIAL/FAMILY:  discussed GOC - family opts for PEG as pt is extubated and maintains her airways  consult     CODE STATUS:  [X] Full Code     DISPOSITION:  [X] ICU     [X] Patient is at high risk of neurologic deterioration/death due to:  brain swelling and risk of herniation

## 2021-07-01 NOTE — PROGRESS NOTE ADULT - ASSESSMENT
81yr woman with R MCA stroke with left sided weakness, with respiratory failure in the setting of PNA.      Problem/Recommendation - 1:  Problem: Acute cerebrovascular accident (CVA) due to thrombosis of right middle cerebral artery. Recommendation:   cont management per NSICU - ASA.     Problem/Recommendation - 2:  ·  Problem: Pneumonia.  Recommendation:  s/p extubation  s/p abx      Problem/Recommendation - 3:  ·  Problem: Acute left-sided weakness.  Recommendation:   Assist with care   PT - eventual rehab    Problem/Recommendation 4  Dysphagia  Unable to do swallow eval  s/p PEG     Problem/Recommendation - 5:  ·  Problem: Encounter for palliative care.  Recommendation:   Patient s/p PEG  Family meeting held on 6/29 - They were informed of declined neurological status and PEG tube likely  may be more permanent.  They were informed if after some time QOL is not in alignment with mother's wishes, they have option to withdraw.   Palliative Care to sign off.

## 2021-07-01 NOTE — PROGRESS NOTE ADULT - SUBJECTIVE AND OBJECTIVE BOX
[facility]                                        Ne[facility] [place]                                 Megan Moulton & Wally                                  [address] [facility]                                        [place], [place], [address]                                             [phone]        CC: Stroke    HPI:   80 yo woman with medical conditions as outlined below who was found down yesterday. She presented with L facial droop, left sided weakness, and dysarthria. 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 She was admitted to NS-ICU for q 1 h neuro-checks and hemicraniectomy watch.    Interim history: remaions extubated, not interactive, had hematoma around PEG    ROS:   Unobtainable due to patient's condition.     MEDICATIONS  (STANDING):  amantadine Syrup 100 milliGRAM(s) Oral <User Schedule>  artificial tears (preservative free) Ophthalmic Solution 1 Drop(s) Both EYES three times a day  atorvastatin 40 milliGRAM(s) Oral at bedtime  chlorhexidine 2% Cloths 1 Application(s) Topical daily  dextrose 40% Gel 15 Gram(s) Oral once  dextrose 5%. 1000 milliLiter(s) (50 mL/Hr) IV Continuous <Continuous>  dextrose 5%. 1000 milliLiter(s) (100 mL/Hr) IV Continuous <Continuous>  dextrose 50% Injectable 25 Gram(s) IV Push once  dextrose 50% Injectable 12.5 Gram(s) IV Push once  dextrose 50% Injectable 25 Gram(s) IV Push once  doxazosin 4 milliGRAM(s) Oral at bedtime  enoxaparin Injectable 40 milliGRAM(s) SubCutaneous <User Schedule>  glucagon  Injectable 1 milliGRAM(s) IntraMuscular once  insulin lispro (ADMELOG) corrective regimen sliding scale   SubCutaneous every 6 hours  insulin NPH human recombinant 8 Unit(s) SubCutaneous every 6 hours  memantine 10 milliGRAM(s) Oral two times a day  pantoprazole  Injectable 40 milliGRAM(s) IV Push every 12 hours  polyethylene glycol 3350 17 Gram(s) Oral daily  senna 2 Tablet(s) Oral at bedtime  valproic  acid Syrup 500 milliGRAM(s) Oral every 12 hours    MEDICATIONS  (PRN):  labetalol Injectable 10 milliGRAM(s) IV Push every 2 hours PRN SBP >165  metoprolol tartrate Injectable 5 milliGRAM(s) IV Push every 6 hours PRN SBP>150      Vital Signs Last 24 Hrs  T(C): 36.9 (01 Jul 2021 15:59), Max: 37.8 (30 Jun 2021 20:01)  T(F): 98.5 (01 Jul 2021 15:59), Max: 100 (30 Jun 2021 20:01)  HR: 68 (01 Jul 2021 16:00) (64 - 129)  BP: 128/72 (01 Jul 2021 16:00) (77/64 - 143/55)  BP(mean): 87 (01 Jul 2021 16:00) (55 - 120)  RR: 29 (01 Jul 2021 16:00) (19 - 31)  SpO2: 96% (01 Jul 2021 16:00) (95% - 100%)    Detailed Neurologic Exam:    Mental status: Lethargic. No repsonse to voice/stimuli. Not following instructions.     Cranial nerves: Pupils equal and react symmetrically to light. There is no visual field deficit to threat. Extraocular motion is full with no nystagmus. Facial sensation is intact. Facial musculature is symmetric. Palate elevates symmetrically. Tongue is midline.    Belen/Sensory: There is normal bulk and tone.  There is no tremor.  Moves right side minimally to stimuli.   Left arm flaccid. No movement to stimuli.       Reflexes: Trace throughout and plantar responses are flexor on right. extensor on left.    Cerebellar: Cannot be tested.     Labs:                         11.5   12.64 )-----------( 211      ( 01 Jul 2021 04:24 )             34.0     07-01    134<L>  |  103  |  23.3<H>  ----------------------------<  125<H>  4.2   |  21.0<L>  |  0.39<L>    Ca    7.2<L>      01 Jul 2021 04:24  Phos  3.8     07-01  Mg     2.0     07-01        PT/INR - ( 01 Jul 2021 04:24 )   PT: 14.6 sec;   INR: 1.27 ratio         PTT - ( 01 Jul 2021 04:24 )  PTT:25.5 sec    Rad:   CT head right middle cerebral artery/thalamic strokes, no blood or mass

## 2021-07-01 NOTE — PROGRESS NOTE ADULT - SUBJECTIVE AND OBJECTIVE BOX
I have seen and examined the patient during SICU rounds from 830-10a     events noted.  CT reviewed  HD normal, perfusion is adequate  and soft, PEG in place.    A/P Mural stomach hematoma after PEG, hemoperitoneum.  Agree with temporary holding ASA/PLAXIV for 25 hrs  Ok to resume TF 7/2  we shall follow

## 2021-07-01 NOTE — PROGRESS NOTE ADULT - SUBJECTIVE AND OBJECTIVE BOX
Chief complaint:   Patient is a 81y old  Female who presents with a chief complaint of Right MCA CVA (30 Jun 2021 19:47)    HPI:  82 y/o female, PMH DM, HTN, presents to ED as Code Stroke after she was found down on her front lawn by a neighbor. Unknown down time, reported LKW 10:50 am. Initial fingerstick for EMS 60, pt given amp of d50 with improvement in glucose level. Pt presenting with L sided facial droop, L sided weakness, dysarthria. Out of window for TPA. CTH with acute right MCA infarct, CTA Right M2 occlusion. CTP with 16 ml core infarct, right MCA territory.Mismatch volume: 22 ml, right MCA territory, suggesting ischemic penumbra. Mismatch ratio: 2.4. 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    NIHSS= 18  mRs= 0     1A: Level of consciousness     +1= Arouses to minor stimulation  1B: Ask month and age       0= Both questions right  1C: "Blink eyes" and "Squeeze Hands"       0= Performs both   2: Horizontal EOMs       +1= Partial gaze palsy; gaze is abnormal in one or both eyes, but forced deviation or total gaze paresis is not present  3: Visual fields     +2= Complete hemianopsia  4: Facial palsy (use grimace if obtunded)      +3=Complete paralysis of one or both sides (absence of facial movement in the upper and lower face)  5A: Left arm motor drift (count out loud and use fingers to show count)      +3= No effort against gravity  5B: Right arm motor drift         0= No drift x 10 seconds  6A: Left leg motor drift       +3= No effort against gravity  6B: Right leg motor drift       0= No drift x 10 seconds  7: Limb ataxia (FNF/heel-shin)       0= No ataxia   8: Sensation       +2= severe sensory loss left side  9: Language/aphasia- describe the scene (on jeanine); name the items; read the sentences (on jeanine)       +1= mild-moderate aphasia some obvious changes without significant limitation  10: Dysarthria- read the words        +2= Severe dysarthria: unintelligible slurring or out of proportion to dysphasia  11: Extinction/inattention       0= No abnormality  Total: 18 (19 Jun 2021 16:30)        24hr EVENTS:      ROS: [ ]  Unable to assess due to mental status   All other systems negative    -----------------------------------------------------------------------------------------------------------------------------------------------------------------------------------  ICU Vital Signs Last 24 Hrs  T(C): 37.5 (01 Jul 2021 07:40), Max: 37.8 (30 Jun 2021 20:01)  T(F): 99.5 (01 Jul 2021 07:40), Max: 100 (30 Jun 2021 20:01)  HR: 69 (01 Jul 2021 08:00) (65 - 145)  BP: 143/55 (01 Jul 2021 08:00) (77/64 - 143/55)  BP(mean): 80 (01 Jul 2021 08:00) (51 - 120)  ABP: 144/60 (01 Jul 2021 08:00) (75/49 - 175/101)  ABP(mean): 90 (01 Jul 2021 08:00) (56 - 125)  RR: 25 (01 Jul 2021 08:00) (19 - 29)  SpO2: 100% (01 Jul 2021 08:00) (95% - 100%)      I&O's Summary    30 Jun 2021 07:01  -  01 Jul 2021 07:00  --------------------------------------------------------  IN: 4189.2 mL / OUT: 1691 mL / NET: 2498.2 mL    01 Jul 2021 07:01  -  01 Jul 2021 08:51  --------------------------------------------------------  IN: 100 mL / OUT: 75 mL / NET: 25 mL        MEDICATIONS  (STANDING):  amantadine Syrup 100 milliGRAM(s) Oral <User Schedule>  atorvastatin 40 milliGRAM(s) Oral at bedtime  chlorhexidine 2% Cloths 1 Application(s) Topical daily  dextrose 40% Gel 15 Gram(s) Oral once  dextrose 5%. 1000 milliLiter(s) (50 mL/Hr) IV Continuous <Continuous>  dextrose 5%. 1000 milliLiter(s) (100 mL/Hr) IV Continuous <Continuous>  dextrose 50% Injectable 25 Gram(s) IV Push once  dextrose 50% Injectable 12.5 Gram(s) IV Push once  dextrose 50% Injectable 25 Gram(s) IV Push once  doxazosin 4 milliGRAM(s) Oral at bedtime  enoxaparin Injectable 40 milliGRAM(s) SubCutaneous <User Schedule>  glucagon  Injectable 1 milliGRAM(s) IntraMuscular once  insulin lispro (ADMELOG) corrective regimen sliding scale   SubCutaneous every 6 hours  insulin NPH human recombinant 8 Unit(s) SubCutaneous every 6 hours  memantine 10 milliGRAM(s) Oral two times a day  pantoprazole  Injectable 40 milliGRAM(s) IV Push every 12 hours  phenylephrine    Infusion 0.1 MICROgram(s)/kG/Min (2.52 mL/Hr) IV Continuous <Continuous>  sodium chloride 0.9%. 1000 milliLiter(s) (50 mL/Hr) IV Continuous <Continuous>  valproate sodium IVPB 500 milliGRAM(s) IV Intermittent every 12 hours      RESPIRATORY:        IMAGING:   Recent imaging studies were reviewed.    LAB RESULTS:                          11.5   12.64 )-----------( 211      ( 01 Jul 2021 04:24 )             34.0       PT/INR - ( 01 Jul 2021 04:24 )   PT: 14.6 sec;   INR: 1.27 ratio         PTT - ( 01 Jul 2021 04:24 )  PTT:25.5 sec    07-01    134<L>  |  103  |  23.3<H>  ----------------------------<  125<H>  4.2   |  21.0<L>  |  0.39<L>    Ca    7.2<L>      01 Jul 2021 04:24  Phos  3.8     07-01  Mg     2.0     07-01            ABG - ( 30 Jun 2021 23:48 )  pH, Arterial: 7.410 pH, Blood: x     /  pCO2: 33    /  pO2: 100   / HCO3: 21    / Base Excess: -3.7  /  SaO2: 99.9                  -----------------------------------------------------------------------------------------------------------------------------------------------------------------------------------    PHYSICAL EXAM:  General: Calm, cooperative.  Neuro:  -Mental status- No acute distress, no EO but alert and awake (likely EO apraxia), intermittently FC, R gaze pref. PERRL 3mm.  writes periodically  spont RUE and RLE  LUE ext.  LLE - wdrl.  CV: S1S2 present  Pulm: clear to auscultation  Abd: Soft, nontender, nondistended  Ext: no noted edema in lower ext  Skin: warm, dry     Chief complaint:   Patient is a 81y old  Female who presents with a chief complaint of Right MCA CVA (30 Jun 2021 19:47)    HPI:  80 y/o female, PMH DM, HTN, presents to ED as Code Stroke after she was found down on her front lawn by a neighbor. Unknown down time, reported LKW 10:50 am. Initial fingerstick for EMS 60, pt given amp of d50 with improvement in glucose level. Pt presenting with L sided facial droop, L sided weakness, dysarthria. Out of window for TPA. CTH with acute right MCA infarct, CTA Right M2 occlusion. CTP with 16 ml core infarct, right MCA territory.Mismatch volume: 22 ml, right MCA territory, suggesting ischemic penumbra. Mismatch ratio: 2.4. 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    24hr EVENTS:  hypotensive, CTA showed hematoma around PEG  1 unit PRBCs    ROS: [x ]  Unable to assess due to mental status   All other systems negative    -----------------------------------------------------------------------------------------------------------------------------------------------------------------------------------  ICU Vital Signs Last 24 Hrs  T(C): 37.5 (01 Jul 2021 07:40), Max: 37.8 (30 Jun 2021 20:01)  T(F): 99.5 (01 Jul 2021 07:40), Max: 100 (30 Jun 2021 20:01)  HR: 69 (01 Jul 2021 08:00) (65 - 145)  BP: 143/55 (01 Jul 2021 08:00) (77/64 - 143/55)  BP(mean): 80 (01 Jul 2021 08:00) (51 - 120)  ABP: 144/60 (01 Jul 2021 08:00) (75/49 - 175/101)  ABP(mean): 90 (01 Jul 2021 08:00) (56 - 125)  RR: 25 (01 Jul 2021 08:00) (19 - 29)  SpO2: 100% (01 Jul 2021 08:00) (95% - 100%)      I&O's Summary    30 Jun 2021 07:01  -  01 Jul 2021 07:00  --------------------------------------------------------  IN: 4189.2 mL / OUT: 1691 mL / NET: 2498.2 mL    01 Jul 2021 07:01  -  01 Jul 2021 08:51  --------------------------------------------------------  IN: 100 mL / OUT: 75 mL / NET: 25 mL        MEDICATIONS  (STANDING):  amantadine Syrup 100 milliGRAM(s) Oral <User Schedule>  atorvastatin 40 milliGRAM(s) Oral at bedtime  chlorhexidine 2% Cloths 1 Application(s) Topical daily  dextrose 40% Gel 15 Gram(s) Oral once  dextrose 5%. 1000 milliLiter(s) (50 mL/Hr) IV Continuous <Continuous>  dextrose 5%. 1000 milliLiter(s) (100 mL/Hr) IV Continuous <Continuous>  dextrose 50% Injectable 25 Gram(s) IV Push once  dextrose 50% Injectable 12.5 Gram(s) IV Push once  dextrose 50% Injectable 25 Gram(s) IV Push once  doxazosin 4 milliGRAM(s) Oral at bedtime  enoxaparin Injectable 40 milliGRAM(s) SubCutaneous <User Schedule>  glucagon  Injectable 1 milliGRAM(s) IntraMuscular once  insulin lispro (ADMELOG) corrective regimen sliding scale   SubCutaneous every 6 hours  insulin NPH human recombinant 8 Unit(s) SubCutaneous every 6 hours  memantine 10 milliGRAM(s) Oral two times a day  pantoprazole  Injectable 40 milliGRAM(s) IV Push every 12 hours  phenylephrine    Infusion 0.1 MICROgram(s)/kG/Min (2.52 mL/Hr) IV Continuous <Continuous>  sodium chloride 0.9%. 1000 milliLiter(s) (50 mL/Hr) IV Continuous <Continuous>  valproate sodium IVPB 500 milliGRAM(s) IV Intermittent every 12 hours      RESPIRATORY:        IMAGING:   Recent imaging studies were reviewed.    LAB RESULTS:                          11.5   12.64 )-----------( 211      ( 01 Jul 2021 04:24 )             34.0       PT/INR - ( 01 Jul 2021 04:24 )   PT: 14.6 sec;   INR: 1.27 ratio         PTT - ( 01 Jul 2021 04:24 )  PTT:25.5 sec    07-01    134<L>  |  103  |  23.3<H>  ----------------------------<  125<H>  4.2   |  21.0<L>  |  0.39<L>    Ca    7.2<L>      01 Jul 2021 04:24  Phos  3.8     07-01  Mg     2.0     07-01            ABG - ( 30 Jun 2021 23:48 )  pH, Arterial: 7.410 pH, Blood: x     /  pCO2: 33    /  pO2: 100   / HCO3: 21    / Base Excess: -3.7  /  SaO2: 99.9                  -----------------------------------------------------------------------------------------------------------------------------------------------------------------------------------    PHYSICAL EXAM:  General: Calm, cooperative.  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 PERRL 3mm.  writes periodically  spont RUE and RLE  LUE ext.  LLE - wdrl.  CV: S1S2 present  Pulm: clear to auscultation  Abd: Soft, nontender, nondistended  Ext: no noted edema in lower ext  Skin: warm, dry

## 2021-07-01 NOTE — CHART NOTE - NSCHARTNOTEFT_GEN_A_CORE
s/p PEG this morning (on dual antiplatelet therapy for recent right MCA stroke with right ICA and M2 occlusions), incidentally found to have nonbleeding ulcer in the duodenal bulb.  This afternoon, she developed afib/RVR.  Tonight, she became hypotensive requiring phenylephrine infusion.  CTA abd/pelvis demonstrated hematoma in gastric wall. Also has a small volume of ascites throughout the abdomen. Incidentally, her stomach also has significant mucosal enhancement on arterial phase (not profuse intraluminal extravasation of IV contrast as I initially thought).    NG placed. Lavaged 500 mL warm water into PEG. A few blood clots were suctioned from NG, but subsequent drainage is completely clear.  After 1 unit RBC transfusion, she no longer requires phenylephrine infusion.    serial ABG only show respiratory alkalosis.  hgb remains stable @ 12 g/dL.    -Given mural hematoma at PEG insertion site, will hold ASA/Plavix and transfuse 1u of platelets.

## 2021-07-01 NOTE — CHART NOTE - NSCHARTNOTEFT_GEN_A_CORE
On 6/30 at approximately 12pm a PEG tube was placed at the bedside by Dr. Sherman, hemoglobin was 12.8 at the time. During the day shift it was reported that the patient's systolic blood pressure was soft to the 90s requiring fluid bolus and was in A-fib rhythm tachycardic to the 140s requiring amiodarone. Upon start of the night shift, the patient was evaluated at the bedside and notable for a mildly tense belly with echhymosis along the left lower quadrant which according to the nursing staff was a relatively new finding. Patient continued to be tachycardic and blood pressure was soft prompting concern for an intra-abdominal bleed. ACS was consulted and an urgent CTA abdomen/pelvis was obtained. Non-contrast portion of the scan did not show any overt bleeding, however the contrasted study showed dye in the stomach and bowels. ACS service stated that no gastrographin study was performed and thus it is hypothesized that there must be a bleed. Gastric lavage was performed at the bedside via PEG tube and NGT was placed to suction without any notable blood in the drainage. Repeat CBC was ordered, 2 units of pRBC and 2 units of platelets are on hold in the event that ACS determines that patient requires an operative procedure. Dr. Meredith from [facility] was actively involved in the entire process.

## 2021-07-01 NOTE — PROGRESS NOTE ADULT - ASSESSMENT
81y Female who is followed by neurology because of stroke    Right MCA stroke/right thalamic stroke  Aspirin suppository on hold  Control blood glucose  Keep normotensive  s/p PEG.  poor mental status  very guarded prognosis     will follow with you    Aureliano Guzman MD PhD   [id number]

## 2021-07-02 LAB
ANION GAP SERPL CALC-SCNC: 11 MMOL/L — SIGNIFICANT CHANGE UP (ref 5–17)
APTT BLD: 29.8 SEC — SIGNIFICANT CHANGE UP (ref 27.5–35.5)
BLD GP AB SCN SERPL QL: SIGNIFICANT CHANGE UP
BUN SERPL-MCNC: 17.4 MG/DL — SIGNIFICANT CHANGE UP (ref 8–20)
CALCIUM SERPL-MCNC: 8 MG/DL — LOW (ref 8.6–10.2)
CHLORIDE SERPL-SCNC: 106 MMOL/L — SIGNIFICANT CHANGE UP (ref 98–107)
CO2 SERPL-SCNC: 23 MMOL/L — SIGNIFICANT CHANGE UP (ref 22–29)
CREAT SERPL-MCNC: 0.4 MG/DL — LOW (ref 0.5–1.3)
GLUCOSE BLDC GLUCOMTR-MCNC: 121 MG/DL — HIGH (ref 70–99)
GLUCOSE BLDC GLUCOMTR-MCNC: 143 MG/DL — HIGH (ref 70–99)
GLUCOSE BLDC GLUCOMTR-MCNC: 96 MG/DL — SIGNIFICANT CHANGE UP (ref 70–99)
GLUCOSE SERPL-MCNC: 98 MG/DL — SIGNIFICANT CHANGE UP (ref 70–99)
HCT VFR BLD CALC: 32.3 % — LOW (ref 34.5–45)
HGB BLD-MCNC: 11 G/DL — LOW (ref 11.5–15.5)
INR BLD: 1.25 RATIO — HIGH (ref 0.88–1.16)
MAGNESIUM SERPL-MCNC: 2.3 MG/DL — SIGNIFICANT CHANGE UP (ref 1.6–2.6)
MCHC RBC-ENTMCNC: 28.4 PG — SIGNIFICANT CHANGE UP (ref 27–34)
MCHC RBC-ENTMCNC: 34.1 GM/DL — SIGNIFICANT CHANGE UP (ref 32–36)
MCV RBC AUTO: 83.5 FL — SIGNIFICANT CHANGE UP (ref 80–100)
PHOSPHATE SERPL-MCNC: 2.6 MG/DL — SIGNIFICANT CHANGE UP (ref 2.4–4.7)
PLATELET # BLD AUTO: 196 K/UL — SIGNIFICANT CHANGE UP (ref 150–400)
POTASSIUM SERPL-MCNC: 3.7 MMOL/L — SIGNIFICANT CHANGE UP (ref 3.5–5.3)
POTASSIUM SERPL-SCNC: 3.7 MMOL/L — SIGNIFICANT CHANGE UP (ref 3.5–5.3)
PROTHROM AB SERPL-ACNC: 14.3 SEC — HIGH (ref 10.6–13.6)
RBC # BLD: 3.87 M/UL — SIGNIFICANT CHANGE UP (ref 3.8–5.2)
RBC # FLD: 15.7 % — HIGH (ref 10.3–14.5)
SODIUM SERPL-SCNC: 139 MMOL/L — SIGNIFICANT CHANGE UP (ref 135–145)
WBC # BLD: 8.52 K/UL — SIGNIFICANT CHANGE UP (ref 3.8–10.5)
WBC # FLD AUTO: 8.52 K/UL — SIGNIFICANT CHANGE UP (ref 3.8–10.5)

## 2021-07-02 PROCEDURE — 99233 SBSQ HOSP IP/OBS HIGH 50: CPT

## 2021-07-02 RX ORDER — LANOLIN ALCOHOL/MO/W.PET/CERES
5 CREAM (GRAM) TOPICAL AT BEDTIME
Refills: 0 | Status: DISCONTINUED | OUTPATIENT
Start: 2021-07-02 | End: 2021-07-08

## 2021-07-02 RX ORDER — CLOPIDOGREL BISULFATE 75 MG/1
75 TABLET, FILM COATED ORAL DAILY
Refills: 0 | Status: DISCONTINUED | OUTPATIENT
Start: 2021-07-02 | End: 2021-07-08

## 2021-07-02 RX ORDER — ASPIRIN/CALCIUM CARB/MAGNESIUM 324 MG
81 TABLET ORAL DAILY
Refills: 0 | Status: DISCONTINUED | OUTPATIENT
Start: 2021-07-02 | End: 2021-07-08

## 2021-07-02 RX ORDER — LANOLIN ALCOHOL/MO/W.PET/CERES
5 CREAM (GRAM) TOPICAL AT BEDTIME
Refills: 0 | Status: DISCONTINUED | OUTPATIENT
Start: 2021-07-02 | End: 2021-07-02

## 2021-07-02 RX ADMIN — CLOPIDOGREL BISULFATE 75 MILLIGRAM(S): 75 TABLET, FILM COATED ORAL at 13:41

## 2021-07-02 RX ADMIN — Medication 500 MILLIGRAM(S): at 08:02

## 2021-07-02 RX ADMIN — PANTOPRAZOLE SODIUM 40 MILLIGRAM(S): 20 TABLET, DELAYED RELEASE ORAL at 17:44

## 2021-07-02 RX ADMIN — Medication 0: at 00:44

## 2021-07-02 RX ADMIN — Medication 100 MILLIGRAM(S): at 17:14

## 2021-07-02 RX ADMIN — HUMAN INSULIN 8 UNIT(S): 100 INJECTION, SUSPENSION SUBCUTANEOUS at 17:10

## 2021-07-02 RX ADMIN — PANTOPRAZOLE SODIUM 40 MILLIGRAM(S): 20 TABLET, DELAYED RELEASE ORAL at 05:58

## 2021-07-02 RX ADMIN — Medication 1 DROP(S): at 16:28

## 2021-07-02 RX ADMIN — MEMANTINE HYDROCHLORIDE 10 MILLIGRAM(S): 10 TABLET ORAL at 05:58

## 2021-07-02 RX ADMIN — POLYETHYLENE GLYCOL 3350 17 GRAM(S): 17 POWDER, FOR SOLUTION ORAL at 13:41

## 2021-07-02 RX ADMIN — Medication 5 MILLIGRAM(S): at 22:23

## 2021-07-02 RX ADMIN — Medication 1 DROP(S): at 22:23

## 2021-07-02 RX ADMIN — MEMANTINE HYDROCHLORIDE 10 MILLIGRAM(S): 10 TABLET ORAL at 22:48

## 2021-07-02 RX ADMIN — SENNA PLUS 2 TABLET(S): 8.6 TABLET ORAL at 22:23

## 2021-07-02 RX ADMIN — ATORVASTATIN CALCIUM 40 MILLIGRAM(S): 80 TABLET, FILM COATED ORAL at 22:23

## 2021-07-02 RX ADMIN — ENOXAPARIN SODIUM 40 MILLIGRAM(S): 100 INJECTION SUBCUTANEOUS at 17:45

## 2021-07-02 RX ADMIN — Medication 4 MILLIGRAM(S): at 22:47

## 2021-07-02 RX ADMIN — Medication 100 MILLIGRAM(S): at 05:57

## 2021-07-02 RX ADMIN — HUMAN INSULIN 8 UNIT(S): 100 INJECTION, SUSPENSION SUBCUTANEOUS at 13:47

## 2021-07-02 RX ADMIN — Medication 5 MILLIGRAM(S): at 17:15

## 2021-07-02 RX ADMIN — Medication 500 MILLIGRAM(S): at 22:48

## 2021-07-02 RX ADMIN — Medication 1 DROP(S): at 05:57

## 2021-07-02 RX ADMIN — Medication 81 MILLIGRAM(S): at 13:41

## 2021-07-02 NOTE — PROGRESS NOTE ADULT - THIS PATIENT HAS THE FOLLOWING CONDITION(S)/DIAGNOSES ON THIS ADMISSION:
None
Cerebral Edema
Cerebral Edema
Encephalopathy
None
Cerebral Edema
Cerebral Edema
None
Cerebral Edema
Cerebral Edema

## 2021-07-02 NOTE — PROGRESS NOTE ADULT - ASSESSMENT
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    NEURO:  Neuro check q 4hrs  ASA and plavix held  R restraint  VPA with cortical hyperexcitability  palliative consult appreciated  amantadine 100mg and namenda  appreciated ENT eval for VC paralysis  Activity: [X] OOB as tolerated  [X] PT [X] OT X[] splint    PULM:   DNI  Monitor O2 sats, maintain above 92 %  chest PT     CV:  -150    RENAL:  Urinary retention - diallo replaced 6/26/21 -  Cardura, re-eval in 48 hrs   stable electrolytes    GI:  Diet: PEG, start TF  small hematoma at PEG site, monitor  Bowel regimen  [X] senna, Miralax  consult for PEG today    ENDO:   cont NPH regimen  SSI- low dose  Atorvastatin for HLD  Goal euglycemia (-180)    HEME/ONC:  VTE prophylaxis: [X] SCDs [X] chemoprophylaxis- Lovenox 40mh   ASA / Plavix  s/p 1 unit PRBCs 7/1    ID: monitor for fever  zosyn for PNA- completed 6/25/21  F/U urine and blood - no growth.    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    NEURO:  Neuro check q 4hrs  restart ASA and plavix   VPA with cortical hyperexcitability  amantadine 100mg and namenda, melatonin qhs  appreciated ENT eval for VC paralysis  Activity: [X] OOB as tolerated  [X] PT [X] OT X[] splint    PULM:   room air  DNI  Monitor O2 sats, maintain above 92 %  chest PT     CV:  -150    RENAL:  Urinary retention - diallo replaced 6/26/21 -  Cardura, failed TOV #2  stable electrolytes    GI:  Diet: PEG, start TF  small hematoma at PEG site, monitor  Bowel regimen  [X] senna, Miralax  LBM 6/27 (NPO 2 days)    ENDO:   cont NPH regimen  SSI- low dose  Atorvastatin for HLD  Goal euglycemia (-180)    HEME/ONC:  VTE prophylaxis: [X] SCDs [X] chemoprophylaxis- Lovenox 40mh   ASA / Plavix  s/p 1 unit PRBCs 7/1    ID: monitor for fever  zosyn for PNA- completed 6/25/21  F/U urine and blood - no growth.    SOCIAL/FAMILY:  consult     CODE STATUS:  [X] Full Code     DISPOSITION:  [X] ICU     [X] Patient is at high risk of neurologic deterioration/death due to:  brain swelling and risk of herniation

## 2021-07-02 NOTE — PROGRESS NOTE ADULT - ASSESSMENT
82 y/o F w/ PMH DM, HTN, presented to ED as Code Stroke after being found down in her front lawn by a neighbor.  Unknown down time however last reported well at 10:50 am placing her out of window for TPA.  Glucose in field reported to be 60 and pt given amp of d50 w/ improvement in glucose level.  On arrival pt presented w/ L-facial droop, L-sided weakness, dysarthria.  CTH reported acute Rt MCA infarct and CTA showed Rt M2 occlusion and CTA neck with occlusion of the Rt ICA from the proximal cervical segment through the supraclinoid segment.  Pt was subsequently admitted to the Neuro ICU and intubated for airway protection (6/21).  Pt was also deemed to not be a candidate for mechanical thrombectomy.  Pt was started on VPA for seizure ppx.  EEG was negative for seizure activity.  Repeat CTH showed evolving infarct but without hemorrhage and no neurosurgical intervention required.  Hospital course has been complicated by LLL PNA likely due to aspiration, afib rvr (treated w/ cardizem and metoprolol) and was worked up for sepsis given hypotension and febrile.  Repeat CTH on 6/24 showed mildly larger stroke w/ small amount of petechial hemorrhage  Pt was successfully extubated 6/27.  PEG placed 6/30 and noted to have a duodenal ulcer; protonix was subsequently started.  CTA abd 7/1 showed hematoma long the greater curvature of the stomach and a small volume hemoperitoneum without evidence of active bleeding.  PEG was lavaged and blood noted however has since been cleared to use PEG and start tube feeds.  During hospital course pt transfused total of 1u PRBC and 1u plts on 7/1.  Pt hemodynamically stable, resumed on ASA and plavix and will be downgraded to medicine.            Rt MCA infarct w/ Rt M2 occlusion and Rt ICA occlusion complicated by enlarging of stroke w/ small petechial hemorrhage  - Pt initially admitted to NeuroICU and now medically stable for downgrade to medicine  - Pt was out of window for TPA and not candidate for mechanical thrombectomy   - c/w VPA for seizure ppx  - c/w ASA and plavix (resumed today), monitor H/H   - Lipid panel reviewed and c/w statin therapy   - Neuro checks and VS q2h  - Maintain HOB elevated to 30 degrees  - Maintain goal -165       Sepsis possible 2/2 aspiration PNA, likely gram negative   - Sepsis resolved and leukocytosis WNL  - Completed short course of Zosyn   - Hemodynamically stable and pt afebrile at this time      AFib RVR, now in NSR  - Initially received Cardizem and metoprolol   - Has since spontaneously converted back to NSR  - Monitor on telemetry        Normocytic anemia  - H/H stable and hemodynamically stable  - ASA and plavix resumed today   - Monitor CBC closely and transfuse as needed      DM II, controlled  - A1c 6.9  - c/w basal / bolus regimen and titrate to maintain BG <180  - ISS and hypoglycemic protocol on board      Essential HTN  - Maintain SBP at goal 100-165         VTE ppx: Lovenox sq   GI ppx: protonix   Code Status: DNR/DNI    Dispo: pending clinical course  80 y/o F w/ PMH DM, HTN, presented to ED as Code Stroke after being found down in her front lawn by a neighbor.  Unknown down time however last reported well at 10:50 am placing her out of window for TPA.  Glucose in field reported to be 60 and pt given amp of d50 w/ improvement in glucose level.  On arrival pt presented w/ L-facial droop, L-sided weakness, dysarthria.  CTH reported acute Rt MCA infarct and CTA showed Rt M2 occlusion and CTA neck with occlusion of the Rt ICA from the proximal cervical segment through the supraclinoid segment.  Pt was subsequently admitted to the Neuro ICU and intubated for airway protection (6/21).  Pt was also deemed to not be a candidate for mechanical thrombectomy.  Pt was started on VPA for seizure ppx.  EEG was negative for seizure activity.  Repeat CTH showed evolving infarct but without hemorrhage and no neurosurgical intervention required.  Hospital course has been complicated by LLL PNA likely due to aspiration, afib rvr (treated w/ cardizem and metoprolol) and was worked up for sepsis given hypotension and febrile.  Repeat CTH on 6/24 showed mildly larger stroke w/ small amount of petechial hemorrhage  Pt was successfully extubated 6/27.  PEG placed 6/30 and noted to have a duodenal ulcer; protonix was subsequently started.  CTA abd 7/1 showed hematoma long the greater curvature of the stomach and a small volume hemoperitoneum without evidence of active bleeding.  PEG was lavaged and blood noted however has since been cleared to use PEG and start tube feeds.  During hospital course pt transfused total of 1u PRBC and 1u plts on 7/1.  Pt hemodynamically stable, resumed on ASA and plavix and will be downgraded to medicine.            Rt MCA infarct w/ Rt M2 occlusion and Rt ICA occlusion complicated by enlarging of stroke w/ small petechial hemorrhage  - Pt initially admitted to NeuroICU and now medically stable for downgrade to medicine  - Pt was out of window for TPA and not candidate for mechanical thrombectomy   - c/w VPA for seizure ppx  - c/w ASA and plavix (resumed today), monitor H/H   - Lipid panel reviewed and c/w statin therapy   - Neuro checks and VS q2h  - Maintain HOB elevated to 30 degrees  - Maintain goal -165       Sepsis possible 2/2 aspiration PNA, likely gram negative   - Sepsis resolved and leukocytosis WNL  - Completed short course of Zosyn   - Hemodynamically stable and pt afebrile at this time      Hematoma along the greater curvature of the stomach and a small volume hemoperitoneum without evidence of active bleeding  - Noted on CTA abd 7/1   - Abd distention noted but without guarding or rigidity  - Monitor H/H   - Tube feeds started today and tolerating       AFib RVR, now rate controlled  - Initially received Cardizem and metoprolol   - c/w metoprolol for rate control as needed  - Monitor on telemetry        Normocytic anemia  - H/H stable and hemodynamically stable  - ASA and plavix resumed today   - Monitor CBC closely and transfuse as needed      DM II, controlled  - A1c 6.9  - c/w basal / bolus regimen and titrate to maintain BG <180  - ISS and hypoglycemic protocol on board      Essential HTN  - Maintain SBP at goal 100-165         VTE ppx: Lovenox sq   GI ppx: protonix   Code Status: DNR/DNI    Dispo: pending clinical course

## 2021-07-02 NOTE — PROGRESS NOTE ADULT - SUBJECTIVE AND OBJECTIVE BOX
Chief complaint:   Patient is a 81y old  Female who presents with a chief complaint of Right MCA CVA (01 Jul 2021 17:00)    HPI:  80 y/o female, PMH DM, HTN, presents to ED as Code Stroke after she was found down on her front lawn by a neighbor. Unknown down time, reported LKW 10:50 am. Initial fingerstick for EMS 60, pt given amp of d50 with improvement in glucose level. Pt presenting with L sided facial droop, L sided weakness, dysarthria. Out of window for TPA. CTH with acute right MCA infarct, CTA Right M2 occlusion. CTP with 16 ml core infarct, right MCA territory.Mismatch volume: 22 ml, right MCA territory, suggesting ischemic penumbra. Mismatch ratio: 2.4. 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    NIHSS= 18  mRs= 0     1A: Level of consciousness     +1= Arouses to minor stimulation  1B: Ask month and age       0= Both questions right  1C: "Blink eyes" and "Squeeze Hands"       0= Performs both   2: Horizontal EOMs       +1= Partial gaze palsy; gaze is abnormal in one or both eyes, but forced deviation or total gaze paresis is not present  3: Visual fields     +2= Complete hemianopsia  4: Facial palsy (use grimace if obtunded)      +3=Complete paralysis of one or both sides (absence of facial movement in the upper and lower face)  5A: Left arm motor drift (count out loud and use fingers to show count)      +3= No effort against gravity  5B: Right arm motor drift         0= No drift x 10 seconds  6A: Left leg motor drift       +3= No effort against gravity  6B: Right leg motor drift       0= No drift x 10 seconds  7: Limb ataxia (FNF/heel-shin)       0= No ataxia   8: Sensation       +2= severe sensory loss left side  9: Language/aphasia- describe the scene (on jeanine); name the items; read the sentences (on jeanine)       +1= mild-moderate aphasia some obvious changes without significant limitation  10: Dysarthria- read the words        +2= Severe dysarthria: unintelligible slurring or out of proportion to dysphasia  11: Extinction/inattention       0= No abnormality  Total: 18 (19 Jun 2021 16:30)        24hr EVENTS:      ROS: [ ]  Unable to assess due to mental status   All other systems negative    -----------------------------------------------------------------------------------------------------------------------------------------------------------------------------------  ICU Vital Signs Last 24 Hrs  T(C): 36.7 (02 Jul 2021 08:00), Max: 37.3 (01 Jul 2021 12:03)  T(F): 98.1 (02 Jul 2021 08:00), Max: 99.1 (01 Jul 2021 12:03)  HR: 68 (02 Jul 2021 07:00) (59 - 102)  BP: 166/80 (02 Jul 2021 07:00) (100/47 - 166/80)  BP(mean): 106 (02 Jul 2021 07:00) (61 - 106)  ABP: 160/70 (02 Jul 2021 07:00) (99/44 - 161/65)  ABP(mean): 100 (02 Jul 2021 07:00) (63 - 101)  RR: 23 (02 Jul 2021 07:00) (18 - 31)  SpO2: 99% (02 Jul 2021 07:00) (95% - 100%)      I&O's Summary    01 Jul 2021 07:01  -  02 Jul 2021 07:00  --------------------------------------------------------  IN: 1030 mL / OUT: 1130 mL / NET: -100 mL        MEDICATIONS  (STANDING):  amantadine Syrup 100 milliGRAM(s) Oral <User Schedule>  artificial tears (preservative free) Ophthalmic Solution 1 Drop(s) Both EYES three times a day  atorvastatin 40 milliGRAM(s) Oral at bedtime  chlorhexidine 2% Cloths 1 Application(s) Topical daily  dextrose 40% Gel 15 Gram(s) Oral once  dextrose 5%. 1000 milliLiter(s) (50 mL/Hr) IV Continuous <Continuous>  dextrose 5%. 1000 milliLiter(s) (100 mL/Hr) IV Continuous <Continuous>  dextrose 50% Injectable 25 Gram(s) IV Push once  dextrose 50% Injectable 25 Gram(s) IV Push once  dextrose 50% Injectable 12.5 Gram(s) IV Push once  doxazosin 4 milliGRAM(s) Oral at bedtime  enoxaparin Injectable 40 milliGRAM(s) SubCutaneous <User Schedule>  glucagon  Injectable 1 milliGRAM(s) IntraMuscular once  insulin lispro (ADMELOG) corrective regimen sliding scale   SubCutaneous every 6 hours  insulin NPH human recombinant 8 Unit(s) SubCutaneous every 6 hours  memantine 10 milliGRAM(s) Oral two times a day  pantoprazole  Injectable 40 milliGRAM(s) IV Push every 12 hours  polyethylene glycol 3350 17 Gram(s) Oral daily  senna 2 Tablet(s) Oral at bedtime  sodium chloride 0.9%. 1000 milliLiter(s) (50 mL/Hr) IV Continuous <Continuous>  valproic  acid Syrup 500 milliGRAM(s) Oral every 12 hours      RESPIRATORY:        IMAGING:   Recent imaging studies were reviewed.    LAB RESULTS:                          11.0   8.52  )-----------( 196      ( 02 Jul 2021 04:46 )             32.3       PT/INR - ( 02 Jul 2021 04:46 )   PT: 14.3 sec;   INR: 1.25 ratio         PTT - ( 02 Jul 2021 04:46 )  PTT:29.8 sec    07-02    139  |  106  |  17.4  ----------------------------<  98  3.7   |  23.0  |  0.40<L>    Ca    8.0<L>      02 Jul 2021 04:46  Phos  2.6     07-02  Mg     2.3     07-02            ABG - ( 30 Jun 2021 23:48 )  pH, Arterial: 7.410 pH, Blood: x     /  pCO2: 33    /  pO2: 100   / HCO3: 21    / Base Excess: -3.7  /  SaO2: 99.9            -----------------------------------------------------------------------------------------------------------------------------------------------------------------------------------  PHYSICAL EXAM:  General: Calm, cooperative.  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 PERRL 3mm.  writes periodically  spont RUE and RLE  LUE ext.  LLE - wdrl.  CV: S1S2 present  Pulm: clear to auscultation  Abd: Soft, nontender, nondistended  Ext: no noted edema in lower ext  Skin: warm, dry   Chief complaint:   Patient is a 81y old  Female who presents with a chief complaint of Right MCA CVA (01 Jul 2021 17:00)    HPI:  82 y/o female, PMH DM, HTN, presents to ED as Code Stroke after she was found down on her front lawn by a neighbor. Unknown down time, reported LKW 10:50 am. Initial fingerstick for EMS 60, pt given amp of d50 with improvement in glucose level. Pt presenting with L sided facial droop, L sided weakness, dysarthria. Out of window for TPA. CTH with acute right MCA infarct, CTA Right M2 occlusion. CTP with 16 ml core infarct, right MCA territory.Mismatch volume: 22 ml, right MCA territory, suggesting ischemic penumbra. Mismatch ratio: 2.4. 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    24hr EVENTS:  no acute issues    ROS: [x ]  Unable to assess due to mental status   All other systems negative    -----------------------------------------------------------------------------------------------------------------------------------------------------------------------------------  ICU Vital Signs Last 24 Hrs  T(C): 36.7 (02 Jul 2021 08:00), Max: 37.3 (01 Jul 2021 12:03)  T(F): 98.1 (02 Jul 2021 08:00), Max: 99.1 (01 Jul 2021 12:03)  HR: 68 (02 Jul 2021 07:00) (59 - 102)  BP: 166/80 (02 Jul 2021 07:00) (100/47 - 166/80)  BP(mean): 106 (02 Jul 2021 07:00) (61 - 106)  ABP: 160/70 (02 Jul 2021 07:00) (99/44 - 161/65)  ABP(mean): 100 (02 Jul 2021 07:00) (63 - 101)  RR: 23 (02 Jul 2021 07:00) (18 - 31)  SpO2: 99% (02 Jul 2021 07:00) (95% - 100%)      I&O's Summary    01 Jul 2021 07:01  -  02 Jul 2021 07:00  --------------------------------------------------------  IN: 1030 mL / OUT: 1130 mL / NET: -100 mL        MEDICATIONS  (STANDING):  amantadine Syrup 100 milliGRAM(s) Oral <User Schedule>  artificial tears (preservative free) Ophthalmic Solution 1 Drop(s) Both EYES three times a day  atorvastatin 40 milliGRAM(s) Oral at bedtime  chlorhexidine 2% Cloths 1 Application(s) Topical daily  dextrose 40% Gel 15 Gram(s) Oral once  dextrose 5%. 1000 milliLiter(s) (50 mL/Hr) IV Continuous <Continuous>  dextrose 5%. 1000 milliLiter(s) (100 mL/Hr) IV Continuous <Continuous>  dextrose 50% Injectable 25 Gram(s) IV Push once  dextrose 50% Injectable 25 Gram(s) IV Push once  dextrose 50% Injectable 12.5 Gram(s) IV Push once  doxazosin 4 milliGRAM(s) Oral at bedtime  enoxaparin Injectable 40 milliGRAM(s) SubCutaneous <User Schedule>  glucagon  Injectable 1 milliGRAM(s) IntraMuscular once  insulin lispro (ADMELOG) corrective regimen sliding scale   SubCutaneous every 6 hours  insulin NPH human recombinant 8 Unit(s) SubCutaneous every 6 hours  memantine 10 milliGRAM(s) Oral two times a day  pantoprazole  Injectable 40 milliGRAM(s) IV Push every 12 hours  polyethylene glycol 3350 17 Gram(s) Oral daily  senna 2 Tablet(s) Oral at bedtime  sodium chloride 0.9%. 1000 milliLiter(s) (50 mL/Hr) IV Continuous <Continuous>  valproic  acid Syrup 500 milliGRAM(s) Oral every 12 hours      IMAGING:   Recent imaging studies were reviewed.    LAB RESULTS:                          11.0   8.52  )-----------( 196      ( 02 Jul 2021 04:46 )             32.3       PT/INR - ( 02 Jul 2021 04:46 )   PT: 14.3 sec;   INR: 1.25 ratio         PTT - ( 02 Jul 2021 04:46 )  PTT:29.8 sec    07-02    139  |  106  |  17.4  ----------------------------<  98  3.7   |  23.0  |  0.40<L>    Ca    8.0<L>      02 Jul 2021 04:46  Phos  2.6     07-02  Mg     2.3     07-02        ABG - ( 30 Jun 2021 23:48 )  pH, Arterial: 7.410 pH, Blood: x     /  pCO2: 33    /  pO2: 100   / HCO3: 21    / Base Excess: -3.7  /  SaO2: 99.9            -----------------------------------------------------------------------------------------------------------------------------------------------------------------------------------  PHYSICAL EXAM:  General: Calm, cooperative.  Neuro:  -Mental status- No acute distress, EO to stim,( possible EO apraxia), nonverbal, intermittently FC, R gaze pref. PERRL 3mm.  no BTT on L, possible LHH  writes periodically, will say daughter or son's name  spont RUE and RLE  LUE ext.  LLE - wdrl.  CV: S1S2 present  Pulm: clear to auscultation  Abd: Soft, nontender, nondistended  Ext: no noted edema in lower ext  Skin: warm, dry

## 2021-07-02 NOTE — CHART NOTE - NSCHARTNOTEFT_GEN_A_CORE
HPI:   80 y/o female, PMH DM, HTN, presents to ED as Code Stroke after she was found down on her front lawn by a neighbor. Unknown down time, reported LKW 10:50 am. Initial fingerstick for EMS 60, pt given amp of d50 with improvement in glucose level. Pt presenting with L sided facial droop, L sided weakness, dysarthria. Out of window for TPA. CTH with acute right MCA infarct, CTA Right M2 occlusion. CTP with 16 ml core infarct, right MCA territory. Mismatch volume: 22 ml, right MCA territory, suggesting ischemic penumbra. Mismatch ratio: 2.4. 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  NIHSS= 18  mRs= 0 (June 19, 2021).     Hospital Course:  6/19: Admitted to neuro ICU, not a candidate for TPA or mechanical thrombectomy.   6/20: Started VPA and EEG. Family made patient DNR/DNI, agreed to PEG. CTH showed evolving infarct, no hemorrhage - no neurosurgical intervention.   6/21: EEG d/janice. LLL infiltrate found on CXR. Prophylactically intubated for airway protection.   6/22: Tube feeds started. A-fib, given Cardizem. Hypotensive and febrile, sepsis work up.  6/23: Metoprolol added for rate control.   6/24: MOLST updated at bedside with family. CTH showed mildly larger stroke with small amount of petechial heme.  6/27: Extubated, medical management continued.   6/29: Family meeting - confirmed they would like patient to get PEG.   6/30: PEG, duodenal ulcer noted, Protonix started.   7/1: CTA ABD shows hematoma along greater curvature of stomach and small volume hemoperitoneum, no evidence of active bleeding. PEG lavaged with acute blood. Per ACS, okay to use PEG for medications.   7/2: Patient stable and appropriate for downgrade to step down unit.     Physical Exam:   General: Calm, cooperative.  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 PERRL 3mm.  writes periodically  -Motor: spont RUE and RLE  LUE ext.  LLE - wdrl.  CV: S1S2 present  Pulm: clear to auscultation  Abd: Soft, nontender, nondistended  Ext: no noted edema in lower ext  Skin: warm, dry    Plan:   -D/w attending   -Q2hr neuro checks   - mg BID   -Amantadine 100 mg BID  -Namenda 10 mg BID  -SBP goal 100-165  -Atorvastatin 40 mg daily  -Labetalol 10 mg Q2hrs PRN  -Metoprolol 5 mg Q6hrs PRN  -SpO2>92%, SpO2 adequate on nasal cannula   -s/p PEG tube day 1   -Protonix 40 mg BID  -Senna/Miralax   -NS @50 while NPO  -Cardura 4 mg QHS  NS @ 50 while NPO   -SCDs for DVT prophylaxis   -ASA 81/Plavix held  -Lovenox 40 mg daily for DVT prophylaxis   -Afebrile   -MISS, FS Q6hrs   -NPH 8 Units Q6hrs HPI:   80 y/o female, PMH DM, HTN, presents to ED as Code Stroke after she was found down on her front lawn by a neighbor. Unknown down time, reported LKW 10:50 am. Initial fingerstick for EMS 60, pt given amp of d50 with improvement in glucose level. Pt presenting with L sided facial droop, L sided weakness, dysarthria. Out of window for TPA. CTH with acute right MCA infarct, CTA Right M2 occlusion. CTP with 16 ml core infarct, right MCA territory. Mismatch volume: 22 ml, right MCA territory, suggesting ischemic penumbra. Mismatch ratio: 2.4. 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  NIHSS= 18  mRs= 0 (June 19, 2021).     Hospital Course:  6/19: Admitted to neuro ICU, not a candidate for TPA or mechanical thrombectomy.   6/20: Started VPA and EEG. Family made patient DNR/DNI, agreed to PEG. CTH showed evolving infarct, no hemorrhage - no neurosurgical intervention.   6/21: EEG d/janice. LLL infiltrate found on CXR. Prophylactically intubated for airway protection.   6/22: Tube feeds started. A-fib, given Cardizem. Hypotensive and febrile, sepsis work up.  6/23: Metoprolol added for rate control.   6/24: MOLST updated at bedside with family. CTH showed mildly larger stroke with small amount of petechial heme.  6/27: Extubated, medical management continued.   6/29: Family meeting - confirmed they would like patient to get PEG.   6/30: PEG, duodenal ulcer noted, Protonix started.   7/1: CTA ABD shows hematoma along greater curvature of stomach and small volume hemoperitoneum, no evidence of active bleeding. PEG lavaged with acute blood. Per ACS, okay to use PEG for medications. 1 unit PRBC and 1 Unit platelets given.   7/2: Patient stable and appropriate for downgrade to step down unit.     Physical Exam:   General: Calm, cooperative.  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 PERRL 3mm.  writes periodically  -Motor: spont RUE and RLE  LUE ext.  LLE - wdrl.  CV: S1S2 present  Pulm: clear to auscultation  Abd: Soft, nontender, nondistended  Ext: no noted edema in lower ext  Skin: warm, dry    Plan:   -D/w attending   -Q2hr neuro checks   - mg BID   -Amantadine 100 mg BID  -Namenda 10 mg BID  -SBP goal 100-165  -Atorvastatin 40 mg daily  -Labetalol 10 mg Q2hrs PRN  -Metoprolol 5 mg Q6hrs PRN  -SpO2>92%, SpO2 adequate on nasal cannula   -s/p PEG tube day 1   -Protonix 40 mg BID  -Senna/Miralax   -NS @50 while NPO  -Cardura 4 mg QHS  NS @ 50 while NPO   -SCDs for DVT prophylaxis   -ASA 81/Plavix held  -Lovenox 40 mg daily for DVT prophylaxis   -Afebrile   -MISS, FS Q6hrs   -NPH 8 Units Q6hrs HPI:   80 y/o female, PMH DM, HTN, presents to ED as Code Stroke after she was found down on her front lawn by a neighbor. Unknown down time, reported LKW 10:50 am. Initial fingerstick for EMS 60, pt given amp of d50 with improvement in glucose level. Pt presenting with L sided facial droop, L sided weakness, dysarthria. Out of window for TPA. CTH with acute right MCA infarct, CTA Right M2 occlusion. CTP with 16 ml core infarct, right MCA territory. Mismatch volume: 22 ml, right MCA territory, suggesting ischemic penumbra. Mismatch ratio: 2.4. 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  NIHSS= 18  mRs= 0 (June 19, 2021).     Hospital Course:  6/19: Admitted to neuro ICU, not a candidate for TPA or mechanical thrombectomy.   6/20: Started VPA and EEG. Family made patient DNR/DNI, agreed to PEG. CTH showed evolving infarct, no hemorrhage - no neurosurgical intervention.   6/21: EEG d/janice. LLL infiltrate found on CXR. Prophylactically intubated for airway protection.   6/22: Tube feeds started. A-fib, given Cardizem. Hypotensive and febrile, sepsis work up.  6/23: Metoprolol added for rate control.   6/24: MOLST updated at bedside with family. CTH showed mildly larger stroke with small amount of petechial heme.  6/27: Extubated, medical management continued.   6/29: Family meeting - confirmed they would like patient to get PEG.   6/30: PEG, duodenal ulcer noted, Protonix started.   7/1: CTA ABD shows hematoma along greater curvature of stomach and small volume hemoperitoneum, no evidence of active bleeding. PEG lavaged with acute blood. Per ACS, okay to use PEG for medications. 1 unit PRBC and 1 Unit platelets given.   7/2: Patient stable and appropriate for downgrade to step down unit. Per ACS, okay to restart ASA and Plavix, okay to start tube feeds. Patient discussed with and signed out to Dr. Wan, hospitalist service.     Physical Exam:   General: Calm, cooperative.  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 PERRL 3mm.  writes periodically  -Motor: spont RUE and RLE  LUE ext.  LLE - wdrl.  CV: S1S2 present  Pulm: clear to auscultation  Abd: Soft, nontender, nondistended  Ext: no noted edema in lower ext  Skin: warm, dry    Plan:   -D/w attending   -Patient discussed at length and signed out to medicine team (Dr. Wan attending). All questions were answered.   -Patient downgraded to medicine service, stepdown unit  -Q2hr neuro checks   - mg BID   -Amantadine 100 mg BID  -Namenda 10 mg BID  -SBP goal 100-165  -Atorvastatin 40 mg daily  -Labetalol 10 mg Q2hrs PRN  -Metoprolol 5 mg Q6hrs PRN  -SpO2>92%, SpO2 adequate on nasal cannula   -s/p PEG tube day 1   -May start tube feeds per ACS   -Protonix 40 mg BID  -Senna/Miralax   -NS @50  -Cardura 4 mg QHS  -SCDs for DVT prophylaxis   -ASA 81/Plavix may restart per ACS   -Lovenox 40 mg daily for DVT prophylaxis   -Afebrile   -MISS, FS Q6hrs   -NPH 8 Units Q6hrs HPI:   80 y/o female, PMH DM, HTN, presents to ED as Code Stroke after she was found down on her front lawn by a neighbor. Unknown down time, reported LKW 10:50 am. Initial fingerstick for EMS 60, pt given amp of d50 with improvement in glucose level. Pt presenting with L sided facial droop, L sided weakness, dysarthria. Out of window for TPA. CTH with acute right MCA infarct, CTA Right M2 occlusion. CTP with 16 ml core infarct, right MCA territory. Mismatch volume: 22 ml, right MCA territory, suggesting ischemic penumbra. Mismatch ratio: 2.4. 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  NIHSS= 18  mRs= 0 (June 19, 2021).     Hospital Course:  6/19: Admitted to neuro ICU, not a candidate for TPA or mechanical thrombectomy.   6/20: Started VPA and EEG. Family made patient DNR/DNI, agreed to PEG. CTH showed evolving infarct, no hemorrhage - no neurosurgical intervention.   6/21: EEG d/janice. LLL infiltrate found on CXR. Prophylactically intubated for airway protection.   6/22: Tube feeds started. A-fib, given Cardizem. Hypotensive and febrile, sepsis work up.  6/23: Metoprolol added for rate control.   6/24: MOLST updated at bedside with family. CTH showed mildly larger stroke with small amount of petechial heme.  6/27: Extubated, medical management continued.   6/29: Family meeting - confirmed they would like patient to get PEG.   6/30: PEG, duodenal ulcer noted, Protonix started.   7/1: CTA ABD shows hematoma along greater curvature of stomach and small volume hemoperitoneum, no evidence of active bleeding. PEG lavaged with acute blood. Per ACS, okay to use PEG for medications. 1 unit PRBC and 1 Unit platelets given.   7/2: Patient stable and appropriate for downgrade to floor with monitored bed. Per ACS, okay to restart ASA and Plavix, okay to start tube feeds. Patient discussed with and signed out to Dr. Wan, hospitalist service.     Physical Exam:   General: Calm, cooperative.  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 PERRL 3mm.  writes periodically  -Motor: spont RUE and RLE  LUE ext.  LLE - wdrl.  CV: S1S2 present  Pulm: clear to auscultation  Abd: Soft, nontender, nondistended  Ext: no noted edema in lower ext  Skin: warm, dry    Plan:   -D/w attending   -Patient discussed at length and signed out to medicine team (Dr. Wan attending). All questions were answered.   -Patient downgraded to medicine service, floor with monitored bed   -Q2hr neuro checks   - mg BID   -Amantadine 100 mg BID  -Namenda 10 mg BID  -SBP goal 100-165  -Atorvastatin 40 mg daily  -Labetalol 10 mg Q2hrs PRN  -Metoprolol 5 mg Q6hrs PRN  -SpO2>92%, SpO2 adequate on nasal cannula   -s/p PEG tube day 1   -May start tube feeds per ACS   -Protonix 40 mg BID  -Senna/Miralax   -NS @50  -Cardura 4 mg QHS  -SCDs for DVT prophylaxis   -ASA 81/Plavix may restart per ACS   -Lovenox 40 mg daily for DVT prophylaxis   -Afebrile   -MISS, FS Q6hrs   -NPH 8 Units Q6hrs

## 2021-07-02 NOTE — PROGRESS NOTE ADULT - SUBJECTIVE AND OBJECTIVE BOX
Neuro ICU downgrade to medicine  82 y/o F w/ PMH DM, HTN, presented to ED as Code Stroke after being found down in her front lawn by a neighbor.  Unknown down time however last reported well at 10:50 am placing her out of window for TPA.  Glucose in field reported to be 60 and pt given amp of d50 w/ improvement in glucose level.  On arrival pt presented w/ L-facial droop, L-sided weakness, dysarthria.  CTH reported acute Rt MCA infarct and CTA showed Rt M2 occlusion and CTA neck with occlusion of the Rt ICA from the proximal cervical segment through the supraclinoid segment.  Pt was subsequently admitted to the Neuro ICU and intubated for airway protection (6/21).  Pt was also deemed to not be a candidate for mechanical thrombectomy.  Pt was started on VPA for seizure ppx.  EEG was negative for seizure activity.  Repeat CTH showed evolving infarct but without hemorrhage and no neurosurgical intervention required.  Hospital course has been complicated by LLL PNA likely due to aspiration, afib rvr (treated w/ cardizem and metoprolol) and was worked up for sepsis given hypotension and febrile.  Repeat CTH on 6/24 showed mildly larger stroke w/ small amount of petechial hemorrhag.  Pt was successfuly extubated 6/27.  PEG placed 6/30 and noted to have a duodenal ulcer; protonix was subsequently started.  CTA abd 7/1 showed hematoma long the greater curvature of the stomach and a small volume hemoperitoneum without evidence of active bleeding.  PEG was lavaged and blood noted however has since been cleared to use PEG and start tube feeds.  During hospital course pt transfused total of 1u PRBC and 1u plts on 7/1.  Pt hemodynamically stable, resumed on ASA and plavix and will be downgraded to medicine.        Pt is DNR/DNI          Chief Complaint:  rt MCA CVA    SUBJECTIVE / OVERNIGHT EVENTS: No acute events reported overnight.      Patient denies chest pain, SOB, abd pain, N/V, fever, chills, dysuria or any other complaints. All remainder ROS negative.       I&O's Summary    01 Jul 2021 07:01  -  02 Jul 2021 07:00  --------------------------------------------------------  IN: 1030 mL / OUT: 1130 mL / NET: -100 mL    02 Jul 2021 07:01  -  02 Jul 2021 13:54  --------------------------------------------------------  IN: 160 mL / OUT: 1200 mL / NET: -1040 mL          PHYSICAL EXAM:  Vital Signs Last 24 Hrs  T(C): 36.7 (02 Jul 2021 08:00), Max: 36.9 (01 Jul 2021 15:59)  T(F): 98.1 (02 Jul 2021 08:00), Max: 98.5 (01 Jul 2021 15:59)  HR: 69 (02 Jul 2021 13:40) (59 - 102)  BP: 148/67 (02 Jul 2021 13:40) (100/47 - 166/80)  BP(mean): 85 (02 Jul 2021 13:40) (61 - 106)  RR: 21 (02 Jul 2021 13:40) (18 - 31)  SpO2: 94% (02 Jul 2021 13:40) (93% - 100%)      CONSTITUTIONAL: pt examined bedside, laying comfortably in bed in NAD  HEENT: NC/AT, moist oral mucosa, clear conjunctiva, sclera nonicteric, EOMI  RESPIRATORY: Normal respiratory effort; CTA b/l, no wheezing, rhonchi, rales  CARDIOVASCULAR: RRR, normal S1 and S2, no murmur/rub/gallop  ABDOMEN: soft, NT/ND, normoactive bowel sounds, no rebound/guarding, no HSM  MUSCLOSKELETAL:  no joint swelling or tenderness to palpation  EXTREMITIES: No cynaosis, no clubbing, no lower extremity edema; Peripheral pulses are 2+ bilaterally  PSYCH: affect appropriate and cooperative  NEUROLOGY: A+O to person, place, and time, no focal neurologic deficits appreciated   SKIN: No rashes or no palpable lesions        LABS:                        11.0   8.52  )-----------( 196      ( 02 Jul 2021 04:46 )             32.3     07-02    139  |  106  |  17.4  ----------------------------<  98  3.7   |  23.0  |  0.40<L>    Ca    8.0<L>      02 Jul 2021 04:46  Phos  2.6     07-02  Mg     2.3     07-02      PT/INR - ( 02 Jul 2021 04:46 )   PT: 14.3 sec;   INR: 1.25 ratio         PTT - ( 02 Jul 2021 04:46 )  PTT:29.8 sec          CAPILLARY BLOOD GLUCOSE      POCT Blood Glucose.: 121 mg/dL (02 Jul 2021 13:46)  POCT Blood Glucose.: 96 mg/dL (02 Jul 2021 00:43)  POCT Blood Glucose.: 121 mg/dL (01 Jul 2021 17:26)        RADIOLOGY & ADDITIONAL TESTS:          MEDICATIONS  (STANDING):  amantadine Syrup 100 milliGRAM(s) Oral <User Schedule>  artificial tears (preservative free) Ophthalmic Solution 1 Drop(s) Both EYES three times a day  aspirin  chewable 81 milliGRAM(s) Oral daily  atorvastatin 40 milliGRAM(s) Oral at bedtime  clopidogrel Tablet 75 milliGRAM(s) Oral daily  dextrose 40% Gel 15 Gram(s) Oral once  dextrose 5%. 1000 milliLiter(s) (50 mL/Hr) IV Continuous <Continuous>  dextrose 5%. 1000 milliLiter(s) (100 mL/Hr) IV Continuous <Continuous>  dextrose 50% Injectable 25 Gram(s) IV Push once  dextrose 50% Injectable 25 Gram(s) IV Push once  dextrose 50% Injectable 12.5 Gram(s) IV Push once  doxazosin 4 milliGRAM(s) Oral at bedtime  enoxaparin Injectable 40 milliGRAM(s) SubCutaneous <User Schedule>  glucagon  Injectable 1 milliGRAM(s) IntraMuscular once  insulin lispro (ADMELOG) corrective regimen sliding scale   SubCutaneous every 6 hours  insulin NPH human recombinant 8 Unit(s) SubCutaneous every 6 hours  melatonin 5 milliGRAM(s) Oral at bedtime  memantine 10 milliGRAM(s) Oral two times a day  pantoprazole  Injectable 40 milliGRAM(s) IV Push every 12 hours  polyethylene glycol 3350 17 Gram(s) Oral daily  senna 2 Tablet(s) Oral at bedtime  sodium chloride 0.9%. 1000 milliLiter(s) (50 mL/Hr) IV Continuous <Continuous>  valproic  acid Syrup 500 milliGRAM(s) Oral every 12 hours    MEDICATIONS  (PRN):  labetalol Injectable 10 milliGRAM(s) IV Push every 2 hours PRN SBP >165  metoprolol tartrate Injectable 5 milliGRAM(s) IV Push every 6 hours PRN SBP>150                                   Neuro ICU downgrade to medicine  82 y/o F w/ PMH DM, HTN, presented to ED as Code Stroke after being found down in her front lawn by a neighbor.  Unknown down time however last reported well at 10:50 am placing her out of window for TPA.  Glucose in field reported to be 60 and pt given amp of d50 w/ improvement in glucose level.  On arrival pt presented w/ L-facial droop, L-sided weakness, dysarthria.  CTH reported acute Rt MCA infarct and CTA showed Rt M2 occlusion and CTA neck with occlusion of the Rt ICA from the proximal cervical segment through the supraclinoid segment.  Pt was subsequently admitted to the Neuro ICU and intubated for airway protection (6/21).  Pt was also deemed to not be a candidate for mechanical thrombectomy.  Pt was started on VPA for seizure ppx.  EEG was negative for seizure activity.  Repeat CTH showed evolving infarct but without hemorrhage and no neurosurgical intervention required.  Hospital course has been complicated by LLL PNA likely due to aspiration, afib rvr (treated w/ cardizem and metoprolol) and was worked up for sepsis given hypotension and febrile.  Repeat CTH on 6/24 showed mildly larger stroke w/ small amount of petechial hemorrhag.  Pt was successfuly extubated 6/27.  PEG placed 6/30 and noted to have a duodenal ulcer; protonix was subsequently started.  CTA abd 7/1 showed hematoma long the greater curvature of the stomach and a small volume hemoperitoneum without evidence of active bleeding.  PEG was lavaged and blood noted however has since been cleared to use PEG and start tube feeds.  During hospital course pt transfused total of 1u PRBC and 1u plts on 7/1.  Pt hemodynamically stable, resumed on ASA and plavix and will be downgraded to medicine.        Pt is DNR/DNI          Chief Complaint:  rt MCA CVA    SUBJECTIVE / OVERNIGHT EVENTS: No acute events reported overnight.      Patient denies chest pain, SOB, abd pain, N/V, fever, chills, dysuria or any other complaints. All remainder ROS negative.       I&O's Summary    01 Jul 2021 07:01  -  02 Jul 2021 07:00  --------------------------------------------------------  IN: 1030 mL / OUT: 1130 mL / NET: -100 mL    02 Jul 2021 07:01  -  02 Jul 2021 13:54  --------------------------------------------------------  IN: 160 mL / OUT: 1200 mL / NET: -1040 mL          PHYSICAL EXAM:  Vital Signs Last 24 Hrs  T(C): 36.7 (02 Jul 2021 08:00), Max: 36.9 (01 Jul 2021 15:59)  T(F): 98.1 (02 Jul 2021 08:00), Max: 98.5 (01 Jul 2021 15:59)  HR: 69 (02 Jul 2021 13:40) (59 - 102)  BP: 148/67 (02 Jul 2021 13:40) (100/47 - 166/80)  BP(mean): 85 (02 Jul 2021 13:40) (61 - 106)  RR: 21 (02 Jul 2021 13:40) (18 - 31)  SpO2: 94% (02 Jul 2021 13:40) (93% - 100%)      GENERAL: pt examined bedside, laying comfortably in bed in NAD  HEENT: NC/AT, moist oral mucosa, clear conjunctiva, sclera nonicteric  RESPIRATORY: poor inspiratory effort, b/l course rales  CARDIOVASCULAR: RRR, normal S1 and S2, no murmur/rub/gallop  ABDOMEN: soft, distended abdomen, nontender, +PEG, normoactive bowel sounds, no rebound/guarding  MUSCLOSKELETAL:  no joint swelling or tenderness to palpation  EXTREMITIES: No cynaosis, no clubbing, no lower extremity edema; Peripheral pulses are 2+ bilaterally  NEUROLOGY: A+O x0, not following commands, PERRL  SKIN: No rashes or no palpable lesions        LABS:                        11.0   8.52  )-----------( 196      ( 02 Jul 2021 04:46 )             32.3     07-02    139  |  106  |  17.4  ----------------------------<  98  3.7   |  23.0  |  0.40<L>    Ca    8.0<L>      02 Jul 2021 04:46  Phos  2.6     07-02  Mg     2.3     07-02      PT/INR - ( 02 Jul 2021 04:46 )   PT: 14.3 sec;   INR: 1.25 ratio         PTT - ( 02 Jul 2021 04:46 )  PTT:29.8 sec          CAPILLARY BLOOD GLUCOSE      POCT Blood Glucose.: 121 mg/dL (02 Jul 2021 13:46)  POCT Blood Glucose.: 96 mg/dL (02 Jul 2021 00:43)  POCT Blood Glucose.: 121 mg/dL (01 Jul 2021 17:26)        RADIOLOGY & ADDITIONAL TESTS:          MEDICATIONS  (STANDING):  amantadine Syrup 100 milliGRAM(s) Oral <User Schedule>  artificial tears (preservative free) Ophthalmic Solution 1 Drop(s) Both EYES three times a day  aspirin  chewable 81 milliGRAM(s) Oral daily  atorvastatin 40 milliGRAM(s) Oral at bedtime  clopidogrel Tablet 75 milliGRAM(s) Oral daily  dextrose 40% Gel 15 Gram(s) Oral once  dextrose 5%. 1000 milliLiter(s) (50 mL/Hr) IV Continuous <Continuous>  dextrose 5%. 1000 milliLiter(s) (100 mL/Hr) IV Continuous <Continuous>  dextrose 50% Injectable 25 Gram(s) IV Push once  dextrose 50% Injectable 25 Gram(s) IV Push once  dextrose 50% Injectable 12.5 Gram(s) IV Push once  doxazosin 4 milliGRAM(s) Oral at bedtime  enoxaparin Injectable 40 milliGRAM(s) SubCutaneous <User Schedule>  glucagon  Injectable 1 milliGRAM(s) IntraMuscular once  insulin lispro (ADMELOG) corrective regimen sliding scale   SubCutaneous every 6 hours  insulin NPH human recombinant 8 Unit(s) SubCutaneous every 6 hours  melatonin 5 milliGRAM(s) Oral at bedtime  memantine 10 milliGRAM(s) Oral two times a day  pantoprazole  Injectable 40 milliGRAM(s) IV Push every 12 hours  polyethylene glycol 3350 17 Gram(s) Oral daily  senna 2 Tablet(s) Oral at bedtime  sodium chloride 0.9%. 1000 milliLiter(s) (50 mL/Hr) IV Continuous <Continuous>  valproic  acid Syrup 500 milliGRAM(s) Oral every 12 hours    MEDICATIONS  (PRN):  labetalol Injectable 10 milliGRAM(s) IV Push every 2 hours PRN SBP >165  metoprolol tartrate Injectable 5 milliGRAM(s) IV Push every 6 hours PRN SBP>150

## 2021-07-03 ENCOUNTER — TRANSCRIPTION ENCOUNTER (OUTPATIENT)
Age: 81
End: 2021-07-03

## 2021-07-03 LAB
ALBUMIN SERPL ELPH-MCNC: 2.7 G/DL — LOW (ref 3.3–5.2)
ALP SERPL-CCNC: 74 U/L — SIGNIFICANT CHANGE UP (ref 40–120)
ALT FLD-CCNC: 43 U/L — HIGH
ANION GAP SERPL CALC-SCNC: 9 MMOL/L — SIGNIFICANT CHANGE UP (ref 5–17)
AST SERPL-CCNC: 45 U/L — HIGH
BASOPHILS # BLD AUTO: 0.02 K/UL — SIGNIFICANT CHANGE UP (ref 0–0.2)
BASOPHILS NFR BLD AUTO: 0.2 % — SIGNIFICANT CHANGE UP (ref 0–2)
BILIRUB SERPL-MCNC: 0.4 MG/DL — SIGNIFICANT CHANGE UP (ref 0.4–2)
BUN SERPL-MCNC: 23.2 MG/DL — HIGH (ref 8–20)
CALCIUM SERPL-MCNC: 8 MG/DL — LOW (ref 8.6–10.2)
CHLORIDE SERPL-SCNC: 107 MMOL/L — SIGNIFICANT CHANGE UP (ref 98–107)
CO2 SERPL-SCNC: 23 MMOL/L — SIGNIFICANT CHANGE UP (ref 22–29)
CREAT SERPL-MCNC: 0.41 MG/DL — LOW (ref 0.5–1.3)
EOSINOPHIL # BLD AUTO: 0.11 K/UL — SIGNIFICANT CHANGE UP (ref 0–0.5)
EOSINOPHIL NFR BLD AUTO: 1.4 % — SIGNIFICANT CHANGE UP (ref 0–6)
GLUCOSE BLDC GLUCOMTR-MCNC: 122 MG/DL — HIGH (ref 70–99)
GLUCOSE BLDC GLUCOMTR-MCNC: 157 MG/DL — HIGH (ref 70–99)
GLUCOSE BLDC GLUCOMTR-MCNC: 167 MG/DL — HIGH (ref 70–99)
GLUCOSE BLDC GLUCOMTR-MCNC: 181 MG/DL — HIGH (ref 70–99)
GLUCOSE BLDC GLUCOMTR-MCNC: 90 MG/DL — SIGNIFICANT CHANGE UP (ref 70–99)
GLUCOSE SERPL-MCNC: 120 MG/DL — HIGH (ref 70–99)
HCT VFR BLD CALC: 34 % — LOW (ref 34.5–45)
HGB BLD-MCNC: 11.2 G/DL — LOW (ref 11.5–15.5)
IMM GRANULOCYTES NFR BLD AUTO: 1 % — SIGNIFICANT CHANGE UP (ref 0–1.5)
LYMPHOCYTES # BLD AUTO: 0.77 K/UL — LOW (ref 1–3.3)
LYMPHOCYTES # BLD AUTO: 9.5 % — LOW (ref 13–44)
MAGNESIUM SERPL-MCNC: 2.3 MG/DL — SIGNIFICANT CHANGE UP (ref 1.6–2.6)
MCHC RBC-ENTMCNC: 28.2 PG — SIGNIFICANT CHANGE UP (ref 27–34)
MCHC RBC-ENTMCNC: 32.9 GM/DL — SIGNIFICANT CHANGE UP (ref 32–36)
MCV RBC AUTO: 85.6 FL — SIGNIFICANT CHANGE UP (ref 80–100)
MONOCYTES # BLD AUTO: 0.84 K/UL — SIGNIFICANT CHANGE UP (ref 0–0.9)
MONOCYTES NFR BLD AUTO: 10.4 % — SIGNIFICANT CHANGE UP (ref 2–14)
NEUTROPHILS # BLD AUTO: 6.25 K/UL — SIGNIFICANT CHANGE UP (ref 1.8–7.4)
NEUTROPHILS NFR BLD AUTO: 77.5 % — HIGH (ref 43–77)
PHOSPHATE SERPL-MCNC: 2.5 MG/DL — SIGNIFICANT CHANGE UP (ref 2.4–4.7)
PLATELET # BLD AUTO: 245 K/UL — SIGNIFICANT CHANGE UP (ref 150–400)
POTASSIUM SERPL-MCNC: 3.5 MMOL/L — SIGNIFICANT CHANGE UP (ref 3.5–5.3)
POTASSIUM SERPL-SCNC: 3.5 MMOL/L — SIGNIFICANT CHANGE UP (ref 3.5–5.3)
PROT SERPL-MCNC: 5.3 G/DL — LOW (ref 6.6–8.7)
RBC # BLD: 3.97 M/UL — SIGNIFICANT CHANGE UP (ref 3.8–5.2)
RBC # FLD: 15.2 % — HIGH (ref 10.3–14.5)
SODIUM SERPL-SCNC: 139 MMOL/L — SIGNIFICANT CHANGE UP (ref 135–145)
WBC # BLD: 8.07 K/UL — SIGNIFICANT CHANGE UP (ref 3.8–10.5)
WBC # FLD AUTO: 8.07 K/UL — SIGNIFICANT CHANGE UP (ref 3.8–10.5)

## 2021-07-03 PROCEDURE — 99233 SBSQ HOSP IP/OBS HIGH 50: CPT

## 2021-07-03 RX ORDER — INSULIN GLARGINE 100 [IU]/ML
15 INJECTION, SOLUTION SUBCUTANEOUS AT BEDTIME
Refills: 0 | Status: DISCONTINUED | OUTPATIENT
Start: 2021-07-03 | End: 2021-07-06

## 2021-07-03 RX ORDER — ACETAMINOPHEN 500 MG
650 TABLET ORAL ONCE
Refills: 0 | Status: COMPLETED | OUTPATIENT
Start: 2021-07-03 | End: 2021-07-03

## 2021-07-03 RX ADMIN — CLOPIDOGREL BISULFATE 75 MILLIGRAM(S): 75 TABLET, FILM COATED ORAL at 14:44

## 2021-07-03 RX ADMIN — HUMAN INSULIN 8 UNIT(S): 100 INJECTION, SUSPENSION SUBCUTANEOUS at 14:55

## 2021-07-03 RX ADMIN — Medication 1 DROP(S): at 14:59

## 2021-07-03 RX ADMIN — Medication 2: at 23:53

## 2021-07-03 RX ADMIN — Medication 10 MILLIGRAM(S): at 21:40

## 2021-07-03 RX ADMIN — MEMANTINE HYDROCHLORIDE 10 MILLIGRAM(S): 10 TABLET ORAL at 18:32

## 2021-07-03 RX ADMIN — ENOXAPARIN SODIUM 40 MILLIGRAM(S): 100 INJECTION SUBCUTANEOUS at 18:32

## 2021-07-03 RX ADMIN — Medication 2: at 14:55

## 2021-07-03 RX ADMIN — ATORVASTATIN CALCIUM 40 MILLIGRAM(S): 80 TABLET, FILM COATED ORAL at 21:39

## 2021-07-03 RX ADMIN — Medication 1 DROP(S): at 21:39

## 2021-07-03 RX ADMIN — MEMANTINE HYDROCHLORIDE 10 MILLIGRAM(S): 10 TABLET ORAL at 05:35

## 2021-07-03 RX ADMIN — INSULIN GLARGINE 15 UNIT(S): 100 INJECTION, SOLUTION SUBCUTANEOUS at 21:39

## 2021-07-03 RX ADMIN — PANTOPRAZOLE SODIUM 40 MILLIGRAM(S): 20 TABLET, DELAYED RELEASE ORAL at 05:35

## 2021-07-03 RX ADMIN — Medication 2: at 06:40

## 2021-07-03 RX ADMIN — PANTOPRAZOLE SODIUM 40 MILLIGRAM(S): 20 TABLET, DELAYED RELEASE ORAL at 18:33

## 2021-07-03 RX ADMIN — SENNA PLUS 2 TABLET(S): 8.6 TABLET ORAL at 21:39

## 2021-07-03 RX ADMIN — Medication 4 MILLIGRAM(S): at 21:44

## 2021-07-03 RX ADMIN — Medication 500 MILLIGRAM(S): at 21:39

## 2021-07-03 RX ADMIN — Medication 100 MILLIGRAM(S): at 18:32

## 2021-07-03 RX ADMIN — Medication 500 MILLIGRAM(S): at 09:22

## 2021-07-03 RX ADMIN — Medication 1 DROP(S): at 05:35

## 2021-07-03 RX ADMIN — Medication 81 MILLIGRAM(S): at 14:44

## 2021-07-03 RX ADMIN — Medication 100 MILLIGRAM(S): at 05:35

## 2021-07-03 RX ADMIN — SODIUM CHLORIDE 50 MILLILITER(S): 9 INJECTION INTRAMUSCULAR; INTRAVENOUS; SUBCUTANEOUS at 18:32

## 2021-07-03 NOTE — PROGRESS NOTE ADULT - SUBJECTIVE AND OBJECTIVE BOX
[facility] Division of Hospital Medicine    Chief Complaint:  CVA     SUBJECTIVE: not verbalising complains    OVERNIGHT EVENTS: none reported     ROS is not available due to mental status     MEDICATIONS  (STANDING):  amantadine Syrup 100 milliGRAM(s) Oral <User Schedule>  artificial tears (preservative free) Ophthalmic Solution 1 Drop(s) Both EYES three times a day  aspirin  chewable 81 milliGRAM(s) Oral daily  atorvastatin 40 milliGRAM(s) Oral at bedtime  clopidogrel Tablet 75 milliGRAM(s) Oral daily  dextrose 40% Gel 15 Gram(s) Oral once  dextrose 5%. 1000 milliLiter(s) (50 mL/Hr) IV Continuous <Continuous>  dextrose 5%. 1000 milliLiter(s) (100 mL/Hr) IV Continuous <Continuous>  dextrose 50% Injectable 25 Gram(s) IV Push once  dextrose 50% Injectable 12.5 Gram(s) IV Push once  dextrose 50% Injectable 25 Gram(s) IV Push once  doxazosin 4 milliGRAM(s) Oral at bedtime  enoxaparin Injectable 40 milliGRAM(s) SubCutaneous <User Schedule>  glucagon  Injectable 1 milliGRAM(s) IntraMuscular once  insulin lispro (ADMELOG) corrective regimen sliding scale   SubCutaneous every 6 hours  insulin NPH human recombinant 8 Unit(s) SubCutaneous every 6 hours  melatonin 5 milliGRAM(s) Oral at bedtime  memantine 10 milliGRAM(s) Oral two times a day  pantoprazole  Injectable 40 milliGRAM(s) IV Push every 12 hours  polyethylene glycol 3350 17 Gram(s) Oral daily  senna 2 Tablet(s) Oral at bedtime  sodium chloride 0.9%. 1000 milliLiter(s) (50 mL/Hr) IV Continuous <Continuous>  valproic  acid Syrup 500 milliGRAM(s) Oral every 12 hours    MEDICATIONS  (PRN):  labetalol Injectable 10 milliGRAM(s) IV Push every 2 hours PRN SBP >165  metoprolol tartrate Injectable 5 milliGRAM(s) IV Push every 6 hours PRN SBP>150        I&O's Summary    02 Jul 2021 07:01  -  03 Jul 2021 07:00  --------------------------------------------------------  IN: 1750 mL / OUT: 2775 mL / NET: -1025 mL    03 Jul 2021 07:01  -  03 Jul 2021 16:53  --------------------------------------------------------  IN: 0 mL / OUT: 400 mL / NET: -400 mL        PHYSICAL EXAM:  Vital Signs Last 24 Hrs  T(C): 36.5 (03 Jul 2021 16:00), Max: 37 (03 Jul 2021 08:00)  T(F): 97.7 (03 Jul 2021 16:00), Max: 98.6 (03 Jul 2021 08:00)  HR: 78 (03 Jul 2021 16:00) (61 - 81)  BP: 142/62 (03 Jul 2021 16:00) (112/59 - 167/79)  BP(mean): 89 (03 Jul 2021 12:00) (58 - 100)  RR: 17 (03 Jul 2021 16:00) (17 - 29)  SpO2: 95% (03 Jul 2021 16:00) (92% - 98%)        CONSTITUTIONAL: NAD  ENMT: Moist oral mucosa, no pharyngeal injection or exudates; normal dentition; No JVD  RESPIRATORY: Normal respiratory effort; diminished on bases, lungs are clear to auscultation bilaterally  CARDIOVASCULAR: Regular rate and rhythm, normal S1 and S2, no murmur/rub/gallop; No lower extremity edema; Peripheral pulses are 2+ bilaterally  ABDOMEN: Nontender to palpation, normoactive bowel sounds, no rebound/guarding; No hepatosplenomegaly, luq PEG with dry blood around it  MUSCLOSKELETAL:  no clubbing or cyanosis of digits; no joint swelling or tenderness to palpation  PSYCH: A+O to person, place, and time; affect appropriate  NEUROLOGY: R facial droop iwht LUE paresis, locolasing pain in all 4 ext ; was observed moving all 3  ext exept LUE on pain.    SKIN: No rashes; no palpable lesions    LABS:                        11.2   8.07  )-----------( 245      ( 03 Jul 2021 10:37 )             34.0     07-03    139  |  107  |  23.2<H>  ----------------------------<  120<H>  3.5   |  23.0  |  0.41<L>    Ca    8.0<L>      03 Jul 2021 10:37  Phos  2.5     07-03  Mg     2.3     07-03    TPro  5.3<L>  /  Alb  2.7<L>  /  TBili  0.4  /  DBili  x   /  AST  45<H>  /  ALT  43<H>  /  AlkPhos  74  07-03    PT/INR - ( 02 Jul 2021 04:46 )   PT: 14.3 sec;   INR: 1.25 ratio         PTT - ( 02 Jul 2021 04:46 )  PTT:29.8 sec          CAPILLARY BLOOD GLUCOSE      POCT Blood Glucose.: 181 mg/dL (03 Jul 2021 14:48)  POCT Blood Glucose.: 157 mg/dL (03 Jul 2021 06:38)  POCT Blood Glucose.: 90 mg/dL (03 Jul 2021 00:15)  POCT Blood Glucose.: 143 mg/dL (02 Jul 2021 17:07)        RADIOLOGY & ADDITIONAL TESTS:  Results Reviewed:   Imaging Personally Reviewed:  Electrocardiogram Personally Reviewed:

## 2021-07-03 NOTE — PROGRESS NOTE ADULT - ASSESSMENT
82 y/o F w/ PMH DM, HTN, presented to ED as Code Stroke after being found down in her front lawn by a neighbor.  Unknown down time however last reported well at 10:50 am placing her out of window for TPA.  Glucose in field reported to be 60 and pt given amp of d50 w/ improvement in glucose level.  On arrival pt presented w/ L-facial droop, L-sided weakness, dysarthria.  CTH reported acute Rt MCA infarct and CTA showed Rt M2 occlusion and CTA neck with occlusion of the Rt ICA from the proximal cervical segment through the supraclinoid segment.  Pt was subsequently admitted to the Neuro ICU and intubated for airway protection (6/21).  Pt was also deemed to not be a candidate for mechanical thrombectomy.  Pt was started on VPA for seizure ppx.  EEG was negative for seizure activity.  Repeat CTH showed evolving infarct but without hemorrhage and no neurosurgical intervention required.  Hospital course has been complicated by LLL PNA likely due to aspiration, afib rvr (treated w/ cardizem and metoprolol) and was worked up for sepsis given hypotension and febrile.  Repeat CTH on 6/24 showed mildly larger stroke w/ small amount of petechial hemorrhage  Pt was successfully extubated 6/27.  PEG placed 6/30 and noted to have a duodenal ulcer; protonix was subsequently started.  CTA abd 7/1 showed hematoma long the greater curvature of the stomach and a small volume hemoperitoneum without evidence of active bleeding.  PEG was lavaged and blood noted however has since been cleared to use PEG and start tube feeds.  During hospital course pt transfused total of 1u PRBC and 1u plts on 7/1.  Pt hemodynamically stable, resumed on ASA and plavix and will be downgraded to medicine.            Rt MCA infarct w/ Rt M2 occlusion and Rt ICA occlusion complicated by enlarging of stroke w/ small petechial hemorrhage  - Pt was out of window for TPA and not candidate for mechanical thrombectomy   - c/w VPA for seizure ppx  - c/w ASA and plavix (resumed today), c/w statin therapy   - Neuro checks and VS q2h  - Maintain HOB elevated to 30 degrees  - Maintain goal -165      Sepsis possible 2/2 aspiration PNA, likely gram negative   - Sepsis resolved and leukocytosis WNL  - Completed short course of Zosyn   - Hemodynamically stable and pt afebrile at this time      Hematoma along the greater curvature of the stomach and a small volume hemoperitoneum without evidence of active bleeding  - Monitor H/H   - Tube feeds were restarted tolerating so far      AFib RVR, now rate controlled  - Initially received Cardizem and metoprolol   - c/w metoprolol for rate control as needed  - Monitor on telemetry       Normocytic anemia  - H/H stable and hemodynamically stable  - ASA and plavix resumed today   - Monitor CBC closely and transfuse as needed      DM II, controlled  - A1c 6.9  - switched to Lantus 15 units qhs, MDSS for now, will adjust base on poc     Essential HTN  - Maintain SBP at goal 100-165     Diarrhear, most likely secondary to tube feeding  - flexesis in place  - will add banana flakes      VTE ppx: Lovenox sq   GI ppx: protonix   Code Status: DNR/DNI    Dispo: pending clinical course

## 2021-07-03 NOTE — DISCHARGE NOTE NURSING/CASE MANAGEMENT/SOCIAL WORK - PATIENT PORTAL LINK FT
You can access the FollowMyHealth Patient Portal offered by [facility] by registering at the following website: [email or web address]. By joining [employer]’s FollowMyHealth portal, you will also be able to view your health information using other applications (apps) compatible with our system.

## 2021-07-04 LAB
ALBUMIN SERPL ELPH-MCNC: 2.7 G/DL — LOW (ref 3.3–5.2)
ALP SERPL-CCNC: 84 U/L — SIGNIFICANT CHANGE UP (ref 40–120)
ALT FLD-CCNC: 38 U/L — HIGH
ANION GAP SERPL CALC-SCNC: 12 MMOL/L — SIGNIFICANT CHANGE UP (ref 5–17)
ANION GAP SERPL CALC-SCNC: 13 MMOL/L — SIGNIFICANT CHANGE UP (ref 5–17)
APPEARANCE UR: ABNORMAL
AST SERPL-CCNC: 36 U/L — HIGH
BACTERIA # UR AUTO: ABNORMAL
BILIRUB SERPL-MCNC: 0.4 MG/DL — SIGNIFICANT CHANGE UP (ref 0.4–2)
BILIRUB UR-MCNC: NEGATIVE — SIGNIFICANT CHANGE UP
BUN SERPL-MCNC: 17.4 MG/DL — SIGNIFICANT CHANGE UP (ref 8–20)
BUN SERPL-MCNC: 18.8 MG/DL — SIGNIFICANT CHANGE UP (ref 8–20)
CALCIUM SERPL-MCNC: 7.6 MG/DL — LOW (ref 8.6–10.2)
CALCIUM SERPL-MCNC: 7.8 MG/DL — LOW (ref 8.6–10.2)
CHLORIDE SERPL-SCNC: 105 MMOL/L — SIGNIFICANT CHANGE UP (ref 98–107)
CHLORIDE SERPL-SCNC: 106 MMOL/L — SIGNIFICANT CHANGE UP (ref 98–107)
CO2 SERPL-SCNC: 21 MMOL/L — LOW (ref 22–29)
CO2 SERPL-SCNC: 22 MMOL/L — SIGNIFICANT CHANGE UP (ref 22–29)
COLOR SPEC: YELLOW — SIGNIFICANT CHANGE UP
CREAT SERPL-MCNC: 0.38 MG/DL — LOW (ref 0.5–1.3)
CREAT SERPL-MCNC: 0.47 MG/DL — LOW (ref 0.5–1.3)
DIFF PNL FLD: ABNORMAL
EPI CELLS # UR: SIGNIFICANT CHANGE UP
GLUCOSE BLDC GLUCOMTR-MCNC: 142 MG/DL — HIGH (ref 70–99)
GLUCOSE BLDC GLUCOMTR-MCNC: 168 MG/DL — HIGH (ref 70–99)
GLUCOSE BLDC GLUCOMTR-MCNC: 180 MG/DL — HIGH (ref 70–99)
GLUCOSE BLDC GLUCOMTR-MCNC: 182 MG/DL — HIGH (ref 70–99)
GLUCOSE SERPL-MCNC: 148 MG/DL — HIGH (ref 70–99)
GLUCOSE SERPL-MCNC: 149 MG/DL — HIGH (ref 70–99)
GLUCOSE UR QL: NEGATIVE MG/DL — SIGNIFICANT CHANGE UP
GRAM STN FLD: SIGNIFICANT CHANGE UP
HCT VFR BLD CALC: 34.5 % — SIGNIFICANT CHANGE UP (ref 34.5–45)
HCT VFR BLD CALC: 34.6 % — SIGNIFICANT CHANGE UP (ref 34.5–45)
HGB BLD-MCNC: 11.3 G/DL — LOW (ref 11.5–15.5)
HGB BLD-MCNC: 11.7 G/DL — SIGNIFICANT CHANGE UP (ref 11.5–15.5)
KETONES UR-MCNC: ABNORMAL
LACTATE BLDV-MCNC: 2.6 MMOL/L — HIGH (ref 0.5–2)
LACTATE SERPL-SCNC: 2.7 MMOL/L — HIGH (ref 0.5–2)
LEUKOCYTE ESTERASE UR-ACNC: ABNORMAL
MAGNESIUM SERPL-MCNC: 2.1 MG/DL — SIGNIFICANT CHANGE UP (ref 1.8–2.6)
MCHC RBC-ENTMCNC: 28.4 PG — SIGNIFICANT CHANGE UP (ref 27–34)
MCHC RBC-ENTMCNC: 28.8 PG — SIGNIFICANT CHANGE UP (ref 27–34)
MCHC RBC-ENTMCNC: 32.7 GM/DL — SIGNIFICANT CHANGE UP (ref 32–36)
MCHC RBC-ENTMCNC: 33.9 GM/DL — SIGNIFICANT CHANGE UP (ref 32–36)
MCV RBC AUTO: 85 FL — SIGNIFICANT CHANGE UP (ref 80–100)
MCV RBC AUTO: 86.9 FL — SIGNIFICANT CHANGE UP (ref 80–100)
NITRITE UR-MCNC: NEGATIVE — SIGNIFICANT CHANGE UP
PH UR: 6 — SIGNIFICANT CHANGE UP (ref 5–8)
PLATELET # BLD AUTO: 286 K/UL — SIGNIFICANT CHANGE UP (ref 150–400)
PLATELET # BLD AUTO: 286 K/UL — SIGNIFICANT CHANGE UP (ref 150–400)
POTASSIUM SERPL-MCNC: 3.4 MMOL/L — LOW (ref 3.5–5.3)
POTASSIUM SERPL-MCNC: 3.9 MMOL/L — SIGNIFICANT CHANGE UP (ref 3.5–5.3)
POTASSIUM SERPL-SCNC: 3.4 MMOL/L — LOW (ref 3.5–5.3)
POTASSIUM SERPL-SCNC: 3.9 MMOL/L — SIGNIFICANT CHANGE UP (ref 3.5–5.3)
PROT SERPL-MCNC: 5.4 G/DL — LOW (ref 6.6–8.7)
PROT UR-MCNC: 30 MG/DL
RBC # BLD: 3.98 M/UL — SIGNIFICANT CHANGE UP (ref 3.8–5.2)
RBC # BLD: 4.06 M/UL — SIGNIFICANT CHANGE UP (ref 3.8–5.2)
RBC # FLD: 15.2 % — HIGH (ref 10.3–14.5)
RBC # FLD: 15.2 % — HIGH (ref 10.3–14.5)
RBC CASTS # UR COMP ASSIST: >50 /HPF (ref 0–4)
SODIUM SERPL-SCNC: 138 MMOL/L — SIGNIFICANT CHANGE UP (ref 135–145)
SODIUM SERPL-SCNC: 140 MMOL/L — SIGNIFICANT CHANGE UP (ref 135–145)
SP GR SPEC: 1.01 — SIGNIFICANT CHANGE UP (ref 1.01–1.02)
SPECIMEN SOURCE: SIGNIFICANT CHANGE UP
UROBILINOGEN FLD QL: 8 MG/DL
WBC # BLD: 10.82 K/UL — HIGH (ref 3.8–10.5)
WBC # BLD: 9.19 K/UL — SIGNIFICANT CHANGE UP (ref 3.8–10.5)
WBC # FLD AUTO: 10.82 K/UL — HIGH (ref 3.8–10.5)
WBC # FLD AUTO: 9.19 K/UL — SIGNIFICANT CHANGE UP (ref 3.8–10.5)
WBC UR QL: ABNORMAL

## 2021-07-04 PROCEDURE — 99223 1ST HOSP IP/OBS HIGH 75: CPT

## 2021-07-04 PROCEDURE — 99233 SBSQ HOSP IP/OBS HIGH 50: CPT

## 2021-07-04 PROCEDURE — 71045 X-RAY EXAM CHEST 1 VIEW: CPT | Mod: 26

## 2021-07-04 RX ORDER — POTASSIUM CHLORIDE 20 MEQ
40 PACKET (EA) ORAL ONCE
Refills: 0 | Status: COMPLETED | OUTPATIENT
Start: 2021-07-04 | End: 2021-07-04

## 2021-07-04 RX ORDER — PIPERACILLIN AND TAZOBACTAM 4; .5 G/20ML; G/20ML
3.38 INJECTION, POWDER, LYOPHILIZED, FOR SOLUTION INTRAVENOUS ONCE
Refills: 0 | Status: COMPLETED | OUTPATIENT
Start: 2021-07-04 | End: 2021-07-04

## 2021-07-04 RX ORDER — SODIUM CHLORIDE 9 MG/ML
1000 INJECTION INTRAMUSCULAR; INTRAVENOUS; SUBCUTANEOUS
Refills: 0 | Status: DISCONTINUED | OUTPATIENT
Start: 2021-07-04 | End: 2021-07-07

## 2021-07-04 RX ORDER — METOPROLOL TARTRATE 50 MG
25 TABLET ORAL
Refills: 0 | Status: DISCONTINUED | OUTPATIENT
Start: 2021-07-04 | End: 2021-07-06

## 2021-07-04 RX ORDER — SODIUM CHLORIDE 9 MG/ML
1000 INJECTION INTRAMUSCULAR; INTRAVENOUS; SUBCUTANEOUS ONCE
Refills: 0 | Status: COMPLETED | OUTPATIENT
Start: 2021-07-04 | End: 2021-07-04

## 2021-07-04 RX ORDER — ACETAMINOPHEN 500 MG
650 TABLET ORAL EVERY 6 HOURS
Refills: 0 | Status: DISCONTINUED | OUTPATIENT
Start: 2021-07-04 | End: 2021-07-08

## 2021-07-04 RX ORDER — POTASSIUM CHLORIDE 20 MEQ
40 PACKET (EA) ORAL ONCE
Refills: 0 | Status: DISCONTINUED | OUTPATIENT
Start: 2021-07-04 | End: 2021-07-04

## 2021-07-04 RX ORDER — METOPROLOL TARTRATE 50 MG
5 TABLET ORAL EVERY 6 HOURS
Refills: 0 | Status: DISCONTINUED | OUTPATIENT
Start: 2021-07-04 | End: 2021-07-08

## 2021-07-04 RX ORDER — PIPERACILLIN AND TAZOBACTAM 4; .5 G/20ML; G/20ML
3.38 INJECTION, POWDER, LYOPHILIZED, FOR SOLUTION INTRAVENOUS EVERY 8 HOURS
Refills: 0 | Status: DISCONTINUED | OUTPATIENT
Start: 2021-07-04 | End: 2021-07-07

## 2021-07-04 RX ORDER — VANCOMYCIN HCL 1 G
1000 VIAL (EA) INTRAVENOUS ONCE
Refills: 0 | Status: COMPLETED | OUTPATIENT
Start: 2021-07-04 | End: 2021-07-04

## 2021-07-04 RX ADMIN — Medication 500 MILLIGRAM(S): at 22:40

## 2021-07-04 RX ADMIN — Medication 2: at 06:23

## 2021-07-04 RX ADMIN — MEMANTINE HYDROCHLORIDE 10 MILLIGRAM(S): 10 TABLET ORAL at 06:37

## 2021-07-04 RX ADMIN — MEMANTINE HYDROCHLORIDE 10 MILLIGRAM(S): 10 TABLET ORAL at 17:43

## 2021-07-04 RX ADMIN — PANTOPRAZOLE SODIUM 40 MILLIGRAM(S): 20 TABLET, DELAYED RELEASE ORAL at 06:23

## 2021-07-04 RX ADMIN — SODIUM CHLORIDE 1000 MILLILITER(S): 9 INJECTION INTRAMUSCULAR; INTRAVENOUS; SUBCUTANEOUS at 02:40

## 2021-07-04 RX ADMIN — PIPERACILLIN AND TAZOBACTAM 25 GRAM(S): 4; .5 INJECTION, POWDER, LYOPHILIZED, FOR SOLUTION INTRAVENOUS at 22:31

## 2021-07-04 RX ADMIN — ENOXAPARIN SODIUM 40 MILLIGRAM(S): 100 INJECTION SUBCUTANEOUS at 17:43

## 2021-07-04 RX ADMIN — Medication 5 MILLIGRAM(S): at 09:29

## 2021-07-04 RX ADMIN — Medication 100 MILLIGRAM(S): at 06:38

## 2021-07-04 RX ADMIN — Medication 40 MILLIEQUIVALENT(S): at 16:23

## 2021-07-04 RX ADMIN — CLOPIDOGREL BISULFATE 75 MILLIGRAM(S): 75 TABLET, FILM COATED ORAL at 11:29

## 2021-07-04 RX ADMIN — PIPERACILLIN AND TAZOBACTAM 25 GRAM(S): 4; .5 INJECTION, POWDER, LYOPHILIZED, FOR SOLUTION INTRAVENOUS at 13:34

## 2021-07-04 RX ADMIN — Medication 25 MILLIGRAM(S): at 11:29

## 2021-07-04 RX ADMIN — Medication 1 DROP(S): at 13:35

## 2021-07-04 RX ADMIN — SODIUM CHLORIDE 75 MILLILITER(S): 9 INJECTION INTRAMUSCULAR; INTRAVENOUS; SUBCUTANEOUS at 10:55

## 2021-07-04 RX ADMIN — Medication 250 MILLIGRAM(S): at 03:04

## 2021-07-04 RX ADMIN — Medication 100 MILLIGRAM(S): at 16:23

## 2021-07-04 RX ADMIN — PIPERACILLIN AND TAZOBACTAM 200 GRAM(S): 4; .5 INJECTION, POWDER, LYOPHILIZED, FOR SOLUTION INTRAVENOUS at 04:57

## 2021-07-04 RX ADMIN — ATORVASTATIN CALCIUM 40 MILLIGRAM(S): 80 TABLET, FILM COATED ORAL at 22:32

## 2021-07-04 RX ADMIN — Medication 25 MILLIGRAM(S): at 17:42

## 2021-07-04 RX ADMIN — Medication 650 MILLIGRAM(S): at 02:10

## 2021-07-04 RX ADMIN — PANTOPRAZOLE SODIUM 40 MILLIGRAM(S): 20 TABLET, DELAYED RELEASE ORAL at 17:43

## 2021-07-04 RX ADMIN — Medication 2: at 17:43

## 2021-07-04 RX ADMIN — Medication 650 MILLIGRAM(S): at 04:18

## 2021-07-04 RX ADMIN — Medication 1 DROP(S): at 22:33

## 2021-07-04 RX ADMIN — Medication 500 MILLIGRAM(S): at 09:30

## 2021-07-04 RX ADMIN — Medication 81 MILLIGRAM(S): at 11:29

## 2021-07-04 RX ADMIN — Medication 1 DROP(S): at 06:23

## 2021-07-04 RX ADMIN — INSULIN GLARGINE 15 UNIT(S): 100 INJECTION, SOLUTION SUBCUTANEOUS at 22:33

## 2021-07-04 RX ADMIN — Medication 4 MILLIGRAM(S): at 22:40

## 2021-07-04 RX ADMIN — Medication 5 MILLIGRAM(S): at 22:31

## 2021-07-04 RX ADMIN — Medication 2: at 22:51

## 2021-07-04 NOTE — CONSULT NOTE ADULT - CONSULT REQUESTED DATE/TIME
20-Jun-2021 16:00
21-Jun-2021 16:51
04-Jul-2021 18:10
19-Jun-2021 16:32
21-Jun-2021 10:13
29-Jun-2021 15:23
19-Jun-2021 18:33

## 2021-07-04 NOTE — CHART NOTE - NSCHARTNOTEFT_GEN_A_CORE
Patient with rectal temp 100.4 with RR 30   Patient seen and evaluated at bedside   ROS unable to be obtain 2/2 to patient's baseline mentation     Vital Signs   T(F): 100.5 rectal   HR: 74   BP: 137/60   RR: 30   SpO2: 93%     GENERAL: NAD  HEAD: Atraumatic, Normocephalic  EYES: EOMI, PERRLA, conjunctiva and sclera clear  ENMT: Moist mucous membranes  CHEST/LUNG: diminished breath sounds b/l; Mildly labored respirations  HEART: S1S2+, Regular rate and rhythm  ABDOMEN: Soft, Nontender, Nondistended; BS+, +PEG  EXTREMITIES:  2+ Peripheral Pulses, No LE edema, no tenderness to palpation of b/l LE  SKIN: Warm and dry    STAT CBC, CMP, Blood cx x 2, lactate, CXR, UA with reflex cx     Addendum 2:30AM   CBC w/ leukocytosis, CXR w/ new infiltrate (pending final read) and UA appears +   Lactate 2.7 ---> 1L NS ordered   Repeat Lactate at 5AM   Urine culture ordered   Vanco x1 and Zosyn x 1   ID consult for AM   RN to notify with any acute changes Patient with rectal temp 100.4 with RR 30   Patient seen and evaluated at bedside   ROS unable to be obtain 2/2 to patient's baseline mentation     Vital Signs   T(F): 100.5 rectal   HR: 74   BP: 137/60   RR: 30   SpO2: 93%     GENERAL: NAD  HEAD: Atraumatic, Normocephalic  EYES: EOMI, PERRLA, conjunctiva and sclera clear  NEURO: AxOx0, does not follow commands   CHEST/LUNG: diminished breath sounds b/l; Mildly labored respirations  HEART: S1S2+, Regular rate and rhythm  ABDOMEN: Soft, Nontender, Nondistended; BS+, +PEG  EXTREMITIES:  2+ Peripheral Pulses, No LE edema, no tenderness to palpation of b/l LE  SKIN: Warm and dry    STAT CBC, CMP, Blood cx x 2, lactate, CXR, UA with reflex cx     Addendum 2:30AM   CBC w/ leukocytosis, CXR w/ new infiltrate (pending final read) and UA appears +   Lactate 2.7 ---> 1L NS ordered   Repeat Lactate at 5AM   Urine culture ordered   Vanco x1 and Zosyn x 1   ID consult for AM   RN to notify with any acute changes

## 2021-07-04 NOTE — CONSULT NOTE ADULT - SUBJECTIVE AND OBJECTIVE BOX
[facility]  INFECTIOUS DISEASES AND INTERNAL MEDICINE at [place]  =======================================================  Yovani Campbell MD  Diplomates American Board of Internal Medicine and Infectious Diseases  Tel: [phone]      Fax: [phone]  =======================================================      [id number]  RISA BEASLEY    CC: Patient is a 81y old  Female who presents with a chief complaint of Right MCA CVA (04 Jul 2021 14:45)      81y  Female       Past Medical & Surgical Hx:  PAST MEDICAL & SURGICAL HISTORY:  Diabetes    Hypertension            Social Hx:    FAMILY HISTORY:      Allergies    Allergy Status Unknown    Intolerances             REVIEW OF SYSTEMS:  CONSTITUTIONAL:  No Fever or chills  HEENT:  No diplopia or blurred vision.  No earache, sore throat or runny nose.  CARDIOVASCULAR:  No pressure, squeezing, strangling, tightness, heaviness or aching about the chest, neck, axilla or epigastrium.  RESPIRATORY:  No cough, shortness of breath  GASTROINTESTINAL:  No nausea, vomiting or diarrhea.  GENITOURINARY:  No dysuria, frequency or urgency. No Blood in urine  MUSCULOSKELETAL:  no joint aches, no muscle pain  SKIN:  No change in skin, hair or nails.  NEUROLOGIC:  No Headaches, seizures or weakness.  PSYCHIATRIC:  No disorder of thought or mood.  ENDOCRINE:  No heat or cold intolerance  HEMATOLOGICAL:  No easy bruising or bleeding.       Physical Exam:    GEN: NAD, pleasant  HEENT: normocephalic and atraumatic. EOMI. PERRL.  Anicteric  NECK: Supple.   LUNGS: Clear to auscultation.  HEART: Regular rate and rhythm without murmur.  ABDOMEN: Soft, nontender, and nondistended.  Positive bowel sounds.    : No CVA tenderness  EXTREMITIES: Without any edema.  MSK: No joint swelling  NEUROLOGIC: Cranial nerves II through XII are grossly intact. No Focal Deficits  PSYCHIATRIC: Appropriate affect .  SKIN: No Rash        Vitals:    T(F): 97 (04 Jul 2021 16:00), Max: 101 (04 Jul 2021 02:00)  HR: 105 (04 Jul 2021 16:00)  BP: 116/68 (04 Jul 2021 16:00)  RR: 17 (04 Jul 2021 16:00)  SpO2: 98% (04 Jul 2021 16:00) (93% - 99%)  temp max in last 48H T(F): , Max: 101 (07-04-21 @ 02:00)    Current Antibiotics:  piperacillin/tazobactam IVPB.. 3.375 Gram(s) IV Intermittent every 8 hours    Other medications:  amantadine Syrup 100 milliGRAM(s) Oral <User Schedule>  artificial tears (preservative free) Ophthalmic Solution 1 Drop(s) Both EYES three times a day  aspirin  chewable 81 milliGRAM(s) Oral daily  atorvastatin 40 milliGRAM(s) Oral at bedtime  clopidogrel Tablet 75 milliGRAM(s) Oral daily  dextrose 40% Gel 15 Gram(s) Oral once  dextrose 5%. 1000 milliLiter(s) IV Continuous <Continuous>  dextrose 5%. 1000 milliLiter(s) IV Continuous <Continuous>  dextrose 50% Injectable 25 Gram(s) IV Push once  dextrose 50% Injectable 12.5 Gram(s) IV Push once  dextrose 50% Injectable 25 Gram(s) IV Push once  doxazosin 4 milliGRAM(s) Oral at bedtime  enoxaparin Injectable 40 milliGRAM(s) SubCutaneous <User Schedule>  glucagon  Injectable 1 milliGRAM(s) IntraMuscular once  insulin glargine Injectable (LANTUS) 15 Unit(s) SubCutaneous at bedtime  insulin lispro (ADMELOG) corrective regimen sliding scale   SubCutaneous every 6 hours  melatonin 5 milliGRAM(s) Oral at bedtime  memantine 10 milliGRAM(s) Oral two times a day  metoprolol tartrate 25 milliGRAM(s) Enteral Tube two times a day  pantoprazole  Injectable 40 milliGRAM(s) IV Push every 12 hours  polyethylene glycol 3350 17 Gram(s) Oral daily  senna 2 Tablet(s) Oral at bedtime  sodium chloride 0.9%. 1000 milliLiter(s) IV Continuous <Continuous>  valproic  acid Syrup 500 milliGRAM(s) Oral every 12 hours                            11.3   9.19  )-----------( 286      ( 04 Jul 2021 09:36 )             34.6     07-04    138  |  105  |  17.4  ----------------------------<  149<H>  3.4<L>   |  21.0<L>  |  0.38<L>    Ca    7.6<L>      04 Jul 2021 09:36  Phos  2.5     07-03  Mg     2.1     07-04    TPro  5.4<L>  /  Alb  2.7<L>  /  TBili  0.4  /  DBili  x   /  AST  36<H>  /  ALT  38<H>  /  AlkPhos  84  07-04    RECENT CULTURES:  07-04 @ 08:21 .Sputum Sputum trap       Few polymorphonuclear leukocytes seen per low power field  Few Squamous epithelial cells seen per low power field  Few Gram Positive Cocci in Clusters seen per oil power field      06-23 @ 16:24 .Urine Catheterized     No growth        06-23 @ 02:12 .Blood Blood-Peripheral     No growth at 5 days.        06-22 @ 00:44 .Sputum Sputum     Normal Respiratory Miladys present    Few polymorphonuclear leukocytes per low power field  Rare Squamous epithelial cells per low power field  Few Gram positive cocci in pairs per oil power field  Few Gram Variable Rods per oil power field      06-21 @ 18:47 .Blood Blood-Peripheral     No growth at 5 days.        06-21 @ 18:46 .Blood Blood-Peripheral     No growth at 5 days.            WBC Count: 9.19 K/uL (07-04-21 @ 09:36)  WBC Count: 10.82 K/uL (07-04-21 @ 01:28)  WBC Count: 8.07 K/uL (07-03-21 @ 10:37)  WBC Count: 8.52 K/uL (07-02-21 @ 04:46)  WBC Count: 12.64 K/uL (07-01-21 @ 04:24)  WBC Count: 13.88 K/uL (07-01-21 @ 00:42)  WBC Count: 13.24 K/uL (06-30-21 @ 16:27)  WBC Count: 7.36 K/uL (06-30-21 @ 03:46)    Creatinine, Serum: 0.38 mg/dL (07-04-21 @ 09:36)  Creatinine, Serum: 0.47 mg/dL (07-04-21 @ 01:28)  Creatinine, Serum: 0.41 mg/dL (07-03-21 @ 10:37)  Creatinine, Serum: 0.40 mg/dL (07-02-21 @ 04:46)  Creatinine, Serum: 0.39 mg/dL (07-01-21 @ 04:24)  Creatinine, Serum: 0.43 mg/dL (06-30-21 @ 16:26)  Creatinine, Serum: 0.46 mg/dL (06-30-21 @ 03:46)          Procalcitonin, Serum: 0.11 ng/mL (06-23-21 @ 01:34)       COVID-19 PCR: NotDetec (06-19-21 @ 16:15)   [facility]  INFECTIOUS DISEASES AND INTERNAL MEDICINE at [place]  =======================================================  Yovani Campbell MD  Diplomates American Board of Internal Medicine and Infectious Diseases  Tel: [phone]      Fax: [phone]  =======================================================      [id number]  RISA BEASLEY    CC: Patient is a 81y old  Female who presents with a chief complaint of Right MCA CVA (04 Jul 2021 14:45)      80 y/o F w/ PMH DM, HTN, presented to ED as Code Stroke after being found down in her front lawn by a neighbor.  Unknown down time however last reported well at 10:50 am placing her out of window for TPA.  Glucose in field reported to be 60 and pt given amp of d50 w/ improvement in glucose level.  On arrival pt presented w/ L-facial droop, L-sided weakness, dysarthria.  CTH reported acute Rt MCA infarct and CTA showed Rt M2 occlusion and CTA neck with occlusion of the Rt ICA from the proximal cervical segment through the supraclinoid segment.  Pt was subsequently admitted to the Neuro ICU and intubated for airway protection (6/21).  Pt was also deemed to not be a candidate for mechanical thrombectomy.  Pt was started on VPA for seizure ppx.  EEG was negative for seizure activity.  Repeat CTH showed evolving infarct but without hemorrhage and no neurosurgical intervention required.  Hospital course has been complicated by LLL PNA likely due to aspiration, afib rvr (treated w/ cardizem and metoprolol) and was worked up for sepsis given hypotension and febrile.  Repeat CTH on 6/24 showed mildly larger stroke w/ small amount of petechial hemorrhage  Pt was successfully extubated 6/27.  PEG placed 6/30 and noted to have a duodenal ulcer; protonix was subsequently started.  CTA abd 7/1 showed hematoma long the greater curvature of the stomach and a small volume hemoperitoneum without evidence of active bleeding.  PEG was lavaged and blood noted however has since been cleared to use PEG and start tube feeds.  During hospital course pt transfused total of 1u PRBC and 1u plts on 7/1.  Pt hemodynamically stable, resumed on ASA and plavix and will be downgraded to medicine. On 07/04 over night spiked fever, sepsis code initiated with work up suggestive RLL aspiration PNA.         Past Medical & Surgical Hx:  PAST MEDICAL & SURGICAL HISTORY:  Diabetes    Hypertension            Social Hx: unknown    FAMILY HISTORY:  unknown    Allergies    Allergy Status Unknown    Intolerances             REVIEW OF SYSTEMS:  cannot obtain  ENDOCRINE:  No heat or cold intolerance  HEMATOLOGICAL:  No easy bruising or bleeding.       Physical Exam:    GEN: NAD, lethargic, does not open eyes when spoken only waves right hand  HEENT: normocephalic and atraumatic. EOMI. PERRL.  Anicteric  NECK: Supple.   LUNGS: Clear to auscultation.  HEART: Regular rate and rhythm without murmur.  ABDOMEN: Soft, nontender, and nondistended.  Positive bowel sounds.    : No CVA tenderness  EXTREMITIES: Without any edema.  MSK: No joint swelling  NEUROLOGIC: left sided paresis  PSYCHIATRIC: not assessed  SKIN: No Rash        Vitals:    T(F): 97 (04 Jul 2021 16:00), Max: 101 (04 Jul 2021 02:00)  HR: 105 (04 Jul 2021 16:00)  BP: 116/68 (04 Jul 2021 16:00)  RR: 17 (04 Jul 2021 16:00)  SpO2: 98% (04 Jul 2021 16:00) (93% - 99%)  temp max in last 48H T(F): , Max: 101 (07-04-21 @ 02:00)    Current Antibiotics:  piperacillin/tazobactam IVPB.. 3.375 Gram(s) IV Intermittent every 8 hours    Other medications:  amantadine Syrup 100 milliGRAM(s) Oral <User Schedule>  artificial tears (preservative free) Ophthalmic Solution 1 Drop(s) Both EYES three times a day  aspirin  chewable 81 milliGRAM(s) Oral daily  atorvastatin 40 milliGRAM(s) Oral at bedtime  clopidogrel Tablet 75 milliGRAM(s) Oral daily  dextrose 40% Gel 15 Gram(s) Oral once  dextrose 5%. 1000 milliLiter(s) IV Continuous <Continuous>  dextrose 5%. 1000 milliLiter(s) IV Continuous <Continuous>  dextrose 50% Injectable 25 Gram(s) IV Push once  dextrose 50% Injectable 12.5 Gram(s) IV Push once  dextrose 50% Injectable 25 Gram(s) IV Push once  doxazosin 4 milliGRAM(s) Oral at bedtime  enoxaparin Injectable 40 milliGRAM(s) SubCutaneous <User Schedule>  glucagon  Injectable 1 milliGRAM(s) IntraMuscular once  insulin glargine Injectable (LANTUS) 15 Unit(s) SubCutaneous at bedtime  insulin lispro (ADMELOG) corrective regimen sliding scale   SubCutaneous every 6 hours  melatonin 5 milliGRAM(s) Oral at bedtime  memantine 10 milliGRAM(s) Oral two times a day  metoprolol tartrate 25 milliGRAM(s) Enteral Tube two times a day  pantoprazole  Injectable 40 milliGRAM(s) IV Push every 12 hours  polyethylene glycol 3350 17 Gram(s) Oral daily  senna 2 Tablet(s) Oral at bedtime  sodium chloride 0.9%. 1000 milliLiter(s) IV Continuous <Continuous>  valproic  acid Syrup 500 milliGRAM(s) Oral every 12 hours                            11.3   9.19  )-----------( 286      ( 04 Jul 2021 09:36 )             34.6     07-04    138  |  105  |  17.4  ----------------------------<  149<H>  3.4<L>   |  21.0<L>  |  0.38<L>    Ca    7.6<L>      04 Jul 2021 09:36  Phos  2.5     07-03  Mg     2.1     07-04    TPro  5.4<L>  /  Alb  2.7<L>  /  TBili  0.4  /  DBili  x   /  AST  36<H>  /  ALT  38<H>  /  AlkPhos  84  07-04    RECENT CULTURES:  07-04 @ 08:21 .Sputum Sputum trap       Few polymorphonuclear leukocytes seen per low power field  Few Squamous epithelial cells seen per low power field  Few Gram Positive Cocci in Clusters seen per oil power field      06-23 @ 16:24 .Urine Catheterized     No growth        06-23 @ 02:12 .Blood Blood-Peripheral     No growth at 5 days.        06-22 @ 00:44 .Sputum Sputum     Normal Respiratory Miladys present    Few polymorphonuclear leukocytes per low power field  Rare Squamous epithelial cells per low power field  Few Gram positive cocci in pairs per oil power field  Few Gram Variable Rods per oil power field      06-21 @ 18:47 .Blood Blood-Peripheral     No growth at 5 days.        06-21 @ 18:46 .Blood Blood-Peripheral     No growth at 5 days.            WBC Count: 9.19 K/uL (07-04-21 @ 09:36)  WBC Count: 10.82 K/uL (07-04-21 @ 01:28)  WBC Count: 8.07 K/uL (07-03-21 @ 10:37)  WBC Count: 8.52 K/uL (07-02-21 @ 04:46)  WBC Count: 12.64 K/uL (07-01-21 @ 04:24)  WBC Count: 13.88 K/uL (07-01-21 @ 00:42)  WBC Count: 13.24 K/uL (06-30-21 @ 16:27)  WBC Count: 7.36 K/uL (06-30-21 @ 03:46)    Creatinine, Serum: 0.38 mg/dL (07-04-21 @ 09:36)  Creatinine, Serum: 0.47 mg/dL (07-04-21 @ 01:28)  Creatinine, Serum: 0.41 mg/dL (07-03-21 @ 10:37)  Creatinine, Serum: 0.40 mg/dL (07-02-21 @ 04:46)  Creatinine, Serum: 0.39 mg/dL (07-01-21 @ 04:24)  Creatinine, Serum: 0.43 mg/dL (06-30-21 @ 16:26)  Creatinine, Serum: 0.46 mg/dL (06-30-21 @ 03:46)          Procalcitonin, Serum: 0.11 ng/mL (06-23-21 @ 01:34)       COVID-19 PCR: NotDetec (06-19-21 @ 16:15)    < from: CT Abdomen and Pelvis w/wo IV Cont (06.30.21 @ 23:26) >  IMPRESSION:  1. Clot along the anterior serosal margin of the stomach adjacent to PEG site. No active arterial extravasation of contrast is identified.  2. Mild left pyelonephritis.  3. Extensive mucus plugging of the left lung with consolidative collapse of the basilar segments of the left lower lobe.        < end of copied text >

## 2021-07-04 NOTE — PROGRESS NOTE ADULT - ASSESSMENT
82 y/o F w/ PMH DM, HTN, presented to ED as Code Stroke after being found down in her front lawn by a neighbor.  Unknown down time however last reported well at 10:50 am placing her out of window for TPA.  Glucose in field reported to be 60 and pt given amp of d50 w/ improvement in glucose level.  On arrival pt presented w/ L-facial droop, L-sided weakness, dysarthria.  CTH reported acute Rt MCA infarct and CTA showed Rt M2 occlusion and CTA neck with occlusion of the Rt ICA from the proximal cervical segment through the supraclinoid segment.  Pt was subsequently admitted to the Neuro ICU and intubated for airway protection (6/21).  Pt was also deemed to not be a candidate for mechanical thrombectomy.  Pt was started on VPA for seizure ppx.  EEG was negative for seizure activity.  Repeat CTH showed evolving infarct but without hemorrhage and no neurosurgical intervention required.  Hospital course has been complicated by LLL PNA likely due to aspiration, afib rvr (treated w/ cardizem and metoprolol) and was worked up for sepsis given hypotension and febrile.  Repeat CTH on 6/24 showed mildly larger stroke w/ small amount of petechial hemorrhage  Pt was successfully extubated 6/27.  PEG placed 6/30 and noted to have a duodenal ulcer; protonix was subsequently started.  CTA abd 7/1 showed hematoma long the greater curvature of the stomach and a small volume hemoperitoneum without evidence of active bleeding.  PEG was lavaged and blood noted however has since been cleared to use PEG and start tube feeds.  During hospital course pt transfused total of 1u PRBC and 1u plts on 7/1.  Pt hemodynamically stable, resumed on ASA and plavix and will be downgraded to medicine. On 07/04 over night spiked fever, sepsis code initiated with work up suggestive RLL aspiration PNA.     Episode of fever, sepsis 2/2 aspiration PNA  - c/w Zosyn  - ivf increased to 75 cc/hr  - UA came back abnormal >> discussed with ID >> Christa was changed   - set of blood cx sent on 07/04      Rt MCA infarct w/ Rt M2 occlusion and Rt ICA occlusion complicated by enlarging of stroke w/ small petechial hemorrhage  - Pt was out of window for TPA and not candidate for mechanical thrombectomy   - c/w VPA for seizure ppx  - c/w ASA and plavix (resumed today), c/w statin therapy   - Neuro checks and VS q4h for now  - Maintain HOB elevated to 30 degrees  - Maintain goal -140    Normocytic anemia, with hematoma along the greater curvature of the stomach and a small volume hemoperitoneum without evidence of active bleeding  - Monitor H/H   - Tube feeds were restarted tolerating so far    AFib wtih RVR  - started Metoprolol 25 bid for now, c/w IV prn for HR > 130    DM II, controlled  - A1c 6.9  - c/w to Lantus 15 units qhs, MDSS for now, will adjust base on poc     Essential HTN  - Maintain SBP at goal 100-140     Diarrhear, most likely secondary to tube feeding, somewhat improved  - flexesis in place  - c/w add banana flakes      VTE ppx: Lovenox sq   GI ppx: protonix   Code Status: DNR/DNI, updated family over phone    Dispo: pending clinical course

## 2021-07-04 NOTE — PROGRESS NOTE ADULT - NUTRITIONAL ASSESSMENT
This patient has been assessed with a concern for Malnutrition and has been determined to have a diagnosis/diagnoses of Moderate protein-calorie malnutrition.    This patient is being managed with:   Diet NPO with Tube Feed-  Tube Feeding Modality: Gastrostomy  Glucerna 1.5 Peter (GLUCERNA1.5)  Total Volume for 24 Hours (mL): 1200  Continuous  Starting Tube Feed Rate {mL per Hour}: 10  Increase Tube Feed Rate by (mL): 10     Every 4 hours  Until Goal Tube Feed Rate (mL per Hour): 50  Tube Feed Duration (in Hours): 24  Tube Feed Start Time: 09:00  Entered: Jul 2 2021  8:52AM    
This patient has been assessed with a concern for Malnutrition and has been determined to have a diagnosis/diagnoses of Moderate protein-calorie malnutrition.      
This patient has been assessed with a concern for Malnutrition and has been determined to have a diagnosis/diagnoses of Moderate protein-calorie malnutrition.    This patient is being managed with:   Diet NPO with Tube Feed-  Tube Feeding Modality: Orogastric  Glucerna 1.5 Peter (GLUCERNA1.5)  Total Volume for 24 Hours (mL): 1200  Continuous  Starting Tube Feed Rate {mL per Hour}: 10  Increase Tube Feed Rate by (mL): 10     Every 4 hours  Until Goal Tube Feed Rate (mL per Hour): 50  Tube Feed Duration (in Hours): 24  Tube Feed Start Time: 10:00  Entered: Jun 24 2021 11:38AM    
This patient has been assessed with a concern for Malnutrition and has been determined to have a diagnosis/diagnoses of Moderate protein-calorie malnutrition.      
This patient has been assessed with a concern for Malnutrition and has been determined to have a diagnosis/diagnoses of Moderate protein-calorie malnutrition.      
This patient has been assessed with a concern for Malnutrition and has been determined to have a diagnosis/diagnoses of Moderate protein-calorie malnutrition.    This patient is being managed with:   Diet NPO with Tube Feed-  Tube Feeding Modality: Orogastric  Glucerna 1.5 Peter (GLUCERNA1.5)  Total Volume for 24 Hours (mL): 1200  Continuous  Starting Tube Feed Rate {mL per Hour}: 10  Increase Tube Feed Rate by (mL): 10     Every 4 hours  Until Goal Tube Feed Rate (mL per Hour): 50  Tube Feed Duration (in Hours): 24  Tube Feed Start Time: 10:00  Entered: Jun 24 2021 11:38AM    
This patient has been assessed with a concern for Malnutrition and has been determined to have a diagnosis/diagnoses of Moderate protein-calorie malnutrition.      
This patient has been assessed with a concern for Malnutrition and has been determined to have a diagnosis/diagnoses of Moderate protein-calorie malnutrition.      
This patient has been assessed with a concern for Malnutrition and has been determined to have a diagnosis/diagnoses of Moderate protein-calorie malnutrition.    This patient is being managed with:   Diet NPO with Tube Feed-  Tube Feeding Modality: Gastrostomy  Glucerna 1.5 Peter (GLUCERNA1.5)  Total Volume for 24 Hours (mL): 1200  Continuous  Starting Tube Feed Rate {mL per Hour}: 10  Increase Tube Feed Rate by (mL): 10     Every 4 hours  Until Goal Tube Feed Rate (mL per Hour): 50  Tube Feed Duration (in Hours): 24  Tube Feed Start Time: 09:00  Bolus   Total Volume per Flush (mL): 250   Frequency: Every 4 Hours   Total Daily Volume of Flush (mL): 150  Entered: Jul  3 2021  5:17PM    
This patient has been assessed with a concern for Malnutrition and has been determined to have a diagnosis/diagnoses of Moderate protein-calorie malnutrition.    This patient is being managed with:   Diet NPO with Tube Feed-  Tube Feeding Modality: Orogastric  Glucerna 1.5 Peter (GLUCERNA1.5)  Total Volume for 24 Hours (mL): 1200  Continuous  Starting Tube Feed Rate {mL per Hour}: 10  Increase Tube Feed Rate by (mL): 10     Every 4 hours  Until Goal Tube Feed Rate (mL per Hour): 50  Tube Feed Duration (in Hours): 24  Tube Feed Start Time: 10:00  Entered: Jun 24 2021 11:38AM    
This patient has been assessed with a concern for Malnutrition and has been determined to have a diagnosis/diagnoses of Moderate protein-calorie malnutrition.    This patient is being managed with:   Diet NPO with Tube Feed-  Tube Feeding Modality: Orogastric  Glucerna 1.5 Peter (GLUCERNA1.5)  Total Volume for 24 Hours (mL): 960  Continuous  Starting Tube Feed Rate {mL per Hour}: 10  Increase Tube Feed Rate by (mL): 10     Every 4 hours  Until Goal Tube Feed Rate (mL per Hour): 40  Tube Feed Duration (in Hours): 24  Tube Feed Start Time: 10:00  Entered: Jun 22 2021  9:57AM

## 2021-07-04 NOTE — CONSULT NOTE ADULT - ASSESSMENT
80 y/o F w/ PMH DM, HTN, presented to ED as Code Stroke after being found down in her front lawn by a neighbor.  Unknown down time however last reported well at 10:50 am placing her out of window for TPA.  Glucose in field reported to be 60 and pt given amp of d50 w/ improvement in glucose level.  On arrival pt presented w/ L-facial droop, L-sided weakness, dysarthria.  CTH reported acute Rt MCA infarct and CTA showed Rt M2 occlusion and CTA neck with occlusion of the Rt ICA from the proximal cervical segment through the supraclinoid segment.  Pt was subsequently admitted to the Neuro ICU and intubated for airway protection (6/21).  Pt was also deemed to not be a candidate for mechanical thrombectomy.  Pt was started on VPA for seizure ppx.  EEG was negative for seizure activity.  Repeat CTH showed evolving infarct but without hemorrhage and no neurosurgical intervention required.  Hospital course has been complicated by LLL PNA likely due to aspiration, afib rvr (treated w/ cardizem and metoprolol) and was worked up for sepsis given hypotension and febrile.  Repeat CTH on 6/24 showed mildly larger stroke w/ small amount of petechial hemorrhage  Pt was successfully extubated 6/27.  PEG placed 6/30 and noted to have a duodenal ulcer; protonix was subsequently started.  CTA abd 7/1 showed hematoma long the greater curvature of the stomach and a small volume hemoperitoneum without evidence of active bleeding.  PEG was lavaged and blood noted however has since been cleared to use PEG and start tube feeds.  During hospital course pt transfused total of 1u PRBC and 1u plts on 7/1.  Pt hemodynamically stable, resumed on ASA and plavix and will be downgraded to medicine. On 07/04 over night spiked fever, sepsis code initiated with work up suggestive RLL aspiration PNA.     Aspiration pneumonia  Fever  ?pyelonephritis    -  suspected aspiration  - Ua +  - f/u BCx, UCX  - Continue zosyn  - Trend Fever  - Trend Leukocytosis    d/w attending, sister  Will Follow

## 2021-07-04 NOTE — PROGRESS NOTE ADULT - SUBJECTIVE AND OBJECTIVE BOX
[facility] Division of Hospital Medicine    Chief Complaint:  R MCA CVA    SUBJECTIVE: not verbal     OVERNIGHT EVENTS: spiked fever up to 101 over night, was given fluids, UA, CXR and blood Cx were sent, dose of Vanco+zosyn was given.    ROS is not available due to poor mentation.     MEDICATIONS  (STANDING):  amantadine Syrup 100 milliGRAM(s) Oral <User Schedule>  artificial tears (preservative free) Ophthalmic Solution 1 Drop(s) Both EYES three times a day  aspirin  chewable 81 milliGRAM(s) Oral daily  atorvastatin 40 milliGRAM(s) Oral at bedtime  clopidogrel Tablet 75 milliGRAM(s) Oral daily  dextrose 40% Gel 15 Gram(s) Oral once  dextrose 5%. 1000 milliLiter(s) (50 mL/Hr) IV Continuous <Continuous>  dextrose 5%. 1000 milliLiter(s) (100 mL/Hr) IV Continuous <Continuous>  dextrose 50% Injectable 25 Gram(s) IV Push once  dextrose 50% Injectable 12.5 Gram(s) IV Push once  dextrose 50% Injectable 25 Gram(s) IV Push once  doxazosin 4 milliGRAM(s) Oral at bedtime  enoxaparin Injectable 40 milliGRAM(s) SubCutaneous <User Schedule>  glucagon  Injectable 1 milliGRAM(s) IntraMuscular once  insulin glargine Injectable (LANTUS) 15 Unit(s) SubCutaneous at bedtime  insulin lispro (ADMELOG) corrective regimen sliding scale   SubCutaneous every 6 hours  melatonin 5 milliGRAM(s) Oral at bedtime  memantine 10 milliGRAM(s) Oral two times a day  metoprolol tartrate 25 milliGRAM(s) Enteral Tube two times a day  pantoprazole  Injectable 40 milliGRAM(s) IV Push every 12 hours  piperacillin/tazobactam IVPB.. 3.375 Gram(s) IV Intermittent every 8 hours  polyethylene glycol 3350 17 Gram(s) Oral daily  potassium chloride   Powder 40 milliEquivalent(s) Oral once  senna 2 Tablet(s) Oral at bedtime  sodium chloride 0.9%. 1000 milliLiter(s) (75 mL/Hr) IV Continuous <Continuous>  valproic  acid Syrup 500 milliGRAM(s) Oral every 12 hours    MEDICATIONS  (PRN):  acetaminophen   Tablet .. 650 milliGRAM(s) Oral every 6 hours PRN Temp greater or equal to 38C (100.4F)  labetalol Injectable 10 milliGRAM(s) IV Push every 2 hours PRN SBP >165  metoprolol tartrate Injectable 5 milliGRAM(s) IV Push every 6 hours PRN see insturctions        I&O's Summary    2021 07:01  -  2021 07:00  --------------------------------------------------------  IN: 2900 mL / OUT: 1150 mL / NET: 1750 mL    2021 07:01  -  2021 14:47  --------------------------------------------------------  IN: 465 mL / OUT: 850 mL / NET: -385 mL        PHYSICAL EXAM:  Vital Signs Last 24 Hrs  T(C): 35.8 (2021 08:32), Max: 38.3 (2021 02:00)  T(F): 96.5 (2021 08:32), Max: 101 (2021 02:00)  HR: 112 (2021 12:00) (68 - 126)  BP: 139/83 (2021 12:00) (104/42 - 160/93)  BP(mean): 93 (2021 12:00) (66 - 101)  RR: 28 (2021 12:00) (17 - 30)  SpO2: 97% (2021 12:00) (93% - 99%)        CONSTITUTIONAL: NAD  ENMT: Moist oral mucosa, no pharyngeal injection or exudates; normal dentition; No JVD  RESPIRATORY: Normal respiratory effort; diminished on bases, lungs are clear to auscultation bilaterally  CARDIOVASCULAR: Regular rate and rhythm, normal S1 and S2, no murmur/rub/gallop; No lower extremity edema; Peripheral pulses are 2+ bilaterally  ABDOMEN: Nontender to palpation, normoactive bowel sounds, no rebound/guarding; No hepatosplenomegaly, LUQ PEG with dry blood around it  MUSCLOSKELETAL:  no clubbing or cyanosis of digits; no joint swelling or tenderness to palpation  PSYCH: A+O to person, place, and time; affect appropriate  NEUROLOGY: R facial droop with LUE paresis, localizing pain in all 4 ext except RUE; was observed moving all 3  ext except LUE on pain.    SKIN: No rashes; no palpable lesions  LABS:                        11.3   9.19  )-----------( 286      ( 2021 09:36 )             34.6     07-04    138  |  105  |  17.4  ----------------------------<  149<H>  3.4<L>   |  21.0<L>  |  0.38<L>    Ca    7.6<L>      2021 09:36  Phos  2.5     07-03  Mg     2.1     07-04    TPro  5.4<L>  /  Alb  2.7<L>  /  TBili  0.4  /  DBili  x   /  AST  36<H>  /  ALT  38<H>  /  AlkPhos  84  07-04          Urinalysis Basic - ( 2021 02:37 )    Color: Yellow / Appearance: Slightly Turbid / S.015 / pH: x  Gluc: x / Ketone: Small  / Bili: Negative / Urobili: 8 mg/dL   Blood: x / Protein: 30 mg/dL / Nitrite: Negative   Leuk Esterase: Moderate / RBC: >50 /HPF / WBC 11-25   Sq Epi: x / Non Sq Epi: Occasional / Bacteria: Few        Culture - Sputum (collected 2021 08:21)  Source: .Sputum Sputum trap  Gram Stain (2021 13:24):    Few polymorphonuclear leukocytes seen per low power field    Few Squamous epithelial cells seen per low power field    Few Gram Positive Cocci in Clusters seen per oil power field      CAPILLARY BLOOD GLUCOSE      POCT Blood Glucose.: 142 mg/dL (2021 11:33)  POCT Blood Glucose.: 180 mg/dL (2021 06:20)  POCT Blood Glucose.: 167 mg/dL (2021 23:52)  POCT Blood Glucose.: 122 mg/dL (2021 18:22)  POCT Blood Glucose.: 181 mg/dL (2021 14:48)        RADIOLOGY & ADDITIONAL TESTS:  Results Reviewed:   Imaging Personally Reviewed:  Electrocardiogram Personally Reviewed:

## 2021-07-05 LAB
ANION GAP SERPL CALC-SCNC: 11 MMOL/L — SIGNIFICANT CHANGE UP (ref 5–17)
BUN SERPL-MCNC: 13.2 MG/DL — SIGNIFICANT CHANGE UP (ref 8–20)
CALCIUM SERPL-MCNC: 7.9 MG/DL — LOW (ref 8.6–10.2)
CHLORIDE SERPL-SCNC: 105 MMOL/L — SIGNIFICANT CHANGE UP (ref 98–107)
CO2 SERPL-SCNC: 24 MMOL/L — SIGNIFICANT CHANGE UP (ref 22–29)
CREAT SERPL-MCNC: 0.41 MG/DL — LOW (ref 0.5–1.3)
GLUCOSE BLDC GLUCOMTR-MCNC: 153 MG/DL — HIGH (ref 70–99)
GLUCOSE BLDC GLUCOMTR-MCNC: 169 MG/DL — HIGH (ref 70–99)
GLUCOSE BLDC GLUCOMTR-MCNC: 177 MG/DL — HIGH (ref 70–99)
GLUCOSE BLDC GLUCOMTR-MCNC: 200 MG/DL — HIGH (ref 70–99)
GLUCOSE SERPL-MCNC: 205 MG/DL — HIGH (ref 70–99)
GRAM STN FLD: SIGNIFICANT CHANGE UP
HCT VFR BLD CALC: 37.1 % — SIGNIFICANT CHANGE UP (ref 34.5–45)
HGB BLD-MCNC: 12.8 G/DL — SIGNIFICANT CHANGE UP (ref 11.5–15.5)
MAGNESIUM SERPL-MCNC: 2 MG/DL — SIGNIFICANT CHANGE UP (ref 1.8–2.6)
MCHC RBC-ENTMCNC: 29 PG — SIGNIFICANT CHANGE UP (ref 27–34)
MCHC RBC-ENTMCNC: 34.5 GM/DL — SIGNIFICANT CHANGE UP (ref 32–36)
MCV RBC AUTO: 84.1 FL — SIGNIFICANT CHANGE UP (ref 80–100)
PLATELET # BLD AUTO: 313 K/UL — SIGNIFICANT CHANGE UP (ref 150–400)
POTASSIUM SERPL-MCNC: 4.2 MMOL/L — SIGNIFICANT CHANGE UP (ref 3.5–5.3)
POTASSIUM SERPL-SCNC: 4.2 MMOL/L — SIGNIFICANT CHANGE UP (ref 3.5–5.3)
RBC # BLD: 4.41 M/UL — SIGNIFICANT CHANGE UP (ref 3.8–5.2)
RBC # FLD: 15.2 % — HIGH (ref 10.3–14.5)
SODIUM SERPL-SCNC: 139 MMOL/L — SIGNIFICANT CHANGE UP (ref 135–145)
SPECIMEN SOURCE: SIGNIFICANT CHANGE UP
WBC # BLD: 9.81 K/UL — SIGNIFICANT CHANGE UP (ref 3.8–10.5)
WBC # FLD AUTO: 9.81 K/UL — SIGNIFICANT CHANGE UP (ref 3.8–10.5)

## 2021-07-05 PROCEDURE — 99232 SBSQ HOSP IP/OBS MODERATE 35: CPT

## 2021-07-05 PROCEDURE — 99233 SBSQ HOSP IP/OBS HIGH 50: CPT

## 2021-07-05 RX ORDER — VANCOMYCIN HCL 1 G
1000 VIAL (EA) INTRAVENOUS EVERY 12 HOURS
Refills: 0 | Status: DISCONTINUED | OUTPATIENT
Start: 2021-07-05 | End: 2021-07-05

## 2021-07-05 RX ORDER — VANCOMYCIN HCL 1 G
750 VIAL (EA) INTRAVENOUS EVERY 12 HOURS
Refills: 0 | Status: DISCONTINUED | OUTPATIENT
Start: 2021-07-05 | End: 2021-07-06

## 2021-07-05 RX ADMIN — SODIUM CHLORIDE 75 MILLILITER(S): 9 INJECTION INTRAMUSCULAR; INTRAVENOUS; SUBCUTANEOUS at 18:47

## 2021-07-05 RX ADMIN — ATORVASTATIN CALCIUM 40 MILLIGRAM(S): 80 TABLET, FILM COATED ORAL at 22:28

## 2021-07-05 RX ADMIN — Medication 25 MILLIGRAM(S): at 06:12

## 2021-07-05 RX ADMIN — MEMANTINE HYDROCHLORIDE 10 MILLIGRAM(S): 10 TABLET ORAL at 19:04

## 2021-07-05 RX ADMIN — Medication 250 MILLIGRAM(S): at 22:19

## 2021-07-05 RX ADMIN — PIPERACILLIN AND TAZOBACTAM 25 GRAM(S): 4; .5 INJECTION, POWDER, LYOPHILIZED, FOR SOLUTION INTRAVENOUS at 13:11

## 2021-07-05 RX ADMIN — Medication 2: at 06:11

## 2021-07-05 RX ADMIN — Medication 1 DROP(S): at 06:11

## 2021-07-05 RX ADMIN — Medication 5 MILLIGRAM(S): at 13:12

## 2021-07-05 RX ADMIN — Medication 100 MILLIGRAM(S): at 13:12

## 2021-07-05 RX ADMIN — PANTOPRAZOLE SODIUM 40 MILLIGRAM(S): 20 TABLET, DELAYED RELEASE ORAL at 06:12

## 2021-07-05 RX ADMIN — INSULIN GLARGINE 15 UNIT(S): 100 INJECTION, SOLUTION SUBCUTANEOUS at 22:41

## 2021-07-05 RX ADMIN — Medication 250 MILLIGRAM(S): at 11:47

## 2021-07-05 RX ADMIN — Medication 2: at 11:46

## 2021-07-05 RX ADMIN — Medication 81 MILLIGRAM(S): at 11:46

## 2021-07-05 RX ADMIN — PIPERACILLIN AND TAZOBACTAM 25 GRAM(S): 4; .5 INJECTION, POWDER, LYOPHILIZED, FOR SOLUTION INTRAVENOUS at 23:20

## 2021-07-05 RX ADMIN — SENNA PLUS 2 TABLET(S): 8.6 TABLET ORAL at 22:28

## 2021-07-05 RX ADMIN — POLYETHYLENE GLYCOL 3350 17 GRAM(S): 17 POWDER, FOR SOLUTION ORAL at 11:46

## 2021-07-05 RX ADMIN — Medication 4 MILLIGRAM(S): at 22:20

## 2021-07-05 RX ADMIN — Medication 1 DROP(S): at 22:28

## 2021-07-05 RX ADMIN — CLOPIDOGREL BISULFATE 75 MILLIGRAM(S): 75 TABLET, FILM COATED ORAL at 11:46

## 2021-07-05 RX ADMIN — Medication 5 MILLIGRAM(S): at 22:21

## 2021-07-05 RX ADMIN — ENOXAPARIN SODIUM 40 MILLIGRAM(S): 100 INJECTION SUBCUTANEOUS at 18:52

## 2021-07-05 RX ADMIN — PIPERACILLIN AND TAZOBACTAM 25 GRAM(S): 4; .5 INJECTION, POWDER, LYOPHILIZED, FOR SOLUTION INTRAVENOUS at 06:12

## 2021-07-05 RX ADMIN — Medication 500 MILLIGRAM(S): at 22:19

## 2021-07-05 RX ADMIN — PANTOPRAZOLE SODIUM 40 MILLIGRAM(S): 20 TABLET, DELAYED RELEASE ORAL at 18:50

## 2021-07-05 RX ADMIN — Medication 1 DROP(S): at 13:05

## 2021-07-05 RX ADMIN — Medication 25 MILLIGRAM(S): at 19:04

## 2021-07-05 RX ADMIN — Medication 2: at 18:45

## 2021-07-05 RX ADMIN — MEMANTINE HYDROCHLORIDE 10 MILLIGRAM(S): 10 TABLET ORAL at 06:12

## 2021-07-05 RX ADMIN — Medication 100 MILLIGRAM(S): at 06:11

## 2021-07-05 RX ADMIN — Medication 500 MILLIGRAM(S): at 07:41

## 2021-07-05 NOTE — PHYSICAL THERAPY INITIAL EVALUATION ADULT - DIAGNOSIS, PT EVAL
functional debility s/p neuro event
pt demonstrates decreased functional mobility due to decrease strength, balance, endurance and cognitive impairment.

## 2021-07-05 NOTE — OCCUPATIONAL THERAPY INITIAL EVALUATION ADULT - MANUAL MUSCLE TESTING RESULTS, REHAB EVAL
Right UE grossly 3-/5
Unable to formally assess Right UE 2* inability to follow commands of task, pt with at least 3/5 throughout. Left sided extremities 0/5

## 2021-07-05 NOTE — PHYSICAL THERAPY INITIAL EVALUATION ADULT - NEUROVASCULAR ASSESSMENT LLE
slight response to light touch on plantar surface of left foot
pt unable to report if numbness/tingling sensation is present./warm/no discoloration

## 2021-07-05 NOTE — PHYSICAL THERAPY INITIAL EVALUATION ADULT - PHYSICAL ASSIST/NONPHYSICAL ASSIST: SUPINE/SIT, REHAB EVAL
Facial grimace during transfer, pointing to PEG tube insertion. Unable to quantify pain level. pt required max A x 1 to maintain upright sitting posture to prevent posterior trunk lean. pt maintained eyes closed for majority of therapy session, opening right eye intermittently when asked. pt able to kick right LE with commands while sitting EOB./1 person assist
2 person assist

## 2021-07-05 NOTE — OCCUPATIONAL THERAPY INITIAL EVALUATION ADULT - GENERAL OBSERVATIONS, REHAB EVAL
+IV, +cardiac monitor, +catheter diallo, +dignicare, +PEG tube
Pt is [place] speaking. Use of ipad  for OT evaluation.  name: Eliana (ID#: [id number]). Pt received semifowler in bed, NAD, +1:1 supervision, +IV, +Tele//BP cuff, +EEG, +4LO2NC, +Goodwin, +bilateral VCB, +Right hand mitten. Pt +dysarthric, non verbal but agreeable to OT evaluation

## 2021-07-05 NOTE — OCCUPATIONAL THERAPY INITIAL EVALUATION ADULT - PRECAUTIONS/LIMITATIONS, REHAB EVAL
aspiration precautions/cardiac precautions/fall precautions/seizure precautions
aspiration precautions/fall precautions/seizure precautions

## 2021-07-05 NOTE — PHYSICAL THERAPY INITIAL EVALUATION ADULT - ACTIVE RANGE OF MOTION EXAMINATION, REHAB EVAL
pt observed moving right UE t/o PT session. right ankle DF/PF AROM WFL. pt unable to follow commands to assess hip and knee joint./Right UE Active ROM was WFL (within functional limits)

## 2021-07-05 NOTE — OCCUPATIONAL THERAPY INITIAL EVALUATION ADULT - DIAGNOSIS, OT EVAL
81 year old Female PMH DM, HTN, presents to ED as Code Stroke after she was found down on her front lawn by a neighbor; unknown down time. Pt presented with Left sided facial droop, Left sided weakness and dysarthria; out of the window for tPA. Pt with Right MCA CVA 2*likely artery to artery emboli (occluded Right carotid -> Right M2)
Right MCA CVA infarct

## 2021-07-05 NOTE — OCCUPATIONAL THERAPY INITIAL EVALUATION ADULT - ADDITIONAL COMMENTS
Pt poor historian, unable to give social hx. Recommend social work to clarify all info including PLOF, set up of home/bathroom, DME owned/used, level of assist needed PTA and level of assist available upon discharge. Per [facility] assessment: Pt resides in a ranch style home with her son. Pt enters the home through the front door. There are 3 steps with a railing to enter. Once inside, pt's bedroom is on the main floor. PTA Pt was independent for all ADLs. NO DME, no O2, no aides, no CALIXTO hx.
As per chart, pt lives in a private ranch house with her son with 3 steps to enter with railing  Pt is a poor historian and is unable to provide any social history

## 2021-07-05 NOTE — PROGRESS NOTE ADULT - ASSESSMENT
81y Female who is followed by neurology because of stroke    Right MCA stroke/right thalamic stroke  Aspirin suppository on hold  Control blood glucose  Keep normotensive  s/p PEG., hematoma  infection/fever  poor mental status  very guarded prognosis   PT/OT as tolerated    No further inpatient neurology workup suggested at this time    Thank you for allowing me to participate in the care of your patient    Aureliano Guzman MD, PhD   [phone]

## 2021-07-05 NOTE — PHYSICAL THERAPY INITIAL EVALUATION ADULT - PASSIVE RANGE OF MOTION EXAMINATION, REHAB EVAL
pt expresses pain with left UE/LE passive ROM./Left UE Passive ROM was WFL (within functional limits)/bilateral lower extremity Passive ROM was WFL (within functional limits)

## 2021-07-05 NOTE — PROGRESS NOTE ADULT - SUBJECTIVE AND OBJECTIVE BOX
[facility] Division of Hospital Medicine    Chief Complaint:  CVA    SUBJECTIVE: not verbal     OVERNIGHT EVENTS: none reported     ROS IS NOT AVAILABLE DUE TO PT MENTATION    MEDICATIONS  (STANDING):  amantadine Syrup 100 milliGRAM(s) Oral <User Schedule>  artificial tears (preservative free) Ophthalmic Solution 1 Drop(s) Both EYES three times a day  aspirin  chewable 81 milliGRAM(s) Oral daily  atorvastatin 40 milliGRAM(s) Oral at bedtime  clopidogrel Tablet 75 milliGRAM(s) Oral daily  dextrose 40% Gel 15 Gram(s) Oral once  dextrose 5%. 1000 milliLiter(s) (50 mL/Hr) IV Continuous <Continuous>  dextrose 5%. 1000 milliLiter(s) (100 mL/Hr) IV Continuous <Continuous>  dextrose 50% Injectable 25 Gram(s) IV Push once  dextrose 50% Injectable 12.5 Gram(s) IV Push once  dextrose 50% Injectable 25 Gram(s) IV Push once  doxazosin 4 milliGRAM(s) Oral at bedtime  enoxaparin Injectable 40 milliGRAM(s) SubCutaneous <User Schedule>  glucagon  Injectable 1 milliGRAM(s) IntraMuscular once  insulin glargine Injectable (LANTUS) 15 Unit(s) SubCutaneous at bedtime  insulin lispro (ADMELOG) corrective regimen sliding scale   SubCutaneous every 6 hours  melatonin 5 milliGRAM(s) Oral at bedtime  memantine 10 milliGRAM(s) Oral two times a day  metoprolol tartrate 25 milliGRAM(s) Enteral Tube two times a day  pantoprazole  Injectable 40 milliGRAM(s) IV Push every 12 hours  piperacillin/tazobactam IVPB.. 3.375 Gram(s) IV Intermittent every 8 hours  polyethylene glycol 3350 17 Gram(s) Oral daily  senna 2 Tablet(s) Oral at bedtime  sodium chloride 0.9%. 1000 milliLiter(s) (75 mL/Hr) IV Continuous <Continuous>  valproic  acid Syrup 500 milliGRAM(s) Oral every 12 hours  vancomycin  IVPB 750 milliGRAM(s) IV Intermittent every 12 hours    MEDICATIONS  (PRN):  acetaminophen   Tablet .. 650 milliGRAM(s) Oral every 6 hours PRN Temp greater or equal to 38C (100.4F)  labetalol Injectable 10 milliGRAM(s) IV Push every 2 hours PRN SBP >165  metoprolol tartrate Injectable 5 milliGRAM(s) IV Push every 6 hours PRN see insturctions        I&O's Summary    2021 07:01  -  2021 07:00  --------------------------------------------------------  IN: 3205 mL / OUT: 4300 mL / NET: -1095 mL        PHYSICAL EXAM:  Vital Signs Last 24 Hrs  T(C): 36.5 (2021 08:20), Max: 38.3 (2021 00:00)  T(F): 97.7 (2021 08:20), Max: 101 (2021 00:00)  HR: 97 (2021 08:20) (97 - 128)  BP: 113/82 (2021 08:20) (108/81 - 139/83)  BP(mean): 101 (2021 18:00) (79 - 101)  RR: 18 (2021 08:20) (17 - 30)  SpO2: 97% (2021 08:20) (94% - 98%)        CONSTITUTIONAL: NAD  ENMT: Moist oral mucosa, no pharyngeal injection or exudates; normal dentition; No JVD  RESPIRATORY: Normal respiratory effort; diminished on bases with central airways secrtion, lungs are clear to auscultation bilaterally  CARDIOVASCULAR: Regular rate and rhythm, normal S1 and S2, no murmur/rub/gallop; No lower extremity edema; Peripheral pulses are 2+ bilaterally  ABDOMEN: Nontender to palpation, normoactive bowel sounds, no rebound/guarding; No hepatosplenomegaly, LUQ PEG with dry blood around it  MUSCLOSKELETAL:  no clubbing or cyanosis of digits; no joint swelling or tenderness to palpation  PSYCH: A+O to person, place, and time; affect appropriate  NEUROLOGY: R facial droop with LUE paresis, localizing pain in all 4 ext except RUE; was observed moving all 3  ext except LUE on pain.    SKIN: No rashes; no palpable lesions    LABS:                        12.8   9.81  )-----------( 313      ( 2021 07:35 )             37.1     07-05    139  |  105  |  13.2  ----------------------------<  205<H>  4.2   |  24.0  |  0.41<L>    Ca    7.9<L>      2021 07:35  Phos  2.5     07-03  Mg     2.0     07-05    TPro  5.4<L>  /  Alb  2.7<L>  /  TBili  0.4  /  DBili  x   /  AST  36<H>  /  ALT  38<H>  /  AlkPhos  84  07-04          Urinalysis Basic - ( 2021 02:37 )    Color: Yellow / Appearance: Slightly Turbid / S.015 / pH: x  Gluc: x / Ketone: Small  / Bili: Negative / Urobili: 8 mg/dL   Blood: x / Protein: 30 mg/dL / Nitrite: Negative   Leuk Esterase: Moderate / RBC: >50 /HPF / WBC 11-25   Sq Epi: x / Non Sq Epi: Occasional / Bacteria: Few        Culture - Sputum (collected 2021 02:30)  Source: .Sputum Sputum  Gram Stain (2021 07:40):    Few polymorphonuclear leukocytes per low power field    Rare Squamous epithelial cells per low power field    Few Gram Negative Rods seen per oil power field    Rare Gram positive cocci in pairs seen per oil power field    Culture - Sputum (collected 2021 08:21)  Source: .Sputum Sputum trap  Gram Stain (2021 13:24):    Few polymorphonuclear leukocytes seen per low power field    Few Squamous epithelial cells seen per low power field    Few Gram Positive Cocci in Clusters seen per oil power field  Preliminary Report (2021 08:55):    Moderate Staphylococcus aureus    Normal Respiratory Miladys present      CAPILLARY BLOOD GLUCOSE      POCT Blood Glucose.: 200 mg/dL (2021 06:04)  POCT Blood Glucose.: 168 mg/dL (2021 22:49)  POCT Blood Glucose.: 182 mg/dL (2021 17:34)  POCT Blood Glucose.: 142 mg/dL (2021 11:33)        RADIOLOGY & ADDITIONAL TESTS:  Results Reviewed:   Imaging Personally Reviewed:  Electrocardiogram Personally Reviewed:

## 2021-07-05 NOTE — PROGRESS NOTE ADULT - ASSESSMENT
80 y/o F w/ PMH DM, HTN, presented to ED as Code Stroke after being found down in her front lawn by a neighbor.  Unknown down time however last reported well at 10:50 am placing her out of window for TPA.  Glucose in field reported to be 60 and pt given amp of d50 w/ improvement in glucose level.  On arrival pt presented w/ L-facial droop, L-sided weakness, dysarthria.  CTH reported acute Rt MCA infarct and CTA showed Rt M2 occlusion and CTA neck with occlusion of the Rt ICA from the proximal cervical segment through the supraclinoid segment.  Pt was subsequently admitted to the Neuro ICU and intubated for airway protection (6/21).  Pt was also deemed to not be a candidate for mechanical thrombectomy.  Pt was started on VPA for seizure ppx.  EEG was negative for seizure activity.  Repeat CTH showed evolving infarct but without hemorrhage and no neurosurgical intervention required.  Hospital course has been complicated by LLL PNA likely due to aspiration, afib rvr (treated w/ cardizem and metoprolol) and was worked up for sepsis given hypotension and febrile.  Repeat CTH on 6/24 showed mildly larger stroke w/ small amount of petechial hemorrhage  Pt was successfully extubated 6/27.  PEG placed 6/30 and noted to have a duodenal ulcer; protonix was subsequently started.  CTA abd 7/1 showed hematoma long the greater curvature of the stomach and a small volume hemoperitoneum without evidence of active bleeding.  PEG was lavaged and blood noted however has since been cleared to use PEG and start tube feeds.  During hospital course pt transfused total of 1u PRBC and 1u plts on 7/1.  Pt hemodynamically stable, resumed on ASA and plavix and will be downgraded to medicine. On 07/04 over night spiked fever, sepsis code initiated with work up suggestive RLL aspiration PNA.    Episode of fever, sepsis 2/2 aspiration PNA  - c/w Zosyn and Vanco  - ID consult noted  - c/w ivf increased to 75 cc/hr  -  Go[place]din was changed on 07/04  - will f/u on blood cx sent on 07/04,sputum from 07/04 with staph aur      Rt MCA infarct w/ Rt M2 occlusion and Rt ICA occlusion complicated by enlarging of stroke w/ small petechial hemorrhage  - Pt was out of window for TPA and not candidate for mechanical thrombectomy   - c/w VPA for seizure ppx  - c/w ASA and plavix (resumed today), c/w statin therapy   - Neuro checks and VS q6h for now, c/w tele    Normocytic anemia, with hematoma along the greater curvature of the stomach and a small volume hemoperitoneum without evidence of active bleeding  - Monitor H/H   - Tube feeds were restarted tolerating so far    AFib wtih RVR better controlled now  -  not on AC due to above,    - c/w Metoprolol 25 bid for now, c/w IV prn for HR > 130    DM II, controlled  - A1c 6.9  - c/w to Lantus 15 units qhs, MDSS for now, will adjust base on poc     Essential HTN  - normotensive now off bp meds except BB as above    Diarrhear, most likely secondary to tube feeding, somewhat improved  - flexesis in place  - c/w add banana flakes      VTE ppx: Lovenox sq   GI ppx: protonix   Code Status: DNR/DNI, updated family over phone    Dispo: pending clinical course

## 2021-07-05 NOTE — OCCUPATIONAL THERAPY INITIAL EVALUATION ADULT - NS ASR FOLLOW COMMAND OT EVAL
Pt inconsistent throughout. Pt required max/continuous cues and repetition throughout for all attempted assessments/25% of the time
unable to follow commands

## 2021-07-05 NOTE — PHYSICAL THERAPY INITIAL EVALUATION ADULT - IMPAIRMENTS FOUND, PT EVAL
arousal, attention, and cognition/gait, locomotion, and balance/muscle strength/tone
aerobic capacity/endurance/arousal, attention, and cognition/cognitive impairment/gait, locomotion, and balance/muscle strength/tone

## 2021-07-05 NOTE — OCCUPATIONAL THERAPY INITIAL EVALUATION ADULT - PLANNED THERAPY INTERVENTIONS, OT EVAL
ADL retraining/balance training/bed mobility training/cognitive, visual perceptual/fine motor coordination training/motor coordination training/neuromuscular re-education/ROM/strengthening/transfer training
toilet/ADL retraining/balance training/bed mobility training/fine motor coordination training/motor coordination training/neuromuscular re-education/ROM/strengthening/transfer training

## 2021-07-05 NOTE — PHYSICAL THERAPY INITIAL EVALUATION ADULT - FOLLOWS COMMANDS/ANSWERS QUESTIONS, REHAB EVAL
inconsistant/25% of the time
Therapist communicated with pt in Mexican./50% of the time/able to follow single-step instructions/unable to answer questions/speech unintelligible

## 2021-07-05 NOTE — PHARMACOTHERAPY INTERVENTION NOTE - COMMENTS
Pharmacy-Directed Vancomycin Dosing   Pt on 750 mG q12h for PNA. Pre-3rd level ordered to check for toxicity due to age. Pharmacy-Directed Vancomycin Dosing   Pt on 750 mG q12h for PNA. Pre-3rd level ordered 7/6 prior to 10am dose to check for toxicity due to age.

## 2021-07-05 NOTE — PHYSICAL THERAPY INITIAL EVALUATION ADULT - PERTINENT HX OF CURRENT PROBLEM, REHAB EVAL
80 y/o female, PMH DM, HTN, presents to ED as Code Stroke after she was found down on her front lawn by a neighbor. CTH with acute right MCA infarct, CTA Right M2 occlusion.
80 yo woman with medical conditions as outlined below who was found down yesterday. She presented with L facial droop, left sided weakness, and dysarthria. 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 She was admitted to NS-ICU for q 1 h neuro-checks and hemicraniectomy watch.

## 2021-07-05 NOTE — PHYSICAL THERAPY INITIAL EVALUATION ADULT - NEUROVASCULAR ASSESSMENT RUE
Pt here for C2D15.   Arrives Ambulating independently, accompanied by            Modifications in dose or schedule: No     Frequency of blood return and site check throughout administration: Prior to administration   Discharged to Home, Ambulating independe
Pt responds to light touch on Right sided extremities
warm/no discoloration

## 2021-07-05 NOTE — PHYSICAL THERAPY INITIAL EVALUATION ADULT - PLANNED THERAPY INTERVENTIONS, PT EVAL
balance training/bed mobility training/gait training/postural re-education/strengthening/transfer training
balance training/bed mobility training/gait training/motor coordination training/neuromuscular re-education/postural re-education/transfer training

## 2021-07-05 NOTE — PHYSICAL THERAPY INITIAL EVALUATION ADULT - LEVEL OF INDEPENDENCE: BED TO CHAIR, REHAB EVAL
unsafe, pt unable to follow commands consistently/unable to perform
unsafe to perform at this time./unable to perform

## 2021-07-05 NOTE — OCCUPATIONAL THERAPY INITIAL EVALUATION ADULT - RANGE OF MOTION EXAMINATION
Pt unable to follow commands of assessment, observed Full AROM for Right UE at shoulder, elbow and gross grasp. Left UE with no observed movement either on command or spontaneous for AROM/no Passive ROM deficits were identified
no active movement left UE; Right UE 1/2 shoulder flexion, 1/2 elbow flexion, full digit flexion

## 2021-07-05 NOTE — OCCUPATIONAL THERAPY INITIAL EVALUATION ADULT - IMPAIRMENTS CONTRIBUTING IMPAIRED BED MOBILITY, REHAB EVAL
impaired balance/cognition/impaired coordination/decreased flexibility/impaired motor control/impaired postural control
impaired balance/cognition/impaired coordination/impaired motor control/impaired postural control/decreased ROM/decreased strength

## 2021-07-05 NOTE — PHYSICAL THERAPY INITIAL EVALUATION ADULT - GENERAL OBSERVATIONS, REHAB EVAL
pt received semi moraes in bed + IV + PEG + diallo + rectal tube + tele + BP cuff, pt received semi moraes in bed + IV + PEG + diallo + rectal tube + tele + BP cuff, NAD and niece at bedside.

## 2021-07-05 NOTE — PROGRESS NOTE ADULT - SUBJECTIVE AND OBJECTIVE BOX
[facility]                                        Neurology at [place]                                 [facility]                                  [address]                                        [place], [place], [address]                                             [phone]        CC: Stroke    HPI:  82 yo woman with medical conditions as outlined below who was found down yesterday. She presented with L facial droop, left sided weakness, and dysarthria. 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 She was admitted to NS-ICU for q 1 h neuro-checks and hemicraniectomy watch.    Interim history: remains extubated, unresponsive eyes not open spontaneously    MEDICATIONS  (PRN):  labetalol Injectable 10 milliGRAM(s) IV Push every 2 hours PRN SBP >165  metoprolol tartrate Injectable 5 milliGRAM(s) IV Push every 6 hours PRN SBP>150      Vital Signs Last 24 Hrs  T(C): 36.9 (01 Jul 2021 15:59), Max: 37.8 (30 Jun 2021 20:01)  T(F): 98.5 (01 Jul 2021 15:59), Max: 100 (30 Jun 2021 20:01)  HR: 68 (01 Jul 2021 16:00) (64 - 129)  BP: 128/72 (01 Jul 2021 16:00) (77/64 - 143/55)  BP(mean): 87 (01 Jul 2021 16:00) (55 - 120)  RR: 29 (01 Jul 2021 16:00) (19 - 31)  SpO2: 96% (01 Jul 2021 16:00) (95% - 100%)    Detailed Neurologic Exam:    Mental status: Lethargic. No response to voice/stimuli. Not following instructions.     Cranial nerves: Pupils equal and react symmetrically to light. There is no visual field deficit to threat. Extraocular motion is full with no nystagmus. Facial sensation is intact. Facial musculature is symmetric. Palate elevates symmetrically. Tongue is midline.    Belen/Sensory: There is normal bulk and tone.  There is no tremor.  Moves right side minimally to stimuli.   Left arm flaccid. No movement to stimuli.     Reflexes: Trace throughout and plantar responses are flexor on right. extensor on left.    Cerebellar: Cannot be tested.     Labs:                                   12.8   9.81  )-----------( 313      ( 05 Jul 2021 07:35 )             37.1     07-05    139  |  105  |  13.2  ----------------------------<  205<H>  4.2   |  24.0  |  0.41<L>    Ca    7.9<L>      05 Jul 2021 07:35  Mg     2.0     07-05    TPro  5.4<L>  /  Alb  2.7<L>  /  TBili  0.4  /  DBili  x   /  AST  36<H>  /  ALT  38<H>  /  AlkPhos  84  07-04    LIVER FUNCTIONS - ( 04 Jul 2021 01:28 )  Alb: 2.7 g/dL / Pro: 5.4 g/dL / ALK PHOS: 84 U/L / ALT: 38 U/L / AST: 36 U/L / GGT: x               Rad:   CT head right middle cerebral artery/thalamic strokes, no blood or mass

## 2021-07-05 NOTE — PROGRESS NOTE ADULT - SUBJECTIVE AND OBJECTIVE BOX
[facility]  INFECTIOUS DISEASES AND INTERNAL MEDICINE at [place]  =======================================================  Yovani Campbell MD  Diplomates American Board of Internal Medicine and Infectious Diseases  Tel: [phone]      Fax: [phone]  =======================================================    GALERISA JOSÉ [id number]    Follow up: pneumonia, uti  +fever  cannot obtain history      Allergies:  Allergy Status Unknown           REVIEW OF SYSTEMS:  cannot obtain    Physical Exam:  GEN: NAD, lethargic, raises right hand when called, keeps eyes closed  HEENT: normocephalic and atraumatic. EOMI. PERRL.  Anicteric   NECK: Supple.   LUNGS: Clear to auscultation.  HEART: Regular rate and rhythm without murmur.  ABDOMEN: Soft, nontender, and nondistended.  Positive bowel sounds.    : No CVA tenderness  EXTREMITIES: Without any edema.  MSK: no joint swelling  NEUROLOGIC: left paresis  PSYCHIATRIC: Appropriate affect .  SKIN: No Rash      Vitals:    T(F): 97.7 (05 Jul 2021 08:20), Max: 101 (05 Jul 2021 00:00)  HR: 111 (05 Jul 2021 12:00)  BP: 134/93 (05 Jul 2021 12:00)  RR: 18 (05 Jul 2021 12:00)  SpO2: 97% (05 Jul 2021 12:00) (94% - 98%)  temp max in last 48H T(F): , Max: 101 (07-04-21 @ 02:00)    Current Antibiotics:  piperacillin/tazobactam IVPB.. 3.375 Gram(s) IV Intermittent every 8 hours  vancomycin  IVPB 750 milliGRAM(s) IV Intermittent every 12 hours    Other medications:  amantadine Syrup 100 milliGRAM(s) Oral <User Schedule>  artificial tears (preservative free) Ophthalmic Solution 1 Drop(s) Both EYES three times a day  aspirin  chewable 81 milliGRAM(s) Oral daily  atorvastatin 40 milliGRAM(s) Oral at bedtime  clopidogrel Tablet 75 milliGRAM(s) Oral daily  dextrose 40% Gel 15 Gram(s) Oral once  dextrose 5%. 1000 milliLiter(s) IV Continuous <Continuous>  dextrose 5%. 1000 milliLiter(s) IV Continuous <Continuous>  dextrose 50% Injectable 25 Gram(s) IV Push once  dextrose 50% Injectable 12.5 Gram(s) IV Push once  dextrose 50% Injectable 25 Gram(s) IV Push once  doxazosin 4 milliGRAM(s) Oral at bedtime  enoxaparin Injectable 40 milliGRAM(s) SubCutaneous <User Schedule>  glucagon  Injectable 1 milliGRAM(s) IntraMuscular once  insulin glargine Injectable (LANTUS) 15 Unit(s) SubCutaneous at bedtime  insulin lispro (ADMELOG) corrective regimen sliding scale   SubCutaneous every 6 hours  melatonin 5 milliGRAM(s) Oral at bedtime  memantine 10 milliGRAM(s) Oral two times a day  metoprolol tartrate 25 milliGRAM(s) Enteral Tube two times a day  pantoprazole  Injectable 40 milliGRAM(s) IV Push every 12 hours  polyethylene glycol 3350 17 Gram(s) Oral daily  senna 2 Tablet(s) Oral at bedtime  sodium chloride 0.9%. 1000 milliLiter(s) IV Continuous <Continuous>  valproic  acid Syrup 500 milliGRAM(s) Oral every 12 hours                            12.8   9.81  )-----------( 313      ( 05 Jul 2021 07:35 )             37.1     07-05    139  |  105  |  13.2  ----------------------------<  205<H>  4.2   |  24.0  |  0.41<L>    Ca    7.9<L>      05 Jul 2021 07:35  Mg     2.0     07-05    TPro  5.4<L>  /  Alb  2.7<L>  /  TBili  0.4  /  DBili  x   /  AST  36<H>  /  ALT  38<H>  /  AlkPhos  84  07-04    RECENT CULTURES:  07-05 @ 02:30 .Sputum Sputum       Few polymorphonuclear leukocytes per low power field  Rare Squamous epithelial cells per low power field  Few Gram Negative Rods seen per oil power field  Rare Gram positive cocci in pairs seen per oil power field      07-04 @ 08:40 .Urine Catheterized     >100,000 CFU/ml Gram Negative Rods        07-04 @ 08:21 .Sputum Sputum trap     Moderate Staphylococcus aureus  Normal Respiratory Miladys present    Few polymorphonuclear leukocytes seen per low power field  Few Squamous epithelial cells seen per low power field  Few Gram Positive Cocci in Clusters seen per oil power field      06-23 @ 16:24 .Urine Catheterized     No growth        06-23 @ 02:12 .Blood Blood-Peripheral     No growth at 5 days.        06-22 @ 00:44 .Sputum Sputum     Normal Respiratory Miladys present    Few polymorphonuclear leukocytes per low power field  Rare Squamous epithelial cells per low power field  Few Gram positive cocci in pairs per oil power field  Few Gram Variable Rods per oil power field      06-21 @ 18:47 .Blood Blood-Peripheral     No growth at 5 days.        06-21 @ 18:46 .Blood Blood-Peripheral     No growth at 5 days.            WBC Count: 9.81 K/uL (07-05-21 @ 07:35)  WBC Count: 9.19 K/uL (07-04-21 @ 09:36)  WBC Count: 10.82 K/uL (07-04-21 @ 01:28)  WBC Count: 8.07 K/uL (07-03-21 @ 10:37)  WBC Count: 8.52 K/uL (07-02-21 @ 04:46)  WBC Count: 12.64 K/uL (07-01-21 @ 04:24)  WBC Count: 13.88 K/uL (07-01-21 @ 00:42)  WBC Count: 13.24 K/uL (06-30-21 @ 16:27)    Creatinine, Serum: 0.41 mg/dL (07-05-21 @ 07:35)  Creatinine, Serum: 0.38 mg/dL (07-04-21 @ 09:36)  Creatinine, Serum: 0.47 mg/dL (07-04-21 @ 01:28)  Creatinine, Serum: 0.41 mg/dL (07-03-21 @ 10:37)  Creatinine, Serum: 0.40 mg/dL (07-02-21 @ 04:46)  Creatinine, Serum: 0.39 mg/dL (07-01-21 @ 04:24)  Creatinine, Serum: 0.43 mg/dL (06-30-21 @ 16:26)          Procalcitonin, Serum: 0.11 ng/mL (06-23-21 @ 01:34)       COVID-19 PCR: NotDetec (06-19-21 @ 16:15)

## 2021-07-05 NOTE — PROGRESS NOTE ADULT - ASSESSMENT
82 y/o F w/ PMH DM, HTN, presented to ED as Code Stroke after being found down in her front lawn by a neighbor.  Unknown down time however last reported well at 10:50 am placing her out of window for TPA.  Glucose in field reported to be 60 and pt given amp of d50 w/ improvement in glucose level.  On arrival pt presented w/ L-facial droop, L-sided weakness, dysarthria.  CTH reported acute Rt MCA infarct and CTA showed Rt M2 occlusion and CTA neck with occlusion of the Rt ICA from the proximal cervical segment through the supraclinoid segment.  Pt was subsequently admitted to the Neuro ICU and intubated for airway protection (6/21).  Pt was also deemed to not be a candidate for mechanical thrombectomy.  Pt was started on VPA for seizure ppx.  EEG was negative for seizure activity.  Repeat CTH showed evolving infarct but without hemorrhage and no neurosurgical intervention required.  Hospital course has been complicated by LLL PNA likely due to aspiration, afib rvr (treated w/ cardizem and metoprolol) and was worked up for sepsis given hypotension and febrile.  Repeat CTH on 6/24 showed mildly larger stroke w/ small amount of petechial hemorrhage  Pt was successfully extubated 6/27.  PEG placed 6/30 and noted to have a duodenal ulcer; protonix was subsequently started.  CTA abd 7/1 showed hematoma long the greater curvature of the stomach and a small volume hemoperitoneum without evidence of active bleeding.  PEG was lavaged and blood noted however has since been cleared to use PEG and start tube feeds.  During hospital course pt transfused total of 1u PRBC and 1u plts on 7/1.  Pt hemodynamically stable, resumed on ASA and plavix and will be downgraded to medicine. On 07/04 over night spiked fever, sepsis code initiated with work up suggestive RLL aspiration PNA.     Aspiration pneumonia  Fever  ?pyelonephritis    -  suspected aspiration  - Ua +  - f/u BCx, UCX  - sputum CX Staph aureus  - Continue zosyn  - vancomycin added due to concern for MRSa  - Trend Fever  - Trend Leukocytosis    d/w pharmacy  Will Follow

## 2021-07-05 NOTE — OCCUPATIONAL THERAPY INITIAL EVALUATION ADULT - VISUAL ACUITY
needs further assessment; pt with eyes closed for most of session
pt with Right gaze deviation noted

## 2021-07-05 NOTE — OCCUPATIONAL THERAPY INITIAL EVALUATION ADULT - PERTINENT HX OF CURRENT PROBLEM, REHAB EVAL
Pt had a stroke and is severely dysarthric
CT head: evolving acute/subacute infarct within Right MCA distribution with associated cytotoxic edema and without hemorrhagic transformation. CTA: Right M2 occlusion. CTA Neck: oclussion of Right ICA from proximal cervical segment through the supraclinoid segment.

## 2021-07-05 NOTE — PHYSICAL THERAPY INITIAL EVALUATION ADULT - CRITERIA FOR SKILLED THERAPEUTIC INTERVENTIONS
[place]/impairments found/rehab potential/anticipated equipment needs at discharge/anticipated discharge recommendation
impairments found/rehab potential/anticipated equipment needs at discharge/anticipated discharge recommendation

## 2021-07-05 NOTE — PHYSICAL THERAPY INITIAL EVALUATION ADULT - MANUAL MUSCLE TESTING RESULTS, REHAB EVAL
right ankle DF/PF 4/5, unable to assess right hip flex and knee ext/flex due to cognitive impairment. Left extremities 0/5.
0/5 on Right sided extremities

## 2021-07-05 NOTE — PHYSICAL THERAPY INITIAL EVALUATION ADULT - IMPAIRMENTS CONTRIBUTING IMPAIRED BED MOBILITY, REHAB EVAL
impaired balance/cognition/impaired coordination/impaired motor control/abnormal muscle tone/decreased sensation/decreased strength
impaired balance/impaired postural control/decreased strength

## 2021-07-05 NOTE — PHYSICAL THERAPY INITIAL EVALUATION ADULT - ADDITIONAL COMMENTS
pt unable to provide social/functional history at this time, niece present at bedside able to provide history. pt lives with her son in a house with 3 steps to enter with no handrails + 2 steps to backyard with no handrails. pt was independent with all ADLs, no assistive device needed. pt does not drive. No DME at home.
Pt unable to give PLOF or hx. Per [facility] assessement: Pt resides in a ranch style home w/ her son. Pt enters the home through  the front door. There are 3 steps w/ a railing to enter. Once inside, Pt's  bedroom is on the main floor. Prior to this admission, Pt was independent for  all ADLs. NO DME, no O2, no aides, no [facility] hx.

## 2021-07-06 LAB
-  AMIKACIN: SIGNIFICANT CHANGE UP
-  AMPICILLIN/SULBACTAM: SIGNIFICANT CHANGE UP
-  AZTREONAM: SIGNIFICANT CHANGE UP
-  CEFAZOLIN: SIGNIFICANT CHANGE UP
-  CEFEPIME: SIGNIFICANT CHANGE UP
-  CEFTAZIDIME: SIGNIFICANT CHANGE UP
-  CIPROFLOXACIN: SIGNIFICANT CHANGE UP
-  CLINDAMYCIN: SIGNIFICANT CHANGE UP
-  ERYTHROMYCIN: SIGNIFICANT CHANGE UP
-  GENTAMICIN: SIGNIFICANT CHANGE UP
-  GENTAMICIN: SIGNIFICANT CHANGE UP
-  IMIPENEM: SIGNIFICANT CHANGE UP
-  LEVOFLOXACIN: SIGNIFICANT CHANGE UP
-  MEROPENEM: SIGNIFICANT CHANGE UP
-  OXACILLIN: SIGNIFICANT CHANGE UP
-  PENICILLIN: SIGNIFICANT CHANGE UP
-  PIPERACILLIN/TAZOBACTAM: SIGNIFICANT CHANGE UP
-  RIFAMPIN: SIGNIFICANT CHANGE UP
-  TETRACYCLINE: SIGNIFICANT CHANGE UP
-  TOBRAMYCIN: SIGNIFICANT CHANGE UP
-  TRIMETHOPRIM/SULFAMETHOXAZOLE: SIGNIFICANT CHANGE UP
-  VANCOMYCIN: SIGNIFICANT CHANGE UP
ANION GAP SERPL CALC-SCNC: 12 MMOL/L — SIGNIFICANT CHANGE UP (ref 5–17)
BUN SERPL-MCNC: 18.6 MG/DL — SIGNIFICANT CHANGE UP (ref 8–20)
CALCIUM SERPL-MCNC: 8.1 MG/DL — LOW (ref 8.6–10.2)
CHLORIDE SERPL-SCNC: 98 MMOL/L — SIGNIFICANT CHANGE UP (ref 98–107)
CO2 SERPL-SCNC: 24 MMOL/L — SIGNIFICANT CHANGE UP (ref 22–29)
CREAT SERPL-MCNC: 0.49 MG/DL — LOW (ref 0.5–1.3)
CULTURE RESULTS: SIGNIFICANT CHANGE UP
GLUCOSE BLDC GLUCOMTR-MCNC: 191 MG/DL — HIGH (ref 70–99)
GLUCOSE BLDC GLUCOMTR-MCNC: 218 MG/DL — HIGH (ref 70–99)
GLUCOSE BLDC GLUCOMTR-MCNC: 225 MG/DL — HIGH (ref 70–99)
GLUCOSE BLDC GLUCOMTR-MCNC: 227 MG/DL — HIGH (ref 70–99)
GLUCOSE BLDC GLUCOMTR-MCNC: 247 MG/DL — HIGH (ref 70–99)
GLUCOSE SERPL-MCNC: 238 MG/DL — HIGH (ref 70–99)
HCT VFR BLD CALC: 38.2 % — SIGNIFICANT CHANGE UP (ref 34.5–45)
HGB BLD-MCNC: 12.8 G/DL — SIGNIFICANT CHANGE UP (ref 11.5–15.5)
MCHC RBC-ENTMCNC: 28.7 PG — SIGNIFICANT CHANGE UP (ref 27–34)
MCHC RBC-ENTMCNC: 33.5 GM/DL — SIGNIFICANT CHANGE UP (ref 32–36)
MCV RBC AUTO: 85.7 FL — SIGNIFICANT CHANGE UP (ref 80–100)
METHOD TYPE: SIGNIFICANT CHANGE UP
METHOD TYPE: SIGNIFICANT CHANGE UP
ORGANISM # SPEC MICROSCOPIC CNT: SIGNIFICANT CHANGE UP
PLATELET # BLD AUTO: 329 K/UL — SIGNIFICANT CHANGE UP (ref 150–400)
POTASSIUM SERPL-MCNC: 4.4 MMOL/L — SIGNIFICANT CHANGE UP (ref 3.5–5.3)
POTASSIUM SERPL-SCNC: 4.4 MMOL/L — SIGNIFICANT CHANGE UP (ref 3.5–5.3)
RBC # BLD: 4.46 M/UL — SIGNIFICANT CHANGE UP (ref 3.8–5.2)
RBC # FLD: 15 % — HIGH (ref 10.3–14.5)
SODIUM SERPL-SCNC: 134 MMOL/L — LOW (ref 135–145)
SPECIMEN SOURCE: SIGNIFICANT CHANGE UP
VANCOMYCIN TROUGH SERPL-MCNC: 7 UG/ML — LOW (ref 10–20)
WBC # BLD: 9.02 K/UL — SIGNIFICANT CHANGE UP (ref 3.8–10.5)
WBC # FLD AUTO: 9.02 K/UL — SIGNIFICANT CHANGE UP (ref 3.8–10.5)

## 2021-07-06 PROCEDURE — 99232 SBSQ HOSP IP/OBS MODERATE 35: CPT

## 2021-07-06 PROCEDURE — 99233 SBSQ HOSP IP/OBS HIGH 50: CPT

## 2021-07-06 RX ORDER — METOPROLOL TARTRATE 50 MG
50 TABLET ORAL EVERY 12 HOURS
Refills: 0 | Status: DISCONTINUED | OUTPATIENT
Start: 2021-07-06 | End: 2021-07-08

## 2021-07-06 RX ORDER — INSULIN GLARGINE 100 [IU]/ML
20 INJECTION, SOLUTION SUBCUTANEOUS AT BEDTIME
Refills: 0 | Status: DISCONTINUED | OUTPATIENT
Start: 2021-07-06 | End: 2021-07-08

## 2021-07-06 RX ADMIN — Medication 25 MILLIGRAM(S): at 05:13

## 2021-07-06 RX ADMIN — Medication 100 MILLIGRAM(S): at 05:11

## 2021-07-06 RX ADMIN — Medication 4: at 06:25

## 2021-07-06 RX ADMIN — CLOPIDOGREL BISULFATE 75 MILLIGRAM(S): 75 TABLET, FILM COATED ORAL at 12:50

## 2021-07-06 RX ADMIN — INSULIN GLARGINE 20 UNIT(S): 100 INJECTION, SOLUTION SUBCUTANEOUS at 22:38

## 2021-07-06 RX ADMIN — ENOXAPARIN SODIUM 40 MILLIGRAM(S): 100 INJECTION SUBCUTANEOUS at 19:13

## 2021-07-06 RX ADMIN — Medication 4: at 13:02

## 2021-07-06 RX ADMIN — Medication 100 MILLIGRAM(S): at 13:13

## 2021-07-06 RX ADMIN — Medication 1 DROP(S): at 14:12

## 2021-07-06 RX ADMIN — PIPERACILLIN AND TAZOBACTAM 25 GRAM(S): 4; .5 INJECTION, POWDER, LYOPHILIZED, FOR SOLUTION INTRAVENOUS at 13:02

## 2021-07-06 RX ADMIN — Medication 1 DROP(S): at 22:30

## 2021-07-06 RX ADMIN — Medication 25 MILLIGRAM(S): at 15:16

## 2021-07-06 RX ADMIN — Medication 4: at 19:12

## 2021-07-06 RX ADMIN — PIPERACILLIN AND TAZOBACTAM 25 GRAM(S): 4; .5 INJECTION, POWDER, LYOPHILIZED, FOR SOLUTION INTRAVENOUS at 05:14

## 2021-07-06 RX ADMIN — ATORVASTATIN CALCIUM 40 MILLIGRAM(S): 80 TABLET, FILM COATED ORAL at 22:31

## 2021-07-06 RX ADMIN — MEMANTINE HYDROCHLORIDE 10 MILLIGRAM(S): 10 TABLET ORAL at 22:28

## 2021-07-06 RX ADMIN — Medication 5 MILLIGRAM(S): at 22:27

## 2021-07-06 RX ADMIN — PANTOPRAZOLE SODIUM 40 MILLIGRAM(S): 20 TABLET, DELAYED RELEASE ORAL at 05:13

## 2021-07-06 RX ADMIN — Medication 81 MILLIGRAM(S): at 12:50

## 2021-07-06 RX ADMIN — Medication 5 MILLIGRAM(S): at 22:32

## 2021-07-06 RX ADMIN — Medication 500 MILLIGRAM(S): at 22:28

## 2021-07-06 RX ADMIN — PIPERACILLIN AND TAZOBACTAM 25 GRAM(S): 4; .5 INJECTION, POWDER, LYOPHILIZED, FOR SOLUTION INTRAVENOUS at 22:41

## 2021-07-06 RX ADMIN — Medication 4: at 00:56

## 2021-07-06 RX ADMIN — Medication 1 DROP(S): at 05:12

## 2021-07-06 RX ADMIN — Medication 4 MILLIGRAM(S): at 22:28

## 2021-07-06 RX ADMIN — SENNA PLUS 2 TABLET(S): 8.6 TABLET ORAL at 22:28

## 2021-07-06 RX ADMIN — MEMANTINE HYDROCHLORIDE 10 MILLIGRAM(S): 10 TABLET ORAL at 05:13

## 2021-07-06 RX ADMIN — PANTOPRAZOLE SODIUM 40 MILLIGRAM(S): 20 TABLET, DELAYED RELEASE ORAL at 19:13

## 2021-07-06 RX ADMIN — Medication 500 MILLIGRAM(S): at 12:50

## 2021-07-06 NOTE — CHART NOTE - NSCHARTNOTESELECT_GEN_ALL_CORE
Event Note
Event Note
Nutrition Services
Transfer Note
Intubation/Event Note
Intubation/Event Note
Nutrition Services
Post Op Check/Event Note
SICU Night Attending

## 2021-07-06 NOTE — PROGRESS NOTE ADULT - SUBJECTIVE AND OBJECTIVE BOX
[facility] Division of Hospital Medicine    Chief Complaint:  CVA    SUBJECTIVE: not verbal     OVERNIGHT EVENTS: none reported     ROS IS NOT AVAILABLE DUE TO PT MENTATION    MEDICATIONS  (STANDING):  amantadine Syrup 100 milliGRAM(s) Oral <User Schedule>  artificial tears (preservative free) Ophthalmic Solution 1 Drop(s) Both EYES three times a day  aspirin  chewable 81 milliGRAM(s) Oral daily  atorvastatin 40 milliGRAM(s) Oral at bedtime  clopidogrel Tablet 75 milliGRAM(s) Oral daily  dextrose 40% Gel 15 Gram(s) Oral once  dextrose 5%. 1000 milliLiter(s) (50 mL/Hr) IV Continuous <Continuous>  dextrose 5%. 1000 milliLiter(s) (100 mL/Hr) IV Continuous <Continuous>  dextrose 50% Injectable 25 Gram(s) IV Push once  dextrose 50% Injectable 12.5 Gram(s) IV Push once  dextrose 50% Injectable 25 Gram(s) IV Push once  doxazosin 4 milliGRAM(s) Oral at bedtime  enoxaparin Injectable 40 milliGRAM(s) SubCutaneous <User Schedule>  glucagon  Injectable 1 milliGRAM(s) IntraMuscular once  insulin glargine Injectable (LANTUS) 20 Unit(s) SubCutaneous at bedtime  insulin lispro (ADMELOG) corrective regimen sliding scale   SubCutaneous every 6 hours  melatonin 5 milliGRAM(s) Oral at bedtime  memantine 10 milliGRAM(s) Oral two times a day  metoprolol tartrate 50 milliGRAM(s) Oral every 12 hours  pantoprazole  Injectable 40 milliGRAM(s) IV Push every 12 hours  piperacillin/tazobactam IVPB.. 3.375 Gram(s) IV Intermittent every 8 hours  polyethylene glycol 3350 17 Gram(s) Oral daily  senna 2 Tablet(s) Oral at bedtime  sodium chloride 0.9%. 1000 milliLiter(s) (75 mL/Hr) IV Continuous <Continuous>  valproic  acid Syrup 500 milliGRAM(s) Oral every 12 hours    MEDICATIONS  (PRN):  acetaminophen   Tablet .. 650 milliGRAM(s) Oral every 6 hours PRN Temp greater or equal to 38C (100.4F)  labetalol Injectable 10 milliGRAM(s) IV Push every 2 hours PRN SBP >165  metoprolol tartrate Injectable 5 milliGRAM(s) IV Push every 6 hours PRN see insturctions        I&O's Summary    04 Jul 2021 07:01  -  05 Jul 2021 07:00  --------------------------------------------------------  IN: 3205 mL / OUT: 4300 mL / NET: -1095 mL        PHYSICAL EXAM:  Vital Signs Last 24 Hrs  T(C): 37.6 (06 Jul 2021 17:25), Max: 37.7 (06 Jul 2021 04:44)  T(F): 99.6 (06 Jul 2021 17:25), Max: 99.9 (06 Jul 2021 04:44)  HR: 93 (06 Jul 2021 17:25) (86 - 140)  BP: 105/74 (06 Jul 2021 17:25) (102/68 - 147/97)  BP(mean): --  RR: 18 (06 Jul 2021 17:25) (18 - 24)  SpO2: 97% (06 Jul 2021 17:25) (92% - 99%)        CONSTITUTIONAL: NAD  ENMT: Moist oral mucosa, no pharyngeal injection or exudates; normal dentition; No JVD  RESPIRATORY: Normal respiratory effort; diminished on bases with central airways secrtion, lungs are clear to auscultation bilaterally  CARDIOVASCULAR: Regular rate and rhythm, normal S1 and S2, no murmur/rub/gallop; No lower extremity edema; Peripheral pulses are 2+ bilaterally  ABDOMEN: Nontender to palpation, normoactive bowel sounds, no rebound/guarding; No hepatosplenomegaly, LUQ PEG with dry blood around it  MUSCLOSKELETAL:  no clubbing or cyanosis of digits; no joint swelling or tenderness to palpation  PSYCH: A+O to person, place, and time; affect appropriate  NEUROLOGY: R facial droop with LUE paresis, localizing pain in all 4 ext except RUE; was observed moving all 3  ext except LUE on pain.    SKIN: No rashes; no palpable lesions     LABS:                        12.8   9.02  )-----------( 329      ( 06 Jul 2021 07:40 )             38.2     07-06    134<L>  |  98  |  18.6  ----------------------------<  238<H>  4.4   |  24.0  |  0.49<L>    Ca    8.1<L>      06 Jul 2021 07:40  Mg     2.0     07-05                Culture - Sputum (collected 05 Jul 2021 02:30)  Source: .Sputum Sputum  Gram Stain (05 Jul 2021 07:40):    Few polymorphonuclear leukocytes per low power field    Rare Squamous epithelial cells per low power field    Few Gram Negative Rods seen per oil power field    Rare Gram positive cocci in pairs seen per oil power field  Final Report (06 Jul 2021 16:56):    Normal Respiratory Miladys present    Culture - Urine (collected 04 Jul 2021 08:40)  Source: .Urine Catheterized  Final Report (06 Jul 2021 09:07):    >100,000 CFU/ml Pseudomonas aeruginosa  Organism: Pseudomonas aeruginosa (06 Jul 2021 09:07)  Organism: Pseudomonas aeruginosa (06 Jul 2021 09:07)    Culture - Sputum (collected 04 Jul 2021 08:21)  Source: .Sputum Sputum trap  Gram Stain (04 Jul 2021 13:24):    Few polymorphonuclear leukocytes seen per low power field    Few Squamous epithelial cells seen per low power field    Few Gram Positive Cocci in Clusters seen per oil power field  Final Report (06 Jul 2021 10:39):    Moderate Staphylococcus aureus    Normal Respiratory Miladys present  Organism: Staphylococcus aureus (06 Jul 2021 10:39)  Organism: Staphylococcus aureus (06 Jul 2021 10:39)    Culture - Blood (collected 04 Jul 2021 03:45)  Source: .Blood Blood-Peripheral  Preliminary Report (06 Jul 2021 05:00):    No growth at 48 hours    Culture - Blood (collected 04 Jul 2021 01:28)  Source: .Blood Blood-Peripheral  Preliminary Report (06 Jul 2021 03:00):    No growth at 48 hours      CAPILLARY BLOOD GLUCOSE      POCT Blood Glucose.: 218 mg/dL (06 Jul 2021 12:55)  POCT Blood Glucose.: 225 mg/dL (06 Jul 2021 06:23)  POCT Blood Glucose.: 227 mg/dL (06 Jul 2021 00:20)  POCT Blood Glucose.: 153 mg/dL (05 Jul 2021 22:32)  POCT Blood Glucose.: 177 mg/dL (05 Jul 2021 18:20)        RADIOLOGY & ADDITIONAL TESTS:  Results Reviewed:   Imaging Personally Reviewed:  Electrocardiogram Personally Reviewed:

## 2021-07-06 NOTE — PROGRESS NOTE ADULT - ASSESSMENT
82 y/o F w/ PMH DM, HTN, presented to ED as Code Stroke after being found down in her front lawn by a neighbor.  Unknown down time however last reported well at 10:50 am placing her out of window for TPA.  Glucose in field reported to be 60 and pt given amp of d50 w/ improvement in glucose level.  On arrival pt presented w/ L-facial droop, L-sided weakness, dysarthria.  CTH reported acute Rt MCA infarct and CTA showed Rt M2 occlusion and CTA neck with occlusion of the Rt ICA from the proximal cervical segment through the supraclinoid segment.  Pt was subsequently admitted to the Neuro ICU and intubated for airway protection (6/21).  Pt was also deemed to not be a candidate for mechanical thrombectomy.  Pt was started on VPA for seizure ppx.  EEG was negative for seizure activity.  Repeat CTH showed evolving infarct but without hemorrhage and no neurosurgical intervention required.  Hospital course has been complicated by LLL PNA likely due to aspiration, afib rvr (treated w/ cardizem and metoprolol) and was worked up for sepsis given hypotension and febrile.  Repeat CTH on 6/24 showed mildly larger stroke w/ small amount of petechial hemorrhage  Pt was successfully extubated 6/27.  PEG placed 6/30 and noted to have a duodenal ulcer; protonix was subsequently started.  CTA abd 7/1 showed hematoma long the greater curvature of the stomach and a small volume hemoperitoneum without evidence of active bleeding.  PEG was lavaged and blood noted however has since been cleared to use PEG and start tube feeds.  During hospital course pt transfused total of 1u PRBC and 1u plts on 7/1.  Pt hemodynamically stable, resumed on ASA and plavix and will be downgraded to medicine. On 07/04 over night spiked fever, sepsis code initiated with work up suggestive RLL aspiration PNA. She was restarted on Zosyn and Vanco and ID team was reconsulted.     Episode of fever, sepsis 2/2 aspiration PNA  - c/w Zosyn   - ID consult noted  -d/c  ivf  -  Goodwin was changed on 07/04  - will f/u on blood cx sent on 07/04, sputum from 07/04 with MSSA, urine with pseudamona ( pan sensitive)     Rt MCA infarct w/ Rt M2 occlusion and Rt ICA occlusion complicated by enlarging of stroke w/ small petechial hemorrhage  - Pt was out of window for TPA and not candidate for mechanical thrombectomy   - c/w VPA for seizure ppx  - c/w ASA and plavix (resumed today), c/w statin therapy   - Neuro checks and VS q6h for now, c/w tele    Normocytic anemia, with hematoma along the greater curvature of the stomach and a small volume hemoperitoneum without evidence of active bleeding  - Monitor H/H   - Tube feeds were restarted tolerating so far    AFib wtih RVR better controlled now  -  not on AC due to above,    - Metoprolol increased to 50 bid for now, c/w IV prn for HR > 130    DM II, controlled  - A1c 6.9  - increased  to Lantus 20 units qhs, MDSS for now, will adjust base on poc     Essential HTN  - normotensive now off bp meds except BB as above    Diarrhear, most likely secondary to tube feeding, somewhat improved  - flexesis in place  - c/w add banana flakes      VTE ppx: Lovenox sq   GI ppx: protonix   Code Status: DNR/DNI, updated family over phone    Dispo: may be d/c to [facility] in next 24-48 hr if remains stable

## 2021-07-06 NOTE — PROGRESS NOTE ADULT - ASSESSMENT
80 y/o F w/ PMH DM, HTN, presented to ED as Code Stroke after being found down in her front lawn by a neighbor.  Unknown down time however last reported well at 10:50 am placing her out of window for TPA.  Glucose in field reported to be 60 and pt given amp of d50 w/ improvement in glucose level.  On arrival pt presented w/ L-facial droop, L-sided weakness, dysarthria.  CTH reported acute Rt MCA infarct and CTA showed Rt M2 occlusion and CTA neck with occlusion of the Rt ICA from the proximal cervical segment through the supraclinoid segment.  Pt was subsequently admitted to the Neuro ICU and intubated for airway protection (6/21).  Pt was also deemed to not be a candidate for mechanical thrombectomy.  Pt was started on VPA for seizure ppx.  EEG was negative for seizure activity.  Repeat CTH showed evolving infarct but without hemorrhage and no neurosurgical intervention required.  Hospital course has been complicated by LLL PNA likely due to aspiration, afib rvr (treated w/ cardizem and metoprolol) and was worked up for sepsis given hypotension and febrile.  Repeat CTH on 6/24 showed mildly larger stroke w/ small amount of petechial hemorrhage  Pt was successfully extubated 6/27.  PEG placed 6/30 and noted to have a duodenal ulcer; protonix was subsequently started.  CTA abd 7/1 showed hematoma long the greater curvature of the stomach and a small volume hemoperitoneum without evidence of active bleeding.  PEG was lavaged and blood noted however has since been cleared to use PEG and start tube feeds.  During hospital course pt transfused total of 1u PRBC and 1u plts on 7/1.  Pt hemodynamically stable, resumed on ASA and plavix and will be downgraded to medicine. On 07/04 over night spiked fever, sepsis code initiated with work up suggestive RLL aspiration PNA.     Aspiration pneumonia  Fever  ?pyelonephritis    - afebrile  - suspected aspiration  - Ua +  - UCX pseudomonas  - BCX ngtd  - Sputum CX Staph aureus MSSA  - Continue zosyn  - Dc vancomycin  - if ready for discharge suggest DC zosyn and change to doxycyline po 100 mg bid through 7/10/21 and levaquin 750 mg daily through 7/17/21  - Trend Fever  - Trend Leukocytosis  - dc diallo if able  - high risk for aspiration    d/w pharmacy, Dr Cole  please call with questions

## 2021-07-06 NOTE — PROVIDER CONTACT NOTE (OTHER) - ACTION/TREATMENT ORDERED:
Medication will be ordered.
No straight cath, repeat bladder scan in 6 hours
Straight Cath and rescan in 6 hours

## 2021-07-06 NOTE — PROGRESS NOTE ADULT - SUBJECTIVE AND OBJECTIVE BOX
[facility]  INFECTIOUS DISEASES AND INTERNAL MEDICINE at [place]  =======================================================  Yovani Campbell MD  Diplomates American Board of Internal Medicine and Infectious Diseases  Tel: [phone]      Fax: [phone]  =======================================================    GALE, RISA [id number]    Follow up: pneumonia, uti  +fever  cannot obtain history      Allergies:  Allergy Status Unknown           REVIEW OF SYSTEMS:  cannot obtain    Physical Exam:  GEN: NAD, lethargic, raises right hand when called, keeps eyes closed  HEENT: normocephalic and atraumatic. EOMI. PERRL.  Anicteric   NECK: Supple.   LUNGS: Clear to auscultation.  HEART: Regular rate and rhythm without murmur.  ABDOMEN: Soft, nontender, and nondistended.  Positive bowel sounds.    : No CVA tenderness  EXTREMITIES: Without any edema.  MSK: no joint swelling  NEUROLOGIC: left paresis  PSYCHIATRIC: Appropriate affect .  SKIN: No Rash      Vitals:    Vital Signs Last 24 Hrs  T(C): 37.6 (06 Jul 2021 17:25), Max: 37.7 (06 Jul 2021 04:44)  T(F): 99.6 (06 Jul 2021 17:25), Max: 99.9 (06 Jul 2021 04:44)  HR: 93 (06 Jul 2021 17:25) (86 - 140)  BP: 105/74 (06 Jul 2021 17:25) (102/68 - 132/87)  BP(mean): --  RR: 18 (06 Jul 2021 17:25) (18 - 22)  SpO2: 97% (06 Jul 2021 17:25) (92% - 99%)    Current Antibiotics:  piperacillin/tazobactam IVPB.. 3.375 Gram(s) IV Intermittent every 8 hours  vancomycin  IVPB 750 milliGRAM(s) IV Intermittent every 12 hours    Other medications:  amantadine Syrup 100 milliGRAM(s) Oral <User Schedule>  artificial tears (preservative free) Ophthalmic Solution 1 Drop(s) Both EYES three times a day  aspirin  chewable 81 milliGRAM(s) Oral daily  atorvastatin 40 milliGRAM(s) Oral at bedtime  clopidogrel Tablet 75 milliGRAM(s) Oral daily  dextrose 40% Gel 15 Gram(s) Oral once  dextrose 5%. 1000 milliLiter(s) IV Continuous <Continuous>  dextrose 5%. 1000 milliLiter(s) IV Continuous <Continuous>  dextrose 50% Injectable 25 Gram(s) IV Push once  dextrose 50% Injectable 12.5 Gram(s) IV Push once  dextrose 50% Injectable 25 Gram(s) IV Push once  doxazosin 4 milliGRAM(s) Oral at bedtime  enoxaparin Injectable 40 milliGRAM(s) SubCutaneous <User Schedule>  glucagon  Injectable 1 milliGRAM(s) IntraMuscular once  insulin glargine Injectable (LANTUS) 15 Unit(s) SubCutaneous at bedtime  insulin lispro (ADMELOG) corrective regimen sliding scale   SubCutaneous every 6 hours  melatonin 5 milliGRAM(s) Oral at bedtime  memantine 10 milliGRAM(s) Oral two times a day  metoprolol tartrate 25 milliGRAM(s) Enteral Tube two times a day  pantoprazole  Injectable 40 milliGRAM(s) IV Push every 12 hours  polyethylene glycol 3350 17 Gram(s) Oral daily  senna 2 Tablet(s) Oral at bedtime  sodium chloride 0.9%. 1000 milliLiter(s) IV Continuous <Continuous>  valproic  acid Syrup 500 milliGRAM(s) Oral every 12 hours                                    12.8   9.02  )-----------( 329      ( 06 Jul 2021 07:40 )             38.2       07-06    134<L>  |  98  |  18.6  ----------------------------<  238<H>  4.4   |  24.0  |  0.49<L>    Ca    8.1<L>      06 Jul 2021 07:40  Mg     2.0     07-05                              CAPILLARY BLOOD GLUCOSE      POCT Blood Glucose.: 218 mg/dL (06 Jul 2021 12:55)            RECENT CULTURES:  07-05 @ 02:30 .Sputum Sputum       Few polymorphonuclear leukocytes per low power field  Rare Squamous epithelial cells per low power field  Few Gram Negative Rods seen per oil power field  Rare Gram positive cocci in pairs seen per oil power field      07-04 @ 08:40 .Urine Catheterized     >100,000 CFU/ml Gram Negative Rods        07-04 @ 08:21 .Sputum Sputum trap     Moderate Staphylococcus aureus  Normal Respiratory Miladys present    Few polymorphonuclear leukocytes seen per low power field  Few Squamous epithelial cells seen per low power field  Few Gram Positive Cocci in Clusters seen per oil power field      06-23 @ 16:24 .Urine Catheterized     No growth        06-23 @ 02:12 .Blood Blood-Peripheral     No growth at 5 days.        06-22 @ 00:44 .Sputum Sputum     Normal Respiratory Miladys present    Few polymorphonuclear leukocytes per low power field  Rare Squamous epithelial cells per low power field  Few Gram positive cocci in pairs per oil power field  Few Gram Variable Rods per oil power field      06-21 @ 18:47 .Blood Blood-Peripheral     No growth at 5 days.        06-21 @ 18:46 .Blood Blood-Peripheral     No growth at 5 days.            WBC Count: 9.81 K/uL (07-05-21 @ 07:35)  WBC Count: 9.19 K/uL (07-04-21 @ 09:36)  WBC Count: 10.82 K/uL (07-04-21 @ 01:28)  WBC Count: 8.07 K/uL (07-03-21 @ 10:37)  WBC Count: 8.52 K/uL (07-02-21 @ 04:46)  WBC Count: 12.64 K/uL (07-01-21 @ 04:24)  WBC Count: 13.88 K/uL (07-01-21 @ 00:42)  WBC Count: 13.24 K/uL (06-30-21 @ 16:27)    Creatinine, Serum: 0.41 mg/dL (07-05-21 @ 07:35)  Creatinine, Serum: 0.38 mg/dL (07-04-21 @ 09:36)  Creatinine, Serum: 0.47 mg/dL (07-04-21 @ 01:28)  Creatinine, Serum: 0.41 mg/dL (07-03-21 @ 10:37)  Creatinine, Serum: 0.40 mg/dL (07-02-21 @ 04:46)  Creatinine, Serum: 0.39 mg/dL (07-01-21 @ 04:24)  Creatinine, Serum: 0.43 mg/dL (06-30-21 @ 16:26)          Procalcitonin, Serum: 0.11 ng/mL (06-23-21 @ 01:34)       COVID-19 PCR: NotDetec (06-19-21 @ 16:15)

## 2021-07-06 NOTE — CHART NOTE - NSCHARTNOTEFT_GEN_A_CORE
Source: Patient [ ]  Family [ ]   other [x ]EMR    Current Diet: NPO with tube feeds via gastrostomy tube      Enteral /Parenteral Nutrition: Glucerna 1.5cal at 50cchr to provide 1000 ml, 1500 kcal, 83g protein, 759 ml free water, and 100% of RDIs for vitamins/minerals. Additional free water per MD discretion.         Current Weight: 6/19 147.9#, 124.7# 7/4, bedscale today 153#  question accuracy  generalized 1 +    % Weight Change     Pertinent Medications: MEDICATIONS  (STANDING):  amantadine Syrup 100 milliGRAM(s) Oral <User Schedule>  artificial tears (preservative free) Ophthalmic Solution 1 Drop(s) Both EYES three times a day  aspirin  chewable 81 milliGRAM(s) Oral daily  atorvastatin 40 milliGRAM(s) Oral at bedtime  clopidogrel Tablet 75 milliGRAM(s) Oral daily  dextrose 40% Gel 15 Gram(s) Oral once  dextrose 5%. 1000 milliLiter(s) (50 mL/Hr) IV Continuous <Continuous>  dextrose 5%. 1000 milliLiter(s) (100 mL/Hr) IV Continuous <Continuous>  dextrose 50% Injectable 25 Gram(s) IV Push once  dextrose 50% Injectable 12.5 Gram(s) IV Push once  dextrose 50% Injectable 25 Gram(s) IV Push once  doxazosin 4 milliGRAM(s) Oral at bedtime  enoxaparin Injectable 40 milliGRAM(s) SubCutaneous <User Schedule>  glucagon  Injectable 1 milliGRAM(s) IntraMuscular once  insulin glargine Injectable (LANTUS) 20 Unit(s) SubCutaneous at bedtime  insulin lispro (ADMELOG) corrective regimen sliding scale   SubCutaneous every 6 hours  melatonin 5 milliGRAM(s) Oral at bedtime  memantine 10 milliGRAM(s) Oral two times a day  metoprolol tartrate 25 milliGRAM(s) Enteral Tube two times a day  pantoprazole  Injectable 40 milliGRAM(s) IV Push every 12 hours  piperacillin/tazobactam IVPB.. 3.375 Gram(s) IV Intermittent every 8 hours  polyethylene glycol 3350 17 Gram(s) Oral daily  senna 2 Tablet(s) Oral at bedtime  sodium chloride 0.9%. 1000 milliLiter(s) (75 mL/Hr) IV Continuous <Continuous>  valproic  acid Syrup 500 milliGRAM(s) Oral every 12 hours  vancomycin  IVPB 750 milliGRAM(s) IV Intermittent every 12 hours    MEDICATIONS  (PRN):  acetaminophen   Tablet .. 650 milliGRAM(s) Oral every 6 hours PRN Temp greater or equal to 38C (100.4F)  labetalol Injectable 10 milliGRAM(s) IV Push every 2 hours PRN SBP >165  metoprolol tartrate Injectable 5 milliGRAM(s) IV Push every 6 hours PRN see insturctions    Pertinent Labs: CBC Full  -  ( 06 Jul 2021 07:40 )  WBC Count : 9.02 K/uL  RBC Count : 4.46 M/uL  Hemoglobin : 12.8 g/dL  Hematocrit : 38.2 %  Platelet Count - Automated : 329 K/uL  Mean Cell Volume : 85.7 fl  Mean Cell Hemoglobin : 28.7 pg  Mean Cell Hemoglobin Concentration : 33.5 gm/dL  Auto Neutrophil # : x  Auto Lymphocyte # : x  Auto Monocyte # : x  Auto Eosinophil # : x  Auto Basophil # : x  Auto Neutrophil % : x  Auto Lymphocyte % : x  Auto Monocyte % : x  Auto Eosinophil % : x  Auto Basophil % : x      07-06 Na134 mmol/L<L> Glu 238 mg/dL<H> K+ 4.4 mmol/L Cr  0.49 mg/dL<L> BUN 18.6 mg/dL Phos n/a   Alb n/a   PAB n/a           Skin:     Nutrition focused physical exam conducted - found signs of malnutrition [ ]absent [x ]present    Subcutaneous fat loss: [ x] Orbital fat pads region, [ ]Buccal fat region, [ ]Triceps region,  [ ]Ribs region    Muscle wasting: [x ]Temples region, [ ]Clavicle region, [x ]Shoulder region, [ ]Scapula region, [ ]Interosseous region,  [ ]thigh region, [ ]Calf region    Estimated Needs:   [x ] no change since previous assessment  [ ] recalculated:     Current Nutrition Diagnosis: Pt remains at high nutrition risk secondary to moderate (acute) protein calorie malnutrition related to inability to meet sufficient protein energy requirements in setting of Right MCA CVA, resp failure requiring vent support as evidenced by physical signs of mild muscle mass and mild fat loss and + edema. Pt is now extubated (6/27) was receiving enteral feeds of Glucerna @ 50ml/hr.    Recommendations:   1. Continue MVI daily   2. Check weight daily to monitor trends   3. Continue with Glucerna @ 50ml/hr (x20 hrs)         Monitoring and Evaluation:   [ ] PO intake [x ] Tolerance to diet prescription [X] Weights  [X] Follow up per protocol [X] Labs:

## 2021-07-07 LAB
ANION GAP SERPL CALC-SCNC: 12 MMOL/L — SIGNIFICANT CHANGE UP (ref 5–17)
BUN SERPL-MCNC: 19.6 MG/DL — SIGNIFICANT CHANGE UP (ref 8–20)
CALCIUM SERPL-MCNC: 8.2 MG/DL — LOW (ref 8.6–10.2)
CHLORIDE SERPL-SCNC: 97 MMOL/L — LOW (ref 98–107)
CO2 SERPL-SCNC: 26 MMOL/L — SIGNIFICANT CHANGE UP (ref 22–29)
CREAT SERPL-MCNC: 0.47 MG/DL — LOW (ref 0.5–1.3)
GLUCOSE BLDC GLUCOMTR-MCNC: 169 MG/DL — HIGH (ref 70–99)
GLUCOSE BLDC GLUCOMTR-MCNC: 184 MG/DL — HIGH (ref 70–99)
GLUCOSE BLDC GLUCOMTR-MCNC: 207 MG/DL — HIGH (ref 70–99)
GLUCOSE BLDC GLUCOMTR-MCNC: 219 MG/DL — HIGH (ref 70–99)
GLUCOSE BLDC GLUCOMTR-MCNC: 228 MG/DL — HIGH (ref 70–99)
GLUCOSE SERPL-MCNC: 231 MG/DL — HIGH (ref 70–99)
HCT VFR BLD CALC: 38.6 % — SIGNIFICANT CHANGE UP (ref 34.5–45)
HGB BLD-MCNC: 12.9 G/DL — SIGNIFICANT CHANGE UP (ref 11.5–15.5)
MCHC RBC-ENTMCNC: 28.6 PG — SIGNIFICANT CHANGE UP (ref 27–34)
MCHC RBC-ENTMCNC: 33.4 GM/DL — SIGNIFICANT CHANGE UP (ref 32–36)
MCV RBC AUTO: 85.6 FL — SIGNIFICANT CHANGE UP (ref 80–100)
PLATELET # BLD AUTO: 345 K/UL — SIGNIFICANT CHANGE UP (ref 150–400)
POTASSIUM SERPL-MCNC: 4.2 MMOL/L — SIGNIFICANT CHANGE UP (ref 3.5–5.3)
POTASSIUM SERPL-SCNC: 4.2 MMOL/L — SIGNIFICANT CHANGE UP (ref 3.5–5.3)
RBC # BLD: 4.51 M/UL — SIGNIFICANT CHANGE UP (ref 3.8–5.2)
RBC # FLD: 15.3 % — HIGH (ref 10.3–14.5)
SODIUM SERPL-SCNC: 135 MMOL/L — SIGNIFICANT CHANGE UP (ref 135–145)
WBC # BLD: 9.55 K/UL — SIGNIFICANT CHANGE UP (ref 3.8–10.5)
WBC # FLD AUTO: 9.55 K/UL — SIGNIFICANT CHANGE UP (ref 3.8–10.5)

## 2021-07-07 PROCEDURE — 99233 SBSQ HOSP IP/OBS HIGH 50: CPT

## 2021-07-07 PROCEDURE — 70450 CT HEAD/BRAIN W/O DYE: CPT | Mod: 26

## 2021-07-07 PROCEDURE — 99497 ADVNCD CARE PLAN 30 MIN: CPT

## 2021-07-07 RX ORDER — PIPERACILLIN AND TAZOBACTAM 4; .5 G/20ML; G/20ML
3.38 INJECTION, POWDER, LYOPHILIZED, FOR SOLUTION INTRAVENOUS EVERY 8 HOURS
Refills: 0 | Status: DISCONTINUED | OUTPATIENT
Start: 2021-07-07 | End: 2021-07-08

## 2021-07-07 RX ORDER — PIPERACILLIN AND TAZOBACTAM 4; .5 G/20ML; G/20ML
3.38 INJECTION, POWDER, LYOPHILIZED, FOR SOLUTION INTRAVENOUS ONCE
Refills: 0 | Status: COMPLETED | OUTPATIENT
Start: 2021-07-07 | End: 2021-07-07

## 2021-07-07 RX ADMIN — Medication 4: at 06:43

## 2021-07-07 RX ADMIN — PIPERACILLIN AND TAZOBACTAM 200 GRAM(S): 4; .5 INJECTION, POWDER, LYOPHILIZED, FOR SOLUTION INTRAVENOUS at 16:29

## 2021-07-07 RX ADMIN — Medication 4: at 23:32

## 2021-07-07 RX ADMIN — PANTOPRAZOLE SODIUM 40 MILLIGRAM(S): 20 TABLET, DELAYED RELEASE ORAL at 17:11

## 2021-07-07 RX ADMIN — ATORVASTATIN CALCIUM 40 MILLIGRAM(S): 80 TABLET, FILM COATED ORAL at 22:03

## 2021-07-07 RX ADMIN — Medication 1 DROP(S): at 13:59

## 2021-07-07 RX ADMIN — PIPERACILLIN AND TAZOBACTAM 25 GRAM(S): 4; .5 INJECTION, POWDER, LYOPHILIZED, FOR SOLUTION INTRAVENOUS at 13:59

## 2021-07-07 RX ADMIN — Medication 2: at 17:18

## 2021-07-07 RX ADMIN — PIPERACILLIN AND TAZOBACTAM 25 GRAM(S): 4; .5 INJECTION, POWDER, LYOPHILIZED, FOR SOLUTION INTRAVENOUS at 06:44

## 2021-07-07 RX ADMIN — Medication 100 MILLIGRAM(S): at 06:43

## 2021-07-07 RX ADMIN — MEMANTINE HYDROCHLORIDE 10 MILLIGRAM(S): 10 TABLET ORAL at 06:45

## 2021-07-07 RX ADMIN — Medication 100 MILLIGRAM(S): at 13:59

## 2021-07-07 RX ADMIN — Medication 81 MILLIGRAM(S): at 07:36

## 2021-07-07 RX ADMIN — Medication 4: at 11:11

## 2021-07-07 RX ADMIN — Medication 500 MILLIGRAM(S): at 07:36

## 2021-07-07 RX ADMIN — Medication 50 MILLIGRAM(S): at 17:11

## 2021-07-07 RX ADMIN — POLYETHYLENE GLYCOL 3350 17 GRAM(S): 17 POWDER, FOR SOLUTION ORAL at 07:36

## 2021-07-07 RX ADMIN — CLOPIDOGREL BISULFATE 75 MILLIGRAM(S): 75 TABLET, FILM COATED ORAL at 07:36

## 2021-07-07 RX ADMIN — PANTOPRAZOLE SODIUM 40 MILLIGRAM(S): 20 TABLET, DELAYED RELEASE ORAL at 06:44

## 2021-07-07 RX ADMIN — SENNA PLUS 2 TABLET(S): 8.6 TABLET ORAL at 22:03

## 2021-07-07 RX ADMIN — Medication 4 MILLIGRAM(S): at 22:03

## 2021-07-07 RX ADMIN — Medication 50 MILLIGRAM(S): at 06:44

## 2021-07-07 RX ADMIN — Medication 5 MILLIGRAM(S): at 22:03

## 2021-07-07 RX ADMIN — ENOXAPARIN SODIUM 40 MILLIGRAM(S): 100 INJECTION SUBCUTANEOUS at 17:11

## 2021-07-07 RX ADMIN — MEMANTINE HYDROCHLORIDE 10 MILLIGRAM(S): 10 TABLET ORAL at 17:12

## 2021-07-07 RX ADMIN — Medication 1 DROP(S): at 06:43

## 2021-07-07 RX ADMIN — Medication 500 MILLIGRAM(S): at 22:19

## 2021-07-07 RX ADMIN — Medication 1 DROP(S): at 22:19

## 2021-07-07 RX ADMIN — INSULIN GLARGINE 20 UNIT(S): 100 INJECTION, SOLUTION SUBCUTANEOUS at 22:19

## 2021-07-07 RX ADMIN — PIPERACILLIN AND TAZOBACTAM 25 GRAM(S): 4; .5 INJECTION, POWDER, LYOPHILIZED, FOR SOLUTION INTRAVENOUS at 22:03

## 2021-07-07 NOTE — PROGRESS NOTE ADULT - ASSESSMENT
80 y/o F w/ PMH DM, HTN, presented to ED as Code Stroke after being found down in her front lawn by a neighbor.  Unknown down time however last reported well at 10:50 am placing her out of window for TPA.  Glucose in field reported to be 60 and pt given amp of d50 w/ improvement in glucose level.  On arrival pt presented w/ L-facial droop, L-sided weakness, dysarthria.  CTH reported acute Rt MCA infarct and CTA showed Rt M2 occlusion and CTA neck with occlusion of the Rt ICA from the proximal cervical segment through the supraclinoid segment.  Pt was subsequently admitted to the Neuro ICU and intubated for airway protection (6/21).  Pt was also deemed to not be a candidate for mechanical thrombectomy.  Pt was started on VPA for seizure ppx.  EEG was negative for seizure activity.  Repeat CTH showed evolving infarct but without hemorrhage and no neurosurgical intervention required.  Hospital course has been complicated by LLL PNA likely due to aspiration, afib rvr (treated w/ cardizem and metoprolol) and was worked up for sepsis given hypotension and febrile.  Repeat CTH on 6/24 showed mildly larger stroke w/ small amount of petechial hemorrhage  Pt was successfully extubated 6/27.  PEG placed 6/30 and noted to have a duodenal ulcer; protonix was subsequently started.  CTA abd 7/1 showed hematoma long the greater curvature of the stomach and a small volume hemoperitoneum without evidence of active bleeding.  PEG was lavaged and blood noted however has since been cleared to use PEG and start tube feeds.  During hospital course pt transfused total of 1u PRBC and 1u plts on 7/1.  Pt hemodynamically stable, resumed on ASA and plavix and will be downgraded to medicine. On 07/04 over night spiked fever, sepsis code initiated with work up suggestive RLL aspiration PNA. She was restarted on Zosyn and Vanco and ID team was reconsulted. Given her poor neurological recovering held GOC  conversation with family about GOC and introduced idea of Hospice.     #Large Rt MCA infarct w/ Rt M2 occlusion and Rt ICA occlusion complicated by enlarging of stroke w/ small petechial hemorrhage, with poor neurological recovery.  - Given her poor neurological recovering held GOC  conversation with family about GOC and intraduced idea of Hospice. We went over what it means to be on Hospice care, as well as pt's grim prognosis all in all. At the end pt gave permission to consult Hospice team.   - c/w VPA for seizure ppx  - c/w ASA and plavix (resumed today), c/w statin therapy   - Neuro checks and VS q6h for now, c/w tele  - CT head today to r/o new interval changes, upon family request who concern that pt is less responsive to them.     Episode of fever, sepsis 2/2 aspiration PNA  - c/w Zosyn for now with plan to switch to doxycyline po 100 mg bid through 7/10/21 and levaquin 750 mg daily through 7/17/21  - ID consult noted  -  Goodwin was changed on 07/04  -  blood cx sent on 07/04 neg, sputum from 07/04 with MSSA, urine with pseudamona ( pan sensitive)     Normocytic anemia, with hematoma along the greater curvature of the stomach and a small volume hemoperitoneum without evidence of active bleeding  - Monitor H/H   - Tube feeds were restarted tolerating so far  - free water 350 q4h per peg    AFib with RVR better controlled now  -  not on AC due to above  - c/w Metoprolol tatrate 50 bid for now, c/w IV prn for HR > 130    DM II, controlled  - A1c 6.9  - c/w Lantus 20 units qhs, MDSS for now, will adjust base on poc   - will change IVF of Abx to NSS    Essential HTN  - normotensive now off bp meds except BB as above    Diarrhear, most likely secondary to tube feeding, somewhat improved  - flexesis in place  - c/w add banana flakes      VTE ppx: Lovenox sq   GI ppx: protonix   Code Status: DNR/DNI, updated family over phone    Dispo: ongoing GOC of care  82 y/o F w/ PMH DM, HTN, presented to ED as Code Stroke after being found down in her front lawn by a neighbor.  Unknown down time however last reported well at 10:50 am placing her out of window for TPA.  Glucose in field reported to be 60 and pt given amp of d50 w/ improvement in glucose level.  On arrival pt presented w/ L-facial droop, L-sided weakness, dysarthria.  CTH reported acute Rt MCA infarct and CTA showed Rt M2 occlusion and CTA neck with occlusion of the Rt ICA from the proximal cervical segment through the supraclinoid segment.  Pt was subsequently admitted to the Neuro ICU and intubated for airway protection (6/21).  Pt was also deemed to not be a candidate for mechanical thrombectomy.  Pt was started on VPA for seizure ppx.  EEG was negative for seizure activity.  Repeat CTH showed evolving infarct but without hemorrhage and no neurosurgical intervention required.  Hospital course has been complicated by LLL PNA likely due to aspiration, afib rvr (treated w/ cardizem and metoprolol) and was worked up for sepsis given hypotension and febrile.  Repeat CTH on 6/24 showed mildly larger stroke w/ small amount of petechial hemorrhage  Pt was successfully extubated 6/27.  PEG placed 6/30 and noted to have a duodenal ulcer; protonix was subsequently started.  CTA abd 7/1 showed hematoma long the greater curvature of the stomach and a small volume hemoperitoneum without evidence of active bleeding.  PEG was lavaged and blood noted however has since been cleared to use PEG and start tube feeds.  During hospital course pt transfused total of 1u PRBC and 1u plts on 7/1.  Pt hemodynamically stable, resumed on ASA and plavix and will be downgraded to medicine. On 07/04 over night spiked fever, sepsis code initiated with work up suggestive RLL aspiration PNA. She was restarted on Zosyn and Vanco and ID team was reconsulted. Given her poor neurological recovering held GOC  conversation with family about GOC and introduced idea of Hospice.     #Large Rt MCA infarct w/ Rt M2 occlusion and Rt ICA occlusion complicated by enlarging of stroke w/ small petechial hemorrhage, with poor neurological recovery.  - Given her poor neurological recovering held GOC  conversation with family about GOC and intraduced idea of Hospice. We went over what it means to be on Hospice care, as well as pt's grim prognosis all in all. At the end pt gave permission to consult Hospice team. ( spent 20 min )  - c/w VPA for seizure ppx  - c/w ASA and plavix, c/w statin therapy   - Neuro checks and VS q6h for now, c/w tele  - CT head today to r/o new interval changes, upon family request who concern that pt is less responsive to them.     Episode of fever, sepsis 2/2 aspiration PNA  - c/w Zosyn for now with plan to switch to doxycyline po 100 mg bid through 7/10/21 and levaquin 750 mg daily through 7/17/21  - ID consult noted  -  Goodwin was changed on 07/04  -  blood cx sent on 07/04 neg, sputum from 07/04 with MSSA, urine with pseudamona ( pan sensitive)     Normocytic anemia, with hematoma along the greater curvature of the stomach and a small volume hemoperitoneum without evidence of active bleeding  - Monitor H/H   - Tube feeds were restarted tolerating so far  - free water 350 q4h per peg    AFib with RVR better controlled now  -  not on AC due to above  - c/w Metoprolol tatrate 50 bid for now, c/w IV prn for HR > 130    DM II, controlled  - A1c 6.9  - c/w Lantus 20 units qhs, MDSS for now, will adjust base on poc   - will change IVF of Abx to NSS    Essential HTN  - normotensive now off bp meds except BB as above    Diarrhear, most likely secondary to tube feeding, somewhat improved  - flexesis in place  - c/w add banana flakes      VTE ppx: Lovenox sq   GI ppx: protonix   Code Status: DNR/DNI, updated family over phone    Dispo: ongoing GOC of care

## 2021-07-07 NOTE — PROGRESS NOTE ADULT - REASON FOR ADMISSION
Right MCA CVA

## 2021-07-07 NOTE — PROGRESS NOTE ADULT - PROVIDER SPECIALTY LIST ADULT
ENT
NSICU
Palliative Care
Rehab Medicine
Neurology
Palliative Care
Surgery
Hospitalist
Infectious Disease
Infectious Disease
NSICU
Neurology
Neurology
Orthopedics
Palliative Care
Hospitalist
NSICU
Neurology
Neurosurgery
Hospitalist
NSICU
Neurology
Palliative Care
Palliative Care
Hospitalist
NSICU
Neurosurgery
NSICU

## 2021-07-07 NOTE — PROGRESS NOTE ADULT - SUBJECTIVE AND OBJECTIVE BOX
[facility] Division of Hospital Medicine    Chief Complaint:  CVA    SUBJECTIVE: not verbal     OVERNIGHT EVENTS: none reported     ROS IS NOT AVAILABLE DUE TO PT MENTATION    MEDICATIONS  (STANDING):  amantadine Syrup 100 milliGRAM(s) Oral <User Schedule>  artificial tears (preservative free) Ophthalmic Solution 1 Drop(s) Both EYES three times a day  aspirin  chewable 81 milliGRAM(s) Oral daily  atorvastatin 40 milliGRAM(s) Oral at bedtime  clopidogrel Tablet 75 milliGRAM(s) Oral daily  dextrose 40% Gel 15 Gram(s) Oral once  dextrose 5%. 1000 milliLiter(s) (50 mL/Hr) IV Continuous <Continuous>  dextrose 5%. 1000 milliLiter(s) (100 mL/Hr) IV Continuous <Continuous>  dextrose 50% Injectable 25 Gram(s) IV Push once  dextrose 50% Injectable 12.5 Gram(s) IV Push once  dextrose 50% Injectable 25 Gram(s) IV Push once  doxazosin 4 milliGRAM(s) Oral at bedtime  enoxaparin Injectable 40 milliGRAM(s) SubCutaneous <User Schedule>  glucagon  Injectable 1 milliGRAM(s) IntraMuscular once  insulin glargine Injectable (LANTUS) 20 Unit(s) SubCutaneous at bedtime  insulin lispro (ADMELOG) corrective regimen sliding scale   SubCutaneous every 6 hours  melatonin 5 milliGRAM(s) Oral at bedtime  memantine 10 milliGRAM(s) Oral two times a day  metoprolol tartrate 50 milliGRAM(s) Oral every 12 hours  pantoprazole  Injectable 40 milliGRAM(s) IV Push every 12 hours  piperacillin/tazobactam IVPB.. 3.375 Gram(s) IV Intermittent every 8 hours  polyethylene glycol 3350 17 Gram(s) Oral daily  senna 2 Tablet(s) Oral at bedtime  valproic  acid Syrup 500 milliGRAM(s) Oral every 12 hours    MEDICATIONS  (PRN):  acetaminophen   Tablet .. 650 milliGRAM(s) Oral every 6 hours PRN Temp greater or equal to 38C (100.4F)  labetalol Injectable 10 milliGRAM(s) IV Push every 2 hours PRN SBP >165  metoprolol tartrate Injectable 5 milliGRAM(s) IV Push every 6 hours PRN see insturctions      I&O's Summary    04 Jul 2021 07:01  -  05 Jul 2021 07:00  --------------------------------------------------------  IN: 3205 mL / OUT: 4300 mL / NET: -1095 mL        PHYSICAL EXAM:  Vital Signs Last 24 Hrs  T(C): 37.1 (07 Jul 2021 09:30), Max: 37.6 (06 Jul 2021 17:25)  T(F): 98.8 (07 Jul 2021 09:30), Max: 99.6 (06 Jul 2021 17:25)  HR: 114 (07 Jul 2021 09:30) (93 - 146)  BP: 125/87 (07 Jul 2021 09:30) (105/74 - 141/73)  BP(mean): --  RR: 18 (07 Jul 2021 09:30) (16 - 18)  SpO2: 94% (07 Jul 2021 09:30) (94% - 97%)        CONSTITUTIONAL: NAD  ENMT: Moist oral mucosa, no pharyngeal injection or exudates; normal dentition; No JVD  RESPIRATORY: poor respiratory effort; diminished on bases with central airways secrtion, lungs are clear to auscultation bilaterally  CARDIOVASCULAR: Regular rate and rhythm, normal S1 and S2, no murmur/rub/gallop; No lower extremity edema; Peripheral pulses are 2+ bilaterally  ABDOMEN: Nontender to palpation, normoactive bowel sounds, no rebound/guarding; No hepatosplenomegaly, LUQ PEG with dry blood around it  MUSCLOSKELETAL:  no clubbing or cyanosis of digits; no joint swelling or tenderness to palpation  PSYCH: can not assess due to current state of mentation  NEUROLOGY: R facial droop, with LUE paresis, localizing pain in all 4 ext except RUE; was observed moving all 3  ext except LUE on pain.    SKIN: No rashes; no palpable lesions      LABS:                        12.9   9.55  )-----------( 345      ( 07 Jul 2021 07:27 )             38.6     07-07    135  |  97<L>  |  19.6  ----------------------------<  231<H>  4.2   |  26.0  |  0.47<L>    Ca    8.2<L>      07 Jul 2021 07:27                Culture - Sputum (collected 05 Jul 2021 02:30)  Source: .Sputum Sputum  Gram Stain (05 Jul 2021 07:40):    Few polymorphonuclear leukocytes per low power field    Rare Squamous epithelial cells per low power field    Few Gram Negative Rods seen per oil power field    Rare Gram positive cocci in pairs seen per oil power field  Final Report (06 Jul 2021 16:56):    Normal Respiratory Miladys present      CAPILLARY BLOOD GLUCOSE      POCT Blood Glucose.: 228 mg/dL (07 Jul 2021 11:10)  POCT Blood Glucose.: 219 mg/dL (07 Jul 2021 06:24)  POCT Blood Glucose.: 191 mg/dL (06 Jul 2021 22:37)  POCT Blood Glucose.: 247 mg/dL (06 Jul 2021 19:04)        RADIOLOGY & ADDITIONAL TESTS:  Results Reviewed:   Imaging Personally Reviewed:  Electrocardiogram Personally Reviewed: 100

## 2021-07-07 NOTE — PROGRESS NOTE ADULT - NSICDXPILOT_GEN_ALL_CORE
[place]

## 2021-07-08 ENCOUNTER — TRANSCRIPTION ENCOUNTER (OUTPATIENT)
Age: 81
End: 2021-07-08

## 2021-07-08 VITALS
DIASTOLIC BLOOD PRESSURE: 82 MMHG | TEMPERATURE: 99 F | RESPIRATION RATE: 18 BRPM | OXYGEN SATURATION: 94 % | SYSTOLIC BLOOD PRESSURE: 127 MMHG | HEART RATE: 120 BPM

## 2021-07-08 LAB
GLUCOSE BLDC GLUCOMTR-MCNC: 179 MG/DL — HIGH (ref 70–99)
GLUCOSE BLDC GLUCOMTR-MCNC: 206 MG/DL — HIGH (ref 70–99)
GLUCOSE BLDC GLUCOMTR-MCNC: 217 MG/DL — HIGH (ref 70–99)
SARS-COV-2 RNA SPEC QL NAA+PROBE: SIGNIFICANT CHANGE UP

## 2021-07-08 PROCEDURE — 83036 HEMOGLOBIN GLYCOSYLATED A1C: CPT

## 2021-07-08 PROCEDURE — 84443 ASSAY THYROID STIM HORMONE: CPT

## 2021-07-08 PROCEDURE — U0005: CPT

## 2021-07-08 PROCEDURE — 80061 LIPID PANEL: CPT

## 2021-07-08 PROCEDURE — 82435 ASSAY OF BLOOD CHLORIDE: CPT

## 2021-07-08 PROCEDURE — 94003 VENT MGMT INPAT SUBQ DAY: CPT

## 2021-07-08 PROCEDURE — 36415 COLL VENOUS BLD VENIPUNCTURE: CPT

## 2021-07-08 PROCEDURE — 87186 SC STD MICRODIL/AGAR DIL: CPT

## 2021-07-08 PROCEDURE — 83605 ASSAY OF LACTIC ACID: CPT

## 2021-07-08 PROCEDURE — 87086 URINE CULTURE/COLONY COUNT: CPT

## 2021-07-08 PROCEDURE — 71045 X-RAY EXAM CHEST 1 VIEW: CPT

## 2021-07-08 PROCEDURE — 84145 PROCALCITONIN (PCT): CPT

## 2021-07-08 PROCEDURE — 85027 COMPLETE CBC AUTOMATED: CPT

## 2021-07-08 PROCEDURE — 86769 SARS-COV-2 COVID-19 ANTIBODY: CPT

## 2021-07-08 PROCEDURE — 84478 ASSAY OF TRIGLYCERIDES: CPT

## 2021-07-08 PROCEDURE — 86850 RBC ANTIBODY SCREEN: CPT

## 2021-07-08 PROCEDURE — P9016: CPT

## 2021-07-08 PROCEDURE — 36430 TRANSFUSION BLD/BLD COMPNT: CPT

## 2021-07-08 PROCEDURE — 87070 CULTURE OTHR SPECIMN AEROBIC: CPT

## 2021-07-08 PROCEDURE — 80307 DRUG TEST PRSMV CHEM ANLYZR: CPT

## 2021-07-08 PROCEDURE — 97167 OT EVAL HIGH COMPLEX 60 MIN: CPT

## 2021-07-08 PROCEDURE — 87635 SARS-COV-2 COVID-19 AMP PRB: CPT

## 2021-07-08 PROCEDURE — 82947 ASSAY GLUCOSE BLOOD QUANT: CPT

## 2021-07-08 PROCEDURE — 85025 COMPLETE CBC W/AUTO DIFF WBC: CPT

## 2021-07-08 PROCEDURE — 85018 HEMOGLOBIN: CPT

## 2021-07-08 PROCEDURE — 97163 PT EVAL HIGH COMPLEX 45 MIN: CPT

## 2021-07-08 PROCEDURE — 99239 HOSP IP/OBS DSCHRG MGMT >30: CPT

## 2021-07-08 PROCEDURE — 80053 COMPREHEN METABOLIC PANEL: CPT

## 2021-07-08 PROCEDURE — 80164 ASSAY DIPROPYLACETIC ACD TOT: CPT

## 2021-07-08 PROCEDURE — 80202 ASSAY OF VANCOMYCIN: CPT

## 2021-07-08 PROCEDURE — 87077 CULTURE AEROBIC IDENTIFY: CPT

## 2021-07-08 PROCEDURE — U0003: CPT

## 2021-07-08 PROCEDURE — 94002 VENT MGMT INPAT INIT DAY: CPT

## 2021-07-08 PROCEDURE — 86901 BLOOD TYPING SEROLOGIC RH(D): CPT

## 2021-07-08 PROCEDURE — 85014 HEMATOCRIT: CPT

## 2021-07-08 PROCEDURE — 83735 ASSAY OF MAGNESIUM: CPT

## 2021-07-08 PROCEDURE — 82803 BLOOD GASES ANY COMBINATION: CPT

## 2021-07-08 PROCEDURE — 81001 URINALYSIS AUTO W/SCOPE: CPT

## 2021-07-08 PROCEDURE — 95700 EEG CONT REC W/VID EEG TECH: CPT

## 2021-07-08 PROCEDURE — 74178 CT ABD&PLV WO CNTR FLWD CNTR: CPT

## 2021-07-08 PROCEDURE — C8929: CPT

## 2021-07-08 PROCEDURE — 86923 COMPATIBILITY TEST ELECTRIC: CPT

## 2021-07-08 PROCEDURE — L8699: CPT

## 2021-07-08 PROCEDURE — 93005 ELECTROCARDIOGRAM TRACING: CPT

## 2021-07-08 PROCEDURE — 82040 ASSAY OF SERUM ALBUMIN: CPT

## 2021-07-08 PROCEDURE — P9100: CPT

## 2021-07-08 PROCEDURE — 99285 EMERGENCY DEPT VISIT HI MDM: CPT

## 2021-07-08 PROCEDURE — 84295 ASSAY OF SERUM SODIUM: CPT

## 2021-07-08 PROCEDURE — 87040 BLOOD CULTURE FOR BACTERIA: CPT

## 2021-07-08 PROCEDURE — 70450 CT HEAD/BRAIN W/O DYE: CPT

## 2021-07-08 PROCEDURE — 82330 ASSAY OF CALCIUM: CPT

## 2021-07-08 PROCEDURE — 86900 BLOOD TYPING SEROLOGIC ABO: CPT

## 2021-07-08 PROCEDURE — 85610 PROTHROMBIN TIME: CPT

## 2021-07-08 PROCEDURE — P9037: CPT

## 2021-07-08 PROCEDURE — 80048 BASIC METABOLIC PNL TOTAL CA: CPT

## 2021-07-08 PROCEDURE — 84100 ASSAY OF PHOSPHORUS: CPT

## 2021-07-08 PROCEDURE — 84484 ASSAY OF TROPONIN QUANT: CPT

## 2021-07-08 PROCEDURE — 84132 ASSAY OF SERUM POTASSIUM: CPT

## 2021-07-08 PROCEDURE — 95714 VEEG EA 12-26 HR UNMNTR: CPT

## 2021-07-08 PROCEDURE — 82962 GLUCOSE BLOOD TEST: CPT

## 2021-07-08 PROCEDURE — 85730 THROMBOPLASTIN TIME PARTIAL: CPT

## 2021-07-08 RX ORDER — ENOXAPARIN SODIUM 100 MG/ML
15 INJECTION SUBCUTANEOUS
Qty: 150 | Refills: 0
Start: 2021-07-08 | End: 2021-07-17

## 2021-07-08 RX ORDER — ASPIRIN/CALCIUM CARB/MAGNESIUM 324 MG
1 TABLET ORAL
Qty: 10 | Refills: 0
Start: 2021-07-08 | End: 2021-07-17

## 2021-07-08 RX ORDER — METOPROLOL TARTRATE 50 MG
1 TABLET ORAL
Qty: 60 | Refills: 0
Start: 2021-07-08 | End: 2021-08-06

## 2021-07-08 RX ORDER — CIPROFLOXACIN LACTATE 400MG/40ML
1 VIAL (ML) INTRAVENOUS
Qty: 11 | Refills: 0
Start: 2021-07-08 | End: 2021-07-18

## 2021-07-08 RX ORDER — METFORMIN HYDROCHLORIDE 850 MG/1
2 TABLET ORAL
Qty: 40 | Refills: 0
Start: 2021-07-08 | End: 2021-07-17

## 2021-07-08 RX ORDER — CLOPIDOGREL BISULFATE 75 MG/1
1 TABLET, FILM COATED ORAL
Qty: 10 | Refills: 0
Start: 2021-07-08 | End: 2021-07-17

## 2021-07-08 RX ORDER — DOXAZOSIN MESYLATE 4 MG
1 TABLET ORAL
Qty: 10 | Refills: 0
Start: 2021-07-08 | End: 2021-07-17

## 2021-07-08 RX ORDER — VALPROIC ACID (AS SODIUM SALT) 250 MG/5ML
10 SOLUTION, ORAL ORAL
Qty: 200 | Refills: 0
Start: 2021-07-08 | End: 2021-07-17

## 2021-07-08 RX ORDER — ATORVASTATIN CALCIUM 80 MG/1
1 TABLET, FILM COATED ORAL
Qty: 10 | Refills: 0
Start: 2021-07-08 | End: 2021-07-17

## 2021-07-08 RX ADMIN — Medication 4: at 17:30

## 2021-07-08 RX ADMIN — Medication 1 DROP(S): at 06:37

## 2021-07-08 RX ADMIN — Medication 4: at 12:33

## 2021-07-08 RX ADMIN — Medication 81 MILLIGRAM(S): at 12:35

## 2021-07-08 RX ADMIN — POLYETHYLENE GLYCOL 3350 17 GRAM(S): 17 POWDER, FOR SOLUTION ORAL at 12:35

## 2021-07-08 RX ADMIN — ENOXAPARIN SODIUM 40 MILLIGRAM(S): 100 INJECTION SUBCUTANEOUS at 17:32

## 2021-07-08 RX ADMIN — PANTOPRAZOLE SODIUM 40 MILLIGRAM(S): 20 TABLET, DELAYED RELEASE ORAL at 17:32

## 2021-07-08 RX ADMIN — CLOPIDOGREL BISULFATE 75 MILLIGRAM(S): 75 TABLET, FILM COATED ORAL at 12:36

## 2021-07-08 RX ADMIN — Medication 500 MILLIGRAM(S): at 09:18

## 2021-07-08 RX ADMIN — PANTOPRAZOLE SODIUM 40 MILLIGRAM(S): 20 TABLET, DELAYED RELEASE ORAL at 06:38

## 2021-07-08 RX ADMIN — Medication 50 MILLIGRAM(S): at 06:38

## 2021-07-08 RX ADMIN — Medication 50 MILLIGRAM(S): at 17:32

## 2021-07-08 RX ADMIN — PIPERACILLIN AND TAZOBACTAM 25 GRAM(S): 4; .5 INJECTION, POWDER, LYOPHILIZED, FOR SOLUTION INTRAVENOUS at 06:38

## 2021-07-08 RX ADMIN — Medication 1 DROP(S): at 14:59

## 2021-07-08 RX ADMIN — MEMANTINE HYDROCHLORIDE 10 MILLIGRAM(S): 10 TABLET ORAL at 06:38

## 2021-07-08 RX ADMIN — Medication 100 MILLIGRAM(S): at 14:59

## 2021-07-08 RX ADMIN — MEMANTINE HYDROCHLORIDE 10 MILLIGRAM(S): 10 TABLET ORAL at 17:32

## 2021-07-08 RX ADMIN — Medication 100 MILLIGRAM(S): at 06:37

## 2021-07-08 RX ADMIN — Medication 2: at 06:40

## 2021-07-08 NOTE — DISCHARGE NOTE PROVIDER - DETAILS OF MALNUTRITION DIAGNOSIS/DIAGNOSES
This patient has been assessed with a concern for Malnutrition and was treated during this hospitalization for the following Nutrition diagnosis/diagnoses:     -  06/24/2021: Moderate protein-calorie malnutrition

## 2021-07-08 NOTE — DISCHARGE NOTE PROVIDER - HOSPITAL COURSE
82 y/o F w/ PMH DM, HTN, presented to ED as Code Stroke after being found down in her front lawn by a neighbor.  Unknown down time however last reported well at 10:50 am placing her out of window for TPA.  Glucose in field reported to be 60 and pt given amp of d50 w/ improvement in glucose level.  On arrival pt presented w/ L-facial droop, L-sided weakness, dysarthria.  CTH reported acute Rt MCA infarct and CTA showed Rt M2 occlusion and CTA neck with occlusion of the Rt ICA from the proximal cervical segment through the supraclinoid segment.  Pt was subsequently admitted to the Neuro ICU and intubated for airway protection (6/21).  Pt was also deemed to not be a candidate for mechanical thrombectomy.  Pt was started on VPA for seizure ppx.  EEG was negative for seizure activity.  Repeat CTH showed evolving infarct but without hemorrhage and no neurosurgical intervention required.  Hospital course has been complicated by LLL PNA likely due to aspiration, afib rvr (treated w/ cardizem and metoprolol) and was worked up for sepsis given hypotension and febrile.  Repeat CTH on 6/24 showed mildly larger stroke w/ small amount of petechial hemorrhage  Pt was successfully extubated 6/27.  PEG placed 6/30 and noted to have a duodenal ulcer; protonix was subsequently started.  CTA abd 7/1 showed hematoma long the greater curvature of the stomach and a small volume hemoperitoneum without evidence of active bleeding.  PEG was lavaged and blood noted however has since been cleared to use PEG and start tube feeds.  During hospital course pt transfused total of 1u PRBC and 1u plts on 7/1.  Pt hemodynamically stable, resumed on ASA and plavix and will be downgraded to medicine. On 07/04 over night spiked fever, sepsis code initiated with work up suggestive RLL aspiration PNA. She was restarted on Zosyn and Vanco and ID team was reconsulted. Given her poor neurological recovering held GOC  conversation with family about GOC and introduced idea of Hospice. Patient was transition uder hospice care and discharge home in stable condition.         #Large Rt MCA infarct w/ Rt M2 occlusion and Rt ICA occlusion complicated by enlarging of stroke w/ small petechial hemorrhage, with poor neurological recovery, s/p PEG tube- c/w VPA for seizure ppx, c/w ASA and plavix, c/w statin on discharge. c/w tube feeding at home with Glucerna 1.5 @ 50 ml/hr 1200 ml/day via peg tube and free water flashes 450 cc every 8 hr via peg tube.     Episode of fever, sepsis 2/2 aspiration PNA, on discharge she was switchted to  doxycyline po 100 mg bid through 7/10/21 and levaquin 750 mg daily through 7/17/21  -  blood cx sent on 07/04 neg, sputum from 07/04 with MSSA, urine with pseudamona ( pan sensitive)     Normocytic anemia, with hematoma along the greater curvature of the stomach and a small volume hemoperitoneum without evidence of active bleeding with stabe h/h on discharge   AFib with RVR better controlled now- on  Metoprolol tatrate 50 bid for now, no AC.     DM II, controlled- will be d/c on Lantus 15 units qhs and mentformin  Essential HTN - well controlled on metoprolol     Vital Signs Last 24 Hrs  T(C): 37.2 (08 Jul 2021 10:20), Max: 37.5 (07 Jul 2021 16:20)  T(F): 98.9 (08 Jul 2021 10:20), Max: 99.5 (07 Jul 2021 16:20)  HR: 96 (08 Jul 2021 10:20) (79 - 108)  BP: 111/76 (08 Jul 2021 10:20) (111/76 - 134/81)  BP(mean): --  RR: 18 (08 Jul 2021 10:20) (18 - 18)  SpO2: 96% (08 Jul 2021 10:20) (95% - 98%)      CONSTITUTIONAL: NAD  ENMT: Moist oral mucosa, no pharyngeal injection or exudates; normal dentition; No JVD  RESPIRATORY: poor respiratory effort; diminished on bases with central airways secrtion, lungs are clear to auscultation bilaterally  CARDIOVASCULAR: Regular rate and rhythm, normal S1 and S2, no murmur/rub/gallop; No lower extremity edema; Peripheral pulses are 2+ bilaterally  ABDOMEN: Nontender to palpation, normoactive bowel sounds, no rebound/guarding; No hepatosplenomegaly, LUQ PEG with dry blood around it  MUSCLOSKELETAL:  no clubbing or cyanosis of digits; no joint swelling or tenderness to palpation  PSYCH: can not assess due to current state of mentation  NEUROLOGY: R facial droop, with LUE paresis, localizing pain in all 4 ext except RUE; was observed moving all 3  ext except LUE on pain.    SKIN: No rashes; no palpable lesions    spent 45 min to coordinate discharge

## 2021-07-08 NOTE — DISCHARGE NOTE PROVIDER - NSDCCPCAREPLAN_GEN_ALL_CORE_FT
PRINCIPAL DISCHARGE DIAGNOSIS  Diagnosis: CVA (cerebral vascular accident)  Assessment and Plan of Treatment: - follow up with Hospice team

## 2021-07-08 NOTE — DISCHARGE NOTE PROVIDER - NSDCMRMEDTOKEN_GEN_ALL_CORE_FT
aspirin 81 mg oral tablet, chewable: 1 tab(s) orally once a day  atorvastatin 40 mg oral tablet: 1 tab(s) orally once a day (at bedtime)  Avidoxy 100 mg oral tablet: 1 tab(s) orally 2 times a day, stop on 07/10/21  clopidogrel 75 mg oral tablet: 1 tab(s) orally once a day, stop on Sep 17 2021.  Depakene 250 mg/5 mL oral liquid: 10 milliliter(s) by gastrostomy tube 2 times a day   doxazosin 4 mg oral tablet: 1 tab(s) orally once a day (at bedtime)  Glucerna 1.5 via G-tube at rate 50 ml/hr for 24 hr, total volume 1200 ml/day:   Lantus Solostar Pen 100 units/mL subcutaneous solution: 15 unit(s) subcutaneous once a day (at bedtime)   levoFLOXacin 750 mg oral tablet: 1 tab(s) orally once a day, stop on 7/17/21  metFORMIN 500 mg oral tablet: 2 tab(s) orally 2 times a day  metoprolol tartrate 50 mg oral tablet: 1 tab(s) orally every 12 hours

## 2021-07-08 NOTE — HOSPICE CARE NOTE - CONVESATION DETAILS
[facility] RN spoke with patient's daughter, Tammy Amador ([phone]) and Tammy's sister and brother via speaker phone. Hospice consent forms reviewed and signed.     Tammy later phoned this writer to advise that DME was delivered.     Writer collaborated with N dietician, who advised that Glucerna 1.5 and Kangaroo Pump will be delivered to patient's home between 5-9 p.m. this evening.     Collaborated with Dr. Duarte and with RN Case Mggilmar HUERTA regarding the above.    Emailed [facility] Clerical office to request 4pm ambulance  - family aware. Will continue to follow until d/c home. 
Home hospice referral received. Writer[facility] RN telephoned patient's daughter, Tammy Amador ([phone]). All aspects of home hospice services explained with good return understanding. All questions answered; emotional support provided. Daughter Tammy agreed to home hospice on patient's behalf. This writer emailed anila Munoz hospice consent forms and plan is to review them together at 10:00 a.m. on Thursday, 7/8.     Updated Dr. Duarte, Dr. Swathi Nguyen and Care Mgr Keke Lozano as to the above. Will continue to follow.

## 2021-07-08 NOTE — HOSPICE CARE NOTE - DME DETAILS
Hospital bed with air mattress, prn oxygen and incontinence supplies. Kangaroo Pump and PEG tube feed supplies to be delivered this evening. 
hospital bed with air mattress, prn oxygen and incontinence supplies.

## 2021-07-09 ENCOUNTER — NON-APPOINTMENT (OUTPATIENT)
Age: 81
End: 2021-07-09

## 2021-07-09 LAB
CULTURE RESULTS: SIGNIFICANT CHANGE UP
CULTURE RESULTS: SIGNIFICANT CHANGE UP
SPECIMEN SOURCE: SIGNIFICANT CHANGE UP
SPECIMEN SOURCE: SIGNIFICANT CHANGE UP

## 2021-07-13 RX ORDER — METFORMIN HYDROCHLORIDE 850 MG/1
2 TABLET ORAL
Qty: 0 | Refills: 0 | DISCHARGE

## 2021-07-13 RX ORDER — ENOXAPARIN SODIUM 100 MG/ML
25 INJECTION SUBCUTANEOUS
Qty: 0 | Refills: 0 | DISCHARGE

## 2021-07-28 ENCOUNTER — RX RENEWAL (OUTPATIENT)
Age: 81
End: 2021-07-28

## 2022-02-28 NOTE — PHYSICAL EXAM
Samples of Besivance given.  Use TID OD. Use regina QHS. [Alert] : alert [Well Nourished] : well nourished [No Acute Distress] : no acute distress [Well Developed] : well developed [Normal Sclera/Conjunctiva] : normal sclera/conjunctiva [EOMI] : extra ocular movement intact [No Proptosis] : no proptosis [Normal Oropharynx] : the oropharynx was normal [Thyroid Not Enlarged] : the thyroid was not enlarged [No Thyroid Nodules] : no palpable thyroid nodules [No Respiratory Distress] : no respiratory distress [No Accessory Muscle Use] : no accessory muscle use [Clear to Auscultation] : lungs were clear to auscultation bilaterally [Normal S1, S2] : normal S1 and S2 [Normal Rate] : heart rate was normal [Regular Rhythm] : with a regular rhythm [No Edema] : no peripheral edema [Normal Anterior Cervical Nodes] : no anterior cervical lymphadenopathy [Normal Posterior Cervical Nodes] : no posterior cervical lymphadenopathy [No Stigmata of Cushings Syndrome] : no stigmata of Cushings Syndrome [Normal Gait] : normal gait [No Tremors] : no tremors [Oriented x3] : oriented to person, place, and time [Normal Affect] : the affect was normal [Normal Mood] : the mood was normal [Acanthosis Nigricans] : no acanthosis nigricans

## 2022-04-21 NOTE — ED ADULT NURSE NOTE - NSSUSCREENINGQ2_ED_ALL_ED
Called mother as [employer] message has not been read.    Referred her to my [employer] message. Pt is sleeping and really struggling over the last week w n/v and HA. Encouraged the ER for fluids and medications and to call back and schedule a visit w Dr Brooks within a week of the ER visit to see how things are going.    Mother thought that the ER would be their next step.  Appreciative of call and no further questions.    Nathalie Lugo RN on 4/21/2022 at 11:30 AM     No

## 2022-06-01 NOTE — PHYSICAL THERAPY INITIAL EVALUATION ADULT - BALANCE DISTURBANCE, IDENTIFIED IMPAIRMENT CONTRIBUTE, REHAB EVAL
See note.
impaired coordination/impaired postural control/decreased strength
impaired coordination/impaired motor control/abnormal muscle tone/impaired postural control/decreased strength

## 2022-10-07 NOTE — OCCUPATIONAL THERAPY INITIAL EVALUATION ADULT - LOWER BODY DRESSING, PREVIOUS LEVEL OF FUNCTION, OT EVAL
She needs to be seen in office to evaluate her symptoms , If her symptoms are bad she can go to ER to get urgent help otherwise she can see pcp or me next week . You can refer her to urgent care if need to be seen today . Thanks    
independent
unknown

## 2023-05-19 NOTE — ED ADULT TRIAGE NOTE - MODE OF ARRIVAL
"Assessment/Plan: Anticipatory guidance provided  Recommend consideration for yearly blood testing  From an regular follow-up with gynecology  1  Well adult exam          Subjective:      Patient ID: Sanjuanita Berrios is a 41 y o  female  Patient is seen today for well check  She is generally feeling well  No concerns or complaints today  The following portions of the patient's history were reviewed and updated as appropriate: allergies, current medications, past family history, past medical history, past social history, past surgical history, and problem list     Review of Systems   Constitutional: Negative  HENT: Negative  Eyes: Negative  Respiratory: Negative  Cardiovascular: Negative  Gastrointestinal: Negative  Endocrine: Negative  Genitourinary: Negative  Musculoskeletal: Negative  Skin: Negative  Allergic/Immunologic: Negative  Neurological: Negative  Hematological: Negative  Psychiatric/Behavioral: Negative  Objective:      /61 (BP Location: Left arm, Patient Position: Sitting, Cuff Size: Standard)   Pulse 83   Temp 98 2 °F (36 8 °C) (Tympanic)   Ht 4' 10\" (1 473 m)   Wt 56 2 kg (124 lb)   SpO2 98%   BMI 25 92 kg/m²          Physical Exam  Vitals reviewed  Constitutional:       Appearance: She is well-developed  HENT:      Head: Normocephalic and atraumatic  Right Ear: External ear normal  Tympanic membrane is not erythematous or bulging  Left Ear: External ear normal  Tympanic membrane is not erythematous or bulging  Nose: Nose normal       Mouth/Throat:      Mouth: No oral lesions  Pharynx: No oropharyngeal exudate  Eyes:      General: No scleral icterus  Right eye: No discharge  Left eye: No discharge  Conjunctiva/sclera: Conjunctivae normal    Neck:      Thyroid: No thyromegaly  Cardiovascular:      Rate and Rhythm: Normal rate and regular rhythm        Heart sounds: " Normal heart sounds  No murmur heard  No friction rub  No gallop  Pulmonary:      Effort: Pulmonary effort is normal  No respiratory distress  Breath sounds: No wheezing or rales  Chest:      Chest wall: No tenderness  Abdominal:      General: Bowel sounds are normal  There is no distension  Palpations: Abdomen is soft  There is no mass  Tenderness: There is no abdominal tenderness  There is no guarding or rebound  Musculoskeletal:         General: No tenderness or deformity  Normal range of motion  Cervical back: Normal range of motion and neck supple  Lymphadenopathy:      Cervical: No cervical adenopathy  Skin:     General: Skin is warm and dry  Coloration: Skin is not pale  Findings: No erythema or rash  Neurological:      Mental Status: She is alert and oriented to person, place, and time  Cranial Nerves: No cranial nerve deficit  Motor: No abnormal muscle tone  Coordination: Coordination normal       Deep Tendon Reflexes: Reflexes are normal and symmetric     Psychiatric:         Behavior: Behavior normal  Walk in

## 2023-05-22 NOTE — ED ADULT NURSE NOTE - OBJECTIVE STATEMENT
Assumed care of pt at 15:24 in [facility]. Pt A&Ox2, c/o slurred speech and left sided facial droop. As per family at bedside, pt was found outside, found by lady walking dog. Last known well time as per family 11:15a. Upon arrival to ED, priority CT called due to pts unknown downtime at arrival, Code stroke called upon confirmation of last known well. Pt noted to have severe left sided facial droop and left sided paralysis to both extremities. Pt bought to [facility] area of ED for further evaluation, MD Kin at bedside. Hospital  [facility] used. Dentures removed and given to son at bedside. PMH: DM, HTN. [No Acute Distress] : no acute distress [No Respiratory Distress] : no respiratory distress  [No Accessory Muscle Use] : no accessory muscle use [Clear to Auscultation] : lungs were clear to auscultation bilaterally [Normal Rate] : normal rate  [Regular Rhythm] : with a regular rhythm [No Focal Deficits] : no focal deficits [Normal Affect] : the affect was normal [No Edema] : there was no peripheral edema [de-identified] : Chronically ill-appearing, frail [de-identified] : Ambulates slowly with a walker Assumed care of pt at 15:24 in [facility]. Pt A&Ox2, c/o slurred speech and left sided facial droop. As per family at bedside, pt was found outside, found by lady walking dog. Last known well time as per family 11:15a. Upon arrival to ED, priority CT called due to pts unknown downtime at arrival, Code stroke called by MD Sharma after CT head noncon completed due to information that son was on his way. Last known well time confirmed at sons arrival to ED. Pt noted to have severe left sided facial droop and left sided paralysis to both extremities. Pt bought to [facility] area of ED for further evaluation, MD Clayton at bedside. Hospital  [facility] used. Dentures removed and given to son at bedside. PMH: DM, HTN.

## 2023-11-03 NOTE — PROGRESS NOTE ADULT - CRITICAL CARE SERVICES PROVIDED
Critical care services provided
Statement Selected